# Patient Record
Sex: FEMALE | Race: ASIAN | NOT HISPANIC OR LATINO | URBAN - METROPOLITAN AREA
[De-identification: names, ages, dates, MRNs, and addresses within clinical notes are randomized per-mention and may not be internally consistent; named-entity substitution may affect disease eponyms.]

---

## 2017-01-25 ENCOUNTER — INPATIENT (INPATIENT)
Facility: HOSPITAL | Age: 67
LOS: 8 days | Discharge: HOME | End: 2017-02-03
Payer: COMMERCIAL

## 2017-01-25 VITALS
HEART RATE: 113 BPM | DIASTOLIC BLOOD PRESSURE: 88 MMHG | TEMPERATURE: 98 F | SYSTOLIC BLOOD PRESSURE: 150 MMHG | OXYGEN SATURATION: 100 % | RESPIRATION RATE: 18 BRPM

## 2017-01-25 DIAGNOSIS — D64.9 ANEMIA, UNSPECIFIED: ICD-10-CM

## 2017-01-25 LAB
ALBUMIN SERPL ELPH-MCNC: 3.6 G/DL — SIGNIFICANT CHANGE UP (ref 3.3–5)
ALP SERPL-CCNC: 84 U/L — SIGNIFICANT CHANGE UP (ref 40–120)
ALT FLD-CCNC: 18 U/L — SIGNIFICANT CHANGE UP (ref 4–33)
APTT BLD: 31.7 SEC — SIGNIFICANT CHANGE UP (ref 27.5–37.4)
AST SERPL-CCNC: 21 U/L — SIGNIFICANT CHANGE UP (ref 4–32)
BASOPHILS # BLD AUTO: 0.07 K/UL — SIGNIFICANT CHANGE UP (ref 0–0.2)
BASOPHILS NFR BLD AUTO: 0.5 % — SIGNIFICANT CHANGE UP (ref 0–2)
BASOPHILS NFR SPEC: 0 % — SIGNIFICANT CHANGE UP (ref 0–2)
BILIRUB SERPL-MCNC: < 0.2 MG/DL — LOW (ref 0.2–1.2)
BLASTS # FLD: 0 % — SIGNIFICANT CHANGE UP (ref 0–0)
BLD GP AB SCN SERPL QL: NEGATIVE — SIGNIFICANT CHANGE UP
BUN SERPL-MCNC: 17 MG/DL — SIGNIFICANT CHANGE UP (ref 7–23)
CALCIUM SERPL-MCNC: 9.3 MG/DL — SIGNIFICANT CHANGE UP (ref 8.4–10.5)
CHLORIDE SERPL-SCNC: 101 MMOL/L — SIGNIFICANT CHANGE UP (ref 98–107)
CK MB BLD-MCNC: 1.64 NG/ML — SIGNIFICANT CHANGE UP (ref 1–4.7)
CK MB BLD-MCNC: SIGNIFICANT CHANGE UP (ref 0–2.5)
CK SERPL-CCNC: 113 U/L — SIGNIFICANT CHANGE UP (ref 25–170)
CO2 SERPL-SCNC: 20 MMOL/L — LOW (ref 22–31)
CREAT SERPL-MCNC: 0.81 MG/DL — SIGNIFICANT CHANGE UP (ref 0.5–1.3)
EOSINOPHIL # BLD AUTO: 0.4 K/UL — SIGNIFICANT CHANGE UP (ref 0–0.5)
EOSINOPHIL NFR BLD AUTO: 2.7 % — SIGNIFICANT CHANGE UP (ref 0–6)
EOSINOPHIL NFR FLD: 3.3 % — SIGNIFICANT CHANGE UP (ref 0–6)
FERRITIN SERPL-MCNC: 8.33 NG/ML — LOW (ref 15–150)
GLUCOSE SERPL-MCNC: 181 MG/DL — HIGH (ref 70–99)
HCT VFR BLD CALC: 24 % — LOW (ref 34.5–45)
HCT VFR BLD CALC: 27.7 % — LOW (ref 34.5–45)
HGB BLD-MCNC: 7.1 G/DL — LOW (ref 11.5–15.5)
HGB BLD-MCNC: 8.6 G/DL — LOW (ref 11.5–15.5)
HYPOCHROMIA BLD QL: SIGNIFICANT CHANGE UP
IMM GRANULOCYTES NFR BLD AUTO: 0.5 % — SIGNIFICANT CHANGE UP (ref 0–1.5)
INR BLD: 1.02 — SIGNIFICANT CHANGE UP (ref 0.87–1.18)
IRON SATN MFR SERPL: 12 UG/DL — LOW (ref 30–160)
IRON SATN MFR SERPL: 409 UG/DL — SIGNIFICANT CHANGE UP (ref 140–530)
LDH SERPL L TO P-CCNC: 184 U/L — SIGNIFICANT CHANGE UP (ref 135–225)
LYMPHOCYTES # BLD AUTO: 26.8 % — SIGNIFICANT CHANGE UP (ref 13–44)
LYMPHOCYTES # BLD AUTO: 3.96 K/UL — HIGH (ref 1–3.3)
LYMPHOCYTES NFR SPEC AUTO: 14.3 % — SIGNIFICANT CHANGE UP (ref 13–44)
MACROCYTES BLD QL: SIGNIFICANT CHANGE UP
MCHC RBC-ENTMCNC: 20.2 PG — LOW (ref 27–34)
MCHC RBC-ENTMCNC: 21.8 PG — LOW (ref 27–34)
MCHC RBC-ENTMCNC: 29.6 % — LOW (ref 32–36)
MCHC RBC-ENTMCNC: 31 % — LOW (ref 32–36)
MCV RBC AUTO: 68.4 FL — LOW (ref 80–100)
MCV RBC AUTO: 70.3 FL — LOW (ref 80–100)
METAMYELOCYTES # FLD: 0 % — SIGNIFICANT CHANGE UP (ref 0–1)
MONOCYTES # BLD AUTO: 1 K/UL — HIGH (ref 0–0.9)
MONOCYTES NFR BLD AUTO: 6.8 % — SIGNIFICANT CHANGE UP (ref 2–14)
MONOCYTES NFR BLD: 6.6 % — SIGNIFICANT CHANGE UP (ref 2–9)
MYELOCYTES NFR BLD: 2.2 % — HIGH (ref 0–0)
NEUTROPHIL AB SER-ACNC: 73.6 % — SIGNIFICANT CHANGE UP (ref 43–77)
NEUTROPHILS # BLD AUTO: 9.27 K/UL — HIGH (ref 1.8–7.4)
NEUTROPHILS NFR BLD AUTO: 62.7 % — SIGNIFICANT CHANGE UP (ref 43–77)
NEUTS BAND # BLD: 0 % — SIGNIFICANT CHANGE UP (ref 0–6)
OB PNL STL: POSITIVE — SIGNIFICANT CHANGE UP
OTHER - HEMATOLOGY %: 0 — SIGNIFICANT CHANGE UP
OVALOCYTES BLD QL SMEAR: SLIGHT — SIGNIFICANT CHANGE UP
PLATELET # BLD AUTO: 575 K/UL — HIGH (ref 150–400)
PLATELET # BLD AUTO: 720 K/UL — HIGH (ref 150–400)
PLATELET COUNT - ESTIMATE: SIGNIFICANT CHANGE UP
PMV BLD: 9.1 FL — SIGNIFICANT CHANGE UP (ref 7–13)
PMV BLD: 9.1 FL — SIGNIFICANT CHANGE UP (ref 7–13)
POLYCHROMASIA BLD QL SMEAR: SLIGHT — SIGNIFICANT CHANGE UP
POTASSIUM SERPL-MCNC: 4.4 MMOL/L — SIGNIFICANT CHANGE UP (ref 3.5–5.3)
POTASSIUM SERPL-SCNC: 4.4 MMOL/L — SIGNIFICANT CHANGE UP (ref 3.5–5.3)
PROMYELOCYTES # FLD: 0 % — SIGNIFICANT CHANGE UP (ref 0–0)
PROT SERPL-MCNC: 7.2 G/DL — SIGNIFICANT CHANGE UP (ref 6–8.3)
PROTHROM AB SERPL-ACNC: 11.6 SEC — SIGNIFICANT CHANGE UP (ref 10–13.1)
RBC # BLD: 3.51 M/UL — LOW (ref 3.8–5.2)
RBC # BLD: 3.94 M/UL — SIGNIFICANT CHANGE UP (ref 3.8–5.2)
RBC # FLD: 19 % — HIGH (ref 10.3–14.5)
RBC # FLD: 19.3 % — HIGH (ref 10.3–14.5)
RETICS #: 101.2 10X3/UL — HIGH (ref 17–73)
RETICS/RBC NFR: 2.9 % — HIGH (ref 0.5–2.5)
RH IG SCN BLD-IMP: POSITIVE — SIGNIFICANT CHANGE UP
SCHISTOCYTES BLD QL AUTO: SLIGHT — SIGNIFICANT CHANGE UP
SODIUM SERPL-SCNC: 137 MMOL/L — SIGNIFICANT CHANGE UP (ref 135–145)
TROPONIN T SERPL-MCNC: < 0.06 NG/ML — SIGNIFICANT CHANGE UP (ref 0–0.06)
UIBC SERPL-MCNC: 397 UG/DL — HIGH (ref 110–370)
VARIANT LYMPHS # BLD: 0 % — SIGNIFICANT CHANGE UP
WBC # BLD: 13.41 K/UL — HIGH (ref 3.8–10.5)
WBC # BLD: 14.77 K/UL — HIGH (ref 3.8–10.5)
WBC # FLD AUTO: 13.41 K/UL — HIGH (ref 3.8–10.5)
WBC # FLD AUTO: 14.77 K/UL — HIGH (ref 3.8–10.5)

## 2017-01-25 PROCEDURE — 99223 1ST HOSP IP/OBS HIGH 75: CPT | Mod: AI

## 2017-01-25 PROCEDURE — 71020: CPT | Mod: 26

## 2017-01-25 RX ORDER — SOD SULF/SODIUM/NAHCO3/KCL/PEG
1 SOLUTION, RECONSTITUTED, ORAL ORAL EVERY 4 HOURS
Qty: 0 | Refills: 0 | Status: COMPLETED | OUTPATIENT
Start: 2017-01-25 | End: 2017-01-25

## 2017-01-25 RX ORDER — INSULIN GLARGINE 100 [IU]/ML
24 INJECTION, SOLUTION SUBCUTANEOUS AT BEDTIME
Qty: 0 | Refills: 0 | Status: DISCONTINUED | OUTPATIENT
Start: 2017-01-25 | End: 2017-01-27

## 2017-01-25 RX ORDER — SODIUM CHLORIDE 9 MG/ML
1000 INJECTION, SOLUTION INTRAVENOUS
Qty: 0 | Refills: 0 | Status: DISCONTINUED | OUTPATIENT
Start: 2017-01-25 | End: 2017-01-30

## 2017-01-25 RX ORDER — DEXTROSE 50 % IN WATER 50 %
25 SYRINGE (ML) INTRAVENOUS ONCE
Qty: 0 | Refills: 0 | Status: DISCONTINUED | OUTPATIENT
Start: 2017-01-25 | End: 2017-01-30

## 2017-01-25 RX ORDER — PANTOPRAZOLE SODIUM 20 MG/1
80 TABLET, DELAYED RELEASE ORAL ONCE
Qty: 0 | Refills: 0 | Status: COMPLETED | OUTPATIENT
Start: 2017-01-25 | End: 2017-01-25

## 2017-01-25 RX ORDER — INSULIN LISPRO 100/ML
VIAL (ML) SUBCUTANEOUS AT BEDTIME
Qty: 0 | Refills: 0 | Status: DISCONTINUED | OUTPATIENT
Start: 2017-01-25 | End: 2017-01-30

## 2017-01-25 RX ORDER — GLUCAGON INJECTION, SOLUTION 0.5 MG/.1ML
1 INJECTION, SOLUTION SUBCUTANEOUS ONCE
Qty: 0 | Refills: 0 | Status: DISCONTINUED | OUTPATIENT
Start: 2017-01-25 | End: 2017-01-30

## 2017-01-25 RX ORDER — DEXTROSE 50 % IN WATER 50 %
12.5 SYRINGE (ML) INTRAVENOUS ONCE
Qty: 0 | Refills: 0 | Status: DISCONTINUED | OUTPATIENT
Start: 2017-01-25 | End: 2017-01-30

## 2017-01-25 RX ORDER — DEXTROSE 50 % IN WATER 50 %
1 SYRINGE (ML) INTRAVENOUS ONCE
Qty: 0 | Refills: 0 | Status: DISCONTINUED | OUTPATIENT
Start: 2017-01-25 | End: 2017-01-30

## 2017-01-25 RX ORDER — SIMVASTATIN 20 MG/1
40 TABLET, FILM COATED ORAL AT BEDTIME
Qty: 0 | Refills: 0 | Status: DISCONTINUED | OUTPATIENT
Start: 2017-01-25 | End: 2017-01-30

## 2017-01-25 RX ORDER — INSULIN LISPRO 100/ML
VIAL (ML) SUBCUTANEOUS
Qty: 0 | Refills: 0 | Status: DISCONTINUED | OUTPATIENT
Start: 2017-01-25 | End: 2017-01-30

## 2017-01-25 RX ADMIN — Medication 1 LITER(S): at 22:46

## 2017-01-25 RX ADMIN — Medication 20 MILLIGRAM(S): at 18:03

## 2017-01-25 RX ADMIN — INSULIN GLARGINE 24 UNIT(S): 100 INJECTION, SOLUTION SUBCUTANEOUS at 22:46

## 2017-01-25 RX ADMIN — PANTOPRAZOLE SODIUM 80 MILLIGRAM(S): 20 TABLET, DELAYED RELEASE ORAL at 10:33

## 2017-01-25 RX ADMIN — SIMVASTATIN 40 MILLIGRAM(S): 20 TABLET, FILM COATED ORAL at 22:46

## 2017-01-25 RX ADMIN — Medication 1 LITER(S): at 18:08

## 2017-01-25 RX ADMIN — Medication 10 MILLIGRAM(S): at 18:03

## 2017-01-25 RX ADMIN — Medication 10 MILLIGRAM(S): at 22:46

## 2017-01-25 NOTE — ED PROVIDER NOTE - PROGRESS NOTE DETAILS
Will check basic labs, type and screen, iron studies, hemolysis labs, give IVF.  Will reassess. Klepfish: Awaiting XR, labs. If guaiac negative. Then PRBC 1-2U, possible CDU. If guaiac+ then likely admission. MIGUEL ANGEL Marshall: Received signout this morning on patient from Dr. Collins. Pt occult blood positive. Pt states her cp is substernal/burning, radiates into her throat, accompanied with dry cough and dry mouth. States all symptoms began last month, after having a viral syndrome and taking anti-inflammatories x 1 month. Anemia 7.1 w/ symptoms, will consent for transfusion. Maria De Jesus Eubanks- [phone]- RN case manager working with Dr. Murray- will reach out to covering doc for admission. Paged GI x 2, no response. Admit to Dr. Russell for Dr. Murray.

## 2017-01-25 NOTE — ED PROVIDER NOTE - MEDICAL DECISION MAKING DETAILS
67 y/o F with pmhx DM II, HTN, and HLD presents with symptomatic anemia and low hgb.  Concern for GIB vs malignancy vs hemolysis vs alternative etiology.  Will check basic labs, type and screen, iron studies, hemolysis labs, give IVF.  Will reassess.

## 2017-01-25 NOTE — ED PROVIDER NOTE - OBJECTIVE STATEMENT
67 y/o F with pmhx DM II, HTN, and HLD presents with symptomatic anemia and low hgb.  According to the patient, she has been feeling weak/tired for about the last month.  She had a URI a month ago and went to urgent care where she received medication and while the URI resolved the fatigue persisted.  She went to her PMD yesterday who checked blood work and she was found to have a hgb of 7.5.  She had the test repeated and went home.  The patient then received a call from the doctor at home alerting her that her repeat hgb was 6.5 and that she needed to come to the hospital.  The patient denies any rectal bleeding, vaginal bleeding, hematemesis, chest pain, cough, SOB, fevers, chills, N/V.  LMP 8 years ago.      PMD: Raven Soriano ([facility]) 65 y/o F with pmhx DM II, HTN, and HLD presents with symptomatic anemia and low hgb.  According to the patient, she has been feeling weak/tired for about the last month.  She had a URI a month ago and went to urgent care where she received medication and while the URI resolved the fatigue persisted.  She went to her PMD yesterday who checked blood work and she was found to have a hgb of 7.5.  She had the test repeated and went home.  The patient then received a call from the doctor at home alerting her that her repeat hgb was 6.5 and that she needed to come to the hospital.  The patient denies any rectal bleeding, vaginal bleeding, hematemesis, chest pain, cough, SOB, fevers, chills, N/V.  LMP 8 years ago.    Klepfish: 66F PMH DM, HTN, HLD p/w anemia. Pt w/ URI-like symptoms a few w ago that has since resolved but had perssitent fatigue and SANDOVAL, went to pmd yesterday where she was called last night for decreasing hgb, finally able to come to ED now. Also has midsternal cp, constant x at least one month, non-exertional. No black/bloody stool, lightheaded, NVD, abd pain, weight loss, night sweats. Never had EGD. Routine colonoscopy 9yrs ago, unknown results.Not on AC. No hx blood transfusions.   PMD: Raven Soriano ([facility])

## 2017-01-25 NOTE — ED PROVIDER NOTE - ATTENDING CONTRIBUTION TO CARE
66F PMH DM, HTN, HLD p/w fatigue, hgb 6.5, SANDOVAL, constant cp. Slight tachycardia, other vitals wnl. Exam, well appearing, pale, otherwise wnl.  ddx: Anemia unclear etiology.  CBC, cmp, coags, T and S. Iron studies, hemolysis labs. Guaiac (though brown on exam).   CP: Very atypical, unlikely ACS. EKG no ischemic changes. Will check CE x1. CXR.  Reassess.

## 2017-01-25 NOTE — H&P ADULT. - ASSESSMENT
66F DM2 HTN with symptomatic anemia, iron deficiency anemia, positive fecal occult blood, c/w subacute GI bleed in setting of NSAID use    Plan:  GI bleeding, likely upper GI source: IV protonix, GI eval to consider endoscopy.  Stop NSAIDs.    Iron deficiency anemia due to bleeding: GI eval as above, iron supplementation    DM2: Continue basal insulin reduce home dose by 20%, 24 units rather than 30. Hold oral DM meds while inpatient. FSBG TIDAC with humalog correction scale, add nutritional humalog if needed. Continue statin.    HTN: Continue enalapril.    VTE PPx: IMPROVE score 2, no pharmacologic prophylaxis due to bleeding, SCD for VTE prophylaxis

## 2017-01-25 NOTE — ED ADULT NURSE REASSESSMENT NOTE - NS ED NURSE REASSESS COMMENT FT1
Pt receiving unit of PRBC, tolerating it well, no adverse reactions noted, pt instructed to report any discomfort while blood infusing. vs wnl, pt afebrile. Pts  at bedside.

## 2017-01-25 NOTE — ED ADULT NURSE NOTE - OBJECTIVE STATEMENT
Pt received to room 23 on stretcher with  at bedside. Pt is awake, ambulatory, A&Ox4, appears in NAD, breathing with ease on room air. Pt is 67 YO female with h/o DM and HTN recommended to seek ED treatment by PCP after low H&H results. Pt reports feeling tired and weak x 1 month, can recall one particular episode of weakness 2 days ago. Pt appears pale on assessment. Pt denies dizziness, lightheadedness, SOB, bloody stools, emesis, and vaginal. Pt denies h/o anemia and reports she has never had a blood transfusion in the past. 20G IV access obtained in right AC, labs drawn and sent as ordered. MD aj at bedside. Comfort and safety measures provided.

## 2017-01-25 NOTE — ED PROVIDER NOTE - INTERPRETATION
normal sinus rhythm, Normal axis, Normal MT interval and QRS complex. There are no acute ischemic ST or T-wave changes.

## 2017-01-25 NOTE — ED ADULT NURSE REASSESSMENT NOTE - NS ED NURSE REASSESS COMMENT FT1
PT awake and alert no nausea or vomiting noted, pt denies sob or chest pain. VS wnl, awaiting further md orders.

## 2017-01-25 NOTE — ED ADULT TRIAGE NOTE - CHIEF COMPLAINT QUOTE
Pt here for low H&H. Was sent by PCP for Hgb 6.5. Currently c/o weakness, CP when coughing. Denies SOB, dizziness. Pt appears mildly pale. Hx: DM, HTN, HLD. F/s 188. Respirations even/unlabored.

## 2017-01-26 LAB
BUN SERPL-MCNC: 13 MG/DL — SIGNIFICANT CHANGE UP (ref 7–23)
CALCIUM SERPL-MCNC: 9.3 MG/DL — SIGNIFICANT CHANGE UP (ref 8.4–10.5)
CHLORIDE SERPL-SCNC: 100 MMOL/L — SIGNIFICANT CHANGE UP (ref 98–107)
CO2 SERPL-SCNC: 21 MMOL/L — LOW (ref 22–31)
CREAT SERPL-MCNC: 0.76 MG/DL — SIGNIFICANT CHANGE UP (ref 0.5–1.3)
GLUCOSE SERPL-MCNC: 190 MG/DL — HIGH (ref 70–99)
HBA1C BLD-MCNC: 8.4 % — HIGH (ref 4–5.6)
HCT VFR BLD CALC: 31.7 % — LOW (ref 34.5–45)
HCV AB S/CO SERPL IA: 0.11 S/CO — SIGNIFICANT CHANGE UP
HCV AB SERPL-IMP: SIGNIFICANT CHANGE UP
HGB BLD-MCNC: 10.2 G/DL — LOW (ref 11.5–15.5)
MCHC RBC-ENTMCNC: 23.1 PG — LOW (ref 27–34)
MCHC RBC-ENTMCNC: 32.2 % — SIGNIFICANT CHANGE UP (ref 32–36)
MCV RBC AUTO: 71.7 FL — LOW (ref 80–100)
PLATELET # BLD AUTO: 566 K/UL — HIGH (ref 150–400)
PMV BLD: 8.9 FL — SIGNIFICANT CHANGE UP (ref 7–13)
POTASSIUM SERPL-MCNC: 4.2 MMOL/L — SIGNIFICANT CHANGE UP (ref 3.5–5.3)
POTASSIUM SERPL-SCNC: 4.2 MMOL/L — SIGNIFICANT CHANGE UP (ref 3.5–5.3)
RBC # BLD: 4.42 M/UL — SIGNIFICANT CHANGE UP (ref 3.8–5.2)
RBC # FLD: 19.5 % — HIGH (ref 10.3–14.5)
SODIUM SERPL-SCNC: 136 MMOL/L — SIGNIFICANT CHANGE UP (ref 135–145)
WBC # BLD: 12.54 K/UL — HIGH (ref 3.8–10.5)
WBC # FLD AUTO: 12.54 K/UL — HIGH (ref 3.8–10.5)

## 2017-01-26 PROCEDURE — 74177 CT ABD & PELVIS W/CONTRAST: CPT | Mod: 26

## 2017-01-26 PROCEDURE — 88305 TISSUE EXAM BY PATHOLOGIST: CPT | Mod: 26

## 2017-01-26 PROCEDURE — 71260 CT THORAX DX C+: CPT | Mod: 26

## 2017-01-26 PROCEDURE — 88312 SPECIAL STAINS GROUP 1: CPT | Mod: 26

## 2017-01-26 RX ORDER — PANTOPRAZOLE SODIUM 20 MG/1
40 TABLET, DELAYED RELEASE ORAL
Qty: 0 | Refills: 0 | Status: DISCONTINUED | OUTPATIENT
Start: 2017-01-26 | End: 2017-01-30

## 2017-01-26 RX ADMIN — INSULIN GLARGINE 24 UNIT(S): 100 INJECTION, SOLUTION SUBCUTANEOUS at 22:22

## 2017-01-26 RX ADMIN — Medication 2: at 22:22

## 2017-01-26 RX ADMIN — Medication 1: at 12:45

## 2017-01-26 RX ADMIN — Medication 1: at 08:17

## 2017-01-26 RX ADMIN — Medication 20 MILLIGRAM(S): at 06:32

## 2017-01-26 RX ADMIN — SIMVASTATIN 40 MILLIGRAM(S): 20 TABLET, FILM COATED ORAL at 22:22

## 2017-01-27 LAB
HCT VFR BLD CALC: 35.6 % — SIGNIFICANT CHANGE UP (ref 34.5–45)
HGB BLD-MCNC: 11.4 G/DL — LOW (ref 11.5–15.5)
MCHC RBC-ENTMCNC: 23.3 PG — LOW (ref 27–34)
MCHC RBC-ENTMCNC: 32 % — SIGNIFICANT CHANGE UP (ref 32–36)
MCV RBC AUTO: 72.8 FL — LOW (ref 80–100)
PLATELET # BLD AUTO: 620 K/UL — HIGH (ref 150–400)
PMV BLD: 9 FL — SIGNIFICANT CHANGE UP (ref 7–13)
RBC # BLD: 4.89 M/UL — SIGNIFICANT CHANGE UP (ref 3.8–5.2)
RBC # FLD: 20.8 % — HIGH (ref 10.3–14.5)
WBC # BLD: 17.67 K/UL — HIGH (ref 3.8–10.5)
WBC # FLD AUTO: 17.67 K/UL — HIGH (ref 3.8–10.5)

## 2017-01-27 RX ORDER — INSULIN GLARGINE 100 [IU]/ML
30 INJECTION, SOLUTION SUBCUTANEOUS AT BEDTIME
Qty: 0 | Refills: 0 | Status: DISCONTINUED | OUTPATIENT
Start: 2017-01-27 | End: 2017-01-30

## 2017-01-27 RX ADMIN — INSULIN GLARGINE 30 UNIT(S): 100 INJECTION, SOLUTION SUBCUTANEOUS at 21:22

## 2017-01-27 RX ADMIN — Medication 20 MILLIGRAM(S): at 05:08

## 2017-01-27 RX ADMIN — Medication 2: at 12:51

## 2017-01-27 RX ADMIN — Medication 4: at 17:01

## 2017-01-27 RX ADMIN — PANTOPRAZOLE SODIUM 40 MILLIGRAM(S): 20 TABLET, DELAYED RELEASE ORAL at 06:35

## 2017-01-27 RX ADMIN — Medication 1: at 21:21

## 2017-01-27 RX ADMIN — SIMVASTATIN 40 MILLIGRAM(S): 20 TABLET, FILM COATED ORAL at 21:22

## 2017-01-27 RX ADMIN — Medication 1: at 08:31

## 2017-01-28 LAB
BUN SERPL-MCNC: 19 MG/DL — SIGNIFICANT CHANGE UP (ref 7–23)
CALCIUM SERPL-MCNC: 9.4 MG/DL — SIGNIFICANT CHANGE UP (ref 8.4–10.5)
CEA SERPL-MCNC: 1.9 NG/ML — SIGNIFICANT CHANGE UP (ref 0.2–3.8)
CHLORIDE SERPL-SCNC: 99 MMOL/L — SIGNIFICANT CHANGE UP (ref 98–107)
CO2 SERPL-SCNC: 24 MMOL/L — SIGNIFICANT CHANGE UP (ref 22–31)
CREAT SERPL-MCNC: 0.86 MG/DL — SIGNIFICANT CHANGE UP (ref 0.5–1.3)
GLUCOSE SERPL-MCNC: 229 MG/DL — HIGH (ref 70–99)
HCT VFR BLD CALC: 33.7 % — LOW (ref 34.5–45)
HGB BLD-MCNC: 10.7 G/DL — LOW (ref 11.5–15.5)
MCHC RBC-ENTMCNC: 23.2 PG — LOW (ref 27–34)
MCHC RBC-ENTMCNC: 31.8 % — LOW (ref 32–36)
MCV RBC AUTO: 72.9 FL — LOW (ref 80–100)
PLATELET # BLD AUTO: 576 K/UL — HIGH (ref 150–400)
PMV BLD: 9.2 FL — SIGNIFICANT CHANGE UP (ref 7–13)
POTASSIUM SERPL-MCNC: 5 MMOL/L — SIGNIFICANT CHANGE UP (ref 3.5–5.3)
POTASSIUM SERPL-SCNC: 5 MMOL/L — SIGNIFICANT CHANGE UP (ref 3.5–5.3)
RBC # BLD: 4.62 M/UL — SIGNIFICANT CHANGE UP (ref 3.8–5.2)
RBC # FLD: 21.5 % — HIGH (ref 10.3–14.5)
SODIUM SERPL-SCNC: 139 MMOL/L — SIGNIFICANT CHANGE UP (ref 135–145)
WBC # BLD: 13.12 K/UL — HIGH (ref 3.8–10.5)
WBC # FLD AUTO: 13.12 K/UL — HIGH (ref 3.8–10.5)

## 2017-01-28 RX ADMIN — Medication 3: at 13:15

## 2017-01-28 RX ADMIN — Medication 4: at 17:19

## 2017-01-28 RX ADMIN — INSULIN GLARGINE 30 UNIT(S): 100 INJECTION, SOLUTION SUBCUTANEOUS at 22:41

## 2017-01-28 RX ADMIN — Medication 2: at 08:06

## 2017-01-28 RX ADMIN — SIMVASTATIN 40 MILLIGRAM(S): 20 TABLET, FILM COATED ORAL at 22:39

## 2017-01-28 RX ADMIN — Medication 1: at 22:39

## 2017-01-28 RX ADMIN — PANTOPRAZOLE SODIUM 40 MILLIGRAM(S): 20 TABLET, DELAYED RELEASE ORAL at 06:06

## 2017-01-29 ENCOUNTER — RESULT REVIEW (OUTPATIENT)
Age: 67
End: 2017-01-29

## 2017-01-29 LAB
APTT BLD: 29.5 SEC — SIGNIFICANT CHANGE UP (ref 27.5–37.4)
BLD GP AB SCN SERPL QL: NEGATIVE — SIGNIFICANT CHANGE UP
BUN SERPL-MCNC: 19 MG/DL — SIGNIFICANT CHANGE UP (ref 7–23)
CALCIUM SERPL-MCNC: 9.2 MG/DL — SIGNIFICANT CHANGE UP (ref 8.4–10.5)
CHLORIDE SERPL-SCNC: 99 MMOL/L — SIGNIFICANT CHANGE UP (ref 98–107)
CO2 SERPL-SCNC: 21 MMOL/L — LOW (ref 22–31)
CREAT SERPL-MCNC: 0.72 MG/DL — SIGNIFICANT CHANGE UP (ref 0.5–1.3)
GLUCOSE SERPL-MCNC: 170 MG/DL — HIGH (ref 70–99)
HCT VFR BLD CALC: 33.6 % — LOW (ref 34.5–45)
HGB BLD-MCNC: 10.6 G/DL — LOW (ref 11.5–15.5)
INR BLD: 1.07 — SIGNIFICANT CHANGE UP (ref 0.87–1.18)
MCHC RBC-ENTMCNC: 23 PG — LOW (ref 27–34)
MCHC RBC-ENTMCNC: 31.5 % — LOW (ref 32–36)
MCV RBC AUTO: 72.9 FL — LOW (ref 80–100)
PLATELET # BLD AUTO: 534 K/UL — HIGH (ref 150–400)
PMV BLD: 9 FL — SIGNIFICANT CHANGE UP (ref 7–13)
POTASSIUM SERPL-MCNC: 4.1 MMOL/L — SIGNIFICANT CHANGE UP (ref 3.5–5.3)
POTASSIUM SERPL-SCNC: 4.1 MMOL/L — SIGNIFICANT CHANGE UP (ref 3.5–5.3)
PROTHROM AB SERPL-ACNC: 12.2 SEC — SIGNIFICANT CHANGE UP (ref 10–13.1)
RBC # BLD: 4.61 M/UL — SIGNIFICANT CHANGE UP (ref 3.8–5.2)
RBC # FLD: 21.8 % — HIGH (ref 10.3–14.5)
RH IG SCN BLD-IMP: POSITIVE — SIGNIFICANT CHANGE UP
SODIUM SERPL-SCNC: 137 MMOL/L — SIGNIFICANT CHANGE UP (ref 135–145)
WBC # BLD: 12.37 K/UL — HIGH (ref 3.8–10.5)
WBC # FLD AUTO: 12.37 K/UL — HIGH (ref 3.8–10.5)

## 2017-01-29 PROCEDURE — 71010: CPT | Mod: 26

## 2017-01-29 PROCEDURE — 93010 ELECTROCARDIOGRAM REPORT: CPT

## 2017-01-29 RX ORDER — ACETAMINOPHEN 500 MG
650 TABLET ORAL ONCE
Qty: 0 | Refills: 0 | Status: COMPLETED | OUTPATIENT
Start: 2017-01-29 | End: 2017-01-29

## 2017-01-29 RX ORDER — SOD SULF/SODIUM/NAHCO3/KCL/PEG
1000 SOLUTION, RECONSTITUTED, ORAL ORAL EVERY 4 HOURS
Qty: 0 | Refills: 0 | Status: COMPLETED | OUTPATIENT
Start: 2017-01-29 | End: 2017-01-29

## 2017-01-29 RX ADMIN — Medication 1: at 08:17

## 2017-01-29 RX ADMIN — Medication 1000 MILLILITER(S): at 17:58

## 2017-01-29 RX ADMIN — INSULIN GLARGINE 30 UNIT(S): 100 INJECTION, SOLUTION SUBCUTANEOUS at 22:36

## 2017-01-29 RX ADMIN — Medication 650 MILLIGRAM(S): at 23:12

## 2017-01-29 RX ADMIN — PANTOPRAZOLE SODIUM 40 MILLIGRAM(S): 20 TABLET, DELAYED RELEASE ORAL at 05:57

## 2017-01-29 RX ADMIN — Medication 1000 MILLILITER(S): at 21:11

## 2017-01-29 RX ADMIN — SIMVASTATIN 40 MILLIGRAM(S): 20 TABLET, FILM COATED ORAL at 22:36

## 2017-01-29 NOTE — PROVIDER CONTACT NOTE (OTHER) - ACTION/TREATMENT ORDERED:
fingerstick rechecked s/p FS of 63 applejuice given, repeat of 73 and applejuice given, repeat fingerstick 108. pt stable.

## 2017-01-30 ENCOUNTER — TRANSCRIPTION ENCOUNTER (OUTPATIENT)
Age: 67
End: 2017-01-30

## 2017-01-30 LAB
BASE EXCESS BLDV CALC-SCNC: -1.6 MMOL/L — SIGNIFICANT CHANGE UP
BASE EXCESS BLDV CALC-SCNC: 1.9 MMOL/L — SIGNIFICANT CHANGE UP
BUN SERPL-MCNC: 10 MG/DL — SIGNIFICANT CHANGE UP (ref 7–23)
BUN SERPL-MCNC: 11 MG/DL — SIGNIFICANT CHANGE UP (ref 7–23)
CA-I SERPL-SCNC: 1.14 MMOL/L — LOW (ref 1.15–1.29)
CA-I SERPL-SCNC: 1.19 MMOL/L — SIGNIFICANT CHANGE UP (ref 1.15–1.29)
CALCIUM SERPL-MCNC: 8.5 MG/DL — SIGNIFICANT CHANGE UP (ref 8.4–10.5)
CALCIUM SERPL-MCNC: 9.2 MG/DL — SIGNIFICANT CHANGE UP (ref 8.4–10.5)
CHLORIDE SERPL-SCNC: 101 MMOL/L — SIGNIFICANT CHANGE UP (ref 98–107)
CHLORIDE SERPL-SCNC: 101 MMOL/L — SIGNIFICANT CHANGE UP (ref 98–107)
CO2 SERPL-SCNC: 23 MMOL/L — SIGNIFICANT CHANGE UP (ref 22–31)
CO2 SERPL-SCNC: 24 MMOL/L — SIGNIFICANT CHANGE UP (ref 22–31)
CREAT SERPL-MCNC: 0.72 MG/DL — SIGNIFICANT CHANGE UP (ref 0.5–1.3)
CREAT SERPL-MCNC: 0.77 MG/DL — SIGNIFICANT CHANGE UP (ref 0.5–1.3)
GAS PNL BLDV: 133 MMOL/L — LOW (ref 136–146)
GAS PNL BLDV: 137 MMOL/L — SIGNIFICANT CHANGE UP (ref 136–146)
GLUCOSE BLDV-MCNC: 191 — HIGH (ref 70–99)
GLUCOSE BLDV-MCNC: 99 — SIGNIFICANT CHANGE UP (ref 70–99)
GLUCOSE SERPL-MCNC: 177 MG/DL — HIGH (ref 70–99)
GLUCOSE SERPL-MCNC: 76 MG/DL — SIGNIFICANT CHANGE UP (ref 70–99)
HCO3 BLDV-SCNC: 23 MMOL/L — SIGNIFICANT CHANGE UP (ref 20–27)
HCO3 BLDV-SCNC: 26 MMOL/L — SIGNIFICANT CHANGE UP (ref 20–27)
HCT VFR BLD CALC: 31.3 % — LOW (ref 34.5–45)
HCT VFR BLD CALC: 33.2 % — LOW (ref 34.5–45)
HCT VFR BLDV CALC: 25 % — LOW (ref 34.5–45)
HCT VFR BLDV CALC: 28.9 % — LOW (ref 34.5–45)
HGB BLD-MCNC: 10.5 G/DL — LOW (ref 11.5–15.5)
HGB BLD-MCNC: 9.9 G/DL — LOW (ref 11.5–15.5)
HGB BLDV-MCNC: 8 G/DL — LOW (ref 11.5–15.5)
HGB BLDV-MCNC: 9.3 G/DL — LOW (ref 11.5–15.5)
INR BLD: 1.12 — SIGNIFICANT CHANGE UP (ref 0.87–1.18)
MAGNESIUM SERPL-MCNC: 1.3 MG/DL — LOW (ref 1.6–2.6)
MAGNESIUM SERPL-MCNC: 1.7 MG/DL — SIGNIFICANT CHANGE UP (ref 1.6–2.6)
MCHC RBC-ENTMCNC: 23.2 PG — LOW (ref 27–34)
MCHC RBC-ENTMCNC: 23.2 PG — LOW (ref 27–34)
MCHC RBC-ENTMCNC: 31.6 % — LOW (ref 32–36)
MCHC RBC-ENTMCNC: 31.6 % — LOW (ref 32–36)
MCV RBC AUTO: 73.5 FL — LOW (ref 80–100)
MCV RBC AUTO: 73.5 FL — LOW (ref 80–100)
PCO2 BLDV: 39 MMHG — LOW (ref 41–51)
PCO2 BLDV: 39 MMHG — LOW (ref 41–51)
PH BLDV: 7.39 PH — SIGNIFICANT CHANGE UP (ref 7.32–7.43)
PH BLDV: 7.43 PH — SIGNIFICANT CHANGE UP (ref 7.32–7.43)
PHOSPHATE SERPL-MCNC: 3.9 MG/DL — SIGNIFICANT CHANGE UP (ref 2.5–4.5)
PHOSPHATE SERPL-MCNC: 4.6 MG/DL — HIGH (ref 2.5–4.5)
PLATELET # BLD AUTO: 459 K/UL — HIGH (ref 150–400)
PLATELET # BLD AUTO: 501 K/UL — HIGH (ref 150–400)
PMV BLD: 8.8 FL — SIGNIFICANT CHANGE UP (ref 7–13)
PMV BLD: 9.1 FL — SIGNIFICANT CHANGE UP (ref 7–13)
PO2 BLDV: 44 MMHG — HIGH (ref 35–40)
PO2 BLDV: 81 MMHG — HIGH (ref 35–40)
POTASSIUM BLDV-SCNC: 3.2 MMOL/L — LOW (ref 3.4–4.5)
POTASSIUM BLDV-SCNC: 3.8 MMOL/L — SIGNIFICANT CHANGE UP (ref 3.4–4.5)
POTASSIUM SERPL-MCNC: 3.7 MMOL/L — SIGNIFICANT CHANGE UP (ref 3.5–5.3)
POTASSIUM SERPL-MCNC: 3.7 MMOL/L — SIGNIFICANT CHANGE UP (ref 3.5–5.3)
POTASSIUM SERPL-SCNC: 3.7 MMOL/L — SIGNIFICANT CHANGE UP (ref 3.5–5.3)
POTASSIUM SERPL-SCNC: 3.7 MMOL/L — SIGNIFICANT CHANGE UP (ref 3.5–5.3)
PROTHROM AB SERPL-ACNC: 12.8 SEC — SIGNIFICANT CHANGE UP (ref 10–13.1)
RBC # BLD: 4.26 M/UL — SIGNIFICANT CHANGE UP (ref 3.8–5.2)
RBC # BLD: 4.52 M/UL — SIGNIFICANT CHANGE UP (ref 3.8–5.2)
RBC # FLD: 22.3 % — HIGH (ref 10.3–14.5)
RBC # FLD: 22.6 % — HIGH (ref 10.3–14.5)
RH IG SCN BLD-IMP: POSITIVE — SIGNIFICANT CHANGE UP
SAO2 % BLDV: 76.3 % — SIGNIFICANT CHANGE UP (ref 60–85)
SAO2 % BLDV: 92.6 % — HIGH (ref 60–85)
SODIUM SERPL-SCNC: 136 MMOL/L — SIGNIFICANT CHANGE UP (ref 135–145)
SODIUM SERPL-SCNC: 139 MMOL/L — SIGNIFICANT CHANGE UP (ref 135–145)
WBC # BLD: 11.09 K/UL — HIGH (ref 3.8–10.5)
WBC # BLD: 17.68 K/UL — HIGH (ref 3.8–10.5)
WBC # FLD AUTO: 11.09 K/UL — HIGH (ref 3.8–10.5)
WBC # FLD AUTO: 17.68 K/UL — HIGH (ref 3.8–10.5)

## 2017-01-30 PROCEDURE — 88309 TISSUE EXAM BY PATHOLOGIST: CPT | Mod: 26

## 2017-01-30 PROCEDURE — 88341 IMHCHEM/IMCYTCHM EA ADD ANTB: CPT | Mod: 26

## 2017-01-30 PROCEDURE — 88342 IMHCHEM/IMCYTCHM 1ST ANTB: CPT | Mod: 26

## 2017-01-30 PROCEDURE — 93306 TTE W/DOPPLER COMPLETE: CPT | Mod: 26

## 2017-01-30 RX ORDER — HYDROMORPHONE HYDROCHLORIDE 2 MG/ML
30 INJECTION INTRAMUSCULAR; INTRAVENOUS; SUBCUTANEOUS
Qty: 0 | Refills: 0 | Status: DISCONTINUED | OUTPATIENT
Start: 2017-01-30 | End: 2017-02-02

## 2017-01-30 RX ORDER — POTASSIUM CHLORIDE 20 MEQ
10 PACKET (EA) ORAL
Qty: 0 | Refills: 0 | Status: COMPLETED | OUTPATIENT
Start: 2017-01-30 | End: 2017-01-31

## 2017-01-30 RX ORDER — HYDROMORPHONE HYDROCHLORIDE 2 MG/ML
0.5 INJECTION INTRAMUSCULAR; INTRAVENOUS; SUBCUTANEOUS
Qty: 0 | Refills: 0 | Status: DISCONTINUED | OUTPATIENT
Start: 2017-01-30 | End: 2017-01-31

## 2017-01-30 RX ORDER — INSULIN LISPRO 100/ML
VIAL (ML) SUBCUTANEOUS EVERY 6 HOURS
Qty: 0 | Refills: 0 | Status: DISCONTINUED | OUTPATIENT
Start: 2017-01-30 | End: 2017-02-01

## 2017-01-30 RX ORDER — MAGNESIUM SULFATE 500 MG/ML
2 VIAL (ML) INJECTION ONCE
Qty: 0 | Refills: 0 | Status: COMPLETED | OUTPATIENT
Start: 2017-01-30 | End: 2017-01-31

## 2017-01-30 RX ORDER — DEXTROSE 50 % IN WATER 50 %
25 SYRINGE (ML) INTRAVENOUS ONCE
Qty: 0 | Refills: 0 | Status: COMPLETED | OUTPATIENT
Start: 2017-01-30 | End: 2017-01-30

## 2017-01-30 RX ORDER — SODIUM CHLORIDE 9 MG/ML
1000 INJECTION, SOLUTION INTRAVENOUS
Qty: 0 | Refills: 0 | Status: DISCONTINUED | OUTPATIENT
Start: 2017-01-30 | End: 2017-02-01

## 2017-01-30 RX ORDER — ENOXAPARIN SODIUM 100 MG/ML
40 INJECTION SUBCUTANEOUS DAILY
Qty: 0 | Refills: 0 | Status: DISCONTINUED | OUTPATIENT
Start: 2017-01-30 | End: 2017-02-03

## 2017-01-30 RX ORDER — INSULIN GLARGINE 100 [IU]/ML
15 INJECTION, SOLUTION SUBCUTANEOUS AT BEDTIME
Qty: 0 | Refills: 0 | Status: DISCONTINUED | OUTPATIENT
Start: 2017-01-30 | End: 2017-02-03

## 2017-01-30 RX ORDER — ONDANSETRON 8 MG/1
4 TABLET, FILM COATED ORAL EVERY 6 HOURS
Qty: 0 | Refills: 0 | Status: DISCONTINUED | OUTPATIENT
Start: 2017-01-30 | End: 2017-02-03

## 2017-01-30 RX ORDER — DIPHENHYDRAMINE HCL 50 MG
25 CAPSULE ORAL EVERY 4 HOURS
Qty: 0 | Refills: 0 | Status: DISCONTINUED | OUTPATIENT
Start: 2017-01-30 | End: 2017-02-03

## 2017-01-30 RX ORDER — HYDROMORPHONE HYDROCHLORIDE 2 MG/ML
0.5 INJECTION INTRAMUSCULAR; INTRAVENOUS; SUBCUTANEOUS
Qty: 0 | Refills: 0 | Status: DISCONTINUED | OUTPATIENT
Start: 2017-01-30 | End: 2017-02-02

## 2017-01-30 RX ORDER — NALOXONE HYDROCHLORIDE 4 MG/.1ML
0.1 SPRAY NASAL
Qty: 0 | Refills: 0 | Status: DISCONTINUED | OUTPATIENT
Start: 2017-01-30 | End: 2017-02-03

## 2017-01-30 RX ORDER — POTASSIUM CHLORIDE 20 MEQ
10 PACKET (EA) ORAL
Qty: 0 | Refills: 0 | Status: DISCONTINUED | OUTPATIENT
Start: 2017-01-30 | End: 2017-01-30

## 2017-01-30 RX ORDER — ONDANSETRON 8 MG/1
4 TABLET, FILM COATED ORAL ONCE
Qty: 0 | Refills: 0 | Status: DISCONTINUED | OUTPATIENT
Start: 2017-01-30 | End: 2017-01-31

## 2017-01-30 RX ORDER — INSULIN GLARGINE 100 [IU]/ML
15 INJECTION, SOLUTION SUBCUTANEOUS AT BEDTIME
Qty: 0 | Refills: 0 | Status: DISCONTINUED | OUTPATIENT
Start: 2017-01-30 | End: 2017-01-30

## 2017-01-30 RX ADMIN — INSULIN GLARGINE 15 UNIT(S): 100 INJECTION, SOLUTION SUBCUTANEOUS at 22:52

## 2017-01-30 RX ADMIN — Medication 650 MILLIGRAM(S): at 00:12

## 2017-01-30 RX ADMIN — HYDROMORPHONE HYDROCHLORIDE 30 MILLILITER(S): 2 INJECTION INTRAMUSCULAR; INTRAVENOUS; SUBCUTANEOUS at 22:33

## 2017-01-30 RX ADMIN — Medication 25 GRAM(S): at 12:35

## 2017-01-30 RX ADMIN — SODIUM CHLORIDE 100 MILLILITER(S): 9 INJECTION, SOLUTION INTRAVENOUS at 10:30

## 2017-01-30 RX ADMIN — SODIUM CHLORIDE 125 MILLILITER(S): 9 INJECTION, SOLUTION INTRAVENOUS at 21:00

## 2017-01-30 RX ADMIN — Medication 100 MILLIEQUIVALENT(S): at 23:10

## 2017-01-30 NOTE — BRIEF OPERATIVE NOTE - OPERATION/FINDINGS
INDICATION: hepatic flexure mass p/w anemia s/p colonoscopy and tattoo of lesion that was clearly seen in hepatic flexure    OPERATION: lap assisted R hemicolectomy [stapled side to side anastomosis with ROSE blue load and TA]

## 2017-01-31 LAB
BUN SERPL-MCNC: 8 MG/DL — SIGNIFICANT CHANGE UP (ref 7–23)
CALCIUM SERPL-MCNC: 8.6 MG/DL — SIGNIFICANT CHANGE UP (ref 8.4–10.5)
CHLORIDE SERPL-SCNC: 95 MMOL/L — LOW (ref 98–107)
CO2 SERPL-SCNC: 26 MMOL/L — SIGNIFICANT CHANGE UP (ref 22–31)
CREAT SERPL-MCNC: 0.71 MG/DL — SIGNIFICANT CHANGE UP (ref 0.5–1.3)
GLUCOSE SERPL-MCNC: 238 MG/DL — HIGH (ref 70–99)
HCT VFR BLD CALC: 30.2 % — LOW (ref 34.5–45)
HGB BLD-MCNC: 9.4 G/DL — LOW (ref 11.5–15.5)
MAGNESIUM SERPL-MCNC: 1.3 MG/DL — LOW (ref 1.6–2.6)
MCHC RBC-ENTMCNC: 22.9 PG — LOW (ref 27–34)
MCHC RBC-ENTMCNC: 31.1 % — LOW (ref 32–36)
MCV RBC AUTO: 73.7 FL — LOW (ref 80–100)
PHOSPHATE SERPL-MCNC: 4.3 MG/DL — SIGNIFICANT CHANGE UP (ref 2.5–4.5)
PLATELET # BLD AUTO: 468 K/UL — HIGH (ref 150–400)
PMV BLD: 9.1 FL — SIGNIFICANT CHANGE UP (ref 7–13)
POTASSIUM SERPL-MCNC: 4.7 MMOL/L — SIGNIFICANT CHANGE UP (ref 3.5–5.3)
POTASSIUM SERPL-SCNC: 4.7 MMOL/L — SIGNIFICANT CHANGE UP (ref 3.5–5.3)
RBC # BLD: 4.1 M/UL — SIGNIFICANT CHANGE UP (ref 3.8–5.2)
RBC # FLD: 22.6 % — HIGH (ref 10.3–14.5)
SODIUM SERPL-SCNC: 135 MMOL/L — SIGNIFICANT CHANGE UP (ref 135–145)
SURGICAL PATHOLOGY STUDY: SIGNIFICANT CHANGE UP
WBC # BLD: 13.85 K/UL — HIGH (ref 3.8–10.5)
WBC # FLD AUTO: 13.85 K/UL — HIGH (ref 3.8–10.5)

## 2017-01-31 RX ORDER — MAGNESIUM SULFATE 500 MG/ML
2 VIAL (ML) INJECTION ONCE
Qty: 0 | Refills: 0 | Status: COMPLETED | OUTPATIENT
Start: 2017-01-31 | End: 2017-01-31

## 2017-01-31 RX ORDER — ACETAMINOPHEN 500 MG
1000 TABLET ORAL EVERY 6 HOURS
Qty: 0 | Refills: 0 | Status: COMPLETED | OUTPATIENT
Start: 2017-01-31 | End: 2017-02-03

## 2017-01-31 RX ORDER — MAGNESIUM SULFATE 500 MG/ML
2 VIAL (ML) INJECTION ONCE
Qty: 0 | Refills: 0 | Status: DISCONTINUED | OUTPATIENT
Start: 2017-01-31 | End: 2017-01-31

## 2017-01-31 RX ADMIN — Medication 1000 MILLIGRAM(S): at 20:20

## 2017-01-31 RX ADMIN — ENOXAPARIN SODIUM 40 MILLIGRAM(S): 100 INJECTION SUBCUTANEOUS at 12:17

## 2017-01-31 RX ADMIN — Medication 50 GRAM(S): at 18:29

## 2017-01-31 RX ADMIN — Medication 2: at 06:29

## 2017-01-31 RX ADMIN — Medication 1000 MILLIGRAM(S): at 11:12

## 2017-01-31 RX ADMIN — Medication 100 MILLIEQUIVALENT(S): at 00:16

## 2017-01-31 RX ADMIN — HYDROMORPHONE HYDROCHLORIDE 30 MILLILITER(S): 2 INJECTION INTRAMUSCULAR; INTRAVENOUS; SUBCUTANEOUS at 08:25

## 2017-01-31 RX ADMIN — Medication 50 GRAM(S): at 09:49

## 2017-01-31 RX ADMIN — Medication 2: at 00:32

## 2017-01-31 RX ADMIN — SODIUM CHLORIDE 125 MILLILITER(S): 9 INJECTION, SOLUTION INTRAVENOUS at 02:26

## 2017-01-31 RX ADMIN — Medication 100 MILLIEQUIVALENT(S): at 01:18

## 2017-01-31 RX ADMIN — Medication 1: at 12:17

## 2017-01-31 RX ADMIN — Medication 400 MILLIGRAM(S): at 18:29

## 2017-01-31 RX ADMIN — INSULIN GLARGINE 15 UNIT(S): 100 INJECTION, SOLUTION SUBCUTANEOUS at 22:45

## 2017-01-31 RX ADMIN — HYDROMORPHONE HYDROCHLORIDE 30 MILLILITER(S): 2 INJECTION INTRAMUSCULAR; INTRAVENOUS; SUBCUTANEOUS at 02:07

## 2017-01-31 RX ADMIN — Medication 400 MILLIGRAM(S): at 10:37

## 2017-01-31 RX ADMIN — HYDROMORPHONE HYDROCHLORIDE 30 MILLILITER(S): 2 INJECTION INTRAMUSCULAR; INTRAVENOUS; SUBCUTANEOUS at 20:11

## 2017-02-01 LAB
BUN SERPL-MCNC: 7 MG/DL — SIGNIFICANT CHANGE UP (ref 7–23)
CALCIUM SERPL-MCNC: 8.8 MG/DL — SIGNIFICANT CHANGE UP (ref 8.4–10.5)
CHLORIDE SERPL-SCNC: 100 MMOL/L — SIGNIFICANT CHANGE UP (ref 98–107)
CO2 SERPL-SCNC: 24 MMOL/L — SIGNIFICANT CHANGE UP (ref 22–31)
CREAT SERPL-MCNC: 0.75 MG/DL — SIGNIFICANT CHANGE UP (ref 0.5–1.3)
GLUCOSE SERPL-MCNC: 105 MG/DL — HIGH (ref 70–99)
HCT VFR BLD CALC: 30.7 % — LOW (ref 34.5–45)
HGB BLD-MCNC: 9.4 G/DL — LOW (ref 11.5–15.5)
MAGNESIUM SERPL-MCNC: 1.9 MG/DL — SIGNIFICANT CHANGE UP (ref 1.6–2.6)
MCHC RBC-ENTMCNC: 22.9 PG — LOW (ref 27–34)
MCHC RBC-ENTMCNC: 30.6 % — LOW (ref 32–36)
MCV RBC AUTO: 74.7 FL — LOW (ref 80–100)
PHOSPHATE SERPL-MCNC: 3.9 MG/DL — SIGNIFICANT CHANGE UP (ref 2.5–4.5)
PLATELET # BLD AUTO: 459 K/UL — HIGH (ref 150–400)
PMV BLD: 9.3 FL — SIGNIFICANT CHANGE UP (ref 7–13)
POTASSIUM SERPL-MCNC: 3.9 MMOL/L — SIGNIFICANT CHANGE UP (ref 3.5–5.3)
POTASSIUM SERPL-SCNC: 3.9 MMOL/L — SIGNIFICANT CHANGE UP (ref 3.5–5.3)
RBC # BLD: 4.11 M/UL — SIGNIFICANT CHANGE UP (ref 3.8–5.2)
RBC # FLD: 23 % — HIGH (ref 10.3–14.5)
SODIUM SERPL-SCNC: 140 MMOL/L — SIGNIFICANT CHANGE UP (ref 135–145)
WBC # BLD: 11.56 K/UL — HIGH (ref 3.8–10.5)
WBC # FLD AUTO: 11.56 K/UL — HIGH (ref 3.8–10.5)

## 2017-02-01 RX ORDER — INSULIN LISPRO 100/ML
VIAL (ML) SUBCUTANEOUS
Qty: 0 | Refills: 0 | Status: DISCONTINUED | OUTPATIENT
Start: 2017-02-01 | End: 2017-02-02

## 2017-02-01 RX ORDER — DEXTROSE MONOHYDRATE, SODIUM CHLORIDE, AND POTASSIUM CHLORIDE 50; .745; 4.5 G/1000ML; G/1000ML; G/1000ML
1000 INJECTION, SOLUTION INTRAVENOUS
Qty: 0 | Refills: 0 | Status: DISCONTINUED | OUTPATIENT
Start: 2017-02-01 | End: 2017-02-02

## 2017-02-01 RX ORDER — PANTOPRAZOLE SODIUM 20 MG/1
40 TABLET, DELAYED RELEASE ORAL
Qty: 0 | Refills: 0 | Status: DISCONTINUED | OUTPATIENT
Start: 2017-02-01 | End: 2017-02-03

## 2017-02-01 RX ORDER — INSULIN LISPRO 100/ML
VIAL (ML) SUBCUTANEOUS AT BEDTIME
Qty: 0 | Refills: 0 | Status: DISCONTINUED | OUTPATIENT
Start: 2017-02-01 | End: 2017-02-02

## 2017-02-01 RX ORDER — INSULIN LISPRO 100/ML
VIAL (ML) SUBCUTANEOUS EVERY 6 HOURS
Qty: 0 | Refills: 0 | Status: DISCONTINUED | OUTPATIENT
Start: 2017-02-01 | End: 2017-02-01

## 2017-02-01 RX ORDER — DEXTROSE MONOHYDRATE, SODIUM CHLORIDE, AND POTASSIUM CHLORIDE 50; .745; 4.5 G/1000ML; G/1000ML; G/1000ML
1000 INJECTION, SOLUTION INTRAVENOUS
Qty: 0 | Refills: 0 | Status: DISCONTINUED | OUTPATIENT
Start: 2017-02-01 | End: 2017-02-01

## 2017-02-01 RX ADMIN — Medication 1000 MILLIGRAM(S): at 06:53

## 2017-02-01 RX ADMIN — ENOXAPARIN SODIUM 40 MILLIGRAM(S): 100 INJECTION SUBCUTANEOUS at 11:48

## 2017-02-01 RX ADMIN — Medication 400 MILLIGRAM(S): at 17:13

## 2017-02-01 RX ADMIN — DEXTROSE MONOHYDRATE, SODIUM CHLORIDE, AND POTASSIUM CHLORIDE 75 MILLILITER(S): 50; .745; 4.5 INJECTION, SOLUTION INTRAVENOUS at 11:13

## 2017-02-01 RX ADMIN — Medication 1000 MILLIGRAM(S): at 12:20

## 2017-02-01 RX ADMIN — Medication 400 MILLIGRAM(S): at 11:47

## 2017-02-01 RX ADMIN — HYDROMORPHONE HYDROCHLORIDE 30 MILLILITER(S): 2 INJECTION INTRAMUSCULAR; INTRAVENOUS; SUBCUTANEOUS at 20:09

## 2017-02-01 RX ADMIN — Medication 1000 MILLIGRAM(S): at 17:45

## 2017-02-01 RX ADMIN — PANTOPRAZOLE SODIUM 40 MILLIGRAM(S): 20 TABLET, DELAYED RELEASE ORAL at 17:12

## 2017-02-01 RX ADMIN — Medication 400 MILLIGRAM(S): at 06:23

## 2017-02-01 RX ADMIN — HYDROMORPHONE HYDROCHLORIDE 30 MILLILITER(S): 2 INJECTION INTRAMUSCULAR; INTRAVENOUS; SUBCUTANEOUS at 08:33

## 2017-02-01 RX ADMIN — Medication 400 MILLIGRAM(S): at 00:00

## 2017-02-01 RX ADMIN — INSULIN GLARGINE 15 UNIT(S): 100 INJECTION, SOLUTION SUBCUTANEOUS at 22:20

## 2017-02-01 RX ADMIN — Medication 1000 MILLIGRAM(S): at 00:30

## 2017-02-01 NOTE — DISCHARGE NOTE ADULT - CARE PLAN
Principal Discharge DX:	S/P colon resection  Goal:	You went to the OR for a right hemicolectomy.  Instructions for follow-up, activity and diet:	Follow a low residue/low fiber diet. Follow up with Dr. Hernandez in one week. Please follow up with your primary care physician regarding your hospitalization. Do not drive while taking pain medications Principal Discharge DX:	S/P colon resection  Goal:	You went to the OR for a right hemicolectomy.  Instructions for follow-up, activity and diet:	Follow a low residue/low fiber diet. Follow up with Dr. Hernandez in one week. Follow up with Gastroenterologist Dr. Ortiz for your endoscopy results showing H.Pylori. Please follow up with your primary care physician regarding your hospitalization. Do not drive while taking pain medications Principal Discharge DX:	S/P colon resection  Goal:	You went to the OR for a right hemicolectomy.  Instructions for follow-up, activity and diet:	Follow a low residue/low fiber diet.   You may take Percocet as needed for pain. Do not take Tylenol and Percocet at the same time. Do not drive while taking pain medications.  Follow up with Dr. Hernandez in one week. Follow up with Gastroenterologist Dr. Ortiz for your endoscopy results showing H.Pylori. Please follow up with your primary care physician regarding your hospitalization.  If you experience pain not controlled by pain medication, fevers greater than 100.4, bleeding that doesn't stop, or inability to tolerate a diet, please call Dr. Hernandez's office and return to the ER at [facility]. Principal Discharge DX:	S/P colon resection  Goal:	You went to the OR for a right hemicolectomy.  Instructions for follow-up, activity and diet:	Follow a low residue/low fiber diet.   You may take Percocet as needed for pain. Do not take Tylenol and Percocet at the same time. Do not drive while taking pain medications.  Follow up with Dr. Hernandez in one week. Follow up with Gastroenterologist Dr. Ortiz for your endoscopy results showing H.Pylori. Please follow up with your primary care physician regarding your hospitalization.  If you experience pain not controlled by pain medication, fevers greater than 100.4, bleeding that doesn't stop, or inability to tolerate a diet, please call Dr. Hernandez's office and return to the ER at [facility]. Principal Discharge DX:	S/P colon resection  Goal:	You went to the OR for a right hemicolectomy.  Instructions for follow-up, activity and diet:	Follow a low residue/low fiber diet.   You may take Percocet as needed for pain. Do not take Tylenol and Percocet at the same time. Do not drive while taking pain medications.  Follow up with Dr. Hernandez in one week. Follow up with Gastroenterologist Dr. Ortiz for your endoscopy results showing H.Pylori. Please follow up with your primary care physician regarding your hospitalization.  If you experience pain not controlled by pain medication, fevers greater than 100.4, bleeding that doesn't stop, or inability to tolerate a diet, please call Dr. Hernandez's office and return to the ER at [facility]. Principal Discharge DX:	S/P colon resection  Goal:	You went to the OR for a right hemicolectomy.  Instructions for follow-up, activity and diet:	Follow a low residue/low fiber diet.   You may take Percocet as needed for pain. Do not take Tylenol and Percocet at the same time. Do not drive while taking pain medications.  Follow up with Dr. Hernandez in one week. Follow up with Gastroenterologist Dr. Ortiz for your endoscopy results showing H.Pylori. Please follow up with your primary care physician regarding your hospitalization.  If you experience pain not controlled by pain medication, fevers greater than 100.4, bleeding that doesn't stop, or inability to tolerate a diet, please call Dr. Hernandez's office and return to the ER at [facility]. Principal Discharge DX:	S/P colon resection  Goal:	You went to the OR for a right hemicolectomy.  Instructions for follow-up, activity and diet:	Follow a low residue/low fiber diet.   You may take Percocet as needed for pain. Do not take Tylenol and Percocet at the same time. Do not drive while taking pain medications.  Follow up with Dr. Hernandez in one week. Follow up with Gastroenterologist Dr. Ortiz for your endoscopy results showing H.Pylori. Please follow up with your primary care physician regarding your hospitalization.  If you experience pain not controlled by pain medication, fevers greater than 100.4, bleeding that doesn't stop, or inability to tolerate a diet, please call Dr. Hernadnez's office and return to the ER at [facility].

## 2017-02-01 NOTE — DISCHARGE NOTE ADULT - CONDITIONS AT DISCHARGE
Alert & orientedx4. Out of bed ambulating. Tolerating diet without nausea or vomiting. Voiding without difficulty. Vitals stable, afebrile. PIV discontinued, no s/s of infection noted. Understands all discharge instruction & will follow up with the MD.

## 2017-02-01 NOTE — DISCHARGE NOTE ADULT - HOSPITAL COURSE
66F DM2 HTN one month ago had URI treated with antibiotic and NSAID. Sinec then has had progressive fatigue and nonexertional mid sternal and mid-back pain.  She went to PMD where labwork showed Hb 6.5, was told to come to ED for evaluation.  Denies hematemesis, hematochezia, dark stools, melena, BRBPR, vaginal bleeding, hematuria.     In ED received protonix IV and 1 unit PRBC.  - IV protonix gtt  - GI Dr Gtz   - Iron studies - deficient iron    [place]-rectal surgery consulted Dr Higgins  - CT Chest/ A/ P 1.  Large ascending colonic mass with associated mesenteric adenopathy measuring up to 1.3 cm., consistent with known neoplasm.  2.  Nonspecific few scattered subcentimeter pulmonary nodules, measuring up to 0.6 cm in the left lower lobe.    1/26 s/p Colonoscopy - Non-thrombosed internal hemorrhoids and internal hemorrhoids that prolapse with straining. Likely malignant partially obstructing tumor at the hepatic flexure. Biopsied. Await pathology results.    1/26 s/p EGD -- Normal esophagus. Chronic gastritis. Biopsied. Non-bleeding gastric ulcers with no stigmata of bleeding. Biopsied.  Await pathology results. Repeat the upper endoscopy for surveillance based on pathology results.     Pathology results:  Final Diagnosis  1. Duodenum, biopsy:  - Duodenal mucosa with surface epithelial erosion    2. Stomach, nodule, biopsy:  - Gastric antral mucosa with active chronic Helicobacter pylori associated gastritis  - Positive for Helicobacter pylori on Warthin-Starry stain.    3. Stomach, antrum, biopsy:  - Gastric oxyntic mucosa with active chronic Helicobacter pylori associated gastritis  - Positive for Helicobacter pylori on Warthin-Starry stain.    4. 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 Post operatively patient hemodynamically stable and transferred to the floor.   Patient's pain initially controlled with PCA. 66F DM2 HTN one month ago had URI treated with antibiotic and NSAID. Sinec then has had progressive fatigue and nonexertional mid sternal and mid-back pain.  She went to PMD where labwork showed Hb 6.5, was told to come to ED for evaluation.  Denies hematemesis, hematochezia, dark stools, melena, BRBPR, vaginal bleeding, hematuria.     In ED received protonix IV and 1 unit PRBC.  - IV protonix gtt  - GI Dr Gtz   - Iron studies - deficient iron    [place]-rectal surgery consulted Dr Higgins  - CT Chest/ A/ P 1.  Large ascending colonic mass with associated mesenteric adenopathy measuring up to 1.3 cm., consistent with known neoplasm.  2.  Nonspecific few scattered subcentimeter pulmonary nodules, measuring up to 0.6 cm in the left lower lobe.    1/26 s/p Colonoscopy - Non-thrombosed internal hemorrhoids and internal hemorrhoids that prolapse with straining. Likely malignant partially obstructing tumor at the hepatic flexure. Biopsied. Await pathology results.    1/26 s/p EGD -- Normal esophagus. Chronic gastritis. Biopsied. Non-bleeding gastric ulcers with no stigmata of bleeding. Biopsied.  Await pathology results. Repeat the upper endoscopy for surveillance based on pathology results.     Pathology results:  Final Diagnosis  1. Duodenum, biopsy:  - Duodenal mucosa with surface epithelial erosion    2. Stomach, nodule, biopsy:  - Gastric antral mucosa with active chronic Helicobacter pylori associated gastritis  - Positive for Helicobacter pylori on Warthin-Starry stain.    3. Stomach, antrum, biopsy:  - Gastric oxyntic mucosa with active chronic Helicobacter pylori associated gastritis  - Positive for Helicobacter pylori on Warthin-Starry stain.    4. 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 Post operatively patient hemodynamically stable and transferred to the floor.   Patient's pain initially controlled with PCA, as diet advanced, pain controlled with PO pain medications.   Patient tolerating regular diet, ambulating, voiding, and pain well controlled.   Pt stable for discharge home at this time with surgery and GI follow up.

## 2017-02-01 NOTE — DISCHARGE NOTE ADULT - PLAN OF CARE
You went to the OR for a right hemicolectomy. Follow a low residue/low fiber diet. Follow up with Dr. Hernandez in one week. Please follow up with your primary care physician regarding your hospitalization. Do not drive while taking pain medications Follow a low residue/low fiber diet. Follow up with Dr. Hernandez in one week. Follow up with Gastroenterologist Dr. Ortiz for your endoscopy results showing H.Pylori. Please follow up with your primary care physician regarding your hospitalization. Do not drive while taking pain medications Follow a low residue/low fiber diet.   You may take Percocet as needed for pain. Do not take Tylenol and Percocet at the same time. Do not drive while taking pain medications.  Follow up with Dr. Hernandez in one week. Follow up with Gastroenterologist Dr. Ortiz for your endoscopy results showing H.Pylori. Please follow up with your primary care physician regarding your hospitalization.  If you experience pain not controlled by pain medication, fevers greater than 100.4, bleeding that doesn't stop, or inability to tolerate a diet, please call Dr. Hernandez's office and return to the ER at [facility].

## 2017-02-01 NOTE — DISCHARGE NOTE ADULT - MEDICATION SUMMARY - MEDICATIONS TO TAKE
I will START or STAY ON the medications listed below when I get home from the hospital:    oxyCODONE-acetaminophen 5 mg-325 mg oral tablet  -- 1 tab(s) by mouth every 4 hours, As Needed -for severe pain MDD:6 tabs  -- Caution federal law prohibits the transfer of this drug to any person other  than the person for whom it was prescribed.  May cause drowsiness.  Alcohol may intensify this effect.  Use care when operating dangerous machinery.  This prescription cannot be refilled.  This product contains acetaminophen.  Do not use  with any other product containing acetaminophen to prevent possible liver damage.  Using more of this medication than prescribed may cause serious breathing problems.    -- Indication: For pain    enalapril 20 mg oral tablet  -- 1 tab(s) by mouth once a day  -- Indication: For Htn    glimepiride 4 mg oral tablet  -- 1 tab(s) by mouth 2 times a day  -- Indication: For Diabetes mellitus    Levemir FlexPen 100 units/mL subcutaneous solution  -- 30 unit(s) subcutaneous once a day  -- Indication: For Diabetes mellitus    SITagliptin-metFORMIN 50mg-1000mg oral tablet  -- 1 tab(s) by mouth 2 times a day  -- Indication: For Diabetes mellitus    simvastatin 40 mg oral tablet  -- 1 tab(s) by mouth once a day (at bedtime)  -- Indication: For High cholesterol I will START or STAY ON the medications listed below when I get home from the hospital:    oxyCODONE-acetaminophen 5 mg-325 mg oral tablet  -- 1 tab(s) by mouth every 4 hours, As Needed -for severe pain MDD:6 tabs  -- Caution federal law prohibits the transfer of this drug to any person other  than the person for whom it was prescribed.  May cause drowsiness.  Alcohol may intensify this effect.  Use care when operating dangerous machinery.  This prescription cannot be refilled.  This product contains acetaminophen.  Do not use  with any other product containing acetaminophen to prevent possible liver damage.  Using more of this medication than prescribed may cause serious breathing problems.    -- Indication: For pain    enalapril 20 mg oral tablet  -- 1 tab(s) by mouth once a day  -- Indication: For Htn    glimepiride 4 mg oral tablet  -- 1 tab(s) by mouth 2 times a day  -- Indication: For Diabetes mellitus    Levemir FlexPen 100 units/mL subcutaneous solution  -- 30 unit(s) subcutaneous once a day  -- Indication: For Diabetes mellitus    SITagliptin-metFORMIN 50mg-1000mg oral tablet  -- 1 tab(s) by mouth 2 times a day  -- Indication: For Diabetes mellitus    simvastatin 40 mg oral tablet  -- 1 tab(s) by mouth once a day (at bedtime)  -- Indication: For High cholesterol    pantoprazole 40 mg oral delayed release tablet  -- 1 tab(s) by mouth 2 times a day  -- It is very important that you take or use this exactly as directed.  Do not skip doses or discontinue unless directed by your doctor.  Obtain medical advice before taking any non-prescription drugs as some may affect the action of this medication.  Swallow whole.  Do not crush.    -- Indication: For gastric ulcer I will START or STAY ON the medications listed below when I get home from the hospital:    oxyCODONE-acetaminophen 5 mg-325 mg oral tablet  -- 1 tab(s) by mouth every 4 hours, As Needed -for severe pain MDD:6 tabs  -- Caution federal law prohibits the transfer of this drug to any person other  than the person for whom it was prescribed.  May cause drowsiness.  Alcohol may intensify this effect.  Use care when operating dangerous machinery.  This prescription cannot be refilled.  This product contains acetaminophen.  Do not use  with any other product containing acetaminophen to prevent possible liver damage.  Using more of this medication than prescribed may cause serious breathing problems.    -- Indication: For post op pain     enalapril 20 mg oral tablet  -- 1 tab(s) by mouth once a day  -- Indication: For Htn    glimepiride 4 mg oral tablet  -- 1 tab(s) by mouth 2 times a day  -- Indication: For Diabetes mellitus    Levemir FlexPen 100 units/mL subcutaneous solution  -- 30 unit(s) subcutaneous once a day  -- Indication: For Diabetes mellitus    SITagliptin-metFORMIN 50mg-1000mg oral tablet  -- 1 tab(s) by mouth 2 times a day  -- Indication: For Diabetes mellitus    simvastatin 40 mg oral tablet  -- 1 tab(s) by mouth once a day (at bedtime)  -- Indication: For High cholesterol    pantoprazole 40 mg oral delayed release tablet  -- 1 tab(s) by mouth 2 times a day  -- It is very important that you take or use this exactly as directed.  Do not skip doses or discontinue unless directed by your doctor.  Obtain medical advice before taking any non-prescription drugs as some may affect the action of this medication.  Swallow whole.  Do not crush.    -- Indication: For gastric ulcer

## 2017-02-01 NOTE — DISCHARGE NOTE ADULT - PATIENT PORTAL LINK FT
“You can access the Follow[employer] Patient Portal, offered by [facility], by registering with the following website: [email or web address]”

## 2017-02-01 NOTE — DIETITIAN INITIAL EVALUATION ADULT. - OTHER INFO
Pt seen for LOS. Met with pt and family by bedside. Pt was NPO and advanced to clear liquid this am, didn't have a meal at time of visit but endorses good appetite. Denies nausea/diarrhea/constipation or difficulty chewing and swallowing, but one episode of vomiting noted this am. No further episode reported. PO and fluid intake encouraged. Provided verbal and written DM diet education. All questions and concerns answered during interview. Pt and family made aware RDN remains available.

## 2017-02-01 NOTE — DISCHARGE NOTE ADULT - CARE PROVIDER_API CALL
Judith Hernandez (MD), ColonRectal Surgery; Surgery  [address]  [place] [address]  Phone: [phone]  Fax: [phone] Judith Hernandez (MD), ColonRectal Surgery; Surgery  [address]  [place] [address]  Phone: [phone]  Fax: [phone]    Moody Gtz (DO), Internal Medicine  [address] [place] [address]  Phone: [phone]  Fax: [phone]

## 2017-02-01 NOTE — DISCHARGE NOTE ADULT - INSTRUCTIONS
Follow a low residue/low fiber diet Watch for signs of infection; redness, swelling, fever, chills or heat, report such symptoms to the MD. No driving while taking pain medication, it causes drowsiness & constipation. Drink 6-8 glasses of fluids daily to promote hydration. No heavy lifting, pulling or pushing heavy objects. Follow up with the MD.

## 2017-02-01 NOTE — DISCHARGE NOTE ADULT - MEDICATION SUMMARY - MEDICATIONS TO STOP TAKING
I will STOP taking the medications listed below when I get home from the hospital:    nabumetone 750 mg oral tablet  -- 1 tab(s) by mouth once a day

## 2017-02-01 NOTE — DIETITIAN INITIAL EVALUATION ADULT. - NS AS NUTRI INTERV MEALS SNACK
Carbohydrate - modified diet/Other (specify)/1. Continue current diet order, which remains appropriate at this time. 2. Suggest advance diet Clear diet-->  CSTCHO (with evening snack) as tolerated. 3. Monitor PO diet tolerance 4. Obtain daily weight 5.Multivitamin 6. Monitor weights, labs, BM's, skin integrity, p.o. intake.

## 2017-02-02 LAB
HCT VFR BLD CALC: 28.4 % — LOW (ref 34.5–45)
HGB BLD-MCNC: 8.7 G/DL — LOW (ref 11.5–15.5)
MCHC RBC-ENTMCNC: 22.8 PG — LOW (ref 27–34)
MCHC RBC-ENTMCNC: 30.6 % — LOW (ref 32–36)
MCV RBC AUTO: 74.5 FL — LOW (ref 80–100)
PLATELET # BLD AUTO: 450 K/UL — HIGH (ref 150–400)
PMV BLD: 9 FL — SIGNIFICANT CHANGE UP (ref 7–13)
RBC # BLD: 3.81 M/UL — SIGNIFICANT CHANGE UP (ref 3.8–5.2)
RBC # FLD: 23.3 % — HIGH (ref 10.3–14.5)
WBC # BLD: 11.16 K/UL — HIGH (ref 3.8–10.5)
WBC # FLD AUTO: 11.16 K/UL — HIGH (ref 3.8–10.5)

## 2017-02-02 RX ORDER — INSULIN LISPRO 100/ML
VIAL (ML) SUBCUTANEOUS
Qty: 0 | Refills: 0 | Status: DISCONTINUED | OUTPATIENT
Start: 2017-02-02 | End: 2017-02-03

## 2017-02-02 RX ORDER — OXYCODONE HYDROCHLORIDE 5 MG/1
5 TABLET ORAL
Qty: 0 | Refills: 0 | Status: DISCONTINUED | OUTPATIENT
Start: 2017-02-02 | End: 2017-02-03

## 2017-02-02 RX ORDER — INSULIN LISPRO 100/ML
VIAL (ML) SUBCUTANEOUS AT BEDTIME
Qty: 0 | Refills: 0 | Status: DISCONTINUED | OUTPATIENT
Start: 2017-02-02 | End: 2017-02-03

## 2017-02-02 RX ORDER — OXYCODONE HYDROCHLORIDE 5 MG/1
7.5 TABLET ORAL
Qty: 0 | Refills: 0 | Status: DISCONTINUED | OUTPATIENT
Start: 2017-02-02 | End: 2017-02-03

## 2017-02-02 RX ORDER — HYDROMORPHONE HYDROCHLORIDE 2 MG/ML
0.5 INJECTION INTRAMUSCULAR; INTRAVENOUS; SUBCUTANEOUS EVERY 6 HOURS
Qty: 0 | Refills: 0 | Status: DISCONTINUED | OUTPATIENT
Start: 2017-02-02 | End: 2017-02-03

## 2017-02-02 RX ORDER — DOCUSATE SODIUM 100 MG
100 CAPSULE ORAL THREE TIMES A DAY
Qty: 0 | Refills: 0 | Status: DISCONTINUED | OUTPATIENT
Start: 2017-02-02 | End: 2017-02-03

## 2017-02-02 RX ORDER — SENNA PLUS 8.6 MG/1
2 TABLET ORAL AT BEDTIME
Qty: 0 | Refills: 0 | Status: DISCONTINUED | OUTPATIENT
Start: 2017-02-02 | End: 2017-02-03

## 2017-02-02 RX ADMIN — Medication 2: at 22:54

## 2017-02-02 RX ADMIN — PANTOPRAZOLE SODIUM 40 MILLIGRAM(S): 20 TABLET, DELAYED RELEASE ORAL at 17:28

## 2017-02-02 RX ADMIN — INSULIN GLARGINE 15 UNIT(S): 100 INJECTION, SOLUTION SUBCUTANEOUS at 22:55

## 2017-02-02 RX ADMIN — ENOXAPARIN SODIUM 40 MILLIGRAM(S): 100 INJECTION SUBCUTANEOUS at 12:57

## 2017-02-02 RX ADMIN — Medication 400 MILLIGRAM(S): at 00:14

## 2017-02-02 RX ADMIN — OXYCODONE HYDROCHLORIDE 5 MILLIGRAM(S): 5 TABLET ORAL at 17:27

## 2017-02-02 RX ADMIN — Medication 2: at 19:13

## 2017-02-02 RX ADMIN — Medication 1000 MILLIGRAM(S): at 00:35

## 2017-02-02 RX ADMIN — HYDROMORPHONE HYDROCHLORIDE 30 MILLILITER(S): 2 INJECTION INTRAMUSCULAR; INTRAVENOUS; SUBCUTANEOUS at 08:34

## 2017-02-02 RX ADMIN — Medication 20 MILLIGRAM(S): at 05:23

## 2017-02-02 RX ADMIN — Medication 1: at 09:35

## 2017-02-02 RX ADMIN — SENNA PLUS 2 TABLET(S): 8.6 TABLET ORAL at 23:01

## 2017-02-02 RX ADMIN — Medication 1: at 12:56

## 2017-02-02 RX ADMIN — OXYCODONE HYDROCHLORIDE 5 MILLIGRAM(S): 5 TABLET ORAL at 17:57

## 2017-02-02 RX ADMIN — Medication 100 MILLIGRAM(S): at 23:00

## 2017-02-02 RX ADMIN — PANTOPRAZOLE SODIUM 40 MILLIGRAM(S): 20 TABLET, DELAYED RELEASE ORAL at 07:32

## 2017-02-03 VITALS
HEART RATE: 102 BPM | TEMPERATURE: 98 F | DIASTOLIC BLOOD PRESSURE: 84 MMHG | SYSTOLIC BLOOD PRESSURE: 147 MMHG | OXYGEN SATURATION: 99 % | RESPIRATION RATE: 18 BRPM

## 2017-02-03 LAB
BUN SERPL-MCNC: 7 MG/DL — SIGNIFICANT CHANGE UP (ref 7–23)
CALCIUM SERPL-MCNC: 9.6 MG/DL — SIGNIFICANT CHANGE UP (ref 8.4–10.5)
CHLORIDE SERPL-SCNC: 100 MMOL/L — SIGNIFICANT CHANGE UP (ref 98–107)
CO2 SERPL-SCNC: 27 MMOL/L — SIGNIFICANT CHANGE UP (ref 22–31)
CREAT SERPL-MCNC: 0.76 MG/DL — SIGNIFICANT CHANGE UP (ref 0.5–1.3)
GLUCOSE SERPL-MCNC: 182 MG/DL — HIGH (ref 70–99)
HCT VFR BLD CALC: 28.4 % — LOW (ref 34.5–45)
HGB BLD-MCNC: 8.7 G/DL — LOW (ref 11.5–15.5)
MAGNESIUM SERPL-MCNC: 1.5 MG/DL — LOW (ref 1.6–2.6)
MCHC RBC-ENTMCNC: 22.8 PG — LOW (ref 27–34)
MCHC RBC-ENTMCNC: 30.6 % — LOW (ref 32–36)
MCV RBC AUTO: 74.5 FL — LOW (ref 80–100)
PHOSPHATE SERPL-MCNC: 4.2 MG/DL — SIGNIFICANT CHANGE UP (ref 2.5–4.5)
PLATELET # BLD AUTO: 458 K/UL — HIGH (ref 150–400)
PMV BLD: 9.1 FL — SIGNIFICANT CHANGE UP (ref 7–13)
POTASSIUM SERPL-MCNC: 5 MMOL/L — SIGNIFICANT CHANGE UP (ref 3.5–5.3)
POTASSIUM SERPL-SCNC: 5 MMOL/L — SIGNIFICANT CHANGE UP (ref 3.5–5.3)
RBC # BLD: 3.81 M/UL — SIGNIFICANT CHANGE UP (ref 3.8–5.2)
RBC # FLD: 23.4 % — HIGH (ref 10.3–14.5)
SODIUM SERPL-SCNC: 139 MMOL/L — SIGNIFICANT CHANGE UP (ref 135–145)
WBC # BLD: 9.87 K/UL — SIGNIFICANT CHANGE UP (ref 3.8–10.5)
WBC # FLD AUTO: 9.87 K/UL — SIGNIFICANT CHANGE UP (ref 3.8–10.5)

## 2017-02-03 RX ORDER — PANTOPRAZOLE SODIUM 20 MG/1
1 TABLET, DELAYED RELEASE ORAL
Qty: 42 | Refills: 0
Start: 2017-02-03 | End: 2017-02-24

## 2017-02-03 RX ORDER — MAGNESIUM SULFATE 500 MG/ML
2 VIAL (ML) INJECTION ONCE
Qty: 0 | Refills: 0 | Status: COMPLETED | OUTPATIENT
Start: 2017-02-03 | End: 2017-02-03

## 2017-02-03 RX ORDER — NABUMETONE 750 MG
1 TABLET ORAL
Qty: 0 | Refills: 0 | COMMUNITY

## 2017-02-03 RX ORDER — INSULIN GLARGINE 100 [IU]/ML
18 INJECTION, SOLUTION SUBCUTANEOUS AT BEDTIME
Qty: 0 | Refills: 0 | Status: DISCONTINUED | OUTPATIENT
Start: 2017-02-03 | End: 2017-02-03

## 2017-02-03 RX ADMIN — ENOXAPARIN SODIUM 40 MILLIGRAM(S): 100 INJECTION SUBCUTANEOUS at 13:07

## 2017-02-03 RX ADMIN — Medication 50 GRAM(S): at 09:48

## 2017-02-03 RX ADMIN — Medication 100 MILLIGRAM(S): at 05:33

## 2017-02-03 RX ADMIN — Medication 1000 MILLIGRAM(S): at 03:30

## 2017-02-03 RX ADMIN — Medication 20 MILLIGRAM(S): at 05:33

## 2017-02-03 RX ADMIN — PANTOPRAZOLE SODIUM 40 MILLIGRAM(S): 20 TABLET, DELAYED RELEASE ORAL at 05:33

## 2017-02-03 RX ADMIN — Medication 100 MILLIGRAM(S): at 13:14

## 2017-02-03 RX ADMIN — Medication 400 MILLIGRAM(S): at 03:00

## 2017-02-03 RX ADMIN — Medication 2: at 13:07

## 2017-02-03 RX ADMIN — Medication 4: at 08:53

## 2017-02-07 LAB — SURGICAL PATHOLOGY STUDY: SIGNIFICANT CHANGE UP

## 2017-02-13 PROBLEM — E11.9 TYPE 2 DIABETES MELLITUS WITHOUT COMPLICATIONS: Chronic | Status: ACTIVE | Noted: 2017-01-25

## 2017-02-13 PROBLEM — E78.5 HYPERLIPIDEMIA, UNSPECIFIED: Chronic | Status: ACTIVE | Noted: 2017-01-25

## 2017-02-13 PROBLEM — Z00.00 ENCOUNTER FOR PREVENTIVE HEALTH EXAMINATION: Status: ACTIVE | Noted: 2017-02-13

## 2017-02-13 PROBLEM — I10 ESSENTIAL (PRIMARY) HYPERTENSION: Chronic | Status: ACTIVE | Noted: 2017-01-25

## 2017-02-17 ENCOUNTER — OUTPATIENT (OUTPATIENT)
Dept: OUTPATIENT SERVICES | Facility: HOSPITAL | Age: 67
LOS: 1 days | Discharge: HOME | End: 2017-02-17

## 2017-02-17 DIAGNOSIS — C18.9 MALIGNANT NEOPLASM OF COLON, UNSPECIFIED: ICD-10-CM

## 2017-02-19 ENCOUNTER — OUTPATIENT (OUTPATIENT)
Dept: OUTPATIENT SERVICES | Facility: HOSPITAL | Age: 67
LOS: 1 days | End: 2017-02-19
Payer: COMMERCIAL

## 2017-02-19 ENCOUNTER — APPOINTMENT (OUTPATIENT)
Dept: NUCLEAR MEDICINE | Facility: IMAGING CENTER | Age: 67
End: 2017-02-19

## 2017-02-19 DIAGNOSIS — Z00.8 ENCOUNTER FOR OTHER GENERAL EXAMINATION: ICD-10-CM

## 2017-02-19 PROCEDURE — A9552: CPT

## 2017-02-19 PROCEDURE — 78815 PET IMAGE W/CT SKULL-THIGH: CPT

## 2017-02-22 ENCOUNTER — APPOINTMENT (OUTPATIENT)
Dept: HEMATOLOGY ONCOLOGY | Facility: CLINIC | Age: 67
End: 2017-02-22

## 2017-02-27 ENCOUNTER — OUTPATIENT (OUTPATIENT)
Dept: OUTPATIENT SERVICES | Facility: HOSPITAL | Age: 67
LOS: 1 days | End: 2017-02-27
Payer: COMMERCIAL

## 2017-02-27 VITALS
OXYGEN SATURATION: 100 % | RESPIRATION RATE: 16 BRPM | SYSTOLIC BLOOD PRESSURE: 125 MMHG | TEMPERATURE: 99 F | HEIGHT: 58 IN | HEART RATE: 90 BPM | DIASTOLIC BLOOD PRESSURE: 80 MMHG | WEIGHT: 149.91 LBS

## 2017-02-27 DIAGNOSIS — Z90.49 ACQUIRED ABSENCE OF OTHER SPECIFIED PARTS OF DIGESTIVE TRACT: Chronic | ICD-10-CM

## 2017-02-27 DIAGNOSIS — K63.9 DISEASE OF INTESTINE, UNSPECIFIED: ICD-10-CM

## 2017-02-27 DIAGNOSIS — I10 ESSENTIAL (PRIMARY) HYPERTENSION: ICD-10-CM

## 2017-02-27 DIAGNOSIS — C18.3 MALIGNANT NEOPLASM OF HEPATIC FLEXURE: ICD-10-CM

## 2017-02-27 PROCEDURE — G0463: CPT

## 2017-02-27 NOTE — H&P PST ADULT - RS GEN PE MLT RESP DETAILS PC
no wheezes/breath sounds equal/clear to auscultation bilaterally/airway patent/good air movement/normal/no rhonchi/no chest wall tenderness/no intercostal retractions/no rales/respirations non-labored/no subcutaneous emphysema

## 2017-02-27 NOTE — H&P PST ADULT - NEGATIVE CARDIOVASCULAR SYMPTOMS
no orthopnea/no dyspnea on exertion/no chest pain/no paroxysmal nocturnal dyspnea/no palpitations/no claudication/no peripheral edema

## 2017-02-27 NOTE — H&P PST ADULT - DOES PATIENT HAVE ADVANCE DIRECTIVE
No/, Wilian Morgan will make decisions for patient in case of medical emergency Telephone [phone]

## 2017-02-27 NOTE — H&P PST ADULT - NEGATIVE GASTROINTESTINAL SYMPTOMS
no flatulence/no melena/no change in bowel habits/no abdominal pain/no vomiting/no diarrhea/no constipation/no nausea

## 2017-02-27 NOTE — H&P PST ADULT - NSANTHOSAYNRD_GEN_A_CORE
No. AURA screening performed.  STOP BANG Legend: 0-2 = LOW Risk; 3-4 = INTERMEDIATE Risk; 5-8 = HIGH Risk

## 2017-02-27 NOTE — H&P PST ADULT - PROBLEM SELECTOR PLAN 2
Instructed to take enalapril with a sip of water the morning of surgery and to follow up with PCP post surgery for management of hypertension and other comorbid conditions

## 2017-02-27 NOTE — H&P PST ADULT - HISTORY OF PRESENT ILLNESS
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

## 2017-02-27 NOTE — H&P PST ADULT - ASSESSMENT
67 y/o Lebanese female with PMHx of essential hypertension, low AURA risk on STOP BANG screen, hyperlipidema, T2DM w/o complications, and malignant neoplasm of hepatic flexure scheduled for bardport insertion on 3/3/2017. Patient is at moderate to high risk for planned procedure

## 2017-02-27 NOTE — H&P PST ADULT - CONSTITUTIONAL DETAILS
well-groomed/well-developed/no distress well-developed/no distress/generalized weakness/well-groomed

## 2017-02-27 NOTE — H&P PST ADULT - NEGATIVE GENERAL SYMPTOMS
no sweating/no polyphagia/no malaise/no weight loss/no chills/no anorexia/no polyuria/no fever/no weight gain/no polydipsia

## 2017-02-27 NOTE — H&P PST ADULT - PMH
Diabetes mellitus, type 2    Essential hypertension    Hepatic flexure mass  malignant neoplasm  Hyperlipidemia, unspecified hyperlipidemia type    Obesity (BMI 30.0-34.9) Diabetes mellitus, type 2    Essential hypertension    Hepatic flexure mass  malignant neoplasm  Hyperlipidemia, unspecified hyperlipidemia type    Malignant neoplasm of hepatic flexure    Obesity (BMI 30.0-34.9)

## 2017-02-27 NOTE — H&P PST ADULT - GASTROINTESTINAL DETAILS
soft/no bruit/no rebound tenderness/no distention/no masses palpable/no guarding/no rigidity/no organomegaly

## 2017-03-03 ENCOUNTER — TRANSCRIPTION ENCOUNTER (OUTPATIENT)
Age: 67
End: 2017-03-03

## 2017-03-03 ENCOUNTER — OUTPATIENT (OUTPATIENT)
Dept: OUTPATIENT SERVICES | Facility: HOSPITAL | Age: 67
LOS: 1 days | Discharge: HOME | End: 2017-03-03
Payer: COMMERCIAL

## 2017-03-03 VITALS
TEMPERATURE: 98 F | SYSTOLIC BLOOD PRESSURE: 114 MMHG | RESPIRATION RATE: 14 BRPM | DIASTOLIC BLOOD PRESSURE: 69 MMHG | HEART RATE: 78 BPM | OXYGEN SATURATION: 100 %

## 2017-03-03 VITALS
SYSTOLIC BLOOD PRESSURE: 120 MMHG | HEART RATE: 87 BPM | OXYGEN SATURATION: 100 % | WEIGHT: 149.91 LBS | RESPIRATION RATE: 18 BRPM | HEIGHT: 58 IN | DIASTOLIC BLOOD PRESSURE: 70 MMHG | TEMPERATURE: 98 F

## 2017-03-03 DIAGNOSIS — C18.3 MALIGNANT NEOPLASM OF HEPATIC FLEXURE: ICD-10-CM

## 2017-03-03 DIAGNOSIS — Z90.49 ACQUIRED ABSENCE OF OTHER SPECIFIED PARTS OF DIGESTIVE TRACT: Chronic | ICD-10-CM

## 2017-03-03 DIAGNOSIS — Z01.818 ENCOUNTER FOR OTHER PREPROCEDURAL EXAMINATION: ICD-10-CM

## 2017-03-03 PROCEDURE — 76000 FLUOROSCOPY <1 HR PHYS/QHP: CPT

## 2017-03-03 PROCEDURE — C1788: CPT

## 2017-03-03 PROCEDURE — 36561 INSERT TUNNELED CV CATH: CPT

## 2017-03-03 RX ORDER — SODIUM CHLORIDE 9 MG/ML
1000 INJECTION, SOLUTION INTRAVENOUS
Qty: 0 | Refills: 0 | Status: DISCONTINUED | OUTPATIENT
Start: 2017-03-03 | End: 2017-03-03

## 2017-03-03 RX ORDER — GLYBURIDE 5 MG
1 TABLET ORAL
Qty: 0 | Refills: 0 | COMMUNITY

## 2017-03-03 RX ORDER — ONDANSETRON 8 MG/1
4 TABLET, FILM COATED ORAL EVERY 6 HOURS
Qty: 0 | Refills: 0 | Status: DISCONTINUED | OUTPATIENT
Start: 2017-03-03 | End: 2017-03-11

## 2017-03-03 RX ORDER — HYDROMORPHONE HYDROCHLORIDE 2 MG/ML
0.5 INJECTION INTRAMUSCULAR; INTRAVENOUS; SUBCUTANEOUS
Qty: 0 | Refills: 0 | Status: DISCONTINUED | OUTPATIENT
Start: 2017-03-03 | End: 2017-03-03

## 2017-03-03 RX ORDER — SODIUM CHLORIDE 9 MG/ML
1000 INJECTION, SOLUTION INTRAVENOUS
Qty: 0 | Refills: 0 | Status: DISCONTINUED | OUTPATIENT
Start: 2017-03-03 | End: 2017-03-11

## 2017-03-03 RX ORDER — SIMVASTATIN 20 MG/1
1 TABLET, FILM COATED ORAL
Qty: 0 | Refills: 0 | COMMUNITY

## 2017-03-03 RX ORDER — ACETAMINOPHEN 500 MG
1000 TABLET ORAL ONCE
Qty: 0 | Refills: 0 | Status: COMPLETED | OUTPATIENT
Start: 2017-03-03 | End: 2017-03-03

## 2017-03-03 RX ADMIN — Medication 1000 MILLIGRAM(S): at 12:40

## 2017-03-03 RX ADMIN — Medication 400 MILLIGRAM(S): at 12:25

## 2017-03-03 RX ADMIN — HYDROMORPHONE HYDROCHLORIDE 0.5 MILLIGRAM(S): 2 INJECTION INTRAMUSCULAR; INTRAVENOUS; SUBCUTANEOUS at 11:56

## 2017-03-03 RX ADMIN — SODIUM CHLORIDE 125 MILLILITER(S): 9 INJECTION, SOLUTION INTRAVENOUS at 12:39

## 2017-03-03 RX ADMIN — HYDROMORPHONE HYDROCHLORIDE 0.5 MILLIGRAM(S): 2 INJECTION INTRAMUSCULAR; INTRAVENOUS; SUBCUTANEOUS at 12:11

## 2017-03-03 NOTE — ASU DISCHARGE PLAN (ADULT/PEDIATRIC). - MEDICATION SUMMARY - MEDICATIONS TO TAKE
I will START or STAY ON the medications listed below when I get home from the hospital:    oxyCODONE-acetaminophen 5 mg-325 mg oral tablet  -- 1 tab(s) by mouth every 4 hours, As Needed -for severe pain MDD:6 tabs  -- Caution federal law prohibits the transfer of this drug to any person other  than the person for whom it was prescribed.  May cause drowsiness.  Alcohol may intensify this effect.  Use care when operating dangerous machinery.  This prescription cannot be refilled.  This product contains acetaminophen.  Do not use  with any other product containing acetaminophen to prevent possible liver damage.  Using more of this medication than prescribed may cause serious breathing problems.    -- Indication: For as previously prescribed    nabumetone 750 mg oral tablet  -- 1 tab(s) by mouth once a day  -- Indication: For as previously prescribed    enalapril 20 mg oral tablet  -- 1 tab(s) by mouth once a day  -- Indication: For as previously prescribed    enalapril 20 mg oral tablet  -- 1 tab(s) by mouth once a day  -- Indication: For as previously prescribed    glimepiride 4 mg oral tablet  -- 1 tab(s) by mouth 2 times a day  -- Indication: For as previously prescribed    Levemir FlexPen 100 units/mL subcutaneous solution  -- 30 unit(s) subcutaneous once a day  -- Indication: For as previously prescribed    Janumet XR 50 mg-1000 mg oral tablet, extended release  -- 2 tab(s) by mouth once a day (in the evening)  -- Indication: For as previously prescribed    SITagliptin-metFORMIN 50mg-1000mg oral tablet  -- 1 tab(s) by mouth 2 times a day  -- Indication: For as previously prescribed    Levemir FlexPen 100 units/mL subcutaneous solution  -- 30 unit(s) subcutaneous once a day  -- Indication: For as previously prescribed    glimepiride 4 mg oral tablet  -- 1 tab(s) by mouth once a day  -- Indication: For as previously prescribed    simvastatin 40 mg oral tablet  -- 1 tab(s) by mouth once a day (at bedtime)  -- Indication: For as previously prescribed    simvastatin 40 mg oral tablet  -- 1 tab(s) by mouth once a day (at bedtime)  -- Indication: For as previously prescribed    pantoprazole 40 mg oral delayed release tablet  -- 1 tab(s) by mouth 2 times a day  -- It is very important that you take or use this exactly as directed.  Do not skip doses or discontinue unless directed by your doctor.  Obtain medical advice before taking any non-prescription drugs as some may affect the action of this medication.  Swallow whole.  Do not crush.    -- Indication: For as previously prescribed

## 2017-03-09 DIAGNOSIS — E11.9 TYPE 2 DIABETES MELLITUS WITHOUT COMPLICATIONS: ICD-10-CM

## 2017-03-09 DIAGNOSIS — E78.5 HYPERLIPIDEMIA, UNSPECIFIED: ICD-10-CM

## 2017-03-09 DIAGNOSIS — I10 ESSENTIAL (PRIMARY) HYPERTENSION: ICD-10-CM

## 2017-03-09 DIAGNOSIS — C18.8 MALIGNANT NEOPLASM OF OVERLAPPING SITES OF COLON: ICD-10-CM

## 2017-04-25 ENCOUNTER — OUTPATIENT (OUTPATIENT)
Dept: OUTPATIENT SERVICES | Facility: HOSPITAL | Age: 67
LOS: 1 days | End: 2017-04-25
Payer: COMMERCIAL

## 2017-04-25 DIAGNOSIS — Z90.49 ACQUIRED ABSENCE OF OTHER SPECIFIED PARTS OF DIGESTIVE TRACT: Chronic | ICD-10-CM

## 2017-04-25 DIAGNOSIS — R07.2 PRECORDIAL PAIN: ICD-10-CM

## 2017-04-25 PROCEDURE — A9502: CPT

## 2017-04-25 PROCEDURE — 78452 HT MUSCLE IMAGE SPECT MULT: CPT

## 2017-04-25 PROCEDURE — 93017 CV STRESS TEST TRACING ONLY: CPT

## 2017-05-08 ENCOUNTER — APPOINTMENT (OUTPATIENT)
Dept: CT IMAGING | Facility: IMAGING CENTER | Age: 67
End: 2017-05-08

## 2017-05-08 ENCOUNTER — OUTPATIENT (OUTPATIENT)
Dept: OUTPATIENT SERVICES | Facility: HOSPITAL | Age: 67
LOS: 1 days | End: 2017-05-08
Payer: COMMERCIAL

## 2017-05-08 DIAGNOSIS — R91.1 SOLITARY PULMONARY NODULE: ICD-10-CM

## 2017-05-08 DIAGNOSIS — Z90.49 ACQUIRED ABSENCE OF OTHER SPECIFIED PARTS OF DIGESTIVE TRACT: Chronic | ICD-10-CM

## 2017-05-08 PROCEDURE — 71250 CT THORAX DX C-: CPT

## 2017-06-12 ENCOUNTER — OUTPATIENT (OUTPATIENT)
Dept: OUTPATIENT SERVICES | Facility: HOSPITAL | Age: 67
LOS: 1 days | End: 2017-06-12
Payer: COMMERCIAL

## 2017-06-12 ENCOUNTER — APPOINTMENT (OUTPATIENT)
Dept: NUCLEAR MEDICINE | Facility: IMAGING CENTER | Age: 67
End: 2017-06-12

## 2017-06-12 DIAGNOSIS — Z90.49 ACQUIRED ABSENCE OF OTHER SPECIFIED PARTS OF DIGESTIVE TRACT: Chronic | ICD-10-CM

## 2017-06-12 DIAGNOSIS — C18.9 MALIGNANT NEOPLASM OF COLON, UNSPECIFIED: ICD-10-CM

## 2017-06-12 PROCEDURE — A9552: CPT

## 2017-06-12 PROCEDURE — 78815 PET IMAGE W/CT SKULL-THIGH: CPT

## 2018-02-01 ENCOUNTER — RESULT REVIEW (OUTPATIENT)
Age: 68
End: 2018-02-01

## 2018-08-29 PROBLEM — I10 ESSENTIAL (PRIMARY) HYPERTENSION: Chronic | Status: ACTIVE | Noted: 2017-02-27

## 2018-08-29 PROBLEM — E66.9 OBESITY, UNSPECIFIED: Chronic | Status: ACTIVE | Noted: 2017-02-27

## 2018-08-29 PROBLEM — E11.9 TYPE 2 DIABETES MELLITUS WITHOUT COMPLICATIONS: Chronic | Status: ACTIVE | Noted: 2017-02-27

## 2018-08-29 PROBLEM — E78.5 HYPERLIPIDEMIA, UNSPECIFIED: Chronic | Status: ACTIVE | Noted: 2017-02-27

## 2018-08-29 PROBLEM — C18.3 MALIGNANT NEOPLASM OF HEPATIC FLEXURE: Chronic | Status: ACTIVE | Noted: 2017-02-27

## 2018-08-29 PROBLEM — K63.9 DISEASE OF INTESTINE, UNSPECIFIED: Chronic | Status: ACTIVE | Noted: 2017-02-27

## 2018-10-23 ENCOUNTER — APPOINTMENT (OUTPATIENT)
Dept: GASTROENTEROLOGY | Facility: CLINIC | Age: 68
End: 2018-10-23
Payer: MEDICARE

## 2018-10-23 VITALS
HEIGHT: 60 IN | RESPIRATION RATE: 16 BRPM | BODY MASS INDEX: 31.61 KG/M2 | OXYGEN SATURATION: 96 % | HEART RATE: 114 BPM | DIASTOLIC BLOOD PRESSURE: 72 MMHG | WEIGHT: 161 LBS | SYSTOLIC BLOOD PRESSURE: 146 MMHG | TEMPERATURE: 98.6 F

## 2018-10-23 PROCEDURE — 99203 OFFICE O/P NEW LOW 30 MIN: CPT

## 2018-11-12 NOTE — H&P PST ADULT - NS PRO AD NO ADVANCE DIRECTIVE
Detail Level: Simple Additional Notes: Pt thinking of removing the area due to the site being uncomfortable when sitting down. Discussed with patient she could developed more scar tissue after surgery, she understood. Case discussed with Dr Webb. He stated he will do procedure if patient decides she want to surgically removed the area completely. She will call later to schedule No

## 2019-02-11 ENCOUNTER — FORM ENCOUNTER (OUTPATIENT)
Age: 69
End: 2019-02-11

## 2019-02-12 ENCOUNTER — APPOINTMENT (OUTPATIENT)
Dept: ENDOVASCULAR SURGERY | Facility: CLINIC | Age: 69
End: 2019-02-12
Payer: MEDICARE

## 2019-02-12 PROCEDURE — 36590 REMOVAL TUNNELED CV CATH: CPT

## 2019-02-12 PROCEDURE — 77001 FLUOROGUIDE FOR VEIN DEVICE: CPT

## 2019-02-25 ENCOUNTER — APPOINTMENT (OUTPATIENT)
Dept: CT IMAGING | Facility: IMAGING CENTER | Age: 69
End: 2019-02-25

## 2019-03-02 ENCOUNTER — APPOINTMENT (OUTPATIENT)
Dept: CT IMAGING | Facility: IMAGING CENTER | Age: 69
End: 2019-03-02
Payer: MEDICARE

## 2019-03-02 ENCOUNTER — OUTPATIENT (OUTPATIENT)
Dept: OUTPATIENT SERVICES | Facility: HOSPITAL | Age: 69
LOS: 1 days | End: 2019-03-02
Payer: COMMERCIAL

## 2019-03-02 DIAGNOSIS — Z00.8 ENCOUNTER FOR OTHER GENERAL EXAMINATION: ICD-10-CM

## 2019-03-02 DIAGNOSIS — Z90.49 ACQUIRED ABSENCE OF OTHER SPECIFIED PARTS OF DIGESTIVE TRACT: Chronic | ICD-10-CM

## 2019-03-02 PROCEDURE — 71250 CT THORAX DX C-: CPT

## 2019-03-02 PROCEDURE — 74177 CT ABD & PELVIS W/CONTRAST: CPT

## 2019-03-02 PROCEDURE — 74177 CT ABD & PELVIS W/CONTRAST: CPT | Mod: 26

## 2019-03-02 PROCEDURE — 71250 CT THORAX DX C-: CPT | Mod: 26

## 2019-06-03 NOTE — H&P ADULT. - HISTORY OF PRESENT ILLNESS
[___] : Living: [unfilled]
66F DM2 HTN one month ago had URI treated with antibiotic and NSAID. Sine then has had progressive fatigue and nonexertional mid sternal and mid-back pain.  She went to PMD where labwork showed Hb 6.5, was told to come to ED for evaluation.  Denies hematemesis, hematochezia, dark stools, melena, BRBPR, vaginal bleeding, hematuria.     In ED received protonix IV and 1 unit PRBC.

## 2019-10-17 ENCOUNTER — APPOINTMENT (OUTPATIENT)
Dept: GASTROENTEROLOGY | Facility: CLINIC | Age: 69
End: 2019-10-17
Payer: MEDICARE

## 2019-10-17 VITALS
SYSTOLIC BLOOD PRESSURE: 130 MMHG | HEART RATE: 103 BPM | BODY MASS INDEX: 31.8 KG/M2 | HEIGHT: 60 IN | DIASTOLIC BLOOD PRESSURE: 72 MMHG | WEIGHT: 162 LBS | OXYGEN SATURATION: 97 % | TEMPERATURE: 97.7 F | RESPIRATION RATE: 16 BRPM

## 2019-10-17 DIAGNOSIS — I10 ESSENTIAL (PRIMARY) HYPERTENSION: ICD-10-CM

## 2019-10-17 DIAGNOSIS — D50.9 IRON DEFICIENCY ANEMIA, UNSPECIFIED: ICD-10-CM

## 2019-10-17 DIAGNOSIS — E11.9 TYPE 2 DIABETES MELLITUS W/OUT COMPLICATIONS: ICD-10-CM

## 2019-10-17 PROCEDURE — 99214 OFFICE O/P EST MOD 30 MIN: CPT

## 2019-10-17 PROCEDURE — 99203 OFFICE O/P NEW LOW 30 MIN: CPT

## 2019-11-02 PROBLEM — I10 HYPERTENSION: Status: ACTIVE | Noted: 2019-11-02

## 2019-11-02 PROBLEM — D50.9 IRON DEFICIENCY ANEMIA: Status: ACTIVE | Noted: 2019-11-02

## 2019-11-02 PROBLEM — E11.9 DIABETES: Status: ACTIVE | Noted: 2019-11-02

## 2019-11-02 NOTE — HISTORY OF PRESENT ILLNESS
[FreeTextEntry1] : Patient referred for iron deficiency anemia and evaluation for video capsule endoscopy.\par Primary Gastroenterologist is Dr. PETERSON RESENDIZ. Primary doctor Dr. JOURDAN CIFUENTES\par Referring physician Dr. Jose VALDEZ, oncologist\par \par Patient had upper endoscopy and colonoscopy by Dr. RESENDIZ this year.\par Diagnosed with H. pylori gastritis, treated, confirm eradicated by HP stool antigen\par \par Does not have chronic anemia. Had anemia requiring blood transfusion and 2017 prompting diagnosis of colon cancer. Currently on oral iron supplementation, did receive IV iron infusion earlier this year. No overt GI blood loss. No other visible blood loss.\par \par No fevers, chills, sweats, abdominal pain, heartburn, dysphagia, nausea, vomiting, constipation, diarrhea, melena, hematochezia, jaundice or weight loss.\par \par Meds:\par insulin\par enalapril\par Janumet\par glimepiride\par simvastatin\par

## 2019-11-02 NOTE — CONSULT LETTER
[Dear  ___] : Dear  [unfilled], [Consult Letter:] : I had the pleasure of evaluating your patient, [unfilled]. [Please see my note below.] : Please see my note below. [Consult Closing:] : Thank you very much for allowing me to participate in the care of this patient.  If you have any questions, please do not hesitate to contact me. [Sincerely,] : Sincerely, [FreeTextEntry3] : Howard Whitney MD, MPH, FASGE\par Chief of Clinical Quality in Gastroenterology, [facility]\par Director of Endoscopic Ultrasound, [facility]\par [address], [address]\par [place], [address]\par Weekdays 8 AM-4PM [phone] - [place]\par 24 hours [phone] [DrJessica  ___] : Dr. LIANG [DrJessica ___] : Dr. LIANG

## 2019-11-02 NOTE — ASSESSMENT
[FreeTextEntry1] : Impression:\par \par Iron deficiency anemia without etiology on upper endoscopy and colonoscopy\par \par History of colon cancer, s/p partial colectomy in 2017.  No history of bowel obstruction.\par \par Plan:\par \par Video capsule endoscopy.\par \par Risks, benefits, alternatives of the procedure were discussed with the patient and the patient was educated about the procedure. Risks include, but are not limited to, capsule retention, bowel obstruction, aspiration, infection, possible need for further procedures including emergency surgery, missed lesions.  Patient understands and agrees to proceed.\par \par Will obtain insurance authorization.\par Office staff will call patient to schedule.\par Patient instructed to call office if no contact in 2 weeks.

## 2020-01-09 ENCOUNTER — APPOINTMENT (OUTPATIENT)
Dept: GASTROENTEROLOGY | Facility: CLINIC | Age: 70
End: 2020-01-09

## 2020-07-23 ENCOUNTER — APPOINTMENT (OUTPATIENT)
Dept: GASTROENTEROLOGY | Facility: CLINIC | Age: 70
End: 2020-07-23
Payer: MEDICARE

## 2020-07-23 VITALS — TEMPERATURE: 97.4 F

## 2020-07-23 PROCEDURE — 91110 GI TRC IMG INTRAL ESOPH-ILE: CPT

## 2021-10-20 NOTE — PRE-OP CHECKLIST - DNR CLARIFICATION FORM COMPLETED
SANDRA REYESCENDALES is a 32y  now PPD#1 s/p spontaneous vaginal delivery at 37.4 weeks gestation, uncomplicated.    S:    The patient has no complaints.  Pain controlled with current treatment regimen.   She is ambulating without difficulty and tolerating PO.   +flatus/-BM/+ voiding   She endorses appropriate lochia, which is decreasing.   She is breastfeeding without difficulty.   Patient denies lightheadedness, dizziness, palpitations, shortness of breath and chest pain.     O:    T(C): 36.7 (10-19-21 @ 02:48), Max: 36.7 (10-19-21 @ 02:48)  HR: 68 (10-19-21 @ 02:48) (66 - 82)  BP: 114/71 (10-19-21 @ 02:48) (114/71 - 164/60)  RR: 16 (10-19-21 @ 02:48) (16 - 18)  SpO2: 96% (10-19-21 @ 02:48) (95% - 96%)    Gen: NAD, AOx3  CV: RRR  Pulm: CTAB  Breast: Nontender, non-engorged   Abdomen:  Soft, non-tender, non-distended, +bowel sounds  Uterus:  Fundus firm below umbilicus  VE:  +Lochia  Ext:  Non-tender and non-edematous                          12.7   9.68  )-----------( 196      ( 18 Oct 2021 23:54 )             36.6     10-19    137  |  103  |  15.3  ----------------------------<  83  3.6   |  18.0<L>  |  0.55    Ca    8.9      19 Oct 2021 01:03    TPro  7.0  /  Alb  3.7  /  TBili  <0.2<L>  /  DBili  x   /  AST  17  /  ALT  13  /  AlkPhos  129<H>  10-19        
SANDRA REYESCENDALES is a 32y  now PPD#2 s/p spontaneous vaginal delivery at 37.4 weeks gestation, uncomplicated.    S:    The patient has no complaints.  Pain controlled with current treatment regimen.   She is ambulating without difficulty and tolerating PO.   +flatus/+BM/+ voiding   She endorses appropriate lochia, which is decreasing.   She is breastfeeding without difficulty.   Patient denies lightheadedness, dizziness, palpitations, shortness of breath and chest pain.     O:    Vital Signs Last 24 Hrs  T(C): 36.6 (19 Oct 2021 15:24), Max: 36.6 (19 Oct 2021 12:12)  T(F): 97.9 (19 Oct 2021 15:24), Max: 97.9 (19 Oct 2021 15:24)  HR: 67 (19 Oct 2021 15:24) (64 - 69)  BP: 111/74 (19 Oct 2021 15:24) (109/71 - 128/76)  RR: 18 (19 Oct 2021 15:24) (16 - 18)  SpO2: 98% (19 Oct 2021 15:24) (96% - 98%)    Gen: NAD, AOx3  Resp: breathing comfortably on RA  Abdomen:  Soft, non-tender, non-distended  Uterus:  Fundus firm below umbilicus  VE:  +Lochia  Ext:  Non-tender and non-edematous                        11.2   x     )-----------( x        ( 19 Oct 2021 09:00 )             32.5     10-    137  |  103  |  15.3  ----------------------------<  83  3.6   |  18.0<L>  |  0.55    Ca    8.9      19 Oct 2021 01:03    TPro  7.0  /  Alb  3.7  /  TBili  <0.2<L>  /  DBili  x   /  AST  17  /  ALT  13  /  AlkPhos  129<H>  10-19  
n/a

## 2022-05-30 NOTE — H&P PST ADULT - MUSCULOSKELETAL
[No Acute Distress] : no acute distress [Well Nourished] : well nourished [Well Developed] : well developed [Well-Appearing] : well-appearing [Normal Sclera/Conjunctiva] : normal sclera/conjunctiva [PERRL] : pupils equal round and reactive to light [EOMI] : extraocular movements intact [Normal Outer Ear/Nose] : the outer ears and nose were normal in appearance [Normal Oropharynx] : the oropharynx was normal [No JVD] : no jugular venous distention [No Lymphadenopathy] : no lymphadenopathy [Supple] : supple [Thyroid Normal, No Nodules] : the thyroid was normal and there were no nodules present [No Respiratory Distress] : no respiratory distress  [No Accessory Muscle Use] : no accessory muscle use [Clear to Auscultation] : lungs were clear to auscultation bilaterally [Normal Rate] : normal rate  [Regular Rhythm] : with a regular rhythm [Normal S1, S2] : normal S1 and S2 [No Murmur] : no murmur heard [No Carotid Bruits] : no carotid bruits [No Abdominal Bruit] : a ~M bruit was not heard ~T in the abdomen [No Varicosities] : no varicosities [Pedal Pulses Present] : the pedal pulses are present [No Edema] : there was no peripheral edema [No Palpable Aorta] : no palpable aorta [No Extremity Clubbing/Cyanosis] : no extremity clubbing/cyanosis [Soft] : abdomen soft [Non Tender] : non-tender [Non-distended] : non-distended [No Masses] : no abdominal mass palpated [No HSM] : no HSM [Normal Bowel Sounds] : normal bowel sounds [Normal Posterior Cervical Nodes] : no posterior cervical lymphadenopathy [Normal Anterior Cervical Nodes] : no anterior cervical lymphadenopathy [No CVA Tenderness] : no CVA  tenderness [No Spinal Tenderness] : no spinal tenderness [No Joint Swelling] : no joint swelling [Grossly Normal Strength/Tone] : grossly normal strength/tone [No Rash] : no rash [Coordination Grossly Intact] : coordination grossly intact [No Focal Deficits] : no focal deficits [Normal Gait] : normal gait [Deep Tendon Reflexes (DTR)] : deep tendon reflexes were 2+ and symmetric [Normal Affect] : the affect was normal [Normal Insight/Judgement] : insight and judgment were intact [Normal Sphincter Tone] : normal sphincter tone [No Mass] : no mass [Stool Occult Blood] : stool negative for occult blood [FreeTextEntry1] : guaiac negative ; prostate mildly enlarged negative No joint pain, swelling or deformity; no limitation of movement

## 2022-07-11 NOTE — ASU PATIENT PROFILE, ADULT - TEACHING/LEARNING FACTORS IMPACT ABILITY TO LEARN
[address], and [facility], [address], [address] [address]   Ph: [phone]    Start ear drops and see Dr Leslie Vizcaino
none

## 2022-07-19 ENCOUNTER — INPATIENT (INPATIENT)
Facility: HOSPITAL | Age: 72
LOS: 5 days | Discharge: HOME | End: 2022-07-25
Attending: SURGERY | Admitting: SURGERY

## 2022-07-19 ENCOUNTER — TRANSCRIPTION ENCOUNTER (OUTPATIENT)
Age: 72
End: 2022-07-19

## 2022-07-19 VITALS
HEIGHT: 58 IN | OXYGEN SATURATION: 100 % | DIASTOLIC BLOOD PRESSURE: 77 MMHG | RESPIRATION RATE: 18 BRPM | TEMPERATURE: 98 F | SYSTOLIC BLOOD PRESSURE: 135 MMHG | HEART RATE: 100 BPM | WEIGHT: 154.98 LBS

## 2022-07-19 DIAGNOSIS — K56.609 UNSPECIFIED INTESTINAL OBSTRUCTION, UNSPECIFIED AS TO PARTIAL VERSUS COMPLETE OBSTRUCTION: ICD-10-CM

## 2022-07-19 DIAGNOSIS — Z90.49 ACQUIRED ABSENCE OF OTHER SPECIFIED PARTS OF DIGESTIVE TRACT: Chronic | ICD-10-CM

## 2022-07-19 LAB
ALBUMIN SERPL ELPH-MCNC: 3.9 G/DL — SIGNIFICANT CHANGE UP (ref 3.3–5)
ALP SERPL-CCNC: 77 U/L — SIGNIFICANT CHANGE UP (ref 40–120)
ALT FLD-CCNC: 16 U/L — SIGNIFICANT CHANGE UP (ref 4–33)
ANION GAP SERPL CALC-SCNC: 16 MMOL/L — HIGH (ref 7–14)
APTT BLD: 30.8 SEC — SIGNIFICANT CHANGE UP (ref 27–36.3)
AST SERPL-CCNC: 16 U/L — SIGNIFICANT CHANGE UP (ref 4–32)
BASE EXCESS BLDV CALC-SCNC: -2.6 MMOL/L — LOW (ref -2–3)
BASE EXCESS BLDV CALC-SCNC: 0.7 MMOL/L — SIGNIFICANT CHANGE UP (ref -2–3)
BASOPHILS # BLD AUTO: 0.02 K/UL — SIGNIFICANT CHANGE UP (ref 0–0.2)
BASOPHILS NFR BLD AUTO: 0.2 % — SIGNIFICANT CHANGE UP (ref 0–2)
BILIRUB SERPL-MCNC: 0.4 MG/DL — SIGNIFICANT CHANGE UP (ref 0.2–1.2)
BLD GP AB SCN SERPL QL: NEGATIVE — SIGNIFICANT CHANGE UP
BLOOD GAS VENOUS COMPREHENSIVE RESULT: SIGNIFICANT CHANGE UP
BLOOD GAS VENOUS COMPREHENSIVE RESULT: SIGNIFICANT CHANGE UP
BUN SERPL-MCNC: 17 MG/DL — SIGNIFICANT CHANGE UP (ref 7–23)
CALCIUM SERPL-MCNC: 9.6 MG/DL — SIGNIFICANT CHANGE UP (ref 8.4–10.5)
CHLORIDE BLDV-SCNC: 95 MMOL/L — LOW (ref 96–108)
CHLORIDE BLDV-SCNC: 97 MMOL/L — SIGNIFICANT CHANGE UP (ref 96–108)
CHLORIDE SERPL-SCNC: 93 MMOL/L — LOW (ref 98–107)
CO2 BLDV-SCNC: 24.5 MMOL/L — SIGNIFICANT CHANGE UP (ref 22–26)
CO2 BLDV-SCNC: 26.6 MMOL/L — HIGH (ref 22–26)
CO2 SERPL-SCNC: 22 MMOL/L — SIGNIFICANT CHANGE UP (ref 22–31)
CREAT SERPL-MCNC: 0.95 MG/DL — SIGNIFICANT CHANGE UP (ref 0.5–1.3)
EGFR: 64 ML/MIN/1.73M2 — SIGNIFICANT CHANGE UP
EOSINOPHIL # BLD AUTO: 0.01 K/UL — SIGNIFICANT CHANGE UP (ref 0–0.5)
EOSINOPHIL NFR BLD AUTO: 0.1 % — SIGNIFICANT CHANGE UP (ref 0–6)
FLUAV AG NPH QL: SIGNIFICANT CHANGE UP
FLUBV AG NPH QL: SIGNIFICANT CHANGE UP
GAS PNL BLDV: 128 MMOL/L — LOW (ref 136–145)
GAS PNL BLDV: 130 MMOL/L — LOW (ref 136–145)
GAS PNL BLDV: SIGNIFICANT CHANGE UP
GAS PNL BLDV: SIGNIFICANT CHANGE UP
GLUCOSE BLDC GLUCOMTR-MCNC: 257 MG/DL — HIGH (ref 70–99)
GLUCOSE BLDV-MCNC: 290 MG/DL — HIGH (ref 70–99)
GLUCOSE BLDV-MCNC: 363 MG/DL — HIGH (ref 70–99)
GLUCOSE SERPL-MCNC: 329 MG/DL — HIGH (ref 70–99)
HCO3 BLDV-SCNC: 23 MMOL/L — SIGNIFICANT CHANGE UP (ref 22–29)
HCO3 BLDV-SCNC: 25 MMOL/L — SIGNIFICANT CHANGE UP (ref 22–29)
HCT VFR BLD CALC: 39.5 % — SIGNIFICANT CHANGE UP (ref 34.5–45)
HCT VFR BLDA CALC: 37 % — SIGNIFICANT CHANGE UP (ref 34.5–46.5)
HCT VFR BLDA CALC: 41 % — SIGNIFICANT CHANGE UP (ref 34.5–46.5)
HGB BLD CALC-MCNC: 12.3 G/DL — SIGNIFICANT CHANGE UP (ref 11.5–15.5)
HGB BLD CALC-MCNC: 13.6 G/DL — SIGNIFICANT CHANGE UP (ref 11.5–15.5)
HGB BLD-MCNC: 13.4 G/DL — SIGNIFICANT CHANGE UP (ref 11.5–15.5)
IANC: 7.85 K/UL — HIGH (ref 1.8–7.4)
IMM GRANULOCYTES NFR BLD AUTO: 0.3 % — SIGNIFICANT CHANGE UP (ref 0–1.5)
INR BLD: 1.09 RATIO — SIGNIFICANT CHANGE UP (ref 0.88–1.16)
LACTATE BLDV-MCNC: 2.7 MMOL/L — HIGH (ref 0.5–2)
LACTATE BLDV-MCNC: 2.8 MMOL/L — HIGH (ref 0.5–2)
LIDOCAIN IGE QN: 27 U/L — SIGNIFICANT CHANGE UP (ref 7–60)
LYMPHOCYTES # BLD AUTO: 1.74 K/UL — SIGNIFICANT CHANGE UP (ref 1–3.3)
LYMPHOCYTES # BLD AUTO: 16.6 % — SIGNIFICANT CHANGE UP (ref 13–44)
MAGNESIUM SERPL-MCNC: 1.4 MG/DL — LOW (ref 1.6–2.6)
MCHC RBC-ENTMCNC: 29.5 PG — SIGNIFICANT CHANGE UP (ref 27–34)
MCHC RBC-ENTMCNC: 33.9 GM/DL — SIGNIFICANT CHANGE UP (ref 32–36)
MCV RBC AUTO: 86.8 FL — SIGNIFICANT CHANGE UP (ref 80–100)
MONOCYTES # BLD AUTO: 0.84 K/UL — SIGNIFICANT CHANGE UP (ref 0–0.9)
MONOCYTES NFR BLD AUTO: 8 % — SIGNIFICANT CHANGE UP (ref 2–14)
NEUTROPHILS # BLD AUTO: 7.85 K/UL — HIGH (ref 1.8–7.4)
NEUTROPHILS NFR BLD AUTO: 74.8 % — SIGNIFICANT CHANGE UP (ref 43–77)
NRBC # BLD: 0 /100 WBCS — SIGNIFICANT CHANGE UP
NRBC # FLD: 0 K/UL — SIGNIFICANT CHANGE UP
PCO2 BLDV: 40 MMHG — SIGNIFICANT CHANGE UP (ref 39–42)
PCO2 BLDV: 43 MMHG — HIGH (ref 39–42)
PH BLDV: 7.34 — SIGNIFICANT CHANGE UP (ref 7.32–7.43)
PH BLDV: 7.41 — SIGNIFICANT CHANGE UP (ref 7.32–7.43)
PLATELET # BLD AUTO: 410 K/UL — HIGH (ref 150–400)
PO2 BLDV: 30 MMHG — SIGNIFICANT CHANGE UP
PO2 BLDV: 36 MMHG — SIGNIFICANT CHANGE UP
POTASSIUM BLDV-SCNC: 4.2 MMOL/L — SIGNIFICANT CHANGE UP (ref 3.5–5.1)
POTASSIUM BLDV-SCNC: 4.9 MMOL/L — SIGNIFICANT CHANGE UP (ref 3.5–5.1)
POTASSIUM SERPL-MCNC: 5 MMOL/L — SIGNIFICANT CHANGE UP (ref 3.5–5.3)
POTASSIUM SERPL-SCNC: 5 MMOL/L — SIGNIFICANT CHANGE UP (ref 3.5–5.3)
PROT SERPL-MCNC: 7.8 G/DL — SIGNIFICANT CHANGE UP (ref 6–8.3)
PROTHROM AB SERPL-ACNC: 12.6 SEC — SIGNIFICANT CHANGE UP (ref 10.5–13.4)
RBC # BLD: 4.55 M/UL — SIGNIFICANT CHANGE UP (ref 3.8–5.2)
RBC # FLD: 14.1 % — SIGNIFICANT CHANGE UP (ref 10.3–14.5)
RH IG SCN BLD-IMP: POSITIVE — SIGNIFICANT CHANGE UP
RSV RNA NPH QL NAA+NON-PROBE: SIGNIFICANT CHANGE UP
SAO2 % BLDV: 33.4 % — SIGNIFICANT CHANGE UP
SAO2 % BLDV: 40.2 % — SIGNIFICANT CHANGE UP
SARS-COV-2 RNA SPEC QL NAA+PROBE: SIGNIFICANT CHANGE UP
SODIUM SERPL-SCNC: 131 MMOL/L — LOW (ref 135–145)
WBC # BLD: 10.49 K/UL — SIGNIFICANT CHANGE UP (ref 3.8–10.5)
WBC # FLD AUTO: 10.49 K/UL — SIGNIFICANT CHANGE UP (ref 3.8–10.5)

## 2022-07-19 PROCEDURE — 74177 CT ABD & PELVIS W/CONTRAST: CPT | Mod: 26,MA

## 2022-07-19 PROCEDURE — 71045 X-RAY EXAM CHEST 1 VIEW: CPT | Mod: 26

## 2022-07-19 PROCEDURE — 99285 EMERGENCY DEPT VISIT HI MDM: CPT

## 2022-07-19 RX ORDER — DEXTROSE 50 % IN WATER 50 %
25 SYRINGE (ML) INTRAVENOUS ONCE
Refills: 0 | Status: DISCONTINUED | OUTPATIENT
Start: 2022-07-19 | End: 2022-07-24

## 2022-07-19 RX ORDER — ENOXAPARIN SODIUM 100 MG/ML
40 INJECTION SUBCUTANEOUS EVERY 24 HOURS
Refills: 0 | Status: DISCONTINUED | OUTPATIENT
Start: 2022-07-19 | End: 2022-07-25

## 2022-07-19 RX ORDER — MORPHINE SULFATE 50 MG/1
4 CAPSULE, EXTENDED RELEASE ORAL ONCE
Refills: 0 | Status: DISCONTINUED | OUTPATIENT
Start: 2022-07-19 | End: 2022-07-19

## 2022-07-19 RX ORDER — SODIUM CHLORIDE 9 MG/ML
1000 INJECTION INTRAMUSCULAR; INTRAVENOUS; SUBCUTANEOUS ONCE
Refills: 0 | Status: COMPLETED | OUTPATIENT
Start: 2022-07-19 | End: 2022-07-19

## 2022-07-19 RX ORDER — TETRACAINE/BENZOCAINE/BUTAMBEN 2%-14%-2%
1 OINTMENT (GRAM) TOPICAL ONCE
Refills: 0 | Status: COMPLETED | OUTPATIENT
Start: 2022-07-19 | End: 2022-07-19

## 2022-07-19 RX ORDER — SODIUM CHLORIDE 9 MG/ML
1000 INJECTION, SOLUTION INTRAVENOUS
Refills: 0 | Status: DISCONTINUED | OUTPATIENT
Start: 2022-07-19 | End: 2022-07-20

## 2022-07-19 RX ORDER — SODIUM CHLORIDE 9 MG/ML
1000 INJECTION, SOLUTION INTRAVENOUS
Refills: 0 | Status: DISCONTINUED | OUTPATIENT
Start: 2022-07-19 | End: 2022-07-24

## 2022-07-19 RX ORDER — DEXTROSE 50 % IN WATER 50 %
12.5 SYRINGE (ML) INTRAVENOUS ONCE
Refills: 0 | Status: DISCONTINUED | OUTPATIENT
Start: 2022-07-19 | End: 2022-07-24

## 2022-07-19 RX ORDER — INSULIN LISPRO 100/ML
VIAL (ML) SUBCUTANEOUS EVERY 6 HOURS
Refills: 0 | Status: DISCONTINUED | OUTPATIENT
Start: 2022-07-19 | End: 2022-07-22

## 2022-07-19 RX ORDER — SODIUM CHLORIDE 9 MG/ML
1000 INJECTION, SOLUTION INTRAVENOUS ONCE
Refills: 0 | Status: COMPLETED | OUTPATIENT
Start: 2022-07-19 | End: 2022-07-19

## 2022-07-19 RX ORDER — ONDANSETRON 8 MG/1
4 TABLET, FILM COATED ORAL ONCE
Refills: 0 | Status: COMPLETED | OUTPATIENT
Start: 2022-07-19 | End: 2022-07-19

## 2022-07-19 RX ORDER — INSULIN DETEMIR 100/ML (3)
15 INSULIN PEN (ML) SUBCUTANEOUS DAILY
Refills: 0 | Status: DISCONTINUED | OUTPATIENT
Start: 2022-07-19 | End: 2022-07-25

## 2022-07-19 RX ADMIN — ONDANSETRON 4 MILLIGRAM(S): 8 TABLET, FILM COATED ORAL at 15:04

## 2022-07-19 RX ADMIN — SODIUM CHLORIDE 1000 MILLILITER(S): 9 INJECTION, SOLUTION INTRAVENOUS at 21:10

## 2022-07-19 RX ADMIN — SODIUM CHLORIDE 150 MILLILITER(S): 9 INJECTION, SOLUTION INTRAVENOUS at 22:44

## 2022-07-19 RX ADMIN — SODIUM CHLORIDE 150 MILLILITER(S): 9 INJECTION, SOLUTION INTRAVENOUS at 23:48

## 2022-07-19 RX ADMIN — MORPHINE SULFATE 4 MILLIGRAM(S): 50 CAPSULE, EXTENDED RELEASE ORAL at 21:10

## 2022-07-19 RX ADMIN — SODIUM CHLORIDE 1000 MILLILITER(S): 9 INJECTION INTRAMUSCULAR; INTRAVENOUS; SUBCUTANEOUS at 15:04

## 2022-07-19 RX ADMIN — MORPHINE SULFATE 4 MILLIGRAM(S): 50 CAPSULE, EXTENDED RELEASE ORAL at 15:19

## 2022-07-19 RX ADMIN — MORPHINE SULFATE 4 MILLIGRAM(S): 50 CAPSULE, EXTENDED RELEASE ORAL at 15:04

## 2022-07-19 RX ADMIN — Medication 1 SPRAY(S): at 23:48

## 2022-07-19 RX ADMIN — MORPHINE SULFATE 4 MILLIGRAM(S): 50 CAPSULE, EXTENDED RELEASE ORAL at 21:25

## 2022-07-19 NOTE — ED ADULT NURSE NOTE - OBJECTIVE STATEMENT
received pt in room 2, 72 yr/o female A+Ox4, ambulatory at baseline, Malayalam speaking. PMH of DMII, HTN, HLD, colon CA s/p hemicolectomy in 2017 and chemo. presented to the ED C/O sever abdominal pain with N/V. abdomen is rounded, large, firm mass noted on RLQ, tender to palpation. pt is tachycardic, MD aware. Right AC 20g placed, labs drawn and sent. medications given as ordered. will continue to monitor.

## 2022-07-19 NOTE — ED PROVIDER NOTE - PROGRESS NOTE DETAILS
Sarah, PGY3: Patient received as sign out, initially presented with RLQ pain with palpable mass on exam. patient s/p hemicolectomy with subsequent reversal for colon CA. pending CT and dispo pending results.

## 2022-07-19 NOTE — ED PROVIDER NOTE - ATTENDING CONTRIBUTION TO CARE
agree with resident note    "71 yo F PMHx of DM, HTN, HLD, colon CA s/p hemicolectomy in 2017 and chemo, presenting to ED for c/o of sharp non radiating RLQ abd pain and NBNB N/V x 2 days. Last BM today AM and unremarkable. No diarrhea or melena/hematochezia. Has not taken anything for her symptoms. Denies dysuria/hematuria. No fevers, cough, SOB, or CP."    PE: incomfortable in pain; VSS; CTAB/L; s1 s2 no m/r/g abd: distended; palpable mass? in RLQ; tender    Imp:  recurrence of colon CA?; has area of distension and possible mass in RLQ: will require, CT, labs, pain control and reassess after CT

## 2022-07-19 NOTE — H&P ADULT - ASSESSMENT
72F with hx of right hemicolectomy for colon ca in 2017, with incarcerated, bowel containing ventral hernia causing an SBO    -admit to surgery   -NPO/IVF  -NGT to suction  -serial abdominal exams  -trend lactate    Discussed with Dr. LASHAWN Gonzales    A team surgery  [id number]

## 2022-07-19 NOTE — ED PROVIDER NOTE - CLINICAL SUMMARY MEDICAL DECISION MAKING FREE TEXT BOX
71 yo F PMHx of DM, HTN, HLD, colon CA s/p hemicolectomy in 2017 and chemo, presenting to ED for c/o of sharp non radiating RLQ abd pain and NBNB N/V x 2 days. Last BM today AM and unremarkable. No diarrhea or melena/hematochezia. Has not taken anything for her symptoms. Denies dysuria/hematuria. No fevers, cough, SOB, or CP. Exam as above. Consider SBO, diverticulitis, colonic mass, colitis, uti, nephrolithiasis. Labs, ecg, imaging, analgesia, will reassess.

## 2022-07-19 NOTE — ED PROVIDER NOTE - OBJECTIVE STATEMENT
73 yo F PMHx of DM, HTN, HLD, colon CA s/p hemicolectomy in 2017 and chemo, presenting to ED for c/o of sharp non radiating RLQ abd pain and NBNB N/V x 2 days. Last BM today AM and unremarkable. No diarrhea or melena/hematochezia. Has not taken anything for her symptoms. Denies dysuria/hematuria. No fevers, cough, SOB, or CP.

## 2022-07-19 NOTE — ED ADULT NURSE REASSESSMENT NOTE - NS ED NURSE REASSESS COMMENT FT1
Break RN note- patient resting quietly in bed, breathing even and nonlabored. Patient reports her abdominal pain has improved. Repeat labs sent . Safety maintained. Patient stable upon exiting the room.

## 2022-07-19 NOTE — ED PROVIDER NOTE - PHYSICAL EXAMINATION
Gen: A&Ox4   HEENT: Atraumatic. Mucous membranes moist, no scleral icterus.  CV: Tachy. No significant lower extremity edema.   Resp: Respirations unlabored. CTAB, no rales, no wheezes.  GI: Abdomen ttp in suprapubic region, RLQ, otherwise non tender to palpation, soft,  Skin/MSK: No open wounds. No ecchymosis appreciated.  Neuro: Following commands. EOMI. Pupils ERRL.  Psych: Appropriate mood, cooperative

## 2022-07-19 NOTE — H&P ADULT - NSICDXPASTMEDICALHX_GEN_ALL_CORE_FT
PAST MEDICAL HISTORY:  Diabetes mellitus type 2    Diabetes mellitus, type 2     Essential hypertension     Essential hypertension     Hepatic flexure mass malignant neoplasm    Hyperlipidemia     Hyperlipidemia, unspecified hyperlipidemia type     Malignant neoplasm of hepatic flexure     Obesity (BMI 30.0-34.9)

## 2022-07-19 NOTE — ED ADULT TRIAGE NOTE - CHIEF COMPLAINT QUOTE
from excel urgi for abd pain. RLQ x 2 days . vomited x 2 today. + flatus, poor appetite. partial collectomy for a cancerous mass in 2017- + chemo. RLQ firm

## 2022-07-19 NOTE — H&P ADULT - NSHPPHYSICALEXAM_GEN_ALL_CORE
General: WN/WD NAD  Neurology: A&Ox3, nonfocal, follows commands  Eyes: PERRLA/ EOMI  CV: Normal rate regular rhythm  Abdominal: Soft obese abdomen, RLQ incarcerated hernia, unreducible, no overlying skin changes  Extremities: No edema, + peripheral pulses  Skin: No Rashes, Hematoma, Ecchymosis General: WN/WD NAD  Neurology: A&Ox3, nonfocal, follows commands  Eyes: PERRLA/ EOMI  CV: Normal rate regular rhythm  Abdominal: Soft obese abdomen, midline ventral incarcerated hernia, unreducible, no overlying skin changes  Extremities: No edema, + peripheral pulses  Skin: No Rashes, Hematoma, Ecchymosis

## 2022-07-19 NOTE — H&P ADULT - NSICDXPASTSURGICALHX_GEN_ALL_CORE_FT
PAST SURGICAL HISTORY:  H/O hemicolectomy right - 1/30/2017    No significant past surgical history

## 2022-07-19 NOTE — H&P ADULT - NSHPLABSRESULTS_GEN_ALL_CORE
13.4   10.49 )-----------( 410      ( 19 Jul 2022 14:55 )             39.5     07-19    131<L>  |  93<L>  |  17  ----------------------------<  329<H>  5.0   |  22  |  0.95    Ca    9.6      19 Jul 2022 14:55  Mg     1.40     07-19    TPro  7.8  /  Alb  3.9  /  TBili  0.4  /  DBili  x   /  AST  16  /  ALT  16  /  AlkPhos  77  07-19    LIVER FUNCTIONS - ( 19 Jul 2022 14:55 )  Alb: 3.9 g/dL / Pro: 7.8 g/dL / ALK PHOS: 77 U/L / ALT: 16 U/L / AST: 16 U/L / GGT: x           PT/INR - ( 19 Jul 2022 14:55 )   PT: 12.6 sec;   INR: 1.09 ratio         PTT - ( 19 Jul 2022 14:55 )  PTT:30.8 sec      < from: CT Abdomen and Pelvis w/ IV Cont (07.19.22 @ 18:22) >    BOWEL: High-grade small bowel obstruction. The transition point is   located at a ventral abdominal wall herniacontaining loops of jejunum.   There is mild mesenteric edema. The bowel wall is normally enhancing.   Small volume ascites is present within the hernia sac. The distal small   bowel is collapsed. No closed loop morphology is present. Partial right   colectomy.  PERITONEUM: Small volume ascites, particularly in the hernia sac.  VESSELS: Atherosclerotic changes.  RETROPERITONEUM/LYMPH NODES: No lymphadenopathy.  ABDOMINAL WALL: Ventral abdominal wall hernia containing loops of   nonobstructed bowel.  BONES: Within normal limits.    IMPRESSION:  High-grade small bowel obstruction to the level of a ventral abdominal   wall hernia containing loops of jejunum. Intra-abdominal mesenteric edema   is present. No pneumatosis, portal venous gas or bowel wall   hypoenhancement.

## 2022-07-19 NOTE — H&P ADULT - HISTORY OF PRESENT ILLNESS
72F with hx of right hemicolectomy in 2017 for colon ca, with adjuvant chemo, DM, HTN, HLD, presenting with 2 days of abdominal pain, N/V. She stated once the pain began, she noted a hard bulge in the RLQ that she is unsure if it has ever been there before. Pain is at the side of the bulge, does not radiate. Denies fevers, chills, chest pain. She last passed gas and had a BM this morning. She has never had pain like this before, has had no complaints of this bulge in the past.

## 2022-07-19 NOTE — H&P ADULT - ATTENDING COMMENTS
date of service 7/19/22    pt seen and examined  pt chart and imaging reviewed in detail   agree with note above  72F s/p RHC for colon ca in 2017 with known ventral hernia on CT 2019. pt reports hernia normally reducible and hasn't seen Karis Higgins/Mary for a few years.  now with SBO secondary to ventral hernia. no signs of ischemia on CT.  pt resting in bed, NAD  softly distended, mild tender @ hernia, no r/g  no erythema, induration.   admit to A team surgery  npo, ivf, ngt  strict IOs  serial abd exams  f/u labs, lactate in am  attempt decompression via ngt overnight if pt worsens or clinically doesn't improve - may require operative reduction/repair.  d/w pt & family @bedside in detail.

## 2022-07-20 ENCOUNTER — TRANSCRIPTION ENCOUNTER (OUTPATIENT)
Age: 72
End: 2022-07-20

## 2022-07-20 ENCOUNTER — RESULT REVIEW (OUTPATIENT)
Age: 72
End: 2022-07-20

## 2022-07-20 LAB
ANION GAP SERPL CALC-SCNC: 11 MMOL/L — SIGNIFICANT CHANGE UP (ref 7–14)
ANION GAP SERPL CALC-SCNC: 13 MMOL/L — SIGNIFICANT CHANGE UP (ref 7–14)
BUN SERPL-MCNC: 11 MG/DL — SIGNIFICANT CHANGE UP (ref 7–23)
BUN SERPL-MCNC: 14 MG/DL — SIGNIFICANT CHANGE UP (ref 7–23)
CALCIUM SERPL-MCNC: 7.1 MG/DL — LOW (ref 8.4–10.5)
CALCIUM SERPL-MCNC: 8.3 MG/DL — LOW (ref 8.4–10.5)
CHLORIDE SERPL-SCNC: 104 MMOL/L — SIGNIFICANT CHANGE UP (ref 98–107)
CHLORIDE SERPL-SCNC: 94 MMOL/L — LOW (ref 98–107)
CO2 SERPL-SCNC: 20 MMOL/L — LOW (ref 22–31)
CO2 SERPL-SCNC: 21 MMOL/L — LOW (ref 22–31)
CREAT SERPL-MCNC: 0.81 MG/DL — SIGNIFICANT CHANGE UP (ref 0.5–1.3)
CREAT SERPL-MCNC: 0.83 MG/DL — SIGNIFICANT CHANGE UP (ref 0.5–1.3)
EGFR: 76 ML/MIN/1.73M2 — SIGNIFICANT CHANGE UP
EGFR: 78 ML/MIN/1.73M2 — SIGNIFICANT CHANGE UP
GAS PNL BLDA: SIGNIFICANT CHANGE UP
GAS PNL BLDV: SIGNIFICANT CHANGE UP
GLUCOSE BLDC GLUCOMTR-MCNC: 142 MG/DL — HIGH (ref 70–99)
GLUCOSE BLDC GLUCOMTR-MCNC: 169 MG/DL — HIGH (ref 70–99)
GLUCOSE BLDC GLUCOMTR-MCNC: 180 MG/DL — HIGH (ref 70–99)
GLUCOSE BLDC GLUCOMTR-MCNC: 204 MG/DL — HIGH (ref 70–99)
GLUCOSE BLDC GLUCOMTR-MCNC: 259 MG/DL — HIGH (ref 70–99)
GLUCOSE SERPL-MCNC: 153 MG/DL — HIGH (ref 70–99)
GLUCOSE SERPL-MCNC: 232 MG/DL — HIGH (ref 70–99)
HCT VFR BLD CALC: 34.1 % — LOW (ref 34.5–45)
HCT VFR BLD CALC: 36.5 % — SIGNIFICANT CHANGE UP (ref 34.5–45)
HGB BLD-MCNC: 11.5 G/DL — SIGNIFICANT CHANGE UP (ref 11.5–15.5)
HGB BLD-MCNC: 11.9 G/DL — SIGNIFICANT CHANGE UP (ref 11.5–15.5)
LACTATE BLDV-MCNC: 2.4 MMOL/L — HIGH (ref 0.5–2)
LACTATE SERPL-SCNC: 1.7 MMOL/L — SIGNIFICANT CHANGE UP (ref 0.5–2)
MCHC RBC-ENTMCNC: 29 PG — SIGNIFICANT CHANGE UP (ref 27–34)
MCHC RBC-ENTMCNC: 29.2 PG — SIGNIFICANT CHANGE UP (ref 27–34)
MCHC RBC-ENTMCNC: 32.6 GM/DL — SIGNIFICANT CHANGE UP (ref 32–36)
MCHC RBC-ENTMCNC: 33.7 GM/DL — SIGNIFICANT CHANGE UP (ref 32–36)
MCV RBC AUTO: 86.5 FL — SIGNIFICANT CHANGE UP (ref 80–100)
MCV RBC AUTO: 89 FL — SIGNIFICANT CHANGE UP (ref 80–100)
NRBC # BLD: 0 /100 WBCS — SIGNIFICANT CHANGE UP
NRBC # BLD: 0 /100 WBCS — SIGNIFICANT CHANGE UP
NRBC # FLD: 0 K/UL — SIGNIFICANT CHANGE UP
NRBC # FLD: 0 K/UL — SIGNIFICANT CHANGE UP
PLATELET # BLD AUTO: 333 K/UL — SIGNIFICANT CHANGE UP (ref 150–400)
PLATELET # BLD AUTO: 337 K/UL — SIGNIFICANT CHANGE UP (ref 150–400)
POTASSIUM SERPL-MCNC: 3.8 MMOL/L — SIGNIFICANT CHANGE UP (ref 3.5–5.3)
POTASSIUM SERPL-MCNC: 4.4 MMOL/L — SIGNIFICANT CHANGE UP (ref 3.5–5.3)
POTASSIUM SERPL-SCNC: 3.8 MMOL/L — SIGNIFICANT CHANGE UP (ref 3.5–5.3)
POTASSIUM SERPL-SCNC: 4.4 MMOL/L — SIGNIFICANT CHANGE UP (ref 3.5–5.3)
RBC # BLD: 3.94 M/UL — SIGNIFICANT CHANGE UP (ref 3.8–5.2)
RBC # BLD: 4.1 M/UL — SIGNIFICANT CHANGE UP (ref 3.8–5.2)
RBC # FLD: 13.8 % — SIGNIFICANT CHANGE UP (ref 10.3–14.5)
RBC # FLD: 14.2 % — SIGNIFICANT CHANGE UP (ref 10.3–14.5)
SODIUM SERPL-SCNC: 128 MMOL/L — LOW (ref 135–145)
SODIUM SERPL-SCNC: 135 MMOL/L — SIGNIFICANT CHANGE UP (ref 135–145)
WBC # BLD: 5.78 K/UL — SIGNIFICANT CHANGE UP (ref 3.8–10.5)
WBC # BLD: 7.52 K/UL — SIGNIFICANT CHANGE UP (ref 3.8–10.5)
WBC # FLD AUTO: 5.78 K/UL — SIGNIFICANT CHANGE UP (ref 3.8–10.5)
WBC # FLD AUTO: 7.52 K/UL — SIGNIFICANT CHANGE UP (ref 3.8–10.5)

## 2022-07-20 PROCEDURE — 71045 X-RAY EXAM CHEST 1 VIEW: CPT | Mod: 26

## 2022-07-20 PROCEDURE — 88302 TISSUE EXAM BY PATHOLOGIST: CPT | Mod: 26

## 2022-07-20 RX ORDER — TETRACAINE/BENZOCAINE/BUTAMBEN 2%-14%-2%
1 OINTMENT (GRAM) TOPICAL EVERY 6 HOURS
Refills: 0 | Status: DISCONTINUED | OUTPATIENT
Start: 2022-07-20 | End: 2022-07-23

## 2022-07-20 RX ORDER — SODIUM CHLORIDE 9 MG/ML
1000 INJECTION INTRAMUSCULAR; INTRAVENOUS; SUBCUTANEOUS
Refills: 0 | Status: DISCONTINUED | OUTPATIENT
Start: 2022-07-20 | End: 2022-07-21

## 2022-07-20 RX ORDER — PANTOPRAZOLE SODIUM 20 MG/1
40 TABLET, DELAYED RELEASE ORAL DAILY
Refills: 0 | Status: DISCONTINUED | OUTPATIENT
Start: 2022-07-20 | End: 2022-07-20

## 2022-07-20 RX ORDER — ACETAMINOPHEN 500 MG
1000 TABLET ORAL EVERY 6 HOURS
Refills: 0 | Status: COMPLETED | OUTPATIENT
Start: 2022-07-20 | End: 2022-07-21

## 2022-07-20 RX ORDER — SODIUM CHLORIDE 9 MG/ML
500 INJECTION INTRAMUSCULAR; INTRAVENOUS; SUBCUTANEOUS ONCE
Refills: 0 | Status: COMPLETED | OUTPATIENT
Start: 2022-07-20 | End: 2022-07-20

## 2022-07-20 RX ORDER — ONDANSETRON 8 MG/1
4 TABLET, FILM COATED ORAL ONCE
Refills: 0 | Status: DISCONTINUED | OUTPATIENT
Start: 2022-07-20 | End: 2022-07-20

## 2022-07-20 RX ORDER — HYDROMORPHONE HYDROCHLORIDE 2 MG/ML
0.5 INJECTION INTRAMUSCULAR; INTRAVENOUS; SUBCUTANEOUS
Refills: 0 | Status: DISCONTINUED | OUTPATIENT
Start: 2022-07-20 | End: 2022-07-22

## 2022-07-20 RX ORDER — HYDROMORPHONE HYDROCHLORIDE 2 MG/ML
0.5 INJECTION INTRAMUSCULAR; INTRAVENOUS; SUBCUTANEOUS
Refills: 0 | Status: DISCONTINUED | OUTPATIENT
Start: 2022-07-20 | End: 2022-07-20

## 2022-07-20 RX ORDER — NALOXONE HYDROCHLORIDE 4 MG/.1ML
0.1 SPRAY NASAL
Refills: 0 | Status: DISCONTINUED | OUTPATIENT
Start: 2022-07-20 | End: 2022-07-25

## 2022-07-20 RX ORDER — PANTOPRAZOLE SODIUM 20 MG/1
40 TABLET, DELAYED RELEASE ORAL
Refills: 0 | Status: DISCONTINUED | OUTPATIENT
Start: 2022-07-20 | End: 2022-07-23

## 2022-07-20 RX ORDER — ONDANSETRON 8 MG/1
4 TABLET, FILM COATED ORAL EVERY 6 HOURS
Refills: 0 | Status: DISCONTINUED | OUTPATIENT
Start: 2022-07-20 | End: 2022-07-25

## 2022-07-20 RX ORDER — HYDROMORPHONE HYDROCHLORIDE 2 MG/ML
1 INJECTION INTRAMUSCULAR; INTRAVENOUS; SUBCUTANEOUS
Refills: 0 | Status: DISCONTINUED | OUTPATIENT
Start: 2022-07-20 | End: 2022-07-20

## 2022-07-20 RX ORDER — HYDROMORPHONE HYDROCHLORIDE 2 MG/ML
30 INJECTION INTRAMUSCULAR; INTRAVENOUS; SUBCUTANEOUS
Refills: 0 | Status: DISCONTINUED | OUTPATIENT
Start: 2022-07-20 | End: 2022-07-22

## 2022-07-20 RX ORDER — DIPHENHYDRAMINE HCL 50 MG
25 CAPSULE ORAL EVERY 4 HOURS
Refills: 0 | Status: DISCONTINUED | OUTPATIENT
Start: 2022-07-20 | End: 2022-07-22

## 2022-07-20 RX ADMIN — PANTOPRAZOLE SODIUM 40 MILLIGRAM(S): 20 TABLET, DELAYED RELEASE ORAL at 17:45

## 2022-07-20 RX ADMIN — Medication 400 MILLIGRAM(S): at 17:43

## 2022-07-20 RX ADMIN — HYDROMORPHONE HYDROCHLORIDE 0.5 MILLIGRAM(S): 2 INJECTION INTRAMUSCULAR; INTRAVENOUS; SUBCUTANEOUS at 13:30

## 2022-07-20 RX ADMIN — HYDROMORPHONE HYDROCHLORIDE 0.5 MILLIGRAM(S): 2 INJECTION INTRAMUSCULAR; INTRAVENOUS; SUBCUTANEOUS at 13:15

## 2022-07-20 RX ADMIN — Medication 1 SPRAY(S): at 05:52

## 2022-07-20 RX ADMIN — SODIUM CHLORIDE 1000 MILLILITER(S): 9 INJECTION INTRAMUSCULAR; INTRAVENOUS; SUBCUTANEOUS at 16:15

## 2022-07-20 RX ADMIN — SODIUM CHLORIDE 1000 MILLILITER(S): 9 INJECTION INTRAMUSCULAR; INTRAVENOUS; SUBCUTANEOUS at 18:42

## 2022-07-20 RX ADMIN — ENOXAPARIN SODIUM 40 MILLIGRAM(S): 100 INJECTION SUBCUTANEOUS at 20:17

## 2022-07-20 RX ADMIN — SODIUM CHLORIDE 1000 MILLILITER(S): 9 INJECTION INTRAMUSCULAR; INTRAVENOUS; SUBCUTANEOUS at 17:00

## 2022-07-20 RX ADMIN — HYDROMORPHONE HYDROCHLORIDE 30 MILLILITER(S): 2 INJECTION INTRAMUSCULAR; INTRAVENOUS; SUBCUTANEOUS at 14:00

## 2022-07-20 RX ADMIN — SODIUM CHLORIDE 150 MILLILITER(S): 9 INJECTION, SOLUTION INTRAVENOUS at 06:46

## 2022-07-20 RX ADMIN — Medication 4: at 14:22

## 2022-07-20 RX ADMIN — Medication 6: at 01:58

## 2022-07-20 RX ADMIN — HYDROMORPHONE HYDROCHLORIDE 30 MILLILITER(S): 2 INJECTION INTRAMUSCULAR; INTRAVENOUS; SUBCUTANEOUS at 22:45

## 2022-07-20 RX ADMIN — SODIUM CHLORIDE 110 MILLILITER(S): 9 INJECTION INTRAMUSCULAR; INTRAVENOUS; SUBCUTANEOUS at 15:35

## 2022-07-20 RX ADMIN — Medication 1000 MILLIGRAM(S): at 18:05

## 2022-07-20 NOTE — PATIENT PROFILE ADULT - FALL HARM RISK - RISK INTERVENTIONS
Assistance OOB with selected safe patient handling equipment/Assistance with ambulation/Communicate Fall Risk and Risk Factors to all staff, patient, and family/Monitor gait and stability/Reinforce activity limits and safety measures with patient and family/Sit up slowly, dangle for a short time, stand at bedside before walking/Use of alarms - bed, chair and/or voice tab/Visual Cue: Yellow wristband/Bed in lowest position, wheels locked, appropriate side rails in place/Call bell, personal items and telephone in reach/Instruct patient to call for assistance before getting out of bed or chair/Non-slip footwear when patient is out of bed/[place] to call system/Physically safe environment - no spills, clutter or unnecessary equipment/Purposeful Proactive Rounding/Room/bathroom lighting operational, light cord in reach

## 2022-07-20 NOTE — PROGRESS NOTE ADULT - ASSESSMENT
72F with hx of right hemicolectomy for colon ca in 2017, with incarcerated, bowel containing ventral hernia causing an SBO    -NPO/IVF  -Going to OR today for Ex Lap, possible SBR, ventral hernia repair      A team surgery  [id number]

## 2022-07-20 NOTE — CHART NOTE - NSCHARTNOTEFT_GEN_A_CORE
Surgery Post-Op Note    Pre-Op Dx:   Procedure: Laparotomy, exploratory    Ventral hernia repair      Surgeon:     SUBJECTIVE:  Pt seen and examined at the bedside. Pt w/some mild appropriate post operative pain around the incisions. Denies F/C/N/V. Pain controlled with medication. BM-/gas-. she received a 500cc bolus in PACU for tachycardia and BP in the low 100s. Her HR was in the 80s when I saw her with SBP in the 120s.    OBJECTIVE:  Vital Signs Last 24 Hrs  T(C): 36.3 (20 Jul 2022 16:00), Max: 37.2 (20 Jul 2022 03:45)  T(F): 97.4 (20 Jul 2022 16:00), Max: 99 (20 Jul 2022 03:45)  HR: 106 (20 Jul 2022 16:30) (93 - 109)  BP: 129/72 (20 Jul 2022 16:30) (108/66 - 155/99)  BP(mean): 86 (20 Jul 2022 16:30) (71 - 134)  RR: 16 (20 Jul 2022 16:30) (12 - 22)  SpO2: 95% (20 Jul 2022 16:30) (93% - 100%)    Parameters below as of 20 Jul 2022 15:00  Patient On (Oxygen Delivery Method): room air        Physical Exam:  General: NAD, resting comfortably in bed   Neuro: A/O x 3, no focal deficits  Pulmonary: Nonlabored breathing, no respiratory distress  Cardiovascular: NSR  Abdominal: soft, appropriately TTP on incision sites, ND, incision clean without discharge, HI serosanguinous, midline prevena vac   Incision: C/D/I   Drains: HI draining serosanguinous fluid 50 ccs,  Extremities: WWP  : telles inplace draining 350 cc urine in bag    LABS:                        11.5   5.78  )-----------( 337      ( 20 Jul 2022 05:38 )             34.1     07-20    128<L>  |  94<L>  |  14  ----------------------------<  232<H>  4.4   |  21<L>  |  0.83    Ca    8.3<L>      20 Jul 2022 05:38  Mg     1.40     07-19    TPro  7.8  /  Alb  3.9  /  TBili  0.4  /  DBili  x   /  AST  16  /  ALT  16  /  AlkPhos  77  07-19    PT/INR - ( 19 Jul 2022 14:55 )   PT: 12.6 sec;   INR: 1.09 ratio         PTT - ( 19 Jul 2022 14:55 )  PTT:30.8 sec  CAPILLARY BLOOD GLUCOSE      POCT Blood Glucose.: 142 mg/dL (20 Jul 2022 17:38)  POCT Blood Glucose.: 204 mg/dL (20 Jul 2022 14:10)  POCT Blood Glucose.: 180 mg/dL (20 Jul 2022 12:39)  POCT Blood Glucose.: 259 mg/dL (20 Jul 2022 01:45)  POCT Blood Glucose.: 257 mg/dL (19 Jul 2022 22:38)      LIVER FUNCTIONS - ( 19 Jul 2022 14:55 )  Alb: 3.9 g/dL / Pro: 7.8 g/dL / ALK PHOS: 77 U/L / ALT: 16 U/L / AST: 16 U/L / GGT: x               IMAGING:    ASSESSMENT:72y Female now 4hours s/p Exploratory laparotomy with reduction of ventral hernia and primary repair and reduction of bowel. She is on NGT with LCS draining 150ccs of bilious fluid, telles draining 350 ccs urine, and 50ccs serosanguinous HI drainage. She is in appropriate pain at incisions without discharge, abdomen is soft, nondistended.     PLAN:  - Pain control  - Encourage IS  - Nausea control PRN  - Monitor vitals  - Diet: IVF  - Monitor I+Os  - OOB/ Ambulate  - DVT ppx:      Team Surgery  [phone]

## 2022-07-20 NOTE — ED ADULT NURSE REASSESSMENT NOTE - NS ED NURSE REASSESS COMMENT FT1
Break RN note- patient resting quietly in bed, breathing even and nonlabored. No acute distress. NGT attached to suction. Approximately 150ml of dark brown gastric drainage in suction container. Patient medicated as ordered. Insulin confirmed with Dr. Joiner. Safety maintained. Patient stable upon exiting the room.

## 2022-07-20 NOTE — BRIEF OPERATIVE NOTE - PRIMARY SURGEON
INTERNAL MEDICINE RESIDENCY PROGRESS NOTE     Name: Robinson Mcgraw   Age & Sex: 41 y o  male   MRN: [id number]  Unit/Bed#: -01   Encounter: [id number]  Team: SOD Team A    PATIENT INFORMATION     Name: Robinson Mcgraw   Age & Sex: 41 y o  male   MRN: [id number]  Hospital Stay Days: 1    ASSESSMENT/PLAN     Principal Problem:    Pleural effusion, left  Active Problems:    Acquired hypothyroidism    History of DVT (deep vein thrombosis)    Tobacco abuse    Sepsis ([place] [id number] )    Mild intermittent asthma      * Pleural effusion, left  Assessment & Plan  Left chest discomfort and shortness of breath started 1/23, per patient was started on Z-Mario 1/25 worsening shortness of breath and chest pain, went back to the ED, was treated with meds - not in the EMR - with temporary relief 1/29 patient went to the ED at [facility] x-ray chest and CT chest showing moderate-to-large loculated left pleural effusion and compressive atelectasis left lung  S/p chest tube  · Patient's pleural effusion met criteria for exudative   · Will switch to ceftriaxone and doxycycline  · Follow-up final cultures  · TPA per IR today  · Follow-up repeat chest x-ray  · Wean oxygen as tolerated  · Smoking cessation  · Will need thoracic consultation if chest tube output does not improve      Mild intermittent asthma  Assessment & Plan  Hx of mild intermittent asthma  not on any bronchodilator    Plan   Albuterol inhaler prn - no evidence of wheezing today       Sepsis ([facility] )  Assessment & Plan  Present on admission secondary to pneumonia  · Follow-up blood cultures  · Ceftriaxone doxycycline as this is a community-acquired pneumonia    Tobacco abuse  Assessment & Plan  Smoking 1 pack a day since age 27 Quit since January 25 in context of shortness of breath and chest pain    Plan  · Offered nicotine patch refused    History of DVT (deep vein thrombosis)  Assessment & Plan  DVT left leg in 2010 secondary to cellulitis due to injury  Patient was on Coumadin for 1 year afterward    Plan  · Lovenox 40 mg subcutaneous daily    Acquired hypothyroidism  Assessment & Plan  Hx of Goiter at age 18 s/p radioactive iodine  Patient not talking Levothyroxine for "Many" years and denies any related symptoms     Plan  · Levothyroxine 25 mg  Disposition: cont in patient care     SUBJECTIVE     Patient seen and examined  Patient with no complaints overnight feeling better breathing improved  OBJECTIVE     Vitals:    21 0404 21 0535 21 0758 21 0802   BP: 124/80  126/91 132/60   Pulse: 95  90    Resp:   20    Temp:       TempSrc:       SpO2: 93%  100%    Weight:  111 kg (244 lb 11 4 oz)     Height:          Temperature:   Temp (24hrs), Av 2 °F (36 8 °C), Min:98 1 °F (36 7 °C), Max:98 3 °F (36 8 °C)    Temperature: 98 1 °F (36 7 °C)  Intake & Output:  I/O        07 -  0700  07 -  0700  07 -  0700    Chest Tube   30    Total Intake(mL/kg)   30 (0 3)    Chest Tube  28     Total Output  28     Net  -28 +30               Weights:   IBW: 77 6 kg    Body mass index is 33 19 kg/m²  Weight (last 2 days)     Date/Time   Weight    21 0535   111 (244 71)            Physical Exam  Constitutional:       Appearance: Normal appearance  HENT:      Head: Normocephalic  Eyes:      Conjunctiva/sclera: Conjunctivae normal    Cardiovascular:      Rate and Rhythm: Normal rate  Pulmonary:      Effort: Pulmonary effort is normal       Comments: decreased breath sounds on the left but present, rhonchi on left    Musculoskeletal: Normal range of motion  Skin:     General: Skin is warm  Neurological:      General: No focal deficit present  Mental Status: He is alert  Psychiatric:         Mood and Affect: Mood normal        LABORATORY DATA     Labs: I have personally reviewed pertinent reports    Results from last 7 days   Lab Units 21  0534 21  0558 21  1654   WBC Thousand/uL 9 57 10 34* 13 03*   HEMOGLOBIN g/dL 12 4 12 8 14 2   HEMATOCRIT % 37 9 38 9 42 6   PLATELETS Thousands/uL 378 382 425*   NEUTROS PCT % 69 74 81*   MONOS PCT % 12 9 9      Results from last 7 days   Lab Units 01/31/21  0534 01/30/21  0558 01/29/21  1654   POTASSIUM mmol/L 3 7 3 7 3 5   CHLORIDE mmol/L 106 101 97*   CO2 mmol/L 24 18* 24   BUN mg/dL 10 12 15   CREATININE mg/dL 0 85 0 88 1 16   CALCIUM mg/dL 9 2 8 3 9 3   ALK PHOS U/L  --  102 119*   ALT U/L  --  19 24   AST U/L  --  13 13     Results from last 7 days   Lab Units 01/30/21  0558   MAGNESIUM mg/dL 1 8     Results from last 7 days   Lab Units 01/30/21  0558   PHOSPHORUS mg/dL 2 4*      Results from last 7 days   Lab Units 01/30/21  0558   INR  1 04     Results from last 7 days   Lab Units 01/29/21  1654   LACTIC ACID mmol/L 1 1     Results from last 7 days   Lab Units 01/29/21  1839   TROPONIN I ng/mL <0 02       IMAGING & DIAGNOSTIC TESTING     Radiology Results: I have personally reviewed pertinent reports  No results found  Other Diagnostic Testing: I have personally reviewed pertinent reports      ACTIVE MEDICATIONS     Current Facility-Administered Medications   Medication Dose Route Frequency    acetaminophen (TYLENOL) tablet 650 mg  650 mg Oral Q6H PRN    Or    HYDROmorphone (DILAUDID) injection 1 mg  1 mg Intravenous Q4H PRN    albuterol (PROVENTIL HFA,VENTOLIN HFA) inhaler 2 puff  2 puff Inhalation Q4H PRN    cefTRIAXone (ROCEPHIN) 1,000 mg in dextrose 5 % 50 mL IVPB  1,000 mg Intravenous Q24H    And    doxycycline (VIBRAMYCIN) 100 mg in sodium chloride 0 9 % 100 mL IVPB  100 mg Intravenous Q12H    docusate sodium (COLACE) capsule 100 mg  100 mg Oral BID PRN    enoxaparin (LOVENOX) subcutaneous injection 40 mg  40 mg Subcutaneous Q24H GARRET    HYDROmorphone (DILAUDID) injection 0 5 mg  0 5 mg Intravenous Q1H PRN    levothyroxine tablet 25 mcg  25 mcg Oral Early Morning    nicotine (NICODERM CQ) 21 mg/24 hr TD 24 hr patch 21 mg  21 mg Transdermal Daily    ondansetron (ZOFRAN) injection 4 mg  4 mg Intravenous Q4H PRN    sodium chloride 0 9 % with KCl 20 mEq/L infusion (premix)  100 mL/hr Intravenous Continuous       VTE Pharmacologic Prophylaxis: Enoxaparin (Lovenox)  VTE Mechanical Prophylaxis: sequential compression device    Portions of the record may have been created with voice recognition software  Occasional wrong word or "sound a like" substitutions may have occurred due to the inherent limitations of voice recognition software    Read the chart carefully and recognize, using context, where substitutions have occurred   ==  Dal Sever, [address] [facility]  Internal Medicine Residency PGY-3 [place] RODNEY Gonzales

## 2022-07-20 NOTE — BRIEF OPERATIVE NOTE - OPERATION/FINDINGS
Lower midline incision was made. Hernia sac was entered and ascites fluid was suctioned. Bowel within hernia sac was eviscerated and inspected. All bowel appeared viable and healthy. Bowel was reduced back into the abdomen. The hernia sac was excised. The small bowel was ran proximally and distally and all appeared viable. The midline defect was repaired primarily. A 19 Fr Brigido drain was left above the fascia. Skin was closed with staples.

## 2022-07-20 NOTE — PATIENT PROFILE ADULT - FUNCTIONAL ASSESSMENT - BASIC MOBILITY 6.
3-calculated by average/Not able to assess (calculate score using [facility] averaging method)

## 2022-07-20 NOTE — PROGRESS NOTE ADULT - SUBJECTIVE AND OBJECTIVE BOX
Surgery Progress Note    SUBJECTIVE: Pt seen and examined at bedside. Patient in obvious discomfort. Complaining of abdominal pain localized to RLQ. NGT in place. NPO    Vital Signs Last 24 Hrs  T(C): 37.2 (20 Jul 2022 03:45), Max: 37.2 (20 Jul 2022 03:45)  T(F): 99 (20 Jul 2022 03:45), Max: 99 (20 Jul 2022 03:45)  HR: 109 (20 Jul 2022 03:45) (100 - 109)  BP: 155/99 (20 Jul 2022 03:45) (135/77 - 155/99)  BP(mean): --  RR: 18 (20 Jul 2022 03:45) (16 - 18)  SpO2: 100% (20 Jul 2022 03:45) (98% - 100%)    Parameters below as of 20 Jul 2022 03:45  Patient On (Oxygen Delivery Method): room air        Physical Exam:  General Appearance: Appears well, NAD  Respiratory: No labored breathing  CV: Pulse regularly present  Abdomen: Soft, tender to palpation, mildly distended. Palpable ventral hernia     LABS:                        11.5   5.78  )-----------( 337      ( 20 Jul 2022 05:38 )             34.1     07-20    128<L>  |  94<L>  |  14  ----------------------------<  232<H>  4.4   |  21<L>  |  0.83    Ca    8.3<L>      20 Jul 2022 05:38  Mg     1.40     07-19    TPro  7.8  /  Alb  3.9  /  TBili  0.4  /  DBili  x   /  AST  16  /  ALT  16  /  AlkPhos  77  07-19    PT/INR - ( 19 Jul 2022 14:55 )   PT: 12.6 sec;   INR: 1.09 ratio         PTT - ( 19 Jul 2022 14:55 )  PTT:30.8 sec      INs and OUTs:    07-19-22 @ 07:01  -  07-20-22 @ 07:00  --------------------------------------------------------  IN: 0 mL / OUT: 300 mL / NET: -300 mL

## 2022-07-20 NOTE — BRIEF OPERATIVE NOTE - NSICDXBRIEFPROCEDURE_GEN_ALL_CORE_FT
PROCEDURES:  Laparotomy, exploratory 20-Jul-2022 12:43:18  Mehreen Vargas  Ventral hernia repair 20-Jul-2022 12:44:07  Mehreen Vargas

## 2022-07-21 ENCOUNTER — TRANSCRIPTION ENCOUNTER (OUTPATIENT)
Age: 72
End: 2022-07-21

## 2022-07-21 LAB
ANION GAP SERPL CALC-SCNC: 11 MMOL/L — SIGNIFICANT CHANGE UP (ref 7–14)
BASE EXCESS BLDA CALC-SCNC: -4.6 MMOL/L — LOW (ref -2–3)
BUN SERPL-MCNC: 11 MG/DL — SIGNIFICANT CHANGE UP (ref 7–23)
CALCIUM SERPL-MCNC: 7.1 MG/DL — LOW (ref 8.4–10.5)
CHLORIDE SERPL-SCNC: 102 MMOL/L — SIGNIFICANT CHANGE UP (ref 98–107)
CO2 BLDA-SCNC: 20 MMOL/L — SIGNIFICANT CHANGE UP (ref 19–24)
CO2 SERPL-SCNC: 21 MMOL/L — LOW (ref 22–31)
CREAT SERPL-MCNC: 0.96 MG/DL — SIGNIFICANT CHANGE UP (ref 0.5–1.3)
EGFR: 64 ML/MIN/1.73M2 — SIGNIFICANT CHANGE UP
GLUCOSE BLDC GLUCOMTR-MCNC: 146 MG/DL — HIGH (ref 70–99)
GLUCOSE BLDC GLUCOMTR-MCNC: 151 MG/DL — HIGH (ref 70–99)
GLUCOSE BLDC GLUCOMTR-MCNC: 157 MG/DL — HIGH (ref 70–99)
GLUCOSE BLDC GLUCOMTR-MCNC: 72 MG/DL — SIGNIFICANT CHANGE UP (ref 70–99)
GLUCOSE SERPL-MCNC: 148 MG/DL — HIGH (ref 70–99)
HCO3 BLDA-SCNC: 19 MMOL/L — LOW (ref 21–28)
HCT VFR BLD CALC: 33.4 % — LOW (ref 34.5–45)
HCT VFR BLD CALC: 34.6 % — SIGNIFICANT CHANGE UP (ref 34.5–45)
HGB BLD-MCNC: 11.2 G/DL — LOW (ref 11.5–15.5)
HGB BLD-MCNC: 11.2 G/DL — LOW (ref 11.5–15.5)
MAGNESIUM SERPL-MCNC: 1.1 MG/DL — LOW (ref 1.6–2.6)
MCHC RBC-ENTMCNC: 28.9 PG — SIGNIFICANT CHANGE UP (ref 27–34)
MCHC RBC-ENTMCNC: 29.7 PG — SIGNIFICANT CHANGE UP (ref 27–34)
MCHC RBC-ENTMCNC: 32.4 GM/DL — SIGNIFICANT CHANGE UP (ref 32–36)
MCHC RBC-ENTMCNC: 33.5 GM/DL — SIGNIFICANT CHANGE UP (ref 32–36)
MCV RBC AUTO: 88.6 FL — SIGNIFICANT CHANGE UP (ref 80–100)
MCV RBC AUTO: 89.4 FL — SIGNIFICANT CHANGE UP (ref 80–100)
NRBC # BLD: 0 /100 WBCS — SIGNIFICANT CHANGE UP
NRBC # BLD: 0 /100 WBCS — SIGNIFICANT CHANGE UP
NRBC # FLD: 0 K/UL — SIGNIFICANT CHANGE UP
NRBC # FLD: 0 K/UL — SIGNIFICANT CHANGE UP
PCO2 BLDA: 32 MMHG — SIGNIFICANT CHANGE UP (ref 32–35)
PH BLDA: 7.39 — SIGNIFICANT CHANGE UP (ref 7.35–7.45)
PHOSPHATE SERPL-MCNC: 3.7 MG/DL — SIGNIFICANT CHANGE UP (ref 2.5–4.5)
PLATELET # BLD AUTO: 297 K/UL — SIGNIFICANT CHANGE UP (ref 150–400)
PLATELET # BLD AUTO: 339 K/UL — SIGNIFICANT CHANGE UP (ref 150–400)
PO2 BLDA: 143 MMHG — HIGH (ref 83–108)
POTASSIUM SERPL-MCNC: 4.3 MMOL/L — SIGNIFICANT CHANGE UP (ref 3.5–5.3)
POTASSIUM SERPL-SCNC: 4.3 MMOL/L — SIGNIFICANT CHANGE UP (ref 3.5–5.3)
RBC # BLD: 3.77 M/UL — LOW (ref 3.8–5.2)
RBC # BLD: 3.87 M/UL — SIGNIFICANT CHANGE UP (ref 3.8–5.2)
RBC # FLD: 14.3 % — SIGNIFICANT CHANGE UP (ref 10.3–14.5)
RBC # FLD: 14.4 % — SIGNIFICANT CHANGE UP (ref 10.3–14.5)
SAO2 % BLDA: 98 % — SIGNIFICANT CHANGE UP (ref 94–98)
SODIUM SERPL-SCNC: 134 MMOL/L — LOW (ref 135–145)
WBC # BLD: 11.59 K/UL — HIGH (ref 3.8–10.5)
WBC # BLD: 14.16 K/UL — HIGH (ref 3.8–10.5)
WBC # FLD AUTO: 11.59 K/UL — HIGH (ref 3.8–10.5)
WBC # FLD AUTO: 14.16 K/UL — HIGH (ref 3.8–10.5)

## 2022-07-21 PROCEDURE — 71045 X-RAY EXAM CHEST 1 VIEW: CPT | Mod: 26

## 2022-07-21 PROCEDURE — 93010 ELECTROCARDIOGRAM REPORT: CPT

## 2022-07-21 RX ORDER — MAGNESIUM SULFATE 500 MG/ML
2 VIAL (ML) INJECTION ONCE
Refills: 0 | Status: COMPLETED | OUTPATIENT
Start: 2022-07-21 | End: 2022-07-21

## 2022-07-21 RX ORDER — ACETAMINOPHEN 500 MG
1000 TABLET ORAL EVERY 6 HOURS
Refills: 0 | Status: COMPLETED | OUTPATIENT
Start: 2022-07-21 | End: 2022-07-22

## 2022-07-21 RX ORDER — SODIUM CHLORIDE 9 MG/ML
1000 INJECTION, SOLUTION INTRAVENOUS
Refills: 0 | Status: DISCONTINUED | OUTPATIENT
Start: 2022-07-21 | End: 2022-07-22

## 2022-07-21 RX ADMIN — HYDROMORPHONE HYDROCHLORIDE 30 MILLILITER(S): 2 INJECTION INTRAMUSCULAR; INTRAVENOUS; SUBCUTANEOUS at 08:26

## 2022-07-21 RX ADMIN — HYDROMORPHONE HYDROCHLORIDE 30 MILLILITER(S): 2 INJECTION INTRAMUSCULAR; INTRAVENOUS; SUBCUTANEOUS at 20:38

## 2022-07-21 RX ADMIN — Medication 1000 MILLIGRAM(S): at 12:24

## 2022-07-21 RX ADMIN — PANTOPRAZOLE SODIUM 40 MILLIGRAM(S): 20 TABLET, DELAYED RELEASE ORAL at 19:02

## 2022-07-21 RX ADMIN — Medication 1.25 MILLIGRAM(S): at 18:47

## 2022-07-21 RX ADMIN — Medication 1.25 MILLIGRAM(S): at 00:16

## 2022-07-21 RX ADMIN — Medication 400 MILLIGRAM(S): at 06:13

## 2022-07-21 RX ADMIN — Medication 2: at 06:24

## 2022-07-21 RX ADMIN — PANTOPRAZOLE SODIUM 40 MILLIGRAM(S): 20 TABLET, DELAYED RELEASE ORAL at 06:27

## 2022-07-21 RX ADMIN — Medication 15 UNIT(S): at 09:22

## 2022-07-21 RX ADMIN — Medication 1.25 MILLIGRAM(S): at 11:59

## 2022-07-21 RX ADMIN — SODIUM CHLORIDE 100 MILLILITER(S): 9 INJECTION, SOLUTION INTRAVENOUS at 18:45

## 2022-07-21 RX ADMIN — Medication 25 GRAM(S): at 14:55

## 2022-07-21 RX ADMIN — Medication 400 MILLIGRAM(S): at 11:54

## 2022-07-21 RX ADMIN — Medication 2: at 00:21

## 2022-07-21 RX ADMIN — Medication 400 MILLIGRAM(S): at 19:06

## 2022-07-21 RX ADMIN — Medication 400 MILLIGRAM(S): at 00:12

## 2022-07-21 RX ADMIN — Medication 1000 MILLIGRAM(S): at 06:43

## 2022-07-21 RX ADMIN — Medication 1000 MILLIGRAM(S): at 19:36

## 2022-07-21 RX ADMIN — Medication 1.25 MILLIGRAM(S): at 06:13

## 2022-07-21 NOTE — PROGRESS NOTE ADULT - SUBJECTIVE AND OBJECTIVE BOX
Surgery Progress Note    S/P ex lap with ventral hernia repair on 7/20    SUBJECTIVE: Pt seen and examined at bedside. Patient in obvious discomfort. Complaining of abdominal pain localized to RLQ. NGT in place. NPO    Vital Signs Last 24 Hrs      Physical Exam:  General Appearance: Appears well, NAD  Respiratory: No labored breathing  CV: Pulse regularly present  Abdomen: Soft, tender to palpation, mildly distended. Palpable ventral hernia     LABS:       A Team Surgery Progress Note    S/P ex lap with ventral hernia repair on 7/20    SUBJECTIVE: Pt seen and examined at bedside. Patient in obvious discomfort. Complaining of abdominal pain localized to RLQ. Encouraged patient to use PCA which she has been doing. NGT in place. NPO. + Flatus/No BM. No Chest Pain/SOB/dizziness.    ICU Vital Signs Last 24 Hrs  T(C): 36.4 (21 Jul 2022 05:30), Max: 37.4 (20 Jul 2022 20:30)  T(F): 97.6 (21 Jul 2022 05:30), Max: 99.3 (20 Jul 2022 20:30)  HR: 104 (21 Jul 2022 05:30) (93 - 127)  BP: 129/79 (21 Jul 2022 05:30) (104/63 - 143/131)  BP(mean): 74 (20 Jul 2022 22:00) (68 - 134)  ABP: 107/96 (20 Jul 2022 13:00) (107/96 - 146/71)  ABP(mean): 102 (20 Jul 2022 13:00) (94 - 102)  RR: 17 (21 Jul 2022 05:30) (12 - 28)  SpO2: 96% (21 Jul 2022 05:30) (91% - 98%)    O2 Parameters below as of 21 Jul 2022 05:30  Patient On (Oxygen Delivery Method): room air    I&O's Detail    20 Jul 2022 07:01  -  21 Jul 2022 07:00  --------------------------------------------------------  IN:    IV PiggyBack: 50 mL    Lactated Ringers: 330 mL    sodium chloride 0.9%: 1430 mL    Sodium Chloride 0.9% Bolus: 1000 mL  Total IN: 2810 mL    OUT:    Drain (mL): 115 mL    Indwelling Catheter - Urethral (mL): 975 mL    Nasogastric/Oral tube (mL): 280 mL  Total OUT: 1370 mL    Total NET: 1440 mL    Physical Exam:  General Appearance: Appears well, NAD  Respiratory: Non labored breathing  CV: Pulse regularly present  Abdomen: Soft, appropriately tender to palpation, mildly distended. Preveena in place with good seal to low wall suction. NGT in place 280.    CBC (07-21 @ 05:55)                          11.2<L>                   11.59<H>  )--------------(  297        --    % Neuts, --    % Lymphs, ANC: --                              33.4<L>  CBC (07-20 @ 19:21)                          11.9                     7.52    )--------------(  333        --    % Neuts, --    % Lymphs, ANC: --                              36.5      BMP (07-20 @ 19:21)       135     |  104     |  11    			Ca++ --      Ca 7.1<L>       ---------------------------------( 153<H>		Mg --           3.8     |  20<L>   |  0.81  			Ph --      BMP (07-20 @ 05:38)       128<L>  |  94<L>   |  14    			Ca++ --      Ca 8.3<L>       ---------------------------------( 232<H>		Mg --           4.4     |  21<L>   |  0.83  			Ph --              ABG (07-20 @ 11:21)     7.35 / 38<H> / 116<H> / 21 / -4.2<L> / 99.4<H>%     Lactate:    ABG (07-20 @ 10:40)     7.43 / 35 / 144<H> / 23 / -0.8 / 99.6<H>%     Lactate:      VBG (07-19 @ 23:11)     7.37 / 40 / 40 / 23 / -2.0 / 50.7%      Lactate: 2.4<H>

## 2022-07-21 NOTE — DISCHARGE NOTE PROVIDER - CARE PROVIDER_API CALL
Juan Gonzales)  Surgery  [address] [facility], [address]  [place] [address]  Phone: [phone]  Fax: [phone]  Follow Up Time: 1 week

## 2022-07-21 NOTE — CHART NOTE - NSCHARTNOTEFT_GEN_A_CORE
SICU EVENT NOTE:    called by RN for shortness of breath and de-sats to low 90s. Saw patient at bedside, was sitting up and anxious, was satting 88% on RA. gave 4L O2 NC which brought her to 94-97%, HR in 110s. Pt was complaining of inability to catch her breath and tightness in the chest. briefly endorsed chest pain, but denied this symptom on subsequent inquiry.        T(F): 98.2 (07-21-22 @ 17:41)  HR: 111  BP: 156/80 (07-21-22 @ 17:41)  RR: 17 (07-21-22 @ 17:41)  SpO2: 95% (07-21-22 @ 17:41)  CVP(mm Hg): --  CVP(cm H2O): --    PHYSICAL EXAM:   Neurological: AAOx3, CNII-XII intact  ENT: mucus membrane moist  Cardiovascular: tachycardic  Respiratory: tachypnic w increased WOB  Vascular: no cyanosis/erythema  Skin: no rashes  MSK: no joint swelling.   LABS:    07-21    134<L>  |  102  |  11  ----------------------------<  148<H>  4.3   |  21<L>  |  0.96    Ca    7.1<L>      21 Jul 2022 05:55  Phos  3.7     07-21  Mg     1.10     07-21                          11.2   14.16 )-----------( 339      ( 21 Jul 2022 23:20 )             34.6     ABG - ( 21 Jul 2022 23:20 )  pH, Arterial: 7.39  pH, Blood: x     /  pCO2: 32    /  pO2: 143   / HCO3: 19    / Base Excess: -4.6  /  SaO2: 98.0          Plan:  - CBC  - BMP Mg Phos  - Coags w D-dimer  - ABG  - lactate  - CXR normal  - EKG shows sinus tach, no ST abnormalities  - Continue to carefully monitor  - O2 at 4LNC EVENT NOTE:    called by RN for shortness of breath and de-sats to low 90s. Saw patient at bedside, was sitting up and anxious, was satting 88% on RA. gave 4L O2 NC which brought her to 94-97%, HR in 110s. Pt was complaining of inability to catch her breath and tightness in the chest. briefly endorsed chest pain, but denied this symptom on subsequent inquiry.       T(F): 98.2 (07-21-22 @ 17:41)  HR: 111  BP: 156/80 (07-21-22 @ 17:41)  RR: 17 (07-21-22 @ 17:41)  SpO2: 95% (07-21-22 @ 17:41)  CVP(mm Hg): --  CVP(cm H2O): --    PHYSICAL EXAM:   Neurological: AAOx3, CNII-XII intact  ENT: mucus membrane moist  Cardiovascular: tachycardic  Respiratory: tachypnic w increased WOB  Vascular: no cyanosis/erythema  Skin: no rashes  MSK: no joint swelling.   LABS:                          11.2   14.16 )-----------( 339      ( 21 Jul 2022 23:20 )             34.6     07-21    134<L>  |  104  |  9   ----------------------------<  103<H>  3.5   |  18<L>  |  0.72    Ca    7.2<L>      21 Jul 2022 23:20  Phos  2.2     07-21  Mg     1.70     07-21        Plan:  - CBC WNL  - BMP Mg Phos  - ABG  - lactate = 1.4  - CXR unremarkable  - cardiac US shows mild pericardial effusion, no R heart strain evident  - EKG shows sinus tach, no ST abnormalities  - O2 at 4LNC  - CTAngio for Chest   - Continue to carefully monitor

## 2022-07-21 NOTE — DISCHARGE NOTE PROVIDER - HOSPITAL COURSE
This patient is a 72F with hx of right hemicolectomy in 2017 for colon ca, with adjuvant chemo, DM, HTN, HLD, who presented to [facility] ED on 7/19/22 with 2 days of abdominal pain, N/V. She stated once the pain began, she noted a hard bulge in the RLQ that she is unsure if it has ever been there before. Pain at the side of the bulge, does not radiate. Denied fevers, chills, chest pain. She last passed gas and had a BM morning of 7/19. She has never had pain like this before, has had no complaints of this bulge in the past.  In ED, CT abdomen and pelvis obtained and showed the following: High-grade small bowel obstruction to the level of a ventral abdominal wall hernia containing loops of jejunum. Intra-abdominal mesenteric edema   is present. No pneumatosis, portal venous gas or bowel wall hypoenhancement.  Patient admitted to surgical service for incarcerated, bowel containing ventral hernia causing an SBO.  Made NPO, started on IV fluids and NGT placed for gastric decompression. Plan for operative intervention if patient worsens clinically.   7/20: Patient continued to have RLQ pain. Patient taken to OR by Dr. Gonzales and underwent exploratory laparotomy, ventral hernia repair. All bowel appeared viable.  Patient tolerated operation well and there were no post-operative complications identified. Patient remained hemodynamically stable in the PACU and transferred to the surgical floor. Remained NPO with NGT in place while awaiting return of bowel function.   ****INCOMPLETE****      Diet was restarted and advanced as tolerated. Pain control was transitioned from IV to PO pain meds. At this time, patient is currently ambulating, voiding, tolerating a regular diet. Pain well controlled on PO pain meds. Patient has been deemed stable for discharge home with follow up as an outpatient.    This patient is a 72F with hx of right hemicolectomy in 2017 for colon ca, with adjuvant chemo, DM, HTN, HLD, who presented to [facility] ED on 7/19/22 with 2 days of abdominal pain, N/V. She stated once the pain began, she noted a hard bulge in the RLQ that she is unsure if it has ever been there before. Pain at the side of the bulge, does not radiate. Denied fevers, chills, chest pain. She last passed gas and had a BM morning of 7/19. She has never had pain like this before, has had no complaints of this bulge in the past.  In ED, CT abdomen and pelvis obtained and showed the following: High-grade small bowel obstruction to the level of a ventral abdominal wall hernia containing loops of jejunum. Intra-abdominal mesenteric edema   is present. No pneumatosis, portal venous gas or bowel wall hypoenhancement.  Patient admitted to surgical service for incarcerated, bowel containing ventral hernia causing an SBO.  Made NPO, started on IV fluids and NGT placed for gastric decompression. Plan for operative intervention if patient worsens clinically.   7/20: Patient continued to have RLQ pain. Patient taken to OR by Dr. Gonzales and underwent exploratory laparotomy, ventral hernia repair. All bowel appeared viable.  Patient tolerated operation well and there were no post-operative complications identified. Patient remained hemodynamically stable in the PACU and transferred to the surgical floor. Remained NPO with NGT in place while awaiting return of bowel function.   7/21-7/23: Patient complained of SOB with hypoxemia, SpO2 80s, started on 4L NC, continued to have low saturations in the low 90s. CXR (Bilateral lower lung atelectasis, otherwise clear lungs), troponins (negative), D Dimer (elevated 3873), EKG (sinus tachycardia). CT Angio PE protocol showed no pulmonary embolus. Small bilateral pleural effusions. Segmental/subsegmental atelectasis within all lobes. SICU consulted for respiratory insufficiency. Medicine and pulmonology services consulted. Patient determined to have hypoxia secondary to atelectasis. Started on high flow nasal cannula and successfully diuresed with lasix. High flow nasal cannula subsequently weaned. Diet was restarted and advanced as tolerated. Pain control was transitioned from IV to PO pain meds.  7/25: Patient hemodynamically stable and saturating well on room air without respiratory distress. Patient seen by physical therapy throughout hospital stay who recommended patient be discharged home without skilled PT needs. At this time, patient is currently ambulating, voiding, tolerating a regular diet. Pain well controlled on PO pain meds. Patient has been deemed stable for discharge home with HI drain and with follow up as an outpatient.    This patient is a 72F with hx of right hemicolectomy in 2017 for colon ca, with adjuvant chemo, DM, HTN, HLD, who presented to [facility] ED on 7/19/22 with 2 days of abdominal pain, N/V. She stated once the pain began, she noted a hard bulge in the RLQ that she is unsure if it has ever been there before. Pain at the side of the bulge, does not radiate. Denied fevers, chills, chest pain. She last passed gas and had a BM morning of 7/19. She has never had pain like this before, has had no complaints of this bulge in the past.  In ED, CT abdomen and pelvis obtained and showed the following: High-grade small bowel obstruction to the level of a ventral abdominal wall hernia containing loops of jejunum. Intra-abdominal mesenteric edema   is present. No pneumatosis, portal venous gas or bowel wall hypoenhancement.  Patient admitted to surgical service for incarcerated, bowel containing ventral hernia causing an SBO.  Made NPO, started on IV fluids and NGT placed for gastric decompression. Plan for operative intervention if patient worsens clinically.   7/20: Patient continued to have RLQ pain. Patient taken to OR by Dr. Gonzales and underwent exploratory laparotomy, ventral hernia repair. All bowel appeared viable.  Patient tolerated operation well and there were no post-operative complications identified. Patient remained hemodynamically stable in the PACU and transferred to the surgical floor. Remained NPO with NGT in place while awaiting return of bowel function.   7/21-7/23: Patient complained of SOB with hypoxemia, SpO2 80s, started on 4L NC, continued to have low saturations in the low 90s. CXR (Bilateral lower lung atelectasis, otherwise clear lungs), troponins (negative), D Dimer (elevated 3873), EKG (sinus tachycardia). CT Angio PE protocol showed no pulmonary embolus. Small bilateral pleural effusions. Segmental/subsegmental atelectasis within all lobes. SICU consulted for respiratory insufficiency. Medicine and pulmonology services consulted. Patient determined to have hypoxia secondary to atelectasis. Started on high flow nasal cannula and successfully diuresed with lasix. High flow nasal cannula subsequently weaned. Diet was restarted and advanced as tolerated. Pain control was transitioned from IV to PO pain meds.  7/25: Patient hemodynamically stable and saturating well on room air without respiratory distress. Patient seen by physical therapy throughout hospital stay who recommended patient be discharged home without skilled PT needs. At this time, patient is currently ambulating, voiding, tolerating a regular diet. Pain well controlled on PO pain meds. Patient has been deemed stable for discharge home with HI drain and with follow up as an outpatient.    iSTOP Reference #: [id number]

## 2022-07-21 NOTE — PROGRESS NOTE ADULT - SUBJECTIVE AND OBJECTIVE BOX
Anesthesia Pain Management Service    SUBJECTIVE: Patient is doing well with IV PCA and no significant problems reported.    Pain Scale Score	At rest: __6/10_ 	With Activity: ___ 	[X ] Refer to charted pain scores    THERAPY:    [ ] IV PCA Morphine		[ ] 5 mg/mL	[ ] 1 mg/mL  [X ] IV PCA Hydromorphone	[ ] 5 mg/mL	[X ] 1 mg/mL  [ ] IV PCA Fentanyl		[ ] 50 micrograms/mL    Demand dose __0.2_ lockout __6_ (minutes) Continuous Rate _0__ Total: _4.9__  mg used (in past 24 hours)      MEDICATIONS  (STANDING):  acetaminophen   IVPB .. 1000 milliGRAM(s) IV Intermittent every 6 hours  dextrose 5%. 1000 milliLiter(s) (100 mL/Hr) IV Continuous <Continuous>  dextrose 50% Injectable 25 Gram(s) IV Push once  dextrose 50% Injectable 12.5 Gram(s) IV Push once  enalaprilat Injectable 1.25 milliGRAM(s) IV Push every 6 hours  enoxaparin Injectable 40 milliGRAM(s) SubCutaneous every 24 hours  HYDROmorphone PCA (1 mG/mL) 30 milliLiter(s) PCA Continuous PCA Continuous  insulin detemir injectable (LEVEMIR) 15 Unit(s) SubCutaneous daily  insulin lispro (ADMELOG) corrective regimen sliding scale   SubCutaneous every 6 hours  magnesium sulfate  IVPB 2 Gram(s) IV Intermittent once  pantoprazole  Injectable 40 milliGRAM(s) IV Push two times a day  sodium chloride 0.9%. 1000 milliLiter(s) (110 mL/Hr) IV Continuous <Continuous>    MEDICATIONS  (PRN):  diphenhydrAMINE Injectable 25 milliGRAM(s) IV Push every 4 hours PRN Pruritus  HYDROmorphone PCA (1 mG/mL) Rescue Clinician Bolus 0.5 milliGRAM(s) IV Push every 15 minutes PRN for Pain Scale GREATER THAN 6  naloxone Injectable 0.1 milliGRAM(s) IV Push every 3 minutes PRN For ANY of the following changes in patient status:  A. RR LESS THAN 10 breaths per minute, B. Oxygen saturation LESS THAN 90%, C. Sedation score of 6  ondansetron Injectable 4 milliGRAM(s) IV Push every 6 hours PRN Nausea  tetracaine/benzocaine/butamben Spray 1 Spray(s) Topical every 6 hours PRN pain      OBJECTIVE: Patient laying in bed. NG tube in place.    Sedation Score:	[ X] Alert	[ ] Drowsy 	[ ] Arousable	[ ] Asleep	[ ] Unresponsive    Side Effects:	[X ] None	[ ] Nausea	[ ] Vomiting	[ ] Pruritus  		[ ] Other:    Vital Signs Last 24 Hrs  T(C): 36.7 (21 Jul 2022 10:00), Max: 37.4 (20 Jul 2022 20:30)  T(F): 98.1 (21 Jul 2022 10:00), Max: 99.3 (20 Jul 2022 20:30)  HR: 82 (21 Jul 2022 10:00) (82 - 127)  BP: 129/82 (21 Jul 2022 10:00) (104/63 - 143/131)  BP(mean): 74 (20 Jul 2022 22:00) (68 - 134)  RR: 18 (21 Jul 2022 10:00) (12 - 28)  SpO2: 98% (21 Jul 2022 10:00) (91% - 98%)    Parameters below as of 21 Jul 2022 10:00  Patient On (Oxygen Delivery Method): room air        ASSESSMENT/ PLAN    Therapy to  be:	[ X] Continue   [ ] Discontinued   [ ] Change to prn Analgesics    Documentation and Verification of current medications:   [X] Done	[ ] Not done, not elligible    Comments: Continue IV PCA. Recommend non-opioid adjuvant analgesics to be used when possible and when allowed by primary surgical team.    Progress Note written now but Patient was seen earlier.

## 2022-07-21 NOTE — DISCHARGE NOTE PROVIDER - NSDCCPTREATMENT_GEN_ALL_CORE_FT
PRINCIPAL PROCEDURE  Procedure: Ventral hernia repair  Findings and Treatment:       SECONDARY PROCEDURE  Procedure: Laparotomy, exploratory  Findings and Treatment:

## 2022-07-21 NOTE — PROGRESS NOTE ADULT - ASSESSMENT
72F with hx of right hemicolectomy for colon ca in 2017, with incarcerated, bowel containing ventral hernia causing an SBO. S/P ex lap with ventral hernia repair on 7/20          A team surgery  [id number] 72F with hx of right hemicolectomy for colon ca in 2017, with incarcerated, bowel containing ventral hernia causing an SBO. S/P ex lap with ventral hernia repair on 7/20    Plan:  - Requires better multimodal pain control - Encouraged PCA use and standing Ofirmev.  - Continue NGT to LWS  - Strict I's/O's  - Continue Preveena to LWS  - PT Consult for OOB Ambulate  - Possible D/c Everett today  - DVT Prophylaxis  - HD monitoring if HR remains elevated despite adequate pain control, will get EKG    A team surgery  [id number] 72F with hx of right hemicolectomy for colon ca in 2017, with incarcerated, bowel containing ventral hernia causing an SBO. S/P ex lap with ventral hernia repair on 7/20    Plan:  - Requires better multimodal pain control - Encouraged PCA use and standing Ofirmev.  - NGT clamp trial  - Strict I's/O's  - Continue Preveena to LWS  - PT Consult for OOB Ambulate  - D/c Everett - void check within 8 hrs  - DVT Prophylaxis  - HD monitoring if HR remains elevated despite adequate pain control, will get EKG    A team surgery  [id number]

## 2022-07-21 NOTE — DISCHARGE NOTE PROVIDER - NSDCCPCAREPLAN_GEN_ALL_CORE_FT
PRINCIPAL DISCHARGE DIAGNOSIS  Diagnosis: Small bowel obstruction  Assessment and Plan of Treatment: ***DRAIN CARE: Henrique Rome drain (HI Drain) If you go home with a HI drain it is important that you measure and record the fluid that is in it (output) on the output record sheet. Please bring the output record sheet to your follow-up appointment. Keep the drain secured to your clothing with a safety pin at all times to avoid accidental displacement. Monitor the insertion site for redness, pain, swelling, or purulent drainage.  You may shower with the drain. Wash around the drain with soap and water, pat dry, and reapply dressing. If you noticed a sudden change in the color or amount of fluid in the drain, please contact your surgeon’s office.  Your drain will be removed at an outpatient follow up appointment.   WOUND CARE:  Please keep incisions clean and dry. Please do not Scrub or rub incisions. Do not use lotion or powder on incisions.   BATHING: You may shower and/or sponge bathe. You may use warm soapy water in the shower and rinse, pat dry.  ACTIVITY: No heavy lifting or straining. Otherwise, you may return to your usual level of physical activity. If you are taking narcotic pain medication DO NOT drive a car, operate machinery or make important decisions.  DIET: Return to your usual diet.  NOTIFY YOUR SURGEON IF YOU HAVE: any bleeding that does not stop, any pus draining from your wound(s), any fever (over 100.4 F) persistent nausea/vomiting, or if your pain is not controlled on your discharge pain medications, unable to urinate.  FOLLOW UP:  1. Please follow up with your primary care physician in one week regarding your hospitalization, bring copies of your discharge paperwork.  2. Please follow up with your surgeon, Dr. Gonzales. Please call to make an appointment [phone].       PRINCIPAL DISCHARGE DIAGNOSIS  Diagnosis: Small bowel obstruction  Assessment and Plan of Treatment: DRAIN CARE: Henrique Rome drain (HI Drain) If you go home with a HI drain it is important that you measure and record the fluid that is in it (output) on the output record sheet. Please bring the output record sheet to your follow-up appointment. Keep the drain secured to your clothing with a safety pin at all times to avoid accidental displacement. Monitor the insertion site for redness, pain, swelling, or purulent drainage.  You may shower with the drain. Wash around the drain with soap and water, pat dry, and reapply dressing. If you noticed a sudden change in the color or amount of fluid in the drain, please contact your surgeon’s office.  Your drain will be removed at an outpatient follow up appointment.   WOUND CARE:  Please keep incisions clean and dry. Please do not Scrub or rub incisions. Do not use lotion or powder on incisions.   BATHING: You may shower and/or sponge bathe. You may use warm soapy water in the shower and rinse, pat dry.  ACTIVITY: No heavy lifting or straining. Otherwise, you may return to your usual level of physical activity. If you are taking narcotic pain medication DO NOT drive a car, operate machinery or make important decisions.  DIET: Return to your usual diet.  NOTIFY YOUR SURGEON IF YOU HAVE: any bleeding that does not stop, any pus draining from your wound(s), any fever (over 100.4 F) persistent nausea/vomiting, or if your pain is not controlled on your discharge pain medications, unable to urinate.  FOLLOW UP:  1. Please follow up with your primary care physician in one week regarding your hospitalization, bring copies of your discharge paperwork.  2. Please follow up with your surgeon, Dr. Gonzales. Please call to make an appointment [phone].

## 2022-07-21 NOTE — DISCHARGE NOTE PROVIDER - NSDCMRMEDTOKEN_GEN_ALL_CORE_FT
enalapril 20 mg oral tablet: 1 tab(s) orally once a day  enalapril 20 mg oral tablet: 1 tab(s) orally once a day  glimepiride 4 mg oral tablet: 1 tab(s) orally 2 times a day  glimepiride 4 mg oral tablet: 1 tab(s) orally once a day  Janumet XR 50 mg-1000 mg oral tablet, extended release: 2 tab(s) orally once a day (in the evening)  Levemir FlexPen 100 units/mL subcutaneous solution: 30 unit(s) subcutaneous once a day  Levemir FlexPen 100 units/mL subcutaneous solution: 30 unit(s) subcutaneous once a day  nabumetone 750 mg oral tablet: 1 tab(s) orally once a day  oxyCODONE-acetaminophen 5 mg-325 mg oral tablet: 1 tab(s) orally every 4 hours, As Needed -for severe pain MDD:6 tabs  pantoprazole 40 mg oral delayed release tablet: 1 tab(s) orally 2 times a day  simvastatin 40 mg oral tablet: 1 tab(s) orally once a day (at bedtime)  simvastatin 40 mg oral tablet: 1 tab(s) orally once a day (at bedtime)  SITagliptin-metFORMIN 50mg-1000mg oral tablet: 1 tab(s) orally 2 times a day   acetaminophen 325 mg oral tablet: 3 tab(s) orally every 6 hours  enalapril 20 mg oral tablet: 1 tab(s) orally once a day  enalapril 20 mg oral tablet: 1 tab(s) orally once a day  glimepiride 4 mg oral tablet: 1 tab(s) orally 2 times a day  glimepiride 4 mg oral tablet: 1 tab(s) orally once a day  Janumet XR 50 mg-1000 mg oral tablet, extended release: 2 tab(s) orally once a day (in the evening)  Levemir FlexPen 100 units/mL subcutaneous solution: 30 unit(s) subcutaneous once a day  Levemir FlexPen 100 units/mL subcutaneous solution: 30 unit(s) subcutaneous once a day  nabumetone 750 mg oral tablet: 1 tab(s) orally once a day  oxyCODONE 5 mg oral tablet: 1 tab(s) orally every 6 hours, As Needed -for severe pain not controlled by over the counter medications MDD:4 tabs       iSTOP Reference #: [id number]     pantoprazole 40 mg oral delayed release tablet: 1 tab(s) orally 2 times a day  simvastatin 40 mg oral tablet: 1 tab(s) orally once a day (at bedtime)  simvastatin 40 mg oral tablet: 1 tab(s) orally once a day (at bedtime)  SITagliptin-metFORMIN 50mg-1000mg oral tablet: 1 tab(s) orally 2 times a day   acetaminophen 325 mg oral tablet: 3 tab(s) orally every 6 hours  enalapril 20 mg oral tablet: 1 tab(s) orally once a day  glimepiride 4 mg oral tablet: 1 tab(s) orally 2 times a day  Lantus Solostar Pen 100 units/mL subcutaneous solution: 40 unit(s) subcutaneous after lunch  nabumetone 750 mg oral tablet: 1 tab(s) orally once a day  oxyCODONE 5 mg oral tablet: 1 tab(s) orally every 6 hours, As Needed -for severe pain not controlled by over the counter medications MDD:4 tabs       iSTOP Reference #: [id number]     pantoprazole 40 mg oral delayed release tablet: 1 tab(s) orally 2 times a day  simvastatin 40 mg oral tablet: 1 tab(s) orally once a day (at bedtime)   acetaminophen 325 mg oral tablet: 3 tab(s) orally every 6 hours  Basaglar KwikPen 100 units/mL subcutaneous solution: 40 unit(s) subcutaneous once a day after lunch  glimepiride 4 mg oral tablet: 1 tab(s) orally 2 times a day  lisinopril 20 mg oral tablet: 1 tab(s) orally once a day  metFORMIN 1000 mg oral tablet: 1 tab(s) orally once a day in the morning  oxyCODONE 5 mg oral tablet: 1 tab(s) orally every 6 hours, As Needed -for severe pain not controlled by over the counter medications MDD:4 tabs       iSTOP Reference #: [id number]     pioglitazone 15 mg oral tablet: 1 tab(s) orally once a day  simvastatin 40 mg oral tablet: 1 tab(s) orally once a day (at bedtime)

## 2022-07-22 LAB
ANION GAP SERPL CALC-SCNC: 12 MMOL/L — SIGNIFICANT CHANGE UP (ref 7–14)
ANION GAP SERPL CALC-SCNC: 12 MMOL/L — SIGNIFICANT CHANGE UP (ref 7–14)
APTT BLD: 27.3 SEC — SIGNIFICANT CHANGE UP (ref 27–36.3)
BLOOD GAS ARTERIAL - LYTES,HGB,ICA,LACT RESULT: SIGNIFICANT CHANGE UP
BUN SERPL-MCNC: 7 MG/DL — SIGNIFICANT CHANGE UP (ref 7–23)
BUN SERPL-MCNC: 9 MG/DL — SIGNIFICANT CHANGE UP (ref 7–23)
CALCIUM SERPL-MCNC: 7.1 MG/DL — LOW (ref 8.4–10.5)
CALCIUM SERPL-MCNC: 7.2 MG/DL — LOW (ref 8.4–10.5)
CHLORIDE SERPL-SCNC: 101 MMOL/L — SIGNIFICANT CHANGE UP (ref 98–107)
CHLORIDE SERPL-SCNC: 104 MMOL/L — SIGNIFICANT CHANGE UP (ref 98–107)
CK MB BLD-MCNC: 2.2 % — SIGNIFICANT CHANGE UP (ref 0–2.5)
CK MB CFR SERPL CALC: 5.4 NG/ML — HIGH
CK SERPL-CCNC: 249 U/L — HIGH (ref 25–170)
CO2 SERPL-SCNC: 18 MMOL/L — LOW (ref 22–31)
CO2 SERPL-SCNC: 19 MMOL/L — LOW (ref 22–31)
CREAT SERPL-MCNC: 0.65 MG/DL — SIGNIFICANT CHANGE UP (ref 0.5–1.3)
CREAT SERPL-MCNC: 0.72 MG/DL — SIGNIFICANT CHANGE UP (ref 0.5–1.3)
D DIMER BLD IA.RAPID-MCNC: 3873 NG/ML DDU — HIGH
EGFR: 90 ML/MIN/1.73M2 — SIGNIFICANT CHANGE UP
EGFR: 95 ML/MIN/1.73M2 — SIGNIFICANT CHANGE UP
GLUCOSE BLDC GLUCOMTR-MCNC: 122 MG/DL — HIGH (ref 70–99)
GLUCOSE BLDC GLUCOMTR-MCNC: 154 MG/DL — HIGH (ref 70–99)
GLUCOSE BLDC GLUCOMTR-MCNC: 163 MG/DL — HIGH (ref 70–99)
GLUCOSE BLDC GLUCOMTR-MCNC: 69 MG/DL — LOW (ref 70–99)
GLUCOSE BLDC GLUCOMTR-MCNC: 72 MG/DL — SIGNIFICANT CHANGE UP (ref 70–99)
GLUCOSE BLDC GLUCOMTR-MCNC: 91 MG/DL — SIGNIFICANT CHANGE UP (ref 70–99)
GLUCOSE BLDC GLUCOMTR-MCNC: 95 MG/DL — SIGNIFICANT CHANGE UP (ref 70–99)
GLUCOSE SERPL-MCNC: 103 MG/DL — HIGH (ref 70–99)
GLUCOSE SERPL-MCNC: 169 MG/DL — HIGH (ref 70–99)
HCT VFR BLD CALC: 34.1 % — LOW (ref 34.5–45)
HGB BLD-MCNC: 10.8 G/DL — LOW (ref 11.5–15.5)
INR BLD: 1.2 RATIO — HIGH (ref 0.88–1.16)
LACTATE SERPL-SCNC: 1.4 MMOL/L — SIGNIFICANT CHANGE UP (ref 0.5–2)
MAGNESIUM SERPL-MCNC: 1.6 MG/DL — SIGNIFICANT CHANGE UP (ref 1.6–2.6)
MAGNESIUM SERPL-MCNC: 1.7 MG/DL — SIGNIFICANT CHANGE UP (ref 1.6–2.6)
MCHC RBC-ENTMCNC: 28.6 PG — SIGNIFICANT CHANGE UP (ref 27–34)
MCHC RBC-ENTMCNC: 31.7 GM/DL — LOW (ref 32–36)
MCV RBC AUTO: 90.2 FL — SIGNIFICANT CHANGE UP (ref 80–100)
NRBC # BLD: 0 /100 WBCS — SIGNIFICANT CHANGE UP
NRBC # FLD: 0.02 K/UL — HIGH
PHOSPHATE SERPL-MCNC: 2.1 MG/DL — LOW (ref 2.5–4.5)
PHOSPHATE SERPL-MCNC: 2.2 MG/DL — LOW (ref 2.5–4.5)
PLATELET # BLD AUTO: 340 K/UL — SIGNIFICANT CHANGE UP (ref 150–400)
POTASSIUM SERPL-MCNC: 3.5 MMOL/L — SIGNIFICANT CHANGE UP (ref 3.5–5.3)
POTASSIUM SERPL-MCNC: 3.5 MMOL/L — SIGNIFICANT CHANGE UP (ref 3.5–5.3)
POTASSIUM SERPL-SCNC: 3.5 MMOL/L — SIGNIFICANT CHANGE UP (ref 3.5–5.3)
POTASSIUM SERPL-SCNC: 3.5 MMOL/L — SIGNIFICANT CHANGE UP (ref 3.5–5.3)
PROTHROM AB SERPL-ACNC: 13.9 SEC — HIGH (ref 10.5–13.4)
RBC # BLD: 3.78 M/UL — LOW (ref 3.8–5.2)
RBC # FLD: 14.4 % — SIGNIFICANT CHANGE UP (ref 10.3–14.5)
SODIUM SERPL-SCNC: 132 MMOL/L — LOW (ref 135–145)
SODIUM SERPL-SCNC: 134 MMOL/L — LOW (ref 135–145)
TROPONIN T, HIGH SENSITIVITY RESULT: 22 NG/L — SIGNIFICANT CHANGE UP
TROPONIN T, HIGH SENSITIVITY RESULT: 31 NG/L — SIGNIFICANT CHANGE UP
WBC # BLD: 15.65 K/UL — HIGH (ref 3.8–10.5)
WBC # FLD AUTO: 15.65 K/UL — HIGH (ref 3.8–10.5)

## 2022-07-22 PROCEDURE — 99223 1ST HOSP IP/OBS HIGH 75: CPT | Mod: 25

## 2022-07-22 PROCEDURE — 71275 CT ANGIOGRAPHY CHEST: CPT | Mod: 26

## 2022-07-22 RX ORDER — INSULIN LISPRO 100/ML
VIAL (ML) SUBCUTANEOUS
Refills: 0 | Status: DISCONTINUED | OUTPATIENT
Start: 2022-07-22 | End: 2022-07-25

## 2022-07-22 RX ORDER — CALCIUM GLUCONATE 100 MG/ML
2 VIAL (ML) INTRAVENOUS ONCE
Refills: 0 | Status: COMPLETED | OUTPATIENT
Start: 2022-07-22 | End: 2022-07-22

## 2022-07-22 RX ORDER — IPRATROPIUM/ALBUTEROL SULFATE 18-103MCG
3 AEROSOL WITH ADAPTER (GRAM) INHALATION ONCE
Refills: 0 | Status: COMPLETED | OUTPATIENT
Start: 2022-07-22 | End: 2022-07-22

## 2022-07-22 RX ORDER — HYDROMORPHONE HYDROCHLORIDE 2 MG/ML
0.25 INJECTION INTRAMUSCULAR; INTRAVENOUS; SUBCUTANEOUS
Refills: 0 | Status: DISCONTINUED | OUTPATIENT
Start: 2022-07-22 | End: 2022-07-24

## 2022-07-22 RX ORDER — LEVALBUTEROL 1.25 MG/.5ML
0.63 SOLUTION, CONCENTRATE RESPIRATORY (INHALATION) EVERY 6 HOURS
Refills: 0 | Status: DISCONTINUED | OUTPATIENT
Start: 2022-07-22 | End: 2022-07-25

## 2022-07-22 RX ORDER — POTASSIUM PHOSPHATE, MONOBASIC POTASSIUM PHOSPHATE, DIBASIC 236; 224 MG/ML; MG/ML
15 INJECTION, SOLUTION INTRAVENOUS ONCE
Refills: 0 | Status: COMPLETED | OUTPATIENT
Start: 2022-07-22 | End: 2022-07-22

## 2022-07-22 RX ORDER — MAGNESIUM SULFATE 500 MG/ML
2 VIAL (ML) INJECTION ONCE
Refills: 0 | Status: COMPLETED | OUTPATIENT
Start: 2022-07-22 | End: 2022-07-22

## 2022-07-22 RX ORDER — LABETALOL HCL 100 MG
10 TABLET ORAL ONCE
Refills: 0 | Status: DISCONTINUED | OUTPATIENT
Start: 2022-07-22 | End: 2022-07-22

## 2022-07-22 RX ORDER — FUROSEMIDE 40 MG
40 TABLET ORAL ONCE
Refills: 0 | Status: COMPLETED | OUTPATIENT
Start: 2022-07-22 | End: 2022-07-22

## 2022-07-22 RX ORDER — HYDROMORPHONE HYDROCHLORIDE 2 MG/ML
0.5 INJECTION INTRAMUSCULAR; INTRAVENOUS; SUBCUTANEOUS
Refills: 0 | Status: DISCONTINUED | OUTPATIENT
Start: 2022-07-22 | End: 2022-07-24

## 2022-07-22 RX ADMIN — Medication 1.25 MILLIGRAM(S): at 09:47

## 2022-07-22 RX ADMIN — PANTOPRAZOLE SODIUM 40 MILLIGRAM(S): 20 TABLET, DELAYED RELEASE ORAL at 17:01

## 2022-07-22 RX ADMIN — Medication 1.25 MILLIGRAM(S): at 03:09

## 2022-07-22 RX ADMIN — Medication 400 MILLIGRAM(S): at 09:53

## 2022-07-22 RX ADMIN — PANTOPRAZOLE SODIUM 40 MILLIGRAM(S): 20 TABLET, DELAYED RELEASE ORAL at 09:46

## 2022-07-22 RX ADMIN — Medication 15 UNIT(S): at 09:39

## 2022-07-22 RX ADMIN — Medication 400 MILLIGRAM(S): at 16:10

## 2022-07-22 RX ADMIN — Medication 1000 MILLIGRAM(S): at 03:39

## 2022-07-22 RX ADMIN — Medication 200 GRAM(S): at 13:54

## 2022-07-22 RX ADMIN — Medication 1000 MILLIGRAM(S): at 10:23

## 2022-07-22 RX ADMIN — Medication 3 MILLILITER(S): at 14:10

## 2022-07-22 RX ADMIN — Medication 1.25 MILLIGRAM(S): at 16:10

## 2022-07-22 RX ADMIN — ENOXAPARIN SODIUM 40 MILLIGRAM(S): 100 INJECTION SUBCUTANEOUS at 23:23

## 2022-07-22 RX ADMIN — Medication 40 MILLIGRAM(S): at 16:10

## 2022-07-22 RX ADMIN — Medication 1000 MILLIGRAM(S): at 16:40

## 2022-07-22 RX ADMIN — Medication 400 MILLIGRAM(S): at 03:09

## 2022-07-22 RX ADMIN — POTASSIUM PHOSPHATE, MONOBASIC POTASSIUM PHOSPHATE, DIBASIC 62.5 MILLIMOLE(S): 236; 224 INJECTION, SOLUTION INTRAVENOUS at 13:54

## 2022-07-22 RX ADMIN — Medication 25 GRAM(S): at 13:55

## 2022-07-22 RX ADMIN — HYDROMORPHONE HYDROCHLORIDE 30 MILLILITER(S): 2 INJECTION INTRAMUSCULAR; INTRAVENOUS; SUBCUTANEOUS at 08:59

## 2022-07-22 NOTE — CONSULT NOTE ADULT - SUBJECTIVE AND OBJECTIVE BOX
07-22-22 @ 14:41    Patient is a 72y old  Female who presents with a chief complaint of SBO (22 Jul 2022 13:45)      HPI:  72F with hx of right hemicolectomy in 2017 for colon ca, with adjuvant chemo, DM, HTN, HLD, presenting with 2 days of abdominal pain, N/V. She stated once the pain began, she noted a hard bulge in the RLQ that she is unsure if it has ever been there before. Pain is at the side of the bulge, does not radiate. Denies fevers, chills, chest pain. She last passed gas and had a BM this morning. She has never had pain like this before, has had no complaints of this bulge in the past. (19 Jul 2022 21:11)    she is s/p bref·  PROCEDURES:  Laparotomy, exploratory  Ventral hernia repair·  PROCEDURES:    ?FOLLOWING PRESENT  [ ] Hx of PE/DVT, [ ] Hx COPD, [ ] Hx of Asthma, [ ] Hx of Hospitalization, [ ]  Hx of BiPAP/CPAP use, [ ] Hx of AURA    Allergies    No Known Allergies    Intolerances        PAST MEDICAL & SURGICAL HISTORY:  Essential hypertension      Diabetes mellitus  type 2      Hyperlipidemia      Diabetes mellitus, type 2      Essential hypertension      Hyperlipidemia, unspecified hyperlipidemia type      Obesity (BMI 30.0-34.9)      Hepatic flexure mass  malignant neoplasm      Malignant neoplasm of hepatic flexure      No significant past surgical history      H/O hemicolectomy  right - 1/30/2017          FAMILY HISTORY:  No pertinent family history in first degree relatives        Social History: [  ] TOBACCO                  [  ] ETOH                                 [  ] IVDA/DRUGS    REVIEW OF SYSTEMS      General:	    Skin/Breast:  	  Ophthalmologic:  	  ENMT:	    Respiratory and Thorax:  	  Cardiovascular:	    Gastrointestinal:	    Genitourinary:	    Musculoskeletal:	    Neurological:	    Psychiatric:	    Hematology/Lymphatics:	    Endocrine:	    Allergic/Immunologic:	    MEDICATIONS  (STANDING):  acetaminophen   IVPB .. 1000 milliGRAM(s) IV Intermittent every 6 hours  dextrose 5%. 1000 milliLiter(s) (100 mL/Hr) IV Continuous <Continuous>  dextrose 50% Injectable 25 Gram(s) IV Push once  dextrose 50% Injectable 12.5 Gram(s) IV Push once  enalaprilat Injectable 1.25 milliGRAM(s) IV Push every 6 hours  enoxaparin Injectable 40 milliGRAM(s) SubCutaneous every 24 hours  insulin detemir injectable (LEVEMIR) 15 Unit(s) SubCutaneous daily  insulin lispro (ADMELOG) corrective regimen sliding scale   SubCutaneous Before meals and at bedtime  pantoprazole  Injectable 40 milliGRAM(s) IV Push two times a day    MEDICATIONS  (PRN):  HYDROmorphone  Injectable 0.5 milliGRAM(s) IV Push every 3 hours PRN Moderate to Severe Pain (4 - 10)  HYDROmorphone  Injectable 0.25 milliGRAM(s) IV Push every 3 hours PRN Severe breakthrough Pain (7 - 10)  levalbuterol Inhalation 0.63 milliGRAM(s) Inhalation every 6 hours PRN sob  naloxone Injectable 0.1 milliGRAM(s) IV Push every 3 minutes PRN For ANY of the following changes in patient status:  A. RR LESS THAN 10 breaths per minute, B. Oxygen saturation LESS THAN 90%, C. Sedation score of 6  ondansetron Injectable 4 milliGRAM(s) IV Push every 6 hours PRN Nausea  tetracaine/benzocaine/butamben Spray 1 Spray(s) Topical every 6 hours PRN pain       Vital Signs Last 24 Hrs  T(C): 36.8 (22 Jul 2022 02:28), Max: 36.8 (21 Jul 2022 17:41)  T(F): 98.2 (22 Jul 2022 02:28), Max: 98.2 (21 Jul 2022 17:41)  HR: 98 (22 Jul 2022 14:38) (90 - 117)  BP: 158/84 (22 Jul 2022 14:00) (152/86 - 191/102)  BP(mean): 107 (22 Jul 2022 14:00) (107 - 107)  RR: 20 (22 Jul 2022 14:38) (17 - 27)  SpO2: 98% (22 Jul 2022 14:38) (90% - 100%)    Parameters below as of 22 Jul 2022 14:38  Patient On (Oxygen Delivery Method): nasal cannula, high flow  O2 Flow (L/min): 30  O2 Concentration (%): 40Orthostatic VS          I&O's Summary    21 Jul 2022 07:01  -  22 Jul 2022 07:00  --------------------------------------------------------  IN: 240 mL / OUT: 1200 mL / NET: -960 mL    22 Jul 2022 07:01  -  22 Jul 2022 14:41  --------------------------------------------------------  IN: 470 mL / OUT: 60 mL / NET: 410 mL        Physical Exam:   GENERAL: NAD, well-groomed, well-developed  HEENT: SULAIMAN/   Atraumatic, Normocephalic  ENMT: No tonsillar erythema, exudates, or enlargement; Moist mucous membranes, Good dentition, No lesions  NECK: Supple, No JVD, Normal thyroid  CHEST/LUNG: Clear to auscultation bilaterally; No rales, rhonchi, wheezing, or rubs  CVS: Regular rate and rhythm; No murmurs, rubs, or gallops  GI: : Soft, Nontender, Nondistended; Bowel sounds present  NERVOUS SYSTEM:  Alert & Oriented X3, Good concentration; Motor Strength 5/5 B/L upper and lower extremities; DTRs 2+ intact and symmetric  EXTREMITIES:  2+ Peripheral Pulses, No clubbing, cyanosis, or edema  LYMPH: No lymphadenopathy noted  SKIN: No rashes or lesions  ENDOCRINOLOGY: No Thyromegaly  PSYCH: Appropriate    Labs:  ABG - ( 22 Jul 2022 12:30 )  pH, Arterial: 7.33  pH, Blood: x     /  pCO2: 37    /  pO2: 167   / HCO3: 20    / Base Excess: -5.9  /  SaO2: 98.5            -2.0<40<4>>40<<7.375>>-2.0<<3><<4><<5<<409>>, Venous<43<4>>30<<7.345>>Venous<<3><<4><<5<<309>>, Venous<40<4>>36<<7.415>>Venous<<3><<4><<5<<369>>  CARDIAC MARKERS ( 22 Jul 2022 12:30 )  x     / x     / 249 U/L / x     / 5.4 ng/mL                            10.8   15.65 )-----------( 340      ( 22 Jul 2022 12:30 )             34.1                         11.2   14.16 )-----------( 339      ( 21 Jul 2022 23:20 )             34.6                         11.2   11.59 )-----------( 297      ( 21 Jul 2022 05:55 )             33.4                         11.9   7.52  )-----------( 333      ( 20 Jul 2022 19:21 )             36.5                         11.5   5.78  )-----------( 337      ( 20 Jul 2022 05:38 )             34.1                         13.4   10.49 )-----------( 410      ( 19 Jul 2022 14:55 )             39.5     07-22    132<L>  |  101  |  7   ----------------------------<  169<H>  3.5   |  19<L>  |  0.65  07-21    134<L>  |  104  |  9   ----------------------------<  103<H>  3.5   |  18<L>  |  0.72  07-21    134<L>  |  102  |  11  ----------------------------<  148<H>  4.3   |  21<L>  |  0.96  07-20    135  |  104  |  11  ----------------------------<  153<H>  3.8   |  20<L>  |  0.81  07-20    128<L>  |  94<L>  |  14  ----------------------------<  232<H>  4.4   |  21<L>  |  0.83  07-19    131<L>  |  93<L>  |  17  ----------------------------<  329<H>  5.0   |  22  |  0.95    Ca    7.1<L>      22 Jul 2022 12:30  Ca    7.2<L>      21 Jul 2022 23:20  Ca    7.1<L>      21 Jul 2022 05:55  Ca    7.1<L>      20 Jul 2022 19:21  Phos  2.1     07-22  Phos  2.2     07-21  Phos  3.7     07-21  Mg     1.60     07-22  Mg     1.70     07-21  Mg     1.10     07-21    TPro  7.8  /  Alb  3.9  /  TBili  0.4  /  DBili  x   /  AST  16  /  ALT  16  /  AlkPhos  77  07-19    CAPILLARY BLOOD GLUCOSE      POCT Blood Glucose.: 154 mg/dL (22 Jul 2022 12:18)  POCT Blood Glucose.: 163 mg/dL (22 Jul 2022 09:38)  POCT Blood Glucose.: 122 mg/dL (22 Jul 2022 08:00)  POCT Blood Glucose.: 72 mg/dL (21 Jul 2022 18:27)      PT/INR - ( 22 Jul 2022 12:30 )   PT: 13.9 sec;   INR: 1.20 ratio         PTT - ( 22 Jul 2022 12:30 )  PTT:27.3 sec    D DImerLactate, Blood: 1.4 mmol/L (07-22 @ 00:12)  Lactate, Blood: 1.7 mmol/L (07-20 @ 05:38)    D-Dimer Assay, Quantitative: 3873 ng/mL DDU (07-22 @ 00:12)      Studies  Chest X-RAY  CT SCAN Chest   CT Abdomen  Venous Dopplers: LE:   Others                     07-22-22 @ 14:41    Patient is a 72y old  Female who presents with a chief complaint of SBO (22 Jul 2022 13:45)      HPI:  72F with hx of right hemicolectomy in 2017 for colon ca, with adjuvant chemo, DM, HTN, HLD, presenting with 2 days of abdominal pain, N/V. She stated once the pain began, she noted a hard bulge in the RLQ that she is unsure if it has ever been there before. Pain is at the side of the bulge, does not radiate. Denies fevers, chills, chest pain. She last passed gas and had a BM this morning. She has never had pain like this before, has had no complaints of this bulge in the past. (19 Jul 2022 21:11)    she is s/p bref·  PROCEDURES:  Laparotomy, exploratory  Ventral hernia repair·  PROCEDURES:    topday in the floor she was found to be in resp distress and was shifter to sicu:  There she was on 40% high flow and feels OK: saying she never had any underlying lung problems:  she never smoked and never had covid or pe or dvt :      ?FOLLOWING PRESENT  [ x] Hx of PE/DVT,x [ ] Hx COPD, [x ] Hx of Asthma, [x ] Hx of Hospitalization, [x ]  Hx of BiPAP/CPAP use, [ ]x Hx of AURA    Allergies    No Known Allergies    Intolerances        PAST MEDICAL & SURGICAL HISTORY:  Essential hypertension      Diabetes mellitus  type 2      Hyperlipidemia      Diabetes mellitus, type 2      Essential hypertension      Hyperlipidemia, unspecified hyperlipidemia type      Obesity (BMI 30.0-34.9)      Hepatic flexure mass  malignant neoplasm      Malignant neoplasm of hepatic flexure      No significant past surgical history      H/O hemicolectomy  right - 1/30/2017          FAMILY HISTORY:  No pertinent family history in first degree relatives        Social History: [  x] TOBACCO                  [  x] ETOH                                 [ x ] IVDA/DRUGS    REVIEW OF SYSTEMS      General:	x    Skin/Breast:x  	  Ophthalmologic:x  	  ENMT:	x    Respiratory and Thorax: sob, increased work of breathing   	  Cardiovascular:	x    Gastrointestinal:	x    Genitourinary:	x    Musculoskeletal:	x    Neurological:	x    Psychiatric:	x    Hematology/Lymphatics:	  x  Endocrine:	x    Allergic/Immunologic:	x    MEDICATIONS  (STANDING):  acetaminophen   IVPB .. 1000 milliGRAM(s) IV Intermittent every 6 hours  dextrose 5%. 1000 milliLiter(s) (100 mL/Hr) IV Continuous <Continuous>  dextrose 50% Injectable 25 Gram(s) IV Push once  dextrose 50% Injectable 12.5 Gram(s) IV Push once  enalaprilat Injectable 1.25 milliGRAM(s) IV Push every 6 hours  enoxaparin Injectable 40 milliGRAM(s) SubCutaneous every 24 hours  insulin detemir injectable (LEVEMIR) 15 Unit(s) SubCutaneous daily  insulin lispro (ADMELOG) corrective regimen sliding scale   SubCutaneous Before meals and at bedtime  pantoprazole  Injectable 40 milliGRAM(s) IV Push two times a day    MEDICATIONS  (PRN):  HYDROmorphone  Injectable 0.5 milliGRAM(s) IV Push every 3 hours PRN Moderate to Severe Pain (4 - 10)  HYDROmorphone  Injectable 0.25 milliGRAM(s) IV Push every 3 hours PRN Severe breakthrough Pain (7 - 10)  levalbuterol Inhalation 0.63 milliGRAM(s) Inhalation every 6 hours PRN sob  naloxone Injectable 0.1 milliGRAM(s) IV Push every 3 minutes PRN For ANY of the following changes in patient status:  A. RR LESS THAN 10 breaths per minute, B. Oxygen saturation LESS THAN 90%, C. Sedation score of 6  ondansetron Injectable 4 milliGRAM(s) IV Push every 6 hours PRN Nausea  tetracaine/benzocaine/butamben Spray 1 Spray(s) Topical every 6 hours PRN pain       Vital Signs Last 24 Hrs  T(C): 36.8 (22 Jul 2022 02:28), Max: 36.8 (21 Jul 2022 17:41)  T(F): 98.2 (22 Jul 2022 02:28), Max: 98.2 (21 Jul 2022 17:41)  HR: 98 (22 Jul 2022 14:38) (90 - 117)  BP: 158/84 (22 Jul 2022 14:00) (152/86 - 191/102)  BP(mean): 107 (22 Jul 2022 14:00) (107 - 107)  RR: 20 (22 Jul 2022 14:38) (17 - 27)  SpO2: 98% (22 Jul 2022 14:38) (90% - 100%)    Parameters below as of 22 Jul 2022 14:38  Patient On (Oxygen Delivery Method): nasal cannula, high flow  O2 Flow (L/min): 30  O2 Concentration (%): 40Orthostatic VS          I&O's Summary    21 Jul 2022 07:01  -  22 Jul 2022 07:00  --------------------------------------------------------  IN: 240 mL / OUT: 1200 mL / NET: -960 mL    22 Jul 2022 07:01  -  22 Jul 2022 14:41  --------------------------------------------------------  IN: 470 mL / OUT: 60 mL / NET: 410 mL        Physical Exam:   GENERAL: NAD, well-groomed, well-developed  HEENT: SULAIMAN/   Atraumatic, Normocephalic  ENMT: No tonsillar erythema, exudates, or enlargement; Moist mucous membranes, Good dentition, No lesions  NECK: Supple, No JVD, Normal thyroid  CHEST/LUNG: bibasilar cracekls+  CVS: Regular rate and rhythm; No murmurs, rubs, or gallops  GI: : Soft, Nontender, Nondistended; Bowel sounds present  NERVOUS SYSTEM:  Alert & Oriented X3  EXTREMITIES:  - edema  LYMPH: No lymphadenopathy noted  SKIN: No rashes or lesions  ENDOCRINOLOGY: No Thyromegaly  PSYCH: Appropriate    Labs:  ABG - ( 22 Jul 2022 12:30 )  pH, Arterial: 7.33  pH, Blood: x     /  pCO2: 37    /  pO2: 167   / HCO3: 20    / Base Excess: -5.9  /  SaO2: 98.5            -2.0<40<4>>40<<7.375>>-2.0<<3><<4><<5<<409>>, Venous<43<4>>30<<7.345>>Venous<<3><<4><<5<<309>>, Venous<40<4>>36<<7.415>>Venous<<3><<4><<5<<369>>  CARDIAC MARKERS ( 22 Jul 2022 12:30 )  x     / x     / 249 U/L / x     / 5.4 ng/mL                            10.8   15.65 )-----------( 340      ( 22 Jul 2022 12:30 )             34.1                         11.2   14.16 )-----------( 339      ( 21 Jul 2022 23:20 )             34.6                         11.2   11.59 )-----------( 297      ( 21 Jul 2022 05:55 )             33.4                         11.9   7.52  )-----------( 333      ( 20 Jul 2022 19:21 )             36.5                         11.5   5.78  )-----------( 337      ( 20 Jul 2022 05:38 )             34.1                         13.4   10.49 )-----------( 410      ( 19 Jul 2022 14:55 )             39.5     07-22    132<L>  |  101  |  7   ----------------------------<  169<H>  3.5   |  19<L>  |  0.65  07-21    134<L>  |  104  |  9   ----------------------------<  103<H>  3.5   |  18<L>  |  0.72  07-21    134<L>  |  102  |  11  ----------------------------<  148<H>  4.3   |  21<L>  |  0.96  07-20    135  |  104  |  11  ----------------------------<  153<H>  3.8   |  20<L>  |  0.81  07-20    128<L>  |  94<L>  |  14  ----------------------------<  232<H>  4.4   |  21<L>  |  0.83  07-19    131<L>  |  93<L>  |  17  ----------------------------<  329<H>  5.0   |  22  |  0.95    Ca    7.1<L>      22 Jul 2022 12:30  Ca    7.2<L>      21 Jul 2022 23:20  Ca    7.1<L>      21 Jul 2022 05:55  Ca    7.1<L>      20 Jul 2022 19:21  Phos  2.1     07-22  Phos  2.2     07-21  Phos  3.7     07-21  Mg     1.60     07-22  Mg     1.70     07-21  Mg     1.10     07-21    TPro  7.8  /  Alb  3.9  /  TBili  0.4  /  DBili  x   /  AST  16  /  ALT  16  /  AlkPhos  77  07-19    CAPILLARY BLOOD GLUCOSE      POCT Blood Glucose.: 154 mg/dL (22 Jul 2022 12:18)  POCT Blood Glucose.: 163 mg/dL (22 Jul 2022 09:38)  POCT Blood Glucose.: 122 mg/dL (22 Jul 2022 08:00)  POCT Blood Glucose.: 72 mg/dL (21 Jul 2022 18:27)      PT/INR - ( 22 Jul 2022 12:30 )   PT: 13.9 sec;   INR: 1.20 ratio         PTT - ( 22 Jul 2022 12:30 )  PTT:27.3 sec    D DImerLactate, Blood: 1.4 mmol/L (07-22 @ 00:12)  Lactate, Blood: 1.7 mmol/L (07-20 @ 05:38)    D-Dimer Assay, Quantitative: 3873 ng/mL DDU (07-22 @ 00:12)      Studies  Chest X-RAY  CT SCAN Chest   CT Abdomen  Venous Dopplers: LE:   Others        rad< from: CT Angio Chest PE Protocol w/ IV Cont (07.22.22 @ 07:45) >  CT Angiogram of the chest was obtained with intravenous contrast. Three   dimensional maximum intensity projection (MIP) images were generated.    FINDINGS:    PULMONARY ANGIOGRAM: No pulmonary embolus.    LYMPH NODES: No thoracic adenopathy.    HEART/VASCULATURE: The heart is normal in size. No pericardial effusion.   There are calcifications of the aorta and coronary arteries. The thoracic   aorta and main pulmonary artery are normal in caliber. No aortic aneurysm   or dissection.    AIRWAYS/LUNGS/PLEURA: Significant collapse of the right main bronchus may   represent bronchomalacia. Central airways are patent.   Segmental/subsegmental atelectasis within all lobes. Small bilateral   pleural effusions. A 4 mm right lower lobe pulmonary nodule is unchanged   (2:58) likely a benign pulmonary lymph node. No pneumothorax.    UPPER ABDOMEN: Small volume perihepatic ascites, which may be   postsurgical in nature. Postsurgical edema involving the anterior   abdominal wall. Small fat-containing ventral hernia, partially imaged.    BONES/SOFT TISSUES: Degenerative changes of the spine.    IMPRESSION:    No pulmonary embolus.    Small bilateral pleural effusions. Segmental/subsegmental atelectasis   within all lobes.    --- End of Report ---           MAIA COBURN MD; Resident Radiologist  This document has been electronically signed.    < end of copied text >      < from: Transthoracic Echocardiogram (01.30.17 @ 09:01) >  consistent with concentric left ventricular remodeling.  Right Heart: Normal right atrium. Normal right ventricular  size and function. Normal tricuspid valve. Minimal  tricuspid regurgitation. Normal pulmonic valve.  Pericardium/PleuraNormal pericardium with no pericardial  effusion.  ------------------------------------------------------------------------  CONCLUSIONS:  1. Increased relative wall thickness with normal left  ventricular mass index, consistent with concentric left  ventricular remodeling.  2. Normal left ventricular systolic function. No segmental  wall motion abnormalities.  3. Normal right ventricular size and function.  ------------------------------------------------------------------------  Confirmed on  1/30/2017 - 13:04:16 by CK Nation  -----------------------------------------------------------------------    < end of copied text >

## 2022-07-22 NOTE — CONSULT NOTE ADULT - SUBJECTIVE AND OBJECTIVE BOX
SICU Consultation Note  =====================================================  HPI:  72F with hx of right hemicolectomy in 2017 for colon ca, with adjuvant chemo, DM, HTN, HLD, presenting with 2 days of abdominal pain, N/V.   Patient had an ex lap with ventral hernia repair on 7/20. Developed SOB overnight on 7/21 and CTA 7/22 negative for PE.   SICU consulted for respiratory distress.  Patient seen and examined at bedside. States she has never had breathing problems and never been diagnosed with lung problems. Admits to having difficulty breathing. Denies chest pain N/V, abdominal pain.    Allergies:   PAST MEDICAL & SURGICAL HISTORY:  Essential hypertension  Diabetes mellitus type 2  Hyperlipidemia  Diabetes mellitus, type 2  Essential hypertension  Hyperlipidemia, unspecified hyperlipidemia type  Obesity (BMI 30.0-34.9)  Hepatic flexure mass - malignant neoplasm  Malignant neoplasm of hepatic flexure      H/O hemicolectomy  right - 1/30/2017    FAMILY HISTORY:  No pertinent family history in first degree relatives      ADVANCE DIRECTIVES: Full Code     REVIEW OF SYSTEMS: negative unless otherwise stated in HPI     HOME MEDICATIONS:    --------------------------------------------------------------------------------------  Home Medications:  enalapril 20 mg oral tablet: 1 tab(s) orally once a day (03 Mar 2017 08:28)  enalapril 20 mg oral tablet: 1 tab(s) orally once a day (03 Mar 2017 06:49)  glimepiride 4 mg oral tablet: 1 tab(s) orally 2 times a day (03 Mar 2017 06:49)  glimepiride 4 mg oral tablet: 1 tab(s) orally once a day (03 Mar 2017 08:28)  Janumet XR 50 mg-1000 mg oral tablet, extended release: 2 tab(s) orally once a day (in the evening) (03 Mar 2017 08:28)  Levemir FlexPen 100 units/mL subcutaneous solution: 30 unit(s) subcutaneous once a day (03 Mar 2017 06:49)  Levemir FlexPen 100 units/mL subcutaneous solution: 30 unit(s) subcutaneous once a day (03 Mar 2017 08:28)  nabumetone 750 mg oral tablet: 1 tab(s) orally once a day (03 Mar 2017 08:28)  simvastatin 40 mg oral tablet: 1 tab(s) orally once a day (at bedtime) (03 Mar 2017 06:49)  simvastatin 40 mg oral tablet: 1 tab(s) orally once a day (at bedtime) (03 Mar 2017 08:28)  SITagliptin-metFORMIN 50mg-1000mg oral tablet: 1 tab(s) orally 2 times a day (03 Mar 2017 06:49)    --------------------------------------------------------------------------------------    CURRENT MEDICATIONS:   --------------------------------------------------------------------------------------  Neurologic Medications  acetaminophen   IVPB .. 1000 milliGRAM(s) IV Intermittent every 6 hours  HYDROmorphone  Injectable 0.5 milliGRAM(s) IV Push every 3 hours PRN Moderate to Severe Pain (4 - 10)  HYDROmorphone  Injectable 0.25 milliGRAM(s) IV Push every 3 hours PRN Severe breakthrough Pain (7 - 10)  ondansetron Injectable 4 milliGRAM(s) IV Push every 6 hours PRN Nausea    Respiratory Medications  albuterol/ipratropium for Nebulization. 3 milliLiter(s) Nebulizer once  levalbuterol Inhalation 0.63 milliGRAM(s) Inhalation every 6 hours PRN sob    Cardiovascular Medications  enalaprilat Injectable 1.25 milliGRAM(s) IV Push every 6 hours    Gastrointestinal Medications  dextrose 5%. 1000 milliLiter(s) IV Continuous <Continuous>  pantoprazole  Injectable 40 milliGRAM(s) IV Push two times a day    Hematologic/Oncologic Medications  enoxaparin Injectable 40 milliGRAM(s) SubCutaneous every 24 hours    Endocrine/Metabolic Medications  dextrose 50% Injectable 25 Gram(s) IV Push once  dextrose 50% Injectable 12.5 Gram(s) IV Push once  insulin detemir injectable (LEVEMIR) 15 Unit(s) SubCutaneous daily  insulin lispro (ADMELOG) corrective regimen sliding scale   SubCutaneous Before meals and at bedtime    Topical/Other Medications  naloxone Injectable 0.1 milliGRAM(s) IV Push every 3 minutes PRN For ANY of the following changes in patient status:  A. RR LESS THAN 10 breaths per minute, B. Oxygen saturation LESS THAN 90%, C. Sedation score of 6  tetracaine/benzocaine/butamben Spray 1 Spray(s) Topical every 6 hours PRN pain    --------------------------------------------------------------------------------------    VITAL SIGNS, INS/OUTS (last 24 hours):  --------------------------------------------------------------------------------------  Vital Signs Last 24 Hrs  T(C): 36.8 (22 Jul 2022 02:28), Max: 36.8 (21 Jul 2022 17:41)  T(F): 98.2 (22 Jul 2022 02:28), Max: 98.2 (21 Jul 2022 17:41)  HR: 117 (22 Jul 2022 03:05) (99 - 117)  BP: 191/102 (22 Jul 2022 03:05) (152/86 - 191/102)  BP(mean): --  RR: 18 (22 Jul 2022 03:05) (17 - 18)  SpO2: 90% (22 Jul 2022 03:05) (90% - 99%)    Parameters below as of 22 Jul 2022 03:05  Patient On (Oxygen Delivery Method): nasal cannula  O2 Flow (L/min): 4      21 Jul 2022 07:01  -  22 Jul 2022 07:00  --------------------------------------------------------  IN:    Oral Fluid: 240 mL  Total IN: 240 mL    OUT:    Drain (mL): 100 mL    Indwelling Catheter - Urethral (mL): 400 mL    Voided (mL): 700 mL  Total OUT: 1200 mL    Total NET: -960 mL    --------------------------------------------------------------------------------------    EXAM  NEUROLOGY  Exam: Normal, NAD, alert, oriented x 3,      HEENT  Exam: Normocephalic, atraumatic.       RESPIRATORY  Exam: + accessory muscle usage, RR of 40. + expiratory wheezes in right chest.     CARDIOVASCULAR  Exam: S1, S2.  Regular rate and rhythm.  Peripheral edema  ***    GI/NUTRITION  Exam: soft, NTND, prevena VAC in place      METABOLIC/FLUIDS/ELECTROLYTES  dextrose 5%. 1000 milliLiter(s) IV Continuous <Continuous>      HEMATOLOGIC  [x] DVT Prophylaxis: enoxaparin Injectable 40 milliGRAM(s) SubCutaneous every 24 hours    Tubes/Lines/Drains   [x] Peripheral IV    LABS  --------------------------------------------------------------------------------------    LABS:  cret                        10.8   15.65 )-----------( 340      ( 22 Jul 2022 12:30 )             34.1     07-21    134<L>  |  104  |  9   ----------------------------<  103<H>  3.5   |  18<L>  |  0.72    Ca    7.2<L>      21 Jul 2022 23:20  Phos  2.2     07-21  Mg     1.70     07-21      PT/INR - ( 22 Jul 2022 12:30 )   PT: 13.9 sec;   INR: 1.20 ratio         PTT - ( 22 Jul 2022 12:30 )  PTT:27.3 sec  --------------------------------------------------------------------------------------      --------------------------------------------------------------------------------------    Critical Care Diagnoses: respiratory distress   SICU Consultation Note  =====================================================  HPI:  72F with hx of right hemicolectomy in 2017 for colon ca, with adjuvant chemo, DM, HTN, HLD, presenting with 2 days of abdominal pain, N/V.   Patient had an ex lap with ventral hernia repair on 7/20. Developed SOB overnight on 7/21 and CTA 7/22 negative for PE.   SICU consulted for respiratory distress.  Patient seen and examined at bedside. States she has never had breathing problems and never been diagnosed with lung problems. Admits to having difficulty breathing. Denies chest pain N/V, abdominal pain.    Allergies:   PAST MEDICAL & SURGICAL HISTORY:  Essential hypertension  Diabetes mellitus type 2  Hyperlipidemia  Diabetes mellitus, type 2  Essential hypertension  Hyperlipidemia, unspecified hyperlipidemia type  Obesity (BMI 30.0-34.9)  Hepatic flexure mass - malignant neoplasm  Malignant neoplasm of hepatic flexure      H/O hemicolectomy  right - 1/30/2017    FAMILY HISTORY:  No pertinent family history in first degree relatives      ADVANCE DIRECTIVES: Full Code     REVIEW OF SYSTEMS: negative unless otherwise stated in HPI     HOME MEDICATIONS:    --------------------------------------------------------------------------------------  Home Medications:  enalapril 20 mg oral tablet: 1 tab(s) orally once a day (03 Mar 2017 08:28)  enalapril 20 mg oral tablet: 1 tab(s) orally once a day (03 Mar 2017 06:49)  glimepiride 4 mg oral tablet: 1 tab(s) orally 2 times a day (03 Mar 2017 06:49)  glimepiride 4 mg oral tablet: 1 tab(s) orally once a day (03 Mar 2017 08:28)  Janumet XR 50 mg-1000 mg oral tablet, extended release: 2 tab(s) orally once a day (in the evening) (03 Mar 2017 08:28)  Levemir FlexPen 100 units/mL subcutaneous solution: 30 unit(s) subcutaneous once a day (03 Mar 2017 06:49)  Levemir FlexPen 100 units/mL subcutaneous solution: 30 unit(s) subcutaneous once a day (03 Mar 2017 08:28)  nabumetone 750 mg oral tablet: 1 tab(s) orally once a day (03 Mar 2017 08:28)  simvastatin 40 mg oral tablet: 1 tab(s) orally once a day (at bedtime) (03 Mar 2017 06:49)  simvastatin 40 mg oral tablet: 1 tab(s) orally once a day (at bedtime) (03 Mar 2017 08:28)  SITagliptin-metFORMIN 50mg-1000mg oral tablet: 1 tab(s) orally 2 times a day (03 Mar 2017 06:49)    --------------------------------------------------------------------------------------    CURRENT MEDICATIONS:   --------------------------------------------------------------------------------------  Neurologic Medications  acetaminophen   IVPB .. 1000 milliGRAM(s) IV Intermittent every 6 hours  HYDROmorphone  Injectable 0.5 milliGRAM(s) IV Push every 3 hours PRN Moderate to Severe Pain (4 - 10)  HYDROmorphone  Injectable 0.25 milliGRAM(s) IV Push every 3 hours PRN Severe breakthrough Pain (7 - 10)  ondansetron Injectable 4 milliGRAM(s) IV Push every 6 hours PRN Nausea    Respiratory Medications  albuterol/ipratropium for Nebulization. 3 milliLiter(s) Nebulizer once  levalbuterol Inhalation 0.63 milliGRAM(s) Inhalation every 6 hours PRN sob    Cardiovascular Medications  enalaprilat Injectable 1.25 milliGRAM(s) IV Push every 6 hours    Gastrointestinal Medications  dextrose 5%. 1000 milliLiter(s) IV Continuous <Continuous>  pantoprazole  Injectable 40 milliGRAM(s) IV Push two times a day    Hematologic/Oncologic Medications  enoxaparin Injectable 40 milliGRAM(s) SubCutaneous every 24 hours    Endocrine/Metabolic Medications  dextrose 50% Injectable 25 Gram(s) IV Push once  dextrose 50% Injectable 12.5 Gram(s) IV Push once  insulin detemir injectable (LEVEMIR) 15 Unit(s) SubCutaneous daily  insulin lispro (ADMELOG) corrective regimen sliding scale   SubCutaneous Before meals and at bedtime    Topical/Other Medications  naloxone Injectable 0.1 milliGRAM(s) IV Push every 3 minutes PRN For ANY of the following changes in patient status:  A. RR LESS THAN 10 breaths per minute, B. Oxygen saturation LESS THAN 90%, C. Sedation score of 6  tetracaine/benzocaine/butamben Spray 1 Spray(s) Topical every 6 hours PRN pain    --------------------------------------------------------------------------------------    VITAL SIGNS, INS/OUTS (last 24 hours):  --------------------------------------------------------------------------------------  Vital Signs Last 24 Hrs  T(C): 36.8 (22 Jul 2022 02:28), Max: 36.8 (21 Jul 2022 17:41)  T(F): 98.2 (22 Jul 2022 02:28), Max: 98.2 (21 Jul 2022 17:41)  HR: 117 (22 Jul 2022 03:05) (99 - 117)  BP: 191/102 (22 Jul 2022 03:05) (152/86 - 191/102)  BP(mean): --  RR: 18 (22 Jul 2022 03:05) (17 - 18)  SpO2: 90% (22 Jul 2022 03:05) (90% - 99%)    Parameters below as of 22 Jul 2022 03:05  Patient On (Oxygen Delivery Method): nasal cannula  O2 Flow (L/min): 4      21 Jul 2022 07:01  -  22 Jul 2022 07:00  --------------------------------------------------------  IN:    Oral Fluid: 240 mL  Total IN: 240 mL    OUT:    Drain (mL): 100 mL    Indwelling Catheter - Urethral (mL): 400 mL    Voided (mL): 700 mL  Total OUT: 1200 mL    Total NET: -960 mL    --------------------------------------------------------------------------------------    EXAM  NEUROLOGY  Exam: Normal, NAD, alert, oriented x 3,      HEENT  Exam: Normocephalic, atraumatic.       RESPIRATORY  Exam: + accessory muscle usage, RR of 40. + expiratory wheezes in right chest.     CARDIOVASCULAR  Exam: S1, S2.  Regular rate and rhythm.  Peripheral edema  ***    GI/NUTRITION  Exam: soft, NTND, prevena VAC in place      METABOLIC/FLUIDS/ELECTROLYTES  dextrose 5%. 1000 milliLiter(s) IV Continuous <Continuous>      HEMATOLOGIC  [x] DVT Prophylaxis: enoxaparin Injectable 40 milliGRAM(s) SubCutaneous every 24 hours    Tubes/Lines/Drains   [x] Peripheral IV    LABS  --------------------------------------------------------------------------------------      LABS:  07-22 @ 12:30 Creatine 249 U/L<H> [25 - 170]  cret                        10.8   15.65 )-----------( 340      ( 22 Jul 2022 12:30 )             34.1     07-22    132<L>  |  101  |  7   ----------------------------<  169<H>  3.5   |  19<L>  |  0.65    Ca    7.1<L>      22 Jul 2022 12:30  Phos  2.1     07-22  Mg     1.60     07-22      PT/INR - ( 22 Jul 2022 12:30 )   PT: 13.9 sec;   INR: 1.20 ratio         PTT - ( 22 Jul 2022 12:30 )  PTT:27.3 sec

## 2022-07-22 NOTE — CHART NOTE - NSCHARTNOTEFT_GEN_A_CORE
: Tina Baker MD/Maral Mcgowan MD    INDICATION:     PROCEDURE:  [X] LIMITED ECHO  [X] LIMITED CHEST  [ ] LIMITED RETROPERITONEAL  [ ] LIMITED ABDOMINAL  [ ] LIMITED DVT  [ ] NEEDLE GUIDANCE VASCULAR  [ ] NEEDLE GUIDANCE THORACENTESIS  [ ] NEEDLE GUIDANCE PARACENTESIS  [ ] NEEDLE GUIDANCE PERICARDIOCENTESIS  [ ] OTHER    FINDINGS:  Lungs: good lung sliding, many (>6) B lines bilaterally  Heart: no wall collapse noted, good contractility    INTERPRETATION:  Nahid hollis

## 2022-07-22 NOTE — PROVIDER CONTACT NOTE (HYPOGLYCEMIA EVENT) - NS PROVIDER CONTACT BACKGROUND-HYPO
Age: 72y    Gender: Female    POCT Blood Glucose:  69 mg/dL (07-22-22 @ 23:15)  72 mg/dL (07-22-22 @ 23:14)  95 mg/dL (07-22-22 @ 16:52)  154 mg/dL (07-22-22 @ 12:18)  163 mg/dL (07-22-22 @ 09:38)  122 mg/dL (07-22-22 @ 08:00)      eMAR:  insulin detemir injectable (LEVEMIR)   15 Unit(s) SubCutaneous (07-22-22 @ 09:39)

## 2022-07-22 NOTE — PROGRESS NOTE ADULT - SUBJECTIVE AND OBJECTIVE BOX
Day ___ of Anesthesia Pain Management Service  3  SUBJECTIVE:    Therapy:	  [x ] IV PCA	   [ ] Epidural           [ ] s/p Spinal Opoid              [ ] Postpartum infusion	  [ ] Patient controlled regional anesthesia (PCRA)    [ ] prn Analgesics    OBJECTIVE:   [ x] No new signs     [ ] Other:    Side Effects:  [ ] None			[ ] Other:    Assessment of Catheter Site:		[ ] Intact		[ ] Other:    ASSESSMENT/PLAN  [ ] Continue current therapy    [x ] Therapy changed to:    [ ] IV PCA       [ ] Epidural     [x ] prn Analgesics     Comments: c/o SOB, surgical team on, no c/o pain

## 2022-07-22 NOTE — CONSULT NOTE ADULT - ASSESSMENT
72F with hx of right hemicolectomy for colon ca in 2017, with incarcerated, bowel containing ventral hernia causing an SBO    now with post op resp distress: currently on high flow at 40%::    D Dimer is very high: cta is negative:   has atelectasis and subsegmental atelectasis in all lobes: ? hypoxia secondary to atelectasis:  she is not retaining co2: ph is doing Ok at this time:  encourage incentive spirometry she has no significant pain :  she has high wbc count but no fever; not on any [id number]:  dvt prophylaxis: cont o monitro in sicu;  dw  and pt

## 2022-07-22 NOTE — PROGRESS NOTE ADULT - SUBJECTIVE AND OBJECTIVE BOX
Anesthesia Pain Management Service    SUBJECTIVE: Patient is doing well with IV PCA, and reports no pain.      Pain Scale Score	At rest: __0/10_ 	With Activity: ___ 	[X ] Refer to charted pain scores    THERAPY:    [ ] IV PCA Morphine		[ ] 5 mg/mL	[ ] 1 mg/mL  [X ] IV PCA Hydromorphone	[ ] 5 mg/mL	[X ] 1 mg/mL  [ ] IV PCA Fentanyl		[ ] 50 micrograms/mL    Demand dose __0.2_ lockout __6_ (minutes) Continuous Rate _0__ Total: _3__   mg used (in past 24 hrs)      MEDICATIONS  (STANDING):  acetaminophen   IVPB .. 1000 milliGRAM(s) IV Intermittent every 6 hours  albuterol/ipratropium for Nebulization. 3 milliLiter(s) Nebulizer once  dextrose 5%. 1000 milliLiter(s) (100 mL/Hr) IV Continuous <Continuous>  dextrose 50% Injectable 25 Gram(s) IV Push once  dextrose 50% Injectable 12.5 Gram(s) IV Push once  enalaprilat Injectable 1.25 milliGRAM(s) IV Push every 6 hours  enoxaparin Injectable 40 milliGRAM(s) SubCutaneous every 24 hours  insulin detemir injectable (LEVEMIR) 15 Unit(s) SubCutaneous daily  insulin lispro (ADMELOG) corrective regimen sliding scale   SubCutaneous Before meals and at bedtime  pantoprazole  Injectable 40 milliGRAM(s) IV Push two times a day    MEDICATIONS  (PRN):  HYDROmorphone  Injectable 0.5 milliGRAM(s) IV Push every 3 hours PRN Moderate to Severe Pain (4 - 10)  HYDROmorphone  Injectable 0.25 milliGRAM(s) IV Push every 3 hours PRN Severe breakthrough Pain (7 - 10)  levalbuterol Inhalation 0.63 milliGRAM(s) Inhalation every 6 hours PRN sob  naloxone Injectable 0.1 milliGRAM(s) IV Push every 3 minutes PRN For ANY of the following changes in patient status:  A. RR LESS THAN 10 breaths per minute, B. Oxygen saturation LESS THAN 90%, C. Sedation score of 6  ondansetron Injectable 4 milliGRAM(s) IV Push every 6 hours PRN Nausea  tetracaine/benzocaine/butamben Spray 1 Spray(s) Topical every 6 hours PRN pain      OBJECTIVE:  Patient is sitting up in bed, short of breath.     Sedation Score:	[ X] Alert	[ ] Drowsy 	[ ] Arousable	[ ] Asleep	[ ] Unresponsive    Side Effects:	[X ] None	[ ] Nausea	[ ] Vomiting	[ ] Pruritus  		[ ] Other:    Vital Signs Last 24 Hrs  T(C): 36.8 (22 Jul 2022 02:28), Max: 36.8 (21 Jul 2022 17:41)  T(F): 98.2 (22 Jul 2022 02:28), Max: 98.2 (21 Jul 2022 17:41)  HR: 98 (22 Jul 2022 14:00) (96 - 117)  BP: 158/84 (22 Jul 2022 14:00) (152/86 - 191/102)  BP(mean): 107 (22 Jul 2022 14:00) (107 - 107)  RR: 21 (22 Jul 2022 14:00) (17 - 27)  SpO2: 98% (22 Jul 2022 14:00) (90% - 100%)    Parameters below as of 22 Jul 2022 14:00  Patient On (Oxygen Delivery Method): nasal cannula, high flow  O2 Flow (L/min): 30  O2 Concentration (%): 10    ASSESSMENT/ PLAN    Therapy to  be:	[ ] Continue   [ X] Discontinued   [X ] Change to prn Analgesics    Documentation and Verification of current medications:   [X] Done	[ ] Not done, not elligible    Comments: IV Dilaudid PCA discontinued. Team made aware.  PRN Oral/IV opioids and/or Adjuvant non-opioid medication to be ordered at this point.    Progress Note written now but Patient was seen earlier.

## 2022-07-22 NOTE — CONSULT NOTE ADULT - ASSESSMENT
72F**    PLAN:  ***  Neurologic:   - pain control PRN with     Respiratory:   - monitor SPO2    Cardiovascular:   - monitor vitals    Gastrointestinal/Nutrition:   - NPO     Renal/Genitourinary:   - monitor UOP  - monitor electrolytes    Hematologic:   - monitor H/H  - DVT ppx:    Infectious Disease:   -    Tubes/Lines/Drains:   -    Endocrine:   -     Disposition: SICU   72F with hx of right hemicolectomy in 2017 for colon ca, with adjuvant chemo, DM, HTN, HLD is now s/p ex lap with ventral hernia repair on 7/20. Patient now with respiratory distress, CTA negative for PE. Admitted to SICU for respiratory distress.     PLAN:    Neurologic:   - pain control PRN with tylenol, dilaudid PRN    Respiratory:   - monitor SPO2  - wean HFNC as tolerated     Cardiovascular:   - monitor vitals  - enalaprilat 1.25 q6h    Gastrointestinal/Nutrition:   - CLD    Renal/Genitourinary:   - monitor UOP  - monitor electrolytes    Hematologic:   - monitor H/H  - DVT ppx: Lovenox    Infectious Disease:   - monitor WBC, off abx for now    Tubes/Lines/Drains:   - PIV    Endocrine:   - Lantus 15   - ISS    Disposition: SICU

## 2022-07-22 NOTE — PROVIDER CONTACT NOTE (CHANGE IN STATUS NOTIFICATION) - BACKGROUND
Pt s/p ex lap w/ ventral hernia repair. PMH of DM, HTN, HLD. Pt on PCA Dilaudid pump 1mg/mL Pt s/p ex lap w/ ventral hernia repair (7/20). PMH of DM, HTN, HLD. Pt on PCA Dilaudid pump 1mg/mL

## 2022-07-22 NOTE — CONSULT NOTE ADULT - SUBJECTIVE AND OBJECTIVE BOX
Patient is a 71 Y/O female with PMhhx of right hemicolectomy in 2017 for colon ca, with adjuvant chemo, DM, HTN, HLD, presenting with 2 days of abdominal pain, N/V. She stated once the pain began, she noted a hard bulge in the RLQ that she is unsure if it has ever been there before. Pain is at the side of the bulge, does not radiate. Denies fevers, chills, chest pain. She last passed gas and had a BM this morning. Patient had an ex lap with ventral hernia repair on 7/20. Developed SOB overnight on 7/21 and CTA 7/22 negative for PE.     REVIEW OF SYSTEMS:    CONSTITUTIONAL: N+ weakness  EYES/ENT: No visual changes;  No vertigo or throat pain   NECK: No pain or stiffness  RESPIRATORY: + SOB   CARDIOVASCULAR: No chest pain or palpitations  GASTROINTESTINAL: No abdominal or epigastric pain. No nausea, vomiting, or hematemesis; No diarrhea or constipation. No melena or hematochezia.  GENITOURINARY: No dysuria, frequency or hematuria  NEUROLOGICAL: No numbness or weakness  SKIN: No itching, burning, rashes, or lesions   All other review of systems is negative unless indicated above.    PAST MEDICAL & SURGICAL HISTORY:  Essential hypertension  Diabetes mellitus  type 2  Hyperlipidemia  Hyperlipidemia, unspecified hyperlipidemia type  Obesity (BMI 30.0-34.9)  Hepatic flexure mass  malignant neoplasm  Malignant neoplasm of hepatic flexure  No significant past surgical history  HO hemicolectomy  right - 1/30/2017    MEDICATIONS  (STANDING):  acetaminophen   IVPB .. 1000 milliGRAM(s) IV Intermittent every 6 hours  dextrose 5%. 1000 milliLiter(s) (100 mL/Hr) IV Continuous <Continuous>  dextrose 50% Injectable 25 Gram(s) IV Push once  dextrose 50% Injectable 12.5 Gram(s) IV Push once  enalaprilat Injectable 1.25 milliGRAM(s) IV Push every 6 hours  enoxaparin Injectable 40 milliGRAM(s) SubCutaneous every 24 hours  insulin detemir injectable (LEVEMIR) 15 Unit(s) SubCutaneous daily  insulin lispro (ADMELOG) corrective regimen sliding scale   SubCutaneous Before meals and at bedtime  pantoprazole  Injectable 40 milliGRAM(s) IV Push two times a day    MEDICATIONS  (PRN):  HYDROmorphone  Injectable 0.5 milliGRAM(s) IV Push every 3 hours PRN Moderate to Severe Pain (4 - 10)  HYDROmorphone  Injectable 0.25 milliGRAM(s) IV Push every 3 hours PRN Severe breakthrough Pain (7 - 10)  levalbuterol Inhalation 0.63 milliGRAM(s) Inhalation every 6 hours PRN sob  naloxone Injectable 0.1 milliGRAM(s) IV Push every 3 minutes PRN For ANY of the following changes in patient status:  A. RR LESS THAN 10 breaths per minute, B. Oxygen saturation LESS THAN 90%, C. Sedation score of 6  ondansetron Injectable 4 milliGRAM(s) IV Push every 6 hours PRN Nausea  tetracaine/benzocaine/butamben Spray 1 Spray(s) Topical every 6 hours PRN pain    Home Medications:  enalapril 20 mg oral tablet: 1 tab(s) orally once a day (03 Mar 2017 08:28)  enalapril 20 mg oral tablet: 1 tab(s) orally once a day (03 Mar 2017 06:49)  glimepiride 4 mg oral tablet: 1 tab(s) orally 2 times a day (03 Mar 2017 06:49)  glimepiride 4 mg oral tablet: 1 tab(s) orally once a day (03 Mar 2017 08:28)  Janumet XR 50 mg-1000 mg oral tablet, extended release: 2 tab(s) orally once a day (in the evening) (03 Mar 2017 08:28)  Levemir FlexPen 100 units/mL subcutaneous solution: 30 unit(s) subcutaneous once a day (03 Mar 2017 06:49)  Levemir FlexPen 100 units/mL subcutaneous solution: 30 unit(s) subcutaneous once a day (03 Mar 2017 08:28)  nabumetone 750 mg oral tablet: 1 tab(s) orally once a day (03 Mar 2017 08:28)  simvastatin 40 mg oral tablet: 1 tab(s) orally once a day (at bedtime) (03 Mar 2017 06:49)  simvastatin 40 mg oral tablet: 1 tab(s) orally once a day (at bedtime) (03 Mar 2017 08:28)  SITagliptin-metFORMIN 50mg-1000mg oral tablet: 1 tab(s) orally 2 times a day (03 Mar 2017 06:49)    Allergies    No Known Allergies    Intolerances        Vital Signs Last 24 Hrs  T(C): 36.8 (22 Jul 2022 02:28), Max: 36.8 (21 Jul 2022 17:41)  T(F): 98.2 (22 Jul 2022 02:28), Max: 98.2 (21 Jul 2022 17:41)  HR: 98 (22 Jul 2022 14:38) (90 - 117)  BP: 158/84 (22 Jul 2022 14:00) (152/86 - 191/102)  BP(mean): 107 (22 Jul 2022 14:00) (107 - 107)  RR: 20 (22 Jul 2022 14:38) (17 - 27)  SpO2: 98% (22 Jul 2022 14:38) (90% - 100%)    Appearance: awake, in discomfort 	  HEENT:   Normal oral mucosa, PERRL, EOMI	  Lymphatic: No lymphadenopathy , no edema  Cardiovascular: Normal S1 S2, No JVD  Respiratory: B/L wheezing 	  Gastrointestinal:  Soft, Non-tender, + BS	  Skin: No rashes, No ecchymoses, No cyanosis, warm to touch  Musculoskeletal: Normal range of motion, normal strength  Psychiatry:  Mood & affect appropriate  Ext: No edema                          10.8   15.65 )-----------( 340      ( 22 Jul 2022 12:30 )             34.1               07-22    132<L>  |  101  |  7   ----------------------------<  169<H>  3.5   |  19<L>  |  0.65    Ca    7.1<L>      22 Jul 2022 12:30  Phos  2.1     07-22  Mg     1.60     07-22      PT/INR - ( 22 Jul 2022 12:30 )   PT: 13.9 sec;   INR: 1.20 ratio         PTT - ( 22 Jul 2022 12:30 )  PTT:27.3 sec       CARDIAC MARKERS ( 22 Jul 2022 12:30 )  x     / x     / 249 U/L / x     / 5.4 ng/mL            ABG - ( 22 Jul 2022 12:30 )  pH, Arterial: 7.33  pH, Blood: x     /  pCO2: 37    /  pO2: 167   / HCO3: 20    / Base Excess: -5.9  /  SaO2: 98.5        < from: CT Angio Chest PE Protocol w/ IV Cont (07.22.22 @ 07:45) >  IMPRESSION:    No pulmonary embolus.    Small bilateral pleural effusions. Segmental/subsegmental atelectasis   within all lobes.

## 2022-07-22 NOTE — CONSULT NOTE ADULT - ASSESSMENT
Patient is a 71 Y/O female with PMhhx of right hemicolectomy in 2017 for colon ca, with adjuvant chemo, DM, HTN, HLD, presenting with 2 days of abdominal pain, N/V. She stated once the pain began, she noted a hard bulge in the RLQ that she is unsure if it has ever been there before. Pain is at the side of the bulge, does not radiate. Denies fevers, chills, chest pain. She last passed gas and had a BM this morning. Patient had an ex lap with ventral hernia repair on 7/20. Developed SOB overnight on 7/21 and CTA 7/22 negative for PE.     # Acute onset SOB   S/P ex lap   elevated D-dimer  PE ruled out   Serial CE and EKG , check Echo   uncontrolled BP and HR  IV Labetalol for BP control   nebs given, cont   ABG noted, not hypoxic, linically not fluid overload, however would cont to control BAUTISTA to avoid pulmonary edema   oral meds as tolerated fo rBP control   SICU eval called   Pulm eval appreciated   incentive breezy     # S/P Ex lap  care as per surg team   pain control     # hypertensive urgency   BPO control     # DM   sliding scale    diab diet when tolerated     # HLD     DVT and gi PPX       Discussed with surg team  Patient is a 73 Y/O female with PMhhx of right hemicolectomy in 2017 for colon ca, with adjuvant chemo, DM, HTN, HLD, presenting with 2 days of abdominal pain, N/V. She stated once the pain began, she noted a hard bulge in the RLQ that she is unsure if it has ever been there before. Pain is at the side of the bulge, does not radiate. Denies fevers, chills, chest pain. She last passed gas and had a BM this morning. Patient had an ex lap with ventral hernia repair on 7/20. Developed SOB overnight on 7/21 and CTA 7/22 negative for PE.     # Acute onset SOB   S/P ex lap   elevated D-dimer  PE ruled out   Serial CE and EKG , check Echo   uncontrolled BP and HR  IV Labetalol for BP control   nebs given, cont   ABG noted, not hypoxic, linically not fluid overload, however would cont to control BP to avoid pulmonary edema   Trial of diuresis   oral meds as tolerated for BP control   SICU eval called   Pulm eval appreciated   incentive breezy     # S/P Ex lap  care as per surg team   pain control     # hypertensive urgency   BPO control     # DM   sliding scale    diab diet when tolerated     # HLD     DVT and gi PPX       Discussed with surg team

## 2022-07-22 NOTE — PROGRESS NOTE ADULT - ASSESSMENT
72F with hx of right hemicolectomy for colon ca in 2017, with incarcerated, bowel containing ventral hernia causing an SBO. S/P ex lap with ventral hernia repair on 7/20    Plan:  - Requires better multimodal pain control - Encouraged PCA use and standing Ofirmev  - Strict I's/O's  - Continue Preveena to LWS  - PT Consult for OOB Ambulate  - DVT Prophylaxis  - CT PE performed this AM    A team surgery  [id number]

## 2022-07-22 NOTE — PROGRESS NOTE ADULT - SUBJECTIVE AND OBJECTIVE BOX
Surgery Progress Note    OVERNIGHT EVENTS: Patient complaining of SOB overnight. Examined by night resident. Patient noted to be saturating in the 80s. Put on 4L NC, continued to have low saturations in the low 90s. CXR, troponins, EKG, and labs were drawn. EKG showed sinus tachycardia. CT Angio PE protocol being completed today.     SUBJECTIVE: Pt seen and examined at bedside. Patient with noticeable increased respirations. Experiencing mild pain. Passing gas, no BMs. Tolerating CLD.     Vital Signs Last 24 Hrs  T(C): 36.8 (22 Jul 2022 02:28), Max: 36.8 (21 Jul 2022 17:41)  T(F): 98.2 (22 Jul 2022 02:28), Max: 98.2 (21 Jul 2022 17:41)  HR: 117 (22 Jul 2022 03:05) (82 - 117)  BP: 191/102 (22 Jul 2022 03:05) (129/82 - 191/102)  BP(mean): --  RR: 18 (22 Jul 2022 03:05) (17 - 18)  SpO2: 90% (22 Jul 2022 03:05) (90% - 99%)    Parameters below as of 22 Jul 2022 03:05  Patient On (Oxygen Delivery Method): nasal cannula  O2 Flow (L/min): 4      Physical Exam:  General Appearance: Appears well, NAD  Respiratory: Increased work of breathing, tachypneic   CV: Pulse regularly present  Abdomen: Soft, nontender, incision c/d/i    LABS:                        11.2   14.16 )-----------( 339      ( 21 Jul 2022 23:20 )             34.6     07-21    134<L>  |  104  |  9   ----------------------------<  103<H>  3.5   |  18<L>  |  0.72    Ca    7.2<L>      21 Jul 2022 23:20  Phos  2.2     07-21  Mg     1.70     07-21            INs and OUTs:    07-21-22 @ 07:01  -  07-22-22 @ 07:00  --------------------------------------------------------  IN: 240 mL / OUT: 1200 mL / NET: -960 mL

## 2022-07-23 LAB
ALBUMIN SERPL ELPH-MCNC: 3.1 G/DL — LOW (ref 3.3–5)
ALP SERPL-CCNC: 119 U/L — SIGNIFICANT CHANGE UP (ref 40–120)
ALT FLD-CCNC: 14 U/L — SIGNIFICANT CHANGE UP (ref 4–33)
ANION GAP SERPL CALC-SCNC: 14 MMOL/L — SIGNIFICANT CHANGE UP (ref 7–14)
ANION GAP SERPL CALC-SCNC: 16 MMOL/L — HIGH (ref 7–14)
AST SERPL-CCNC: 25 U/L — SIGNIFICANT CHANGE UP (ref 4–32)
BILIRUB SERPL-MCNC: 0.3 MG/DL — SIGNIFICANT CHANGE UP (ref 0.2–1.2)
BLD GP AB SCN SERPL QL: NEGATIVE — SIGNIFICANT CHANGE UP
BLOOD GAS ARTERIAL - LYTES,HGB,ICA,LACT RESULT: SIGNIFICANT CHANGE UP
BLOOD GAS ARTERIAL COMPREHENSIVE RESULT: SIGNIFICANT CHANGE UP
BUN SERPL-MCNC: 7 MG/DL — SIGNIFICANT CHANGE UP (ref 7–23)
BUN SERPL-MCNC: 7 MG/DL — SIGNIFICANT CHANGE UP (ref 7–23)
CALCIUM SERPL-MCNC: 8.2 MG/DL — LOW (ref 8.4–10.5)
CALCIUM SERPL-MCNC: 8.6 MG/DL — SIGNIFICANT CHANGE UP (ref 8.4–10.5)
CHLORIDE SERPL-SCNC: 100 MMOL/L — SIGNIFICANT CHANGE UP (ref 98–107)
CHLORIDE SERPL-SCNC: 94 MMOL/L — LOW (ref 98–107)
CO2 SERPL-SCNC: 22 MMOL/L — SIGNIFICANT CHANGE UP (ref 22–31)
CO2 SERPL-SCNC: 23 MMOL/L — SIGNIFICANT CHANGE UP (ref 22–31)
CREAT SERPL-MCNC: 0.7 MG/DL — SIGNIFICANT CHANGE UP (ref 0.5–1.3)
CREAT SERPL-MCNC: 0.73 MG/DL — SIGNIFICANT CHANGE UP (ref 0.5–1.3)
EGFR: 88 ML/MIN/1.73M2 — SIGNIFICANT CHANGE UP
EGFR: 93 ML/MIN/1.73M2 — SIGNIFICANT CHANGE UP
GLUCOSE BLDC GLUCOMTR-MCNC: 106 MG/DL — HIGH (ref 70–99)
GLUCOSE BLDC GLUCOMTR-MCNC: 131 MG/DL — HIGH (ref 70–99)
GLUCOSE BLDC GLUCOMTR-MCNC: 136 MG/DL — HIGH (ref 70–99)
GLUCOSE BLDC GLUCOMTR-MCNC: 144 MG/DL — HIGH (ref 70–99)
GLUCOSE BLDC GLUCOMTR-MCNC: 145 MG/DL — HIGH (ref 70–99)
GLUCOSE BLDC GLUCOMTR-MCNC: 152 MG/DL — HIGH (ref 70–99)
GLUCOSE BLDC GLUCOMTR-MCNC: 99 MG/DL — SIGNIFICANT CHANGE UP (ref 70–99)
GLUCOSE SERPL-MCNC: 154 MG/DL — HIGH (ref 70–99)
GLUCOSE SERPL-MCNC: 156 MG/DL — HIGH (ref 70–99)
HCT VFR BLD CALC: 33.1 % — LOW (ref 34.5–45)
HGB BLD-MCNC: 11 G/DL — LOW (ref 11.5–15.5)
MAGNESIUM SERPL-MCNC: 1.6 MG/DL — SIGNIFICANT CHANGE UP (ref 1.6–2.6)
MAGNESIUM SERPL-MCNC: 1.8 MG/DL — SIGNIFICANT CHANGE UP (ref 1.6–2.6)
MCHC RBC-ENTMCNC: 29.1 PG — SIGNIFICANT CHANGE UP (ref 27–34)
MCHC RBC-ENTMCNC: 33.2 GM/DL — SIGNIFICANT CHANGE UP (ref 32–36)
MCV RBC AUTO: 87.6 FL — SIGNIFICANT CHANGE UP (ref 80–100)
NRBC # BLD: 0 /100 WBCS — SIGNIFICANT CHANGE UP
NRBC # FLD: 0.02 K/UL — HIGH
PHOSPHATE SERPL-MCNC: 1.9 MG/DL — LOW (ref 2.5–4.5)
PHOSPHATE SERPL-MCNC: 2.8 MG/DL — SIGNIFICANT CHANGE UP (ref 2.5–4.5)
PLATELET # BLD AUTO: 377 K/UL — SIGNIFICANT CHANGE UP (ref 150–400)
POTASSIUM SERPL-MCNC: 3.5 MMOL/L — SIGNIFICANT CHANGE UP (ref 3.5–5.3)
POTASSIUM SERPL-MCNC: 4.2 MMOL/L — SIGNIFICANT CHANGE UP (ref 3.5–5.3)
POTASSIUM SERPL-SCNC: 3.5 MMOL/L — SIGNIFICANT CHANGE UP (ref 3.5–5.3)
POTASSIUM SERPL-SCNC: 4.2 MMOL/L — SIGNIFICANT CHANGE UP (ref 3.5–5.3)
PROT SERPL-MCNC: 6.3 G/DL — SIGNIFICANT CHANGE UP (ref 6–8.3)
RBC # BLD: 3.78 M/UL — LOW (ref 3.8–5.2)
RBC # FLD: 14.2 % — SIGNIFICANT CHANGE UP (ref 10.3–14.5)
RH IG SCN BLD-IMP: POSITIVE — SIGNIFICANT CHANGE UP
SODIUM SERPL-SCNC: 133 MMOL/L — LOW (ref 135–145)
SODIUM SERPL-SCNC: 136 MMOL/L — SIGNIFICANT CHANGE UP (ref 135–145)
WBC # BLD: 15.39 K/UL — HIGH (ref 3.8–10.5)
WBC # FLD AUTO: 15.39 K/UL — HIGH (ref 3.8–10.5)

## 2022-07-23 PROCEDURE — 99291 CRITICAL CARE FIRST HOUR: CPT

## 2022-07-23 PROCEDURE — 71045 X-RAY EXAM CHEST 1 VIEW: CPT | Mod: 26

## 2022-07-23 RX ORDER — CALCIUM GLUCONATE 100 MG/ML
1 VIAL (ML) INTRAVENOUS ONCE
Refills: 0 | Status: COMPLETED | OUTPATIENT
Start: 2022-07-23 | End: 2022-07-23

## 2022-07-23 RX ORDER — POTASSIUM PHOSPHATE, MONOBASIC POTASSIUM PHOSPHATE, DIBASIC 236; 224 MG/ML; MG/ML
30 INJECTION, SOLUTION INTRAVENOUS ONCE
Refills: 0 | Status: COMPLETED | OUTPATIENT
Start: 2022-07-23 | End: 2022-07-23

## 2022-07-23 RX ORDER — MAGNESIUM SULFATE 500 MG/ML
2 VIAL (ML) INJECTION ONCE
Refills: 0 | Status: COMPLETED | OUTPATIENT
Start: 2022-07-23 | End: 2022-07-24

## 2022-07-23 RX ORDER — POTASSIUM CHLORIDE 20 MEQ
40 PACKET (EA) ORAL ONCE
Refills: 0 | Status: COMPLETED | OUTPATIENT
Start: 2022-07-23 | End: 2022-07-23

## 2022-07-23 RX ORDER — FUROSEMIDE 40 MG
40 TABLET ORAL ONCE
Refills: 0 | Status: COMPLETED | OUTPATIENT
Start: 2022-07-23 | End: 2022-07-23

## 2022-07-23 RX ORDER — PANTOPRAZOLE SODIUM 20 MG/1
40 TABLET, DELAYED RELEASE ORAL
Refills: 0 | Status: DISCONTINUED | OUTPATIENT
Start: 2022-07-23 | End: 2022-07-25

## 2022-07-23 RX ORDER — MAGNESIUM SULFATE 500 MG/ML
1 VIAL (ML) INJECTION ONCE
Refills: 0 | Status: COMPLETED | OUTPATIENT
Start: 2022-07-23 | End: 2022-07-23

## 2022-07-23 RX ADMIN — PANTOPRAZOLE SODIUM 40 MILLIGRAM(S): 20 TABLET, DELAYED RELEASE ORAL at 05:17

## 2022-07-23 RX ADMIN — Medication 2: at 08:19

## 2022-07-23 RX ADMIN — HYDROMORPHONE HYDROCHLORIDE 0.5 MILLIGRAM(S): 2 INJECTION INTRAMUSCULAR; INTRAVENOUS; SUBCUTANEOUS at 17:10

## 2022-07-23 RX ADMIN — Medication 100 GRAM(S): at 04:41

## 2022-07-23 RX ADMIN — HYDROMORPHONE HYDROCHLORIDE 0.5 MILLIGRAM(S): 2 INJECTION INTRAMUSCULAR; INTRAVENOUS; SUBCUTANEOUS at 17:25

## 2022-07-23 RX ADMIN — POTASSIUM PHOSPHATE, MONOBASIC POTASSIUM PHOSPHATE, DIBASIC 83.33 MILLIMOLE(S): 236; 224 INJECTION, SOLUTION INTRAVENOUS at 06:45

## 2022-07-23 RX ADMIN — Medication 100 GRAM(S): at 04:42

## 2022-07-23 RX ADMIN — HYDROMORPHONE HYDROCHLORIDE 0.5 MILLIGRAM(S): 2 INJECTION INTRAMUSCULAR; INTRAVENOUS; SUBCUTANEOUS at 05:06

## 2022-07-23 RX ADMIN — Medication 40 MILLIGRAM(S): at 14:34

## 2022-07-23 RX ADMIN — PANTOPRAZOLE SODIUM 40 MILLIGRAM(S): 20 TABLET, DELAYED RELEASE ORAL at 17:18

## 2022-07-23 RX ADMIN — HYDROMORPHONE HYDROCHLORIDE 0.5 MILLIGRAM(S): 2 INJECTION INTRAMUSCULAR; INTRAVENOUS; SUBCUTANEOUS at 04:51

## 2022-07-23 RX ADMIN — Medication 1.25 MILLIGRAM(S): at 09:24

## 2022-07-23 RX ADMIN — ENOXAPARIN SODIUM 40 MILLIGRAM(S): 100 INJECTION SUBCUTANEOUS at 23:04

## 2022-07-23 RX ADMIN — Medication 15 UNIT(S): at 08:17

## 2022-07-23 RX ADMIN — Medication 1.25 MILLIGRAM(S): at 02:56

## 2022-07-23 RX ADMIN — Medication 40 MILLIEQUIVALENT(S): at 04:42

## 2022-07-23 NOTE — PROGRESS NOTE ADULT - SUBJECTIVE AND OBJECTIVE BOX
Surgery Progress Note    OVERNIGHT EVENTS: Patient complaining of SOB overnight. Examined by night resident. Patient noted to be saturating in the 80s. Put on 4L NC, continued to have low saturations in the low 90s. CXR, troponins, EKG, and labs were drawn. EKG showed sinus tachycardia. CT Angio PE protocol being completed today.     SUBJECTIVE: Pt seen and examined at bedside. Patient with noticeable increased respirations. Experiencing mild pain. Passing gas, no BMs. Tolerating CLD.     Vital Signs Last 24 Hrs        Physical Exam:  General Appearance: Appears well, NAD  Respiratory: Increased work of breathing, tachypneic   CV: Pulse regularly present  Abdomen: Soft, nontender, incision c/d/i    LABS:               Surgery Progress Note    SUBJECTIVE: Pt seen and examined at bedside. Patient appears more comfortable on HFNC. No nausea, vomiting, diarrhea. Pain is controlled. Tolerating CLD.     Vital Signs Last 24 Hrs  T(C): 36.2 (23 Jul 2022 08:00), Max: 37.7 (23 Jul 2022 04:00)  T(F): 97.1 (23 Jul 2022 08:00), Max: 99.8 (23 Jul 2022 04:00)  HR: 95 (23 Jul 2022 09:00) (90 - 118)  BP: 165/79 (23 Jul 2022 09:00) (106/82 - 173/87)  BP(mean): 106 (23 Jul 2022 09:00) (89 - 117)  RR: 24 (23 Jul 2022 09:00) (14 - 29)  SpO2: 99% (23 Jul 2022 09:00) (94% - 100%)    Parameters below as of 23 Jul 2022 08:00  Patient On (Oxygen Delivery Method): nasal cannula, high flow  O2 Flow (L/min): 30  O2 Concentration (%): 40    Physical Exam:  General Appearance: Appears well, NAD  Respiratory: No labored breathing  CV: Pulse regularly present  Abdomen: Soft, nontender, incision c/d/i, HI drain in place     LABS:                        11.0   15.39 )-----------( 377      ( 23 Jul 2022 01:15 )             33.1     07-23    136  |  100  |  7   ----------------------------<  154<H>  3.5   |  22  |  0.70    Ca    8.2<L>      23 Jul 2022 01:15  Phos  1.9     07-23  Mg     1.80     07-23    TPro  6.3  /  Alb  3.1<L>  /  TBili  0.3  /  DBili  x   /  AST  25  /  ALT  14  /  AlkPhos  119  07-23    PT/INR - ( 22 Jul 2022 12:30 )   PT: 13.9 sec;   INR: 1.20 ratio         PTT - ( 22 Jul 2022 12:30 )  PTT:27.3 sec      INs and OUTs:    07-22-22 @ 07:01  -  07-23-22 @ 07:00  --------------------------------------------------------  IN: 1080 mL / OUT: 5225 mL / NET: -4145 mL

## 2022-07-23 NOTE — PROGRESS NOTE ADULT - SUBJECTIVE AND OBJECTIVE BOX
Name of Patient : DAVON ALCALA  MRN: [id number]  Date of visit: 07-23-22 @ 17:05      Subjective: Patient seen and examined. No new events except as noted.   doing better  improved breathing and BP     REVIEW OF SYSTEMS:    CONSTITUTIONAL: No weakness, fevers or chills  EYES/ENT: No visual changes;  No vertigo or throat pain   NECK: No pain or stiffness  RESPIRATORY: No cough, wheezing, hemoptysis; No shortness of breath  CARDIOVASCULAR: No chest pain or palpitations  GASTROINTESTINAL: No abdominal or epigastric pain. No nausea, vomiting, or hematemesis; No diarrhea or constipation. No melena or hematochezia.  GENITOURINARY: No dysuria, frequency or hematuria  NEUROLOGICAL: No numbness or weakness  SKIN: No itching, burning, rashes, or lesions   All other review of systems is negative unless indicated above.    MEDICATIONS:  MEDICATIONS  (STANDING):  dextrose 5%. 1000 milliLiter(s) (100 mL/Hr) IV Continuous <Continuous>  dextrose 50% Injectable 25 Gram(s) IV Push once  dextrose 50% Injectable 12.5 Gram(s) IV Push once  enalapril 20 milliGRAM(s) Oral daily  enoxaparin Injectable 40 milliGRAM(s) SubCutaneous every 24 hours  insulin detemir injectable (LEVEMIR) 15 Unit(s) SubCutaneous daily  insulin lispro (ADMELOG) corrective regimen sliding scale   SubCutaneous Before meals and at bedtime  pantoprazole    Tablet 40 milliGRAM(s) Oral two times a day      PHYSICAL EXAM:  T(C): 36.3 (07-23-22 @ 16:00), Max: 37.7 (07-23-22 @ 04:00)  HR: 109 (07-23-22 @ 16:00) (95 - 118)  BP: 147/91 (07-23-22 @ 16:00) (106/82 - 172/80)  RR: 23 (07-23-22 @ 16:00) (14 - 29)  SpO2: 98% (07-23-22 @ 16:00) (95% - 100%)  Wt(kg): --  I&O's Summary    22 Jul 2022 07:01  -  23 Jul 2022 07:00  --------------------------------------------------------  IN: 1080 mL / OUT: 5225 mL / NET: -4145 mL    23 Jul 2022 07:01  -  23 Jul 2022 17:05  --------------------------------------------------------  IN: 979.8 mL / OUT: 1880 mL / NET: -900.2 mL          Appearance: Normal	  HEENT:  PERRLA   Lymphatic: No lymphadenopathy   Cardiovascular: Normal S1 S2, no JVD  Respiratory: normal effort , clear  Gastrointestinal:  Soft, Non-tender  Skin: No rashes,  warm to touch  Psychiatry:  Mood & affect appropriate  Musculuskeletal: No edema      All labs, Imaging and EKGs personally reviewed     07-22-22 @ 07:01  -  07-23-22 @ 07:00  --------------------------------------------------------  IN: 1080 mL / OUT: 5225 mL / NET: -4145 mL    07-23-22 @ 07:01  -  07-23-22 @ 17:05  --------------------------------------------------------  IN: 979.8 mL / OUT: 1880 mL / NET: -900.2 mL                            11.0   15.39 )-----------( 377      ( 23 Jul 2022 01:15 )             33.1               07-23    136  |  100  |  7   ----------------------------<  154<H>  3.5   |  22  |  0.70    Ca    8.2<L>      23 Jul 2022 01:15  Phos  1.9     07-23  Mg     1.80     07-23    TPro  6.3  /  Alb  3.1<L>  /  TBili  0.3  /  DBili  x   /  AST  25  /  ALT  14  /  AlkPhos  119  07-23    PT/INR - ( 22 Jul 2022 12:30 )   PT: 13.9 sec;   INR: 1.20 ratio         PTT - ( 22 Jul 2022 12:30 )  PTT:27.3 sec       CARDIAC MARKERS ( 22 Jul 2022 12:30 )  x     / x     / 249 U/L / x     / 5.4 ng/mL            ABG - ( 23 Jul 2022 01:38 )  pH, Arterial: 7.47  pH, Blood: x     /  pCO2: 33    /  pO2: 96    / HCO3: 24    / Base Excess: 0.7   /  SaO2: 97.6

## 2022-07-23 NOTE — PROGRESS NOTE ADULT - ASSESSMENT
72F with hx of right hemicolectomy for colon ca in 2017, with incarcerated, bowel containing ventral hernia causing an SBO. S/P ex lap with ventral hernia repair on 7/20    Plan:  - Requires better multimodal pain control - Encouraged PCA use and standing Ofirmev  - Strict I's/O's  - Continue Preveena to LWS  - PT Consult for OOB Ambulate  - DVT Prophylaxis  - CT PE performed this AM    A team surgery  [id number] 72F with hx of right hemicolectomy for colon ca in 2017, with incarcerated, bowel containing ventral hernia causing an SBO. S/P ex lap with ventral hernia repair on 7/20. Patient with low oxygen saturation and increased WOB on 7/22. Transferred to SICU.     Plan:  - Pain: Tylenol, Dilaudid PRN  - Strict I's/O's  - PT Consult for OOB Ambulate  - Prevena removed today at bedside  - DVT Prophylaxis  - Continue excellent care per SICU       A team surgery  [id number]

## 2022-07-23 NOTE — PROGRESS NOTE ADULT - ASSESSMENT
Patient is a 73 Y/O female with PMhhx of right hemicolectomy in 2017 for colon ca, with adjuvant chemo, DM, HTN, HLD, presenting with 2 days of abdominal pain, N/V. She stated once the pain began, she noted a hard bulge in the RLQ that she is unsure if it has ever been there before. Pain is at the side of the bulge, does not radiate. Denies fevers, chills, chest pain. She last passed gas and had a BM this morning. Patient had an ex lap with ventral hernia repair on 7/20. Developed SOB overnight on 7/21 and CTA 7/22 negative for PE.     # Acute onset SOB   S/P ex lap   elevated D-dimer  PE ruled out   Serial CE and EKG , check Echo   likely fluid overload, improved after dose of lasix   nebs given, cont   oral meds as tolerated for BP control   SICU eval care appreciated   Pulm eval appreciated   incentive breezy     # S/P Ex lap  care as per surg team   pain control     # hypertensive urgency   BP control     # DM   sliding scale    diab diet     # HLD     DVT and gi PPX       Discussed with surg team

## 2022-07-23 NOTE — PROGRESS NOTE ADULT - ASSESSMENT
72F with hx of right hemicolectomy in 2017 for colon ca, with adjuvant chemo, DM, HTN, HLD is now s/p ex lap with ventral hernia repair on 7/20. Patient now with respiratory distress, CTA negative for PE. Admitted to SICU for respiratory distress.     PLAN:    Neurologic:   - pain control PRN with tylenol, dilaudid PRN    Respiratory:   - monitor SPO2  - wean HFNC as tolerated     Cardiovascular:   - monitor vitals  - enalaprilat 1.25 q6h    Gastrointestinal/Nutrition:   - adv to reg     Renal/Genitourinary:   - monitor UOP  - monitor electrolytes    Hematologic:   - monitor H/H  - DVT ppx: Lovenox    Infectious Disease:   - monitor WBC, off abx for now    Tubes/Lines/Drains:   - PIV    Endocrine:   - Lantus 15   - ISS    Disposition: SICU     72F with hx of right hemicolectomy in 2017 for colon ca, with adjuvant chemo, DM, HTN, HLD is now s/p ex lap with ventral hernia repair on 7/20. Patient now with respiratory distress, CTA negative for PE. Admitted to SICU for respiratory distress.     PLAN:    Neurologic:   - pain control PRN with tylenol, dilaudid PRN    Respiratory:   - monitor SPO2  - wean HFNC as tolerated, transition to NC  -POCUS + B lines profusely, Plan for further diuresis with 40 lasix    Cardiovascular:   - monitor vitals  - enalaprilat 1.25 q6h, transition to home PO dose    Gastrointestinal/Nutrition:   - adv to reg when respiratory status improves    Renal/Genitourinary:   - monitor UOP  - monitor electrolytes    Hematologic:   - monitor H/H  - DVT ppx: Lovenox    Infectious Disease:   - monitor WBC, off abx for now    Tubes/Lines/Drains:   - PIV    Endocrine:   - Lantus 15   - Monitor FS  - ISS    Disposition: SICU

## 2022-07-23 NOTE — PROGRESS NOTE ADULT - SUBJECTIVE AND OBJECTIVE BOX
24 HOUR EVENTS:  - Remains on HiFlo  - Diuresed with lasix     SUBJECTIVE/ROS:  Patient seen at bedside this AM.     [ ] Due to altered mental status/intubation, subjective information were not able to be obtained from the patient. History was obtained, to the extent possible, from review of the chart and collateral sources of information.    OBJECTIVE:    VITALS:  Vital Signs Last 24 Hrs  T(C): 36.7 (23 Jul 2022 00:00), Max: 36.8 (22 Jul 2022 02:28)  T(F): 98 (23 Jul 2022 00:00), Max: 98.2 (22 Jul 2022 02:28)  HR: 106 (23 Jul 2022 01:00) (90 - 118)  BP: 158/95 (23 Jul 2022 01:00) (149/85 - 191/102)  BP(mean): 114 (23 Jul 2022 01:00) (102 - 114)  RR: 25 (23 Jul 2022 01:00) (18 - 29)  SpO2: 96% (23 Jul 2022 01:00) (90% - 100%)    Parameters below as of 23 Jul 2022 01:00  Patient On (Oxygen Delivery Method): nasal cannula w/ humidification  O2 Flow (L/min): 30  O2 Concentration (%): 40    I&O's Summary    21 Jul 2022 07:01  -  22 Jul 2022 07:00  --------------------------------------------------------  IN: 240 mL / OUT: 1200 mL / NET: -960 mL    22 Jul 2022 07:01  -  23 Jul 2022 02:23  --------------------------------------------------------  IN: 1080 mL / OUT: 4725 mL / NET: -3645 mL        PHYSICAL EXAM:    NEUROLOGY  Exam: Normal, NAD, alert, oriented x 3,      HEENT  Exam: Normocephalic, atraumatic.       RESPIRATORY  Exam: + accessory muscle usage, RR of 40. + expiratory wheezes in right chest.     CARDIOVASCULAR  Exam: S1, S2.  Regular rate and rhythm. 2+ Peripheral edema    GI/NUTRITION  Exam: soft, NTND, prevena VAC in place      METABOLIC/FLUIDS/ELECTROLYTES  dextrose 5%. 1000 milliLiter(s) IV Continuous <Continuous>      HEMATOLOGIC  [x] DVT Prophylaxis: enoxaparin Injectable 40 milliGRAM(s) SubCutaneous every 24 hours    Tubes/Lines/Drains   [x] Peripheral IV    LABS:                        11.0   15.39 )-----------( 377      ( 23 Jul 2022 01:15 )             33.1   07-23    136  |  100  |  7   ----------------------------<  154<H>  3.5   |  22  |  0.70    Ca    8.2<L>      23 Jul 2022 01:15  Phos  1.9     07-23  Mg     1.80     07-23    TPro  6.3  /  Alb  3.1<L>  /  TBili  0.3  /  DBili  x   /  AST  25  /  ALT  14  /  AlkPhos  119  07-23    CAPILLARY BLOOD GLUCOSE      POCT Blood Glucose.: 145 mg/dL (23 Jul 2022 00:53)  POCT Blood Glucose.: 106 mg/dL (23 Jul 2022 00:35)  POCT Blood Glucose.: 99 mg/dL (23 Jul 2022 00:24)  POCT Blood Glucose.: 91 mg/dL (22 Jul 2022 23:50)  POCT Blood Glucose.: 69 mg/dL (22 Jul 2022 23:15)  POCT Blood Glucose.: 72 mg/dL (22 Jul 2022 23:14)  POCT Blood Glucose.: 95 mg/dL (22 Jul 2022 16:52)  POCT Blood Glucose.: 154 mg/dL (22 Jul 2022 12:18)  POCT Blood Glucose.: 163 mg/dL (22 Jul 2022 09:38)  POCT Blood Glucose.: 122 mg/dL (22 Jul 2022 08:00)      MEDICATIONS:   MEDICATIONS  (STANDING):  dextrose 5%. 1000 milliLiter(s) (100 mL/Hr) IV Continuous <Continuous>  dextrose 50% Injectable 25 Gram(s) IV Push once  dextrose 50% Injectable 12.5 Gram(s) IV Push once  enalaprilat Injectable 1.25 milliGRAM(s) IV Push every 6 hours  enoxaparin Injectable 40 milliGRAM(s) SubCutaneous every 24 hours  insulin detemir injectable (LEVEMIR) 15 Unit(s) SubCutaneous daily  insulin lispro (ADMELOG) corrective regimen sliding scale   SubCutaneous Before meals and at bedtime  pantoprazole  Injectable 40 milliGRAM(s) IV Push two times a day    MEDICATIONS  (PRN):  HYDROmorphone  Injectable 0.5 milliGRAM(s) IV Push every 3 hours PRN Moderate to Severe Pain (4 - 10)  HYDROmorphone  Injectable 0.25 milliGRAM(s) IV Push every 3 hours PRN Severe breakthrough Pain (7 - 10)  levalbuterol Inhalation 0.63 milliGRAM(s) Inhalation every 6 hours PRN sob  naloxone Injectable 0.1 milliGRAM(s) IV Push every 3 minutes PRN For ANY of the following changes in patient status:  A. RR LESS THAN 10 breaths per minute, B. Oxygen saturation LESS THAN 90%, C. Sedation score of 6  ondansetron Injectable 4 milliGRAM(s) IV Push every 6 hours PRN Nausea  tetracaine/benzocaine/butamben Spray 1 Spray(s) Topical every 6 hours PRN pain      IMAGING: 24 HOUR EVENTS:  - Remains on HiFlo overnight  - Diuresed with lasix     SUBJECTIVE/ROS:  Patient seen at bedside this AM.         OBJECTIVE:    VITALS:  Vital Signs Last 24 Hrs  T(C): 36.7 (23 Jul 2022 00:00), Max: 36.8 (22 Jul 2022 02:28)  T(F): 98 (23 Jul 2022 00:00), Max: 98.2 (22 Jul 2022 02:28)  HR: 106 (23 Jul 2022 01:00) (90 - 118)  BP: 158/95 (23 Jul 2022 01:00) (149/85 - 191/102)  BP(mean): 114 (23 Jul 2022 01:00) (102 - 114)  RR: 25 (23 Jul 2022 01:00) (18 - 29)  SpO2: 96% (23 Jul 2022 01:00) (90% - 100%)    Parameters below as of 23 Jul 2022 01:00  Patient On (Oxygen Delivery Method): nasal cannula w/ humidification  O2 Flow (L/min): 30  O2 Concentration (%): 40    I&O's Summary    21 Jul 2022 07:01  -  22 Jul 2022 07:00  --------------------------------------------------------  IN: 240 mL / OUT: 1200 mL / NET: -960 mL    22 Jul 2022 07:01  -  23 Jul 2022 02:23  --------------------------------------------------------  IN: 1080 mL / OUT: 4725 mL / NET: -3645 mL        PHYSICAL EXAM:    NEUROLOGY  Exam: Normal, NAD, alert, oriented x 3,      HEENT  Exam: Normocephalic, atraumatic.       RESPIRATORY  Exam: + accessory muscle usage, RR of 40. + expiratory wheezes in right chest.     CARDIOVASCULAR  Exam: S1, S2.  Regular rate and rhythm. 2+ Peripheral edema    GI/NUTRITION  Exam: soft, NTND, prevena VAC in place      METABOLIC/FLUIDS/ELECTROLYTES  dextrose 5%. 1000 milliLiter(s) IV Continuous <Continuous>      HEMATOLOGIC  [x] DVT Prophylaxis: enoxaparin Injectable 40 milliGRAM(s) SubCutaneous every 24 hours    Tubes/Lines/Drains   [x] Peripheral IV    LABS:                        11.0   15.39 )-----------( 377      ( 23 Jul 2022 01:15 )             33.1   07-23    136  |  100  |  7   ----------------------------<  154<H>  3.5   |  22  |  0.70    Ca    8.2<L>      23 Jul 2022 01:15  Phos  1.9     07-23  Mg     1.80     07-23    TPro  6.3  /  Alb  3.1<L>  /  TBili  0.3  /  DBili  x   /  AST  25  /  ALT  14  /  AlkPhos  119  07-23    CAPILLARY BLOOD GLUCOSE      POCT Blood Glucose.: 145 mg/dL (23 Jul 2022 00:53)  POCT Blood Glucose.: 106 mg/dL (23 Jul 2022 00:35)  POCT Blood Glucose.: 99 mg/dL (23 Jul 2022 00:24)  POCT Blood Glucose.: 91 mg/dL (22 Jul 2022 23:50)  POCT Blood Glucose.: 69 mg/dL (22 Jul 2022 23:15)  POCT Blood Glucose.: 72 mg/dL (22 Jul 2022 23:14)  POCT Blood Glucose.: 95 mg/dL (22 Jul 2022 16:52)  POCT Blood Glucose.: 154 mg/dL (22 Jul 2022 12:18)  POCT Blood Glucose.: 163 mg/dL (22 Jul 2022 09:38)  POCT Blood Glucose.: 122 mg/dL (22 Jul 2022 08:00)      MEDICATIONS:   MEDICATIONS  (STANDING):  dextrose 5%. 1000 milliLiter(s) (100 mL/Hr) IV Continuous <Continuous>  dextrose 50% Injectable 25 Gram(s) IV Push once  dextrose 50% Injectable 12.5 Gram(s) IV Push once  enalaprilat Injectable 1.25 milliGRAM(s) IV Push every 6 hours  enoxaparin Injectable 40 milliGRAM(s) SubCutaneous every 24 hours  insulin detemir injectable (LEVEMIR) 15 Unit(s) SubCutaneous daily  insulin lispro (ADMELOG) corrective regimen sliding scale   SubCutaneous Before meals and at bedtime  pantoprazole  Injectable 40 milliGRAM(s) IV Push two times a day    MEDICATIONS  (PRN):  HYDROmorphone  Injectable 0.5 milliGRAM(s) IV Push every 3 hours PRN Moderate to Severe Pain (4 - 10)  HYDROmorphone  Injectable 0.25 milliGRAM(s) IV Push every 3 hours PRN Severe breakthrough Pain (7 - 10)  levalbuterol Inhalation 0.63 milliGRAM(s) Inhalation every 6 hours PRN sob  naloxone Injectable 0.1 milliGRAM(s) IV Push every 3 minutes PRN For ANY of the following changes in patient status:  A. RR LESS THAN 10 breaths per minute, B. Oxygen saturation LESS THAN 90%, C. Sedation score of 6  ondansetron Injectable 4 milliGRAM(s) IV Push every 6 hours PRN Nausea  tetracaine/benzocaine/butamben Spray 1 Spray(s) Topical every 6 hours PRN pain      IMAGING:

## 2022-07-24 LAB
ALBUMIN SERPL ELPH-MCNC: 3.5 G/DL — SIGNIFICANT CHANGE UP (ref 3.3–5)
ALP SERPL-CCNC: 126 U/L — HIGH (ref 40–120)
ALT FLD-CCNC: 17 U/L — SIGNIFICANT CHANGE UP (ref 4–33)
ANION GAP SERPL CALC-SCNC: 16 MMOL/L — HIGH (ref 7–14)
AST SERPL-CCNC: 23 U/L — SIGNIFICANT CHANGE UP (ref 4–32)
BILIRUB SERPL-MCNC: 0.4 MG/DL — SIGNIFICANT CHANGE UP (ref 0.2–1.2)
BUN SERPL-MCNC: 7 MG/DL — SIGNIFICANT CHANGE UP (ref 7–23)
CALCIUM SERPL-MCNC: 8.6 MG/DL — SIGNIFICANT CHANGE UP (ref 8.4–10.5)
CHLORIDE SERPL-SCNC: 93 MMOL/L — LOW (ref 98–107)
CO2 SERPL-SCNC: 22 MMOL/L — SIGNIFICANT CHANGE UP (ref 22–31)
CREAT SERPL-MCNC: 0.81 MG/DL — SIGNIFICANT CHANGE UP (ref 0.5–1.3)
EGFR: 78 ML/MIN/1.73M2 — SIGNIFICANT CHANGE UP
GLUCOSE BLDC GLUCOMTR-MCNC: 141 MG/DL — HIGH (ref 70–99)
GLUCOSE BLDC GLUCOMTR-MCNC: 154 MG/DL — HIGH (ref 70–99)
GLUCOSE BLDC GLUCOMTR-MCNC: 156 MG/DL — HIGH (ref 70–99)
GLUCOSE BLDC GLUCOMTR-MCNC: 201 MG/DL — HIGH (ref 70–99)
GLUCOSE SERPL-MCNC: 130 MG/DL — HIGH (ref 70–99)
HCT VFR BLD CALC: 35.8 % — SIGNIFICANT CHANGE UP (ref 34.5–45)
HGB BLD-MCNC: 11.8 G/DL — SIGNIFICANT CHANGE UP (ref 11.5–15.5)
MAGNESIUM SERPL-MCNC: 1.6 MG/DL — SIGNIFICANT CHANGE UP (ref 1.6–2.6)
MCHC RBC-ENTMCNC: 29 PG — SIGNIFICANT CHANGE UP (ref 27–34)
MCHC RBC-ENTMCNC: 33 GM/DL — SIGNIFICANT CHANGE UP (ref 32–36)
MCV RBC AUTO: 88 FL — SIGNIFICANT CHANGE UP (ref 80–100)
NRBC # BLD: 0 /100 WBCS — SIGNIFICANT CHANGE UP
NRBC # FLD: 0 K/UL — SIGNIFICANT CHANGE UP
PHOSPHATE SERPL-MCNC: 2.7 MG/DL — SIGNIFICANT CHANGE UP (ref 2.5–4.5)
PLATELET # BLD AUTO: 388 K/UL — SIGNIFICANT CHANGE UP (ref 150–400)
POTASSIUM SERPL-MCNC: 3.8 MMOL/L — SIGNIFICANT CHANGE UP (ref 3.5–5.3)
POTASSIUM SERPL-SCNC: 3.8 MMOL/L — SIGNIFICANT CHANGE UP (ref 3.5–5.3)
PROT SERPL-MCNC: 7.1 G/DL — SIGNIFICANT CHANGE UP (ref 6–8.3)
RBC # BLD: 4.07 M/UL — SIGNIFICANT CHANGE UP (ref 3.8–5.2)
RBC # FLD: 14.4 % — SIGNIFICANT CHANGE UP (ref 10.3–14.5)
SODIUM SERPL-SCNC: 131 MMOL/L — LOW (ref 135–145)
WBC # BLD: 12.47 K/UL — HIGH (ref 3.8–10.5)
WBC # FLD AUTO: 12.47 K/UL — HIGH (ref 3.8–10.5)

## 2022-07-24 PROCEDURE — 99232 SBSQ HOSP IP/OBS MODERATE 35: CPT

## 2022-07-24 PROCEDURE — 71045 X-RAY EXAM CHEST 1 VIEW: CPT | Mod: 26

## 2022-07-24 RX ORDER — LANOLIN ALCOHOL/MO/W.PET/CERES
5 CREAM (GRAM) TOPICAL ONCE
Refills: 0 | Status: DISCONTINUED | OUTPATIENT
Start: 2022-07-24 | End: 2022-07-24

## 2022-07-24 RX ORDER — LANOLIN ALCOHOL/MO/W.PET/CERES
3 CREAM (GRAM) TOPICAL ONCE
Refills: 0 | Status: COMPLETED | OUTPATIENT
Start: 2022-07-24 | End: 2022-07-24

## 2022-07-24 RX ORDER — FUROSEMIDE 40 MG
40 TABLET ORAL ONCE
Refills: 0 | Status: COMPLETED | OUTPATIENT
Start: 2022-07-24 | End: 2022-07-24

## 2022-07-24 RX ORDER — ACETAMINOPHEN 500 MG
975 TABLET ORAL EVERY 6 HOURS
Refills: 0 | Status: DISCONTINUED | OUTPATIENT
Start: 2022-07-24 | End: 2022-07-25

## 2022-07-24 RX ORDER — POTASSIUM CHLORIDE 20 MEQ
20 PACKET (EA) ORAL ONCE
Refills: 0 | Status: COMPLETED | OUTPATIENT
Start: 2022-07-24 | End: 2022-07-24

## 2022-07-24 RX ORDER — MAGNESIUM SULFATE 500 MG/ML
2 VIAL (ML) INJECTION ONCE
Refills: 0 | Status: DISCONTINUED | OUTPATIENT
Start: 2022-07-24 | End: 2022-07-24

## 2022-07-24 RX ORDER — OXYCODONE HYDROCHLORIDE 5 MG/1
2.5 TABLET ORAL EVERY 4 HOURS
Refills: 0 | Status: DISCONTINUED | OUTPATIENT
Start: 2022-07-24 | End: 2022-07-25

## 2022-07-24 RX ORDER — SODIUM,POTASSIUM PHOSPHATES 278-250MG
1 POWDER IN PACKET (EA) ORAL ONCE
Refills: 0 | Status: COMPLETED | OUTPATIENT
Start: 2022-07-24 | End: 2022-07-24

## 2022-07-24 RX ORDER — OXYCODONE HYDROCHLORIDE 5 MG/1
5 TABLET ORAL EVERY 4 HOURS
Refills: 0 | Status: DISCONTINUED | OUTPATIENT
Start: 2022-07-24 | End: 2022-07-25

## 2022-07-24 RX ADMIN — Medication 975 MILLIGRAM(S): at 23:01

## 2022-07-24 RX ADMIN — Medication 4: at 22:52

## 2022-07-24 RX ADMIN — Medication 975 MILLIGRAM(S): at 18:13

## 2022-07-24 RX ADMIN — ENOXAPARIN SODIUM 40 MILLIGRAM(S): 100 INJECTION SUBCUTANEOUS at 22:49

## 2022-07-24 RX ADMIN — Medication 2: at 08:48

## 2022-07-24 RX ADMIN — Medication 3 MILLIGRAM(S): at 22:49

## 2022-07-24 RX ADMIN — Medication 2: at 16:09

## 2022-07-24 RX ADMIN — Medication 20 MILLIGRAM(S): at 06:39

## 2022-07-24 RX ADMIN — Medication 40 MILLIGRAM(S): at 09:56

## 2022-07-24 RX ADMIN — Medication 20 MILLIEQUIVALENT(S): at 06:39

## 2022-07-24 RX ADMIN — PANTOPRAZOLE SODIUM 40 MILLIGRAM(S): 20 TABLET, DELAYED RELEASE ORAL at 18:12

## 2022-07-24 RX ADMIN — Medication 25 GRAM(S): at 03:15

## 2022-07-24 RX ADMIN — PANTOPRAZOLE SODIUM 40 MILLIGRAM(S): 20 TABLET, DELAYED RELEASE ORAL at 06:39

## 2022-07-24 RX ADMIN — Medication 15 UNIT(S): at 08:48

## 2022-07-24 RX ADMIN — Medication 1 PACKET(S): at 06:38

## 2022-07-24 RX ADMIN — Medication 975 MILLIGRAM(S): at 18:40

## 2022-07-24 NOTE — PROGRESS NOTE ADULT - SUBJECTIVE AND OBJECTIVE BOX
Surgery Progress Note    SUBJECTIVE: Pt seen and examined at bedside. Patient comfortable and in no-apparent distress. No nausea, vomiting, diarrhea. Pain is controlled. +Gas/+BM. Tolerating CLD. Patient is breathing comfortably on RA, satting well.     Vital Signs Last 24 Hrs  T(C): 37.1 (24 Jul 2022 04:00), Max: 37.4 (23 Jul 2022 12:00)  T(F): 98.7 (24 Jul 2022 04:00), Max: 99.3 (23 Jul 2022 12:00)  HR: 95 (24 Jul 2022 07:00) (89 - 121)  BP: 150/81 (24 Jul 2022 07:00) (130/75 - 172/80)  BP(mean): 101 (24 Jul 2022 07:00) (92 - 107)  RR: 26 (24 Jul 2022 07:00) (19 - 27)  SpO2: 96% (24 Jul 2022 07:00) (94% - 100%)    Parameters below as of 24 Jul 2022 07:00  Patient On (Oxygen Delivery Method): room air        Physical Exam:  General Appearance: Appears well, NAD  Respiratory: No labored breathing  CV: Pulse regularly present  Abdomen: Soft, nontender, incision c/d/i, HI drain with serosanginous output     LABS:                        11.8   12.47 )-----------( 388      ( 24 Jul 2022 03:40 )             35.8     07-24    131<L>  |  93<L>  |  7   ----------------------------<  130<H>  3.8   |  22  |  0.81    Ca    8.6      24 Jul 2022 03:40  Phos  2.7     07-24  Mg     1.60     07-24    TPro  7.1  /  Alb  3.5  /  TBili  0.4  /  DBili  x   /  AST  23  /  ALT  17  /  AlkPhos  126<H>  07-24    PT/INR - ( 22 Jul 2022 12:30 )   PT: 13.9 sec;   INR: 1.20 ratio         PTT - ( 22 Jul 2022 12:30 )  PTT:27.3 sec      INs and OUTs:    07-23-22 @ 07:01  -  07-24-22 @ 07:00  --------------------------------------------------------  IN: 1469.8 mL / OUT: 4100 mL / NET: -2630.2 mL

## 2022-07-24 NOTE — PROGRESS NOTE ADULT - ASSESSMENT
Patient is a 72 year old female with a PMHx of right hemicolectomy (in 2017 for colon cancer with adjuvant chemo), DM2, HTN and HLD who presented with abdominal pain, nausea and vomiting x2 days.  CT Abdomen / Pelvis performed showing high-grade small bowel obstruction to the level of a ventral abdominal wall hernia containing loops of jejunum. Intra-abdominal mesenteric edema is present.  No pneumatosis, portal venous gas or bowel wall hypoenhancement.  Patient is now S/P ex-lap and ventral hernia repair on 7/20/22.  Hospital course complicated by respiratory distress requiring HiFlow nasal cannula and SICU admission.      PLAN:    NEUROLOGY:  - No active issues  - Pain control with Dilaudid PRN    RESPIRATORY:  - Weaned off HiFlow nasal cannula  - Saturating well on room air  - Continuous pulse oximetry  - Maintain O2 saturation >92%  - Continue with Xopenex PRN    CARDIOVASCULAR:  - Normotensive  - No pressor requirements  - Continue with home dose Enalapril   - Keep MAP >65    GI / NUTRITION:  - Clear liquid diet  - GI prophylaxis with Protonix    RENAL / GENITOURINARY:  - Indwelling telles catheter  - Monitor electrolytes and replete PRN  - Continue to monitor strict ins and outs q1 hour    HEMATOLOGIC:  - Hemoglobin and hematocrit stable  - VTE prophylaxis with Lovenox subcutaneous    INFECTIOUS DISEASE:  - Currently afebrile with leukocytosis of 15.39  - Continue to monitor off antibiotics    ENDOCRINOLOGY:  - Monitor fingersticks before meals and at bedtime  - Continue with Lantus 15 units qHS and insulin sliding scale      Disposition: SICU    --------------------------------------------------------------------------------------   Patient is a 72 year old female with a PMHx of right hemicolectomy (in 2017 for colon cancer with adjuvant chemo), DM2, HTN and HLD who presented with abdominal pain, nausea and vomiting x2 days.  CT Abdomen / Pelvis performed showing high-grade small bowel obstruction to the level of a ventral abdominal wall hernia containing loops of jejunum. Intra-abdominal mesenteric edema is present.  No pneumatosis, portal venous gas or bowel wall hypoenhancement.  Patient is now S/P ex-lap and ventral hernia repair on 7/20/22.  Hospital course complicated by respiratory distress requiring HiFlow nasal cannula and SICU admission.      PLAN:    NEUROLOGY:  - No active issues  - Pain control with Dilaudid PRN    RESPIRATORY:  - Weaned off HiFlow nasal cannula  - Saturating well on room air  - Continuous pulse oximetry  - Maintain O2 saturation >92%  - Continue with Xopenex PRN    CARDIOVASCULAR:  - Normotensive  - No pressor requirements  - Continue with home dose Enalapril   - Keep MAP >65    GI / NUTRITION:  - Clear liquid diet  - GI prophylaxis with Protonix    RENAL / GENITOURINARY:  - Indwelling telles catheter  - Monitor electrolytes and replete PRN  - Continue to monitor strict ins and outs q1 hour  - To POCUS and give 40 lasix    HEMATOLOGIC:  - Hemoglobin and hematocrit stable  - VTE prophylaxis with Lovenox subcutaneous    INFECTIOUS DISEASE:  - Currently afebrile with leukocytosis of 15.39  - Continue to monitor off antibiotics    ENDOCRINOLOGY:  - Monitor fingersticks before meals and at bedtime  - Continue with Lantus 15 units qHS and insulin sliding scale      Disposition: floor    --------------------------------------------------------------------------------------

## 2022-07-24 NOTE — PROGRESS NOTE ADULT - SUBJECTIVE AND OBJECTIVE BOX
SICU Daily Progress Note  =====================================================  Interval / Overnight Events: No acute events overnight.      HPI:  Patient is a 72 year old female with a PMHx of right hemicolectomy (in 2017 for colon cancer with adjuvant chemo), DM2, HTN and HLD who presented with abdominal pain, nausea and vomiting x2 days. (19 Jul 2022 21:11)      PAST MEDICAL & SURGICAL HISTORY:  Essential hypertension  Diabetes mellitus  type 2  Hyperlipidemia  Diabetes mellitus, type 2  Essential hypertension  Hyperlipidemia, unspecified hyperlipidemia type  Obesity (BMI 30.0-34.9)  Hepatic flexure mass  malignant neoplasm  Malignant neoplasm of hepatic flexure  No significant past surgical history  H/O hemicolectomy  right - 1/30/2017      ALLERGIES:  No Known Allergies    --------------------------------------------------------------------------------------    MEDICATIONS:    Neurologic Medications  HYDROmorphone  Injectable 0.5 milliGRAM(s) IV Push every 3 hours PRN Moderate to Severe Pain (4 - 10)  HYDROmorphone  Injectable 0.25 milliGRAM(s) IV Push every 3 hours PRN Severe breakthrough Pain (7 - 10)  ondansetron Injectable 4 milliGRAM(s) IV Push every 6 hours PRN Nausea    Respiratory Medications  levalbuterol Inhalation 0.63 milliGRAM(s) Inhalation every 6 hours PRN sob    Cardiovascular Medications  enalapril 20 milliGRAM(s) Oral daily    Gastrointestinal Medications  dextrose 5%. 1000 milliLiter(s) IV Continuous <Continuous>  magnesium sulfate  IVPB 2 Gram(s) IV Intermittent once  pantoprazole    Tablet 40 milliGRAM(s) Oral two times a day    Hematologic/Oncologic Medications  enoxaparin Injectable 40 milliGRAM(s) SubCutaneous every 24 hours    Endocrine/Metabolic Medications  dextrose 50% Injectable 25 Gram(s) IV Push once  dextrose 50% Injectable 12.5 Gram(s) IV Push once  insulin detemir injectable (LEVEMIR) 15 Unit(s) SubCutaneous daily  insulin lispro (ADMELOG) corrective regimen sliding scale   SubCutaneous Before meals and at bedtime    Topical/Other Medications  naloxone Injectable 0.1 milliGRAM(s) IV Push every 3 minutes PRN For ANY of the following changes in patient status:  A. RR LESS THAN 10 breaths per minute, B. Oxygen saturation LESS THAN 90%, C. Sedation score of 6    --------------------------------------------------------------------------------------    VITAL SIGNS:  T(C): 36.9 (23 Jul 2022 20:00), Max: 37.7 (23 Jul 2022 04:00)  T(F): 98.5 (23 Jul 2022 20:00), Max: 99.8 (23 Jul 2022 04:00)  HR: 96 (23 Jul 2022 21:00) (95 - 121)  BP: 148/82 (23 Jul 2022 21:00) (106/82 - 172/80)  BP(mean): 103 (23 Jul 2022 21:00) (89 - 117)  RR: 24 (23 Jul 2022 21:00) (14 - 27)  SpO2: 98% (23 Jul 2022 21:00) (95% - 100%)    --------------------------------------------------------------------------------------    INS AND OUTS:    22 Jul 2022 07:01  -  23 Jul 2022 07:00  --------------------------------------------------------  IN:    IV PiggyBack: 400 mL    Oral Fluid: 680 mL  Total IN: 1080 mL    OUT:    Drain (mL): 75 mL    Voided (mL): 5150 mL  Total OUT: 5225 mL    Total NET: -4145 mL      23 Jul 2022 07:01  -  24 Jul 2022 00:06  --------------------------------------------------------  IN:    IV PiggyBack: 499.8 mL    Oral Fluid: 720 mL  Total IN: 1219.8 mL    OUT:    Drain (mL): 30 mL    Voided (mL): 3450 mL  Total OUT: 3480 mL    Total NET: -2260.2 mL    --------------------------------------------------------------------------------------    EXAM    NEUROLOGY  Exam: Normal, in no acute distress.    HEENT  Exam: Normocephalic, atraumatic.    RESPIRATORY  Exam: Normal expansion / effort.    CARDIOVASCULAR  Exam: S1, S2.  Regular rate and rhythm.    GI/NUTRITION  Exam: Abdomen soft, Non-tender, Non-distended.  Incision clean, dry and intact.  HI drain x1 with serosanguinous output.  Current Diet: Clear liquid diet    MUSCULOSKELETAL  Exam: All extremities moving spontaneously without limitations.      METABOLIC / FLUIDS / ELECTROLYTES  dextrose 5%. 1000 milliLiter(s) IV Continuous <Continuous>  magnesium sulfate  IVPB 2 Gram(s) IV Intermittent once      HEMATOLOGIC  [x] VTE Prophylaxis: enoxaparin Injectable 40 milliGRAM(s) SubCutaneous every 24 hours      TUBES / LINES / DRAINS  [x] Peripheral IV  [] Central Venous Line     	[] R	[] L	[] IJ	[] Fem	[] SC	Date Placed:   [] Arterial Line		[] R	[] L	[] Fem	[] Rad	[] Ax	Date Placed:   [] PICC		[] Midline		[] Mediport  [] Urinary Catheter		Date Placed:   [x] Necessity of urinary, arterial, and venous catheters discussed    --------------------------------------------------------------------------------------    LABS    ((Insert SICU Labs here))***  --------------------------------------------------------------------------------------    OTHER LABORATORY:     IMAGING STUDIES:   CXR:     ASSESSMENT:  72y Female    ((insert from previous))***    PLAN:   ***  NEUROLOGY:    RESPIRATORY:  - Maintain O2 saturation >92%    CARDIOVASCULAR:    GI / NUTRITION:    RENAL / GENITOURINARY:  - Indwelling telles catheter  - Monitor electrolytes and replete PRN  - Continue to monitor strict ins and outs q1 hour    HEMATOLOGIC:    INFECTIOUS DISEASE:  - Currently afebrile with no leukocytosis  - Continue to monitor off antibiotics    ENDOCRINOLOGY:  - No active issues  - Continue to monitor glucose on BMP (goal 140-180)    Disposition: SICU    --------------------------------------------------------------------------------------    Critical Care Diagnoses: SICU Daily Progress Note  =====================================================  Interval / Overnight Events: No acute events overnight.      HPI:  Patient is a 72 year old female with a PMHx of right hemicolectomy (in 2017 for colon cancer with adjuvant chemo), DM2, HTN and HLD who presented with abdominal pain, nausea and vomiting x2 days. (19 Jul 2022 21:11)      PAST MEDICAL & SURGICAL HISTORY:  Essential hypertension  Diabetes mellitus  type 2  Hyperlipidemia  Diabetes mellitus, type 2  Essential hypertension  Hyperlipidemia, unspecified hyperlipidemia type  Obesity (BMI 30.0-34.9)  Hepatic flexure mass  malignant neoplasm  Malignant neoplasm of hepatic flexure  No significant past surgical history  H/O hemicolectomy  right - 1/30/2017      ALLERGIES:  No Known Allergies    --------------------------------------------------------------------------------------    MEDICATIONS:    Neurologic Medications  HYDROmorphone  Injectable 0.5 milliGRAM(s) IV Push every 3 hours PRN Moderate to Severe Pain (4 - 10)  HYDROmorphone  Injectable 0.25 milliGRAM(s) IV Push every 3 hours PRN Severe breakthrough Pain (7 - 10)  ondansetron Injectable 4 milliGRAM(s) IV Push every 6 hours PRN Nausea    Respiratory Medications  levalbuterol Inhalation 0.63 milliGRAM(s) Inhalation every 6 hours PRN sob    Cardiovascular Medications  enalapril 20 milliGRAM(s) Oral daily    Gastrointestinal Medications  dextrose 5%. 1000 milliLiter(s) IV Continuous <Continuous>  magnesium sulfate  IVPB 2 Gram(s) IV Intermittent once  pantoprazole    Tablet 40 milliGRAM(s) Oral two times a day    Hematologic/Oncologic Medications  enoxaparin Injectable 40 milliGRAM(s) SubCutaneous every 24 hours    Endocrine/Metabolic Medications  dextrose 50% Injectable 25 Gram(s) IV Push once  dextrose 50% Injectable 12.5 Gram(s) IV Push once  insulin detemir injectable (LEVEMIR) 15 Unit(s) SubCutaneous daily  insulin lispro (ADMELOG) corrective regimen sliding scale   SubCutaneous Before meals and at bedtime    Topical/Other Medications  naloxone Injectable 0.1 milliGRAM(s) IV Push every 3 minutes PRN For ANY of the following changes in patient status:  A. RR LESS THAN 10 breaths per minute, B. Oxygen saturation LESS THAN 90%, C. Sedation score of 6    --------------------------------------------------------------------------------------    VITAL SIGNS:  T(C): 36.9 (23 Jul 2022 20:00), Max: 37.7 (23 Jul 2022 04:00)  T(F): 98.5 (23 Jul 2022 20:00), Max: 99.8 (23 Jul 2022 04:00)  HR: 96 (23 Jul 2022 21:00) (95 - 121)  BP: 148/82 (23 Jul 2022 21:00) (106/82 - 172/80)  BP(mean): 103 (23 Jul 2022 21:00) (89 - 117)  RR: 24 (23 Jul 2022 21:00) (14 - 27)  SpO2: 98% (23 Jul 2022 21:00) (95% - 100%)    --------------------------------------------------------------------------------------    INS AND OUTS:    22 Jul 2022 07:01  -  23 Jul 2022 07:00  --------------------------------------------------------  IN:    IV PiggyBack: 400 mL    Oral Fluid: 680 mL  Total IN: 1080 mL    OUT:    Drain (mL): 75 mL    Voided (mL): 5150 mL  Total OUT: 5225 mL    Total NET: -4145 mL      23 Jul 2022 07:01  -  24 Jul 2022 00:06  --------------------------------------------------------  IN:    IV PiggyBack: 499.8 mL    Oral Fluid: 720 mL  Total IN: 1219.8 mL    OUT:    Drain (mL): 30 mL    Voided (mL): 3450 mL  Total OUT: 3480 mL    Total NET: -2260.2 mL    --------------------------------------------------------------------------------------    EXAM    NEUROLOGY  Exam: Normal, in no acute distress.    HEENT  Exam: Normocephalic, atraumatic.    RESPIRATORY  Exam: Normal expansion / effort.    CARDIOVASCULAR  Exam: S1, S2.  Regular rate and rhythm.    GI/NUTRITION  Exam: Abdomen soft, Non-tender, Non-distended.  Incision clean, dry and intact.  HI drain x1 with serosanguinous output.  Current Diet: Clear liquid diet    MUSCULOSKELETAL  Exam: All extremities moving spontaneously without limitations.      METABOLIC / FLUIDS / ELECTROLYTES  dextrose 5%. 1000 milliLiter(s) IV Continuous <Continuous>  magnesium sulfate  IVPB 2 Gram(s) IV Intermittent once      HEMATOLOGIC  [x] VTE Prophylaxis: enoxaparin Injectable 40 milliGRAM(s) SubCutaneous every 24 hours      TUBES / LINES / DRAINS  [x] Peripheral IV  [] Central Venous Line     	[] R	[] L	[] IJ	[] Fem	[] SC	Date Placed:   [] Arterial Line		[] R	[] L	[] Fem	[] Rad	[] Ax	Date Placed:   [] PICC		[] Midline		[] Mediport  [] Urinary Catheter		Date Placed:   [x] Necessity of urinary, arterial, and venous catheters discussed    --------------------------------------------------------------------------------------

## 2022-07-24 NOTE — PHYSICAL THERAPY INITIAL EVALUATION ADULT - PERTINENT HX OF CURRENT PROBLEM, REHAB EVAL
patient is a 72 year old female admitted for SBO S/P ex-lap and ventral hernia repair on 7/20/22.  Hospital course complicated by respiratory distress requiring HiFlow nasal cannula and SICU admission

## 2022-07-24 NOTE — PROGRESS NOTE ADULT - SUBJECTIVE AND OBJECTIVE BOX
Name of Patient : DAVON ALCALA  MRN: [id number]  Date of visit: 07-24-22       Subjective: Patient seen and examined. No new events except as noted.   Doing okay     REVIEW OF SYSTEMS:    CONSTITUTIONAL: No weakness, fevers or chills  EYES/ENT: No visual changes;  No vertigo or throat pain   NECK: No pain or stiffness  RESPIRATORY: No cough, wheezing, hemoptysis; No shortness of breath  CARDIOVASCULAR: No chest pain or palpitations  GASTROINTESTINAL: No abdominal or epigastric pain. No nausea, vomiting, or hematemesis; No diarrhea or constipation. No melena or hematochezia.  GENITOURINARY: No dysuria, frequency or hematuria  NEUROLOGICAL: No numbness or weakness  SKIN: No itching, burning, rashes, or lesions   All other review of systems is negative unless indicated above.    MEDICATIONS:  MEDICATIONS  (STANDING):  acetaminophen     Tablet .. 975 milliGRAM(s) Oral every 6 hours  enalapril 20 milliGRAM(s) Oral daily  enoxaparin Injectable 40 milliGRAM(s) SubCutaneous every 24 hours  insulin detemir injectable (LEVEMIR) 15 Unit(s) SubCutaneous daily  insulin lispro (ADMELOG) corrective regimen sliding scale   SubCutaneous Before meals and at bedtime  pantoprazole    Tablet 40 milliGRAM(s) Oral two times a day      PHYSICAL EXAM:  T(C): 36.8 (07-24-22 @ 20:00), Max: 37.2 (07-24-22 @ 16:00)  HR: 112 (07-24-22 @ 20:00) (89 - 112)  BP: 133/77 (07-24-22 @ 20:00) (128/68 - 159/97)  RR: 24 (07-24-22 @ 20:00) (19 - 31)  SpO2: 98% (07-24-22 @ 20:00) (90% - 98%)  Wt(kg): --  I&O's Summary    23 Jul 2022 07:01  -  24 Jul 2022 07:00  --------------------------------------------------------  IN: 1469.8 mL / OUT: 4100 mL / NET: -2630.2 mL    24 Jul 2022 07:01  -  24 Jul 2022 22:10  --------------------------------------------------------  IN: 0 mL / OUT: 1410 mL / NET: -1410 mL          Appearance: Normal	  HEENT:  PERRLA   Lymphatic: No lymphadenopathy   Cardiovascular: Normal S1 S2, no JVD  Respiratory: normal effort , clear  Gastrointestinal:  Soft, Non-tender  Skin: No rashes,  warm to touch  Psychiatry:  Mood & affect appropriate  Musculuskeletal: No edema      All labs, Imaging and EKGs personally reviewed     07-23-22 @ 07:01  -  07-24-22 @ 07:00  --------------------------------------------------------  IN: 1469.8 mL / OUT: 4100 mL / NET: -2630.2 mL    07-24-22 @ 07:01  -  07-24-22 @ 22:10  --------------------------------------------------------  IN: 0 mL / OUT: 1410 mL / NET: -1410 mL                          11.8   12.47 )-----------( 388      ( 24 Jul 2022 03:40 )             35.8               07-24    131<L>  |  93<L>  |  7   ----------------------------<  130<H>  3.8   |  22  |  0.81    Ca    8.6      24 Jul 2022 03:40  Phos  2.7     07-24  Mg     1.60     07-24    TPro  7.1  /  Alb  3.5  /  TBili  0.4  /  DBili  x   /  AST  23  /  ALT  17  /  AlkPhos  126<H>  07-24                     ABG - ( 23 Jul 2022 01:38 )  pH, Arterial: 7.47  pH, Blood: x     /  pCO2: 33    /  pO2: 96    / HCO3: 24    / Base Excess: 0.7   /  SaO2: 97.6

## 2022-07-24 NOTE — PROGRESS NOTE ADULT - ASSESSMENT
72F with hx of right hemicolectomy for colon ca in 2017, with incarcerated, bowel containing ventral hernia causing an SBO. S/P ex lap with ventral hernia repair on 7/20. Patient with low oxygen saturation and increased WOB on 7/22. Transferred to SICU.     Plan:  - Pain: Tylenol, Dilaudid PRN  - Strict I's/O's  - Advance diet as tolerated   - PT Consult for OOB Ambulate  - DVT Prophylaxis  - Continue excellent care per SICU     A team surgery  [id number]

## 2022-07-24 NOTE — PROGRESS NOTE ADULT - ASSESSMENT
72F with hx of right hemicolectomy for colon ca in 2017, with incarcerated, bowel containing ventral hernia causing an SBO    now with post op resp distress: currently on high flow at 40%::    D Dimer is very high: cta is negative:   has atelectasis and subsegmental atelectasis in all lobes: ? hypoxia secondary to atelectasis:  she is not retaining co2: ph is doing Ok at this time:  encourage incentive spirometry she has no significant pain :  she has high wbc count but no fever; not on any [id number]:  dvt prophylaxis: cont o monitro in sicu;  dw  and pt       7/24:    D Dimer is very high: cta is negative: responded to Lasix  and now on room air:    has atelectasis and subsegmental atelectasis in all lobes: ? hypoxia secondary to atelectasis and chf: now better  encourage incentive spirometry she has no significant pain :  she has high wbc count but no fever; not on any antibiotics:   dvt prophylaxis: cont to monitor in sicu;  dw  and pt and PMD

## 2022-07-24 NOTE — PROGRESS NOTE ADULT - SUBJECTIVE AND OBJECTIVE BOX
Date of Service: 07-24-22 @ 10:21    Patient is a 72y old  Female who presents with a chief complaint of SBO (24 Jul 2022 08:56)      Any change in ROS: Doing much better after diuresis  she is on room air now:  no sob:  on room air: 93% sao2:     MEDICATIONS  (STANDING):  enalapril 20 milliGRAM(s) Oral daily  enoxaparin Injectable 40 milliGRAM(s) SubCutaneous every 24 hours  insulin detemir injectable (LEVEMIR) 15 Unit(s) SubCutaneous daily  insulin lispro (ADMELOG) corrective regimen sliding scale   SubCutaneous Before meals and at bedtime  pantoprazole    Tablet 40 milliGRAM(s) Oral two times a day    MEDICATIONS  (PRN):  HYDROmorphone  Injectable 0.5 milliGRAM(s) IV Push every 3 hours PRN Moderate to Severe Pain (4 - 10)  HYDROmorphone  Injectable 0.25 milliGRAM(s) IV Push every 3 hours PRN Severe breakthrough Pain (7 - 10)  levalbuterol Inhalation 0.63 milliGRAM(s) Inhalation every 6 hours PRN sob  naloxone Injectable 0.1 milliGRAM(s) IV Push every 3 minutes PRN For ANY of the following changes in patient status:  A. RR LESS THAN 10 breaths per minute, B. Oxygen saturation LESS THAN 90%, C. Sedation score of 6  ondansetron Injectable 4 milliGRAM(s) IV Push every 6 hours PRN Nausea    Vital Signs Last 24 Hrs  T(C): 36.9 (24 Jul 2022 08:00), Max: 37.4 (23 Jul 2022 12:00)  T(F): 98.4 (24 Jul 2022 08:00), Max: 99.3 (23 Jul 2022 12:00)  HR: 94 (24 Jul 2022 09:00) (89 - 121)  BP: 142/76 (24 Jul 2022 09:00) (130/75 - 172/80)  BP(mean): 92 (24 Jul 2022 09:00) (92 - 107)  RR: 27 (24 Jul 2022 09:00) (19 - 27)  SpO2: 95% (24 Jul 2022 09:00) (94% - 100%)    Parameters below as of 24 Jul 2022 08:00  Patient On (Oxygen Delivery Method): room air        I&O's Summary    23 Jul 2022 07:01  -  24 Jul 2022 07:00  --------------------------------------------------------  IN: 1469.8 mL / OUT: 4100 mL / NET: -2630.2 mL          Physical Exam:   GENERAL: NAD, well-groomed, well-developed  HEENT: SULAIMAN/   Atraumatic, Normocephalic  ENMT: No tonsillar erythema, exudates, or enlargement; Moist mucous membranes, Good dentition, No lesions  NECK: Supple, No JVD, Normal thyroid  CHEST/LUNG: Clear to auscultaion  CVS: Regular rate and rhythm; No murmurs, rubs, or gallops  GI: : Soft, Nontender, Nondistended; Bowel sounds present  NERVOUS SYSTEM:  Alert & Oriented X3  EXTREMITIES:  - edema  LYMPH: No lymphadenopathy noted  SKIN: No rashes or lesions  ENDOCRINOLOGY: No Thyromegaly  PSYCH: Appropriate    Labs:  ABG - ( 23 Jul 2022 01:38 )  pH, Arterial: 7.47  pH, Blood: x     /  pCO2: 33    /  pO2: 96    / HCO3: 24    / Base Excess: 0.7   /  SaO2: 97.6            23, 23, 25  CARDIAC MARKERS ( 22 Jul 2022 12:30 )  x     / x     / 249 U/L / x     / 5.4 ng/mL                            11.8   12.47 )-----------( 388      ( 24 Jul 2022 03:40 )             35.8                         11.0   15.39 )-----------( 377      ( 23 Jul 2022 01:15 )             33.1                         10.8   15.65 )-----------( 340      ( 22 Jul 2022 12:30 )             34.1                         11.2   14.16 )-----------( 339      ( 21 Jul 2022 23:20 )             34.6                         11.2   11.59 )-----------( 297      ( 21 Jul 2022 05:55 )             33.4                         11.9   7.52  )-----------( 333      ( 20 Jul 2022 19:21 )             36.5     07-24    131<L>  |  93<L>  |  7   ----------------------------<  130<H>  3.8   |  22  |  0.81  07-23    133<L>  |  94<L>  |  7   ----------------------------<  156<H>  4.2   |  23  |  0.73  07-23    136  |  100  |  7   ----------------------------<  154<H>  3.5   |  22  |  0.70  07-22    132<L>  |  101  |  7   ----------------------------<  169<H>  3.5   |  19<L>  |  0.65  07-21    134<L>  |  104  |  9   ----------------------------<  103<H>  3.5   |  18<L>  |  0.72  07-21    134<L>  |  102  |  11  ----------------------------<  148<H>  4.3   |  21<L>  |  0.96  07-20    135  |  104  |  11  ----------------------------<  153<H>  3.8   |  20<L>  |  0.81    Ca    8.6      24 Jul 2022 03:40  Ca    8.6      23 Jul 2022 17:43  Ca    8.2<L>      23 Jul 2022 01:15  Ca    7.1<L>      22 Jul 2022 12:30  Phos  2.7     07-24  Phos  2.8     07-23  Phos  1.9     07-23  Phos  2.1     07-22  Mg     1.60     07-24  Mg     1.60     07-23  Mg     1.80     07-23  Mg     1.60     07-22    TPro  7.1  /  Alb  3.5  /  TBili  0.4  /  DBili  x   /  AST  23  /  ALT  17  /  AlkPhos  126<H>  07-24  TPro  6.3  /  Alb  3.1<L>  /  TBili  0.3  /  DBili  x   /  AST  25  /  ALT  14  /  AlkPhos  119  07-23    CAPILLARY BLOOD GLUCOSE      POCT Blood Glucose.: 156 mg/dL (24 Jul 2022 08:30)  POCT Blood Glucose.: 131 mg/dL (23 Jul 2022 23:03)  POCT Blood Glucose.: 144 mg/dL (23 Jul 2022 17:12)  POCT Blood Glucose.: 136 mg/dL (23 Jul 2022 11:24)      LIVER FUNCTIONS - ( 24 Jul 2022 03:40 )  Alb: 3.5 g/dL / Pro: 7.1 g/dL / ALK PHOS: 126 U/L / ALT: 17 U/L / AST: 23 U/L / GGT: x           PT/INR - ( 22 Jul 2022 12:30 )   PT: 13.9 sec;   INR: 1.20 ratio         PTT - ( 22 Jul 2022 12:30 )  PTT:27.3 sec    D-Dimer Assay, Quantitative: 3873 ng/mL DDU (07-22 @ 00:12)  Lactate, Blood: 1.4 mmol/L (07-22 @ 00:12)        RECENT CULTURES:    rd< from: Xray Chest 1 View- PORTABLE-Routine (Xray Chest 1 View- PORTABLE-Routine in AM.) (07.23.22 @ 00:43) >    ACC: [id number] EXAM:  XR CHEST PORTABLE ROUTINE 1V                          PROCEDURE DATE:  07/23/2022          INTERPRETATION:  XR CHEST. One view.    INDICATION: Tachycardia, shortness of breath    COMPARISON: 7/22/2022    FINDINGS/  IMPRESSION:    Stable bilateral areas of atelectasis. Small pleural effusions.    --- End of Report ---            BERNADETTE CASTANEDA MD; Attending Radiologist  This document has been electronically signed. Jul 23 2022 11:29AM    < end of copied text >      RESPIRATORY CULTURES:          Studies  Chest X-RAY  CT SCAN Chest   Venous Dopplers: LE:   CT Abdomen  Others

## 2022-07-25 ENCOUNTER — TRANSCRIPTION ENCOUNTER (OUTPATIENT)
Age: 72
End: 2022-07-25

## 2022-07-25 VITALS
OXYGEN SATURATION: 95 % | SYSTOLIC BLOOD PRESSURE: 128 MMHG | HEART RATE: 97 BPM | DIASTOLIC BLOOD PRESSURE: 78 MMHG | RESPIRATION RATE: 24 BRPM

## 2022-07-25 LAB
A1C WITH ESTIMATED AVERAGE GLUCOSE RESULT: 9.4 % — HIGH (ref 4–5.6)
ANION GAP SERPL CALC-SCNC: 12 MMOL/L — SIGNIFICANT CHANGE UP (ref 7–14)
BUN SERPL-MCNC: 13 MG/DL — SIGNIFICANT CHANGE UP (ref 7–23)
CALCIUM SERPL-MCNC: 8.5 MG/DL — SIGNIFICANT CHANGE UP (ref 8.4–10.5)
CHLORIDE SERPL-SCNC: 95 MMOL/L — LOW (ref 98–107)
CO2 SERPL-SCNC: 26 MMOL/L — SIGNIFICANT CHANGE UP (ref 22–31)
CREAT SERPL-MCNC: 1.02 MG/DL — SIGNIFICANT CHANGE UP (ref 0.5–1.3)
EGFR: 59 ML/MIN/1.73M2 — LOW
ESTIMATED AVERAGE GLUCOSE: 223 — SIGNIFICANT CHANGE UP
GLUCOSE BLDC GLUCOMTR-MCNC: 231 MG/DL — HIGH (ref 70–99)
GLUCOSE BLDC GLUCOMTR-MCNC: 235 MG/DL — HIGH (ref 70–99)
GLUCOSE SERPL-MCNC: 158 MG/DL — HIGH (ref 70–99)
HCT VFR BLD CALC: 33.1 % — LOW (ref 34.5–45)
HGB BLD-MCNC: 10.9 G/DL — LOW (ref 11.5–15.5)
MAGNESIUM SERPL-MCNC: 1.7 MG/DL — SIGNIFICANT CHANGE UP (ref 1.6–2.6)
MCHC RBC-ENTMCNC: 28.8 PG — SIGNIFICANT CHANGE UP (ref 27–34)
MCHC RBC-ENTMCNC: 32.9 GM/DL — SIGNIFICANT CHANGE UP (ref 32–36)
MCV RBC AUTO: 87.3 FL — SIGNIFICANT CHANGE UP (ref 80–100)
NRBC # BLD: 0 /100 WBCS — SIGNIFICANT CHANGE UP
NRBC # FLD: 0.02 K/UL — HIGH
PHOSPHATE SERPL-MCNC: 2.8 MG/DL — SIGNIFICANT CHANGE UP (ref 2.5–4.5)
PLATELET # BLD AUTO: 419 K/UL — HIGH (ref 150–400)
POTASSIUM SERPL-MCNC: 3.9 MMOL/L — SIGNIFICANT CHANGE UP (ref 3.5–5.3)
POTASSIUM SERPL-SCNC: 3.9 MMOL/L — SIGNIFICANT CHANGE UP (ref 3.5–5.3)
RBC # BLD: 3.79 M/UL — LOW (ref 3.8–5.2)
RBC # FLD: 14.6 % — HIGH (ref 10.3–14.5)
SODIUM SERPL-SCNC: 133 MMOL/L — LOW (ref 135–145)
WBC # BLD: 13.79 K/UL — HIGH (ref 3.8–10.5)
WBC # FLD AUTO: 13.79 K/UL — HIGH (ref 3.8–10.5)

## 2022-07-25 PROCEDURE — 99232 SBSQ HOSP IP/OBS MODERATE 35: CPT | Mod: GC

## 2022-07-25 PROCEDURE — 71045 X-RAY EXAM CHEST 1 VIEW: CPT | Mod: 26

## 2022-07-25 RX ORDER — SITAGLIPTIN AND METFORMIN HYDROCHLORIDE 500; 50 MG/1; MG/1
1 TABLET, FILM COATED ORAL
Qty: 0 | Refills: 0 | DISCHARGE

## 2022-07-25 RX ORDER — SIMVASTATIN 20 MG/1
1 TABLET, FILM COATED ORAL
Qty: 0 | Refills: 0 | DISCHARGE

## 2022-07-25 RX ORDER — GLIMEPIRIDE 1 MG
1 TABLET ORAL
Qty: 0 | Refills: 0 | DISCHARGE

## 2022-07-25 RX ORDER — ENOXAPARIN SODIUM 100 MG/ML
40 INJECTION SUBCUTANEOUS
Qty: 0 | Refills: 0 | DISCHARGE

## 2022-07-25 RX ORDER — NABUMETONE 750 MG
1 TABLET ORAL
Qty: 0 | Refills: 0 | DISCHARGE

## 2022-07-25 RX ORDER — LISINOPRIL 2.5 MG/1
1 TABLET ORAL
Qty: 0 | Refills: 0 | DISCHARGE

## 2022-07-25 RX ORDER — SITAGLIPTIN AND METFORMIN HYDROCHLORIDE 500; 50 MG/1; MG/1
2 TABLET, FILM COATED ORAL
Qty: 0 | Refills: 0 | DISCHARGE

## 2022-07-25 RX ORDER — ACETAMINOPHEN 500 MG
3 TABLET ORAL
Qty: 0 | Refills: 0 | DISCHARGE
Start: 2022-07-25

## 2022-07-25 RX ORDER — INSULIN LISPRO 100/ML
2 VIAL (ML) SUBCUTANEOUS
Refills: 0 | Status: DISCONTINUED | OUTPATIENT
Start: 2022-07-25 | End: 2022-07-25

## 2022-07-25 RX ORDER — PIOGLITAZONE HYDROCHLORIDE 15 MG/1
1 TABLET ORAL
Qty: 0 | Refills: 0 | DISCHARGE

## 2022-07-25 RX ORDER — METFORMIN HYDROCHLORIDE 850 MG/1
1 TABLET ORAL
Qty: 0 | Refills: 0 | DISCHARGE

## 2022-07-25 RX ORDER — INSULIN DETEMIR 100/ML (3)
30 INSULIN PEN (ML) SUBCUTANEOUS
Qty: 0 | Refills: 0 | DISCHARGE

## 2022-07-25 RX ORDER — OXYCODONE HYDROCHLORIDE 5 MG/1
1 TABLET ORAL
Qty: 8 | Refills: 0
Start: 2022-07-25 | End: 2022-07-26

## 2022-07-25 RX ADMIN — Medication 975 MILLIGRAM(S): at 07:10

## 2022-07-25 RX ADMIN — Medication 975 MILLIGRAM(S): at 11:40

## 2022-07-25 RX ADMIN — Medication 20 MILLIGRAM(S): at 06:40

## 2022-07-25 RX ADMIN — PANTOPRAZOLE SODIUM 40 MILLIGRAM(S): 20 TABLET, DELAYED RELEASE ORAL at 06:40

## 2022-07-25 RX ADMIN — Medication 4: at 07:44

## 2022-07-25 RX ADMIN — Medication 975 MILLIGRAM(S): at 06:40

## 2022-07-25 RX ADMIN — Medication 975 MILLIGRAM(S): at 12:10

## 2022-07-25 RX ADMIN — Medication 4: at 11:41

## 2022-07-25 RX ADMIN — Medication 15 UNIT(S): at 07:50

## 2022-07-25 NOTE — DIETITIAN INITIAL EVALUATION ADULT - PHYSCIAL ASSESSMENT
Otis Delaney,  This looks fantastic!  So much better than last year.  We will fax your biometrics form in and send you the original.    Bessy Reddy MD obese

## 2022-07-25 NOTE — PROGRESS NOTE ADULT - PROVIDER SPECIALTY LIST ADULT
Internal Medicine
Pain Medicine
Pulmonology
Surgery
Surgery
Pain Medicine
SICU
Surgery
Surgery
Pain Medicine
Pain Medicine
SICU
Surgery
Internal Medicine
Internal Medicine
SICU
Surgery
Pulmonology

## 2022-07-25 NOTE — DIETITIAN INITIAL EVALUATION ADULT - OTHER INFO
71 y/o Malayalam speaking female with PMHx of right hemicolectomy (in 2017 for colon cancer with adjuvant chemo), DM2, HTN and HLD who presented with abdominal pain, nausea and vomiting x2 days.  CT Abdomen / Pelvis performed showing high-grade small bowel obstruction to the level of a ventral abdominal wall hernia containing loops of jejunum. Intra-abdominal mesenteric edema is present.  No pneumatosis, portal venous gas or bowel wall hypoenhancement.  Patient is now S/P ex-lap and ventral hernia repair on 7/20/22.  Hospital course complicated by respiratory distress requiring HiFlow nasal cannula and SICU critical care management. Visited with pt to obtain nutrition hx. Unit staff member who speaks pt's native language assisted with translation. Pt reported fair oral intake due to dislike of hospital food. Food preferences taken and menu alternatives discussed. She had experienced N/V with abdominal pain over 2 days PTA which has now resolved; last BM 7/25. Pt followed a diabetic diet PTA - request Hb A1C lab taken to assess glycemic control PTA.  - 231 mg/dl with Levemir and Admelog insulins ordered for coverage. Reviewed low fiber dietary restrictions with pt with very good understanding. Suggest Glucerna Therapeutic Nutrition Shake 240mls 2x daily (440kcals, 20g protein) to optimize oral intake. Encourage and monitor oral intake, especially of nutrition supplement. RDN services to remain available as needed.

## 2022-07-25 NOTE — DIETITIAN INITIAL EVALUATION ADULT - PERTINENT LABORATORY DATA
07-25    133<L>  |  95<L>  |  13  ----------------------------<  158<H>  3.9   |  26  |  1.02    Ca    8.5      25 Jul 2022 01:45  Phos  2.8     07-25  Mg     1.70     07-25    TPro  7.1  /  Alb  3.5  /  TBili  0.4  /  DBili  x   /  AST  23  /  ALT  17  /  AlkPhos  126<H>  07-24  POCT Blood Glucose.: 231 mg/dL (07-25-22 @ 07:43)

## 2022-07-25 NOTE — PROGRESS NOTE ADULT - SUBJECTIVE AND OBJECTIVE BOX
SICU Daily Progress Note  =====================================================  Interval / Overnight Events: No acute events overnight.      HPI:  Patient is a 72 year old female with a PMHx of right hemicolectomy (in 2017 for colon cancer with adjuvant chemo), DM2, HTN and HLD who presented with abdominal pain, nausea and vomiting x2 days. (19 Jul 2022 21:11)      PAST MEDICAL & SURGICAL HISTORY:  Essential hypertension  Diabetes mellitus  type 2  Hyperlipidemia  Diabetes mellitus, type 2  Essential hypertension  Hyperlipidemia, unspecified hyperlipidemia type  Obesity (BMI 30.0-34.9)  Hepatic flexure mass  malignant neoplasm  Malignant neoplasm of hepatic flexure  No significant past surgical history  H/O hemicolectomy  right - 1/30/2017      ALLERGIES:  No Known Allergies    --------------------------------------------------------------------------------------    MEDICATIONS:    Neurologic Medications  acetaminophen     Tablet .. 975 milliGRAM(s) Oral every 6 hours  ondansetron Injectable 4 milliGRAM(s) IV Push every 6 hours PRN Nausea  oxyCODONE    IR 5 milliGRAM(s) Oral every 4 hours PRN Severe Pain (7 - 10)  oxyCODONE    IR 2.5 milliGRAM(s) Oral every 4 hours PRN Moderate Pain (4 - 6)    Respiratory Medications  levalbuterol Inhalation 0.63 milliGRAM(s) Inhalation every 6 hours PRN sob    Cardiovascular Medications  enalapril 20 milliGRAM(s) Oral daily    Gastrointestinal Medications  pantoprazole    Tablet 40 milliGRAM(s) Oral two times a day    Hematologic/Oncologic Medications  enoxaparin Injectable 40 milliGRAM(s) SubCutaneous every 24 hours    Endocrine/Metabolic Medications  insulin detemir injectable (LEVEMIR) 15 Unit(s) SubCutaneous daily  insulin lispro (ADMELOG) corrective regimen sliding scale   SubCutaneous Before meals and at bedtime    Topical/Other Medications  naloxone Injectable 0.1 milliGRAM(s) IV Push every 3 minutes PRN For ANY of the following changes in patient status:  A. RR LESS THAN 10 breaths per minute, B. Oxygen saturation LESS THAN 90%, C. Sedation score of 6    --------------------------------------------------------------------------------------    VITAL SIGNS:  T(C): 36.8 (24 Jul 2022 20:00), Max: 37.2 (24 Jul 2022 16:00)  T(F): 98.3 (24 Jul 2022 20:00), Max: 98.9 (24 Jul 2022 16:00)  HR: 112 (24 Jul 2022 20:00) (89 - 112)  BP: 133/77 (24 Jul 2022 20:00) (128/68 - 159/97)  BP(mean): 94 (24 Jul 2022 20:00) (86 - 116)  RR: 24 (24 Jul 2022 20:00) (19 - 31)  SpO2: 98% (24 Jul 2022 20:00) (90% - 98%)    --------------------------------------------------------------------------------------    INS AND OUTS:    23 Jul 2022 07:01  -  24 Jul 2022 07:00  --------------------------------------------------------  IN:    IV PiggyBack: 549.8 mL    Oral Fluid: 920 mL  Total IN: 1469.8 mL    OUT:    Drain (mL): 50 mL    Voided (mL): 4050 mL  Total OUT: 4100 mL    Total NET: -2630.2 mL      24 Jul 2022 07:01  -  25 Jul 2022 00:17  --------------------------------------------------------  IN:    Oral Fluid: 100 mL  Total IN: 100 mL    OUT:    Drain (mL): 10 mL    Voided (mL): 1475 mL  Total OUT: 1485 mL    Total NET: -1385 mL    --------------------------------------------------------------------------------------    EXAM    NEUROLOGY  Exam: Normal, in no acute distress.    HEENT  Exam: Normocephalic, atraumatic.    RESPIRATORY  Exam: Normal expansion / effort.    CARDIOVASCULAR  Exam: S1, S2.  Regular rate and rhythm.    GI/NUTRITION  Exam: Abdomen soft, Non-tender, Non-distended.  Incision clean, dry and intact.  HI drain x1 with serosanguinous output.  Current Diet: Low fiber diet    MUSCULOSKELETAL  Exam: All extremities moving spontaneously without limitations.      HEMATOLOGIC  [x] VTE Prophylaxis: enoxaparin Injectable 40 milliGRAM(s) SubCutaneous every 24 hours      TUBES / LINES / DRAINS  [x] Peripheral IV  [] Central Venous Line     	[] R	[] L	[] IJ	[] Fem	[] SC	Date Placed:   [] Arterial Line		[] R	[] L	[] Fem	[] Rad	[] Ax	Date Placed:   [] PICC		[] Midline		[] Mediport  [] Urinary Catheter		Date Placed:   [x] Necessity of urinary, arterial, and venous catheters discussed    -------------------------------------------------------------------------------------- SICU Daily Progress Note    Interval / Overnight Events:   - POCUS PM 7/24 revealing lung B-lines, lasix 40mg given x1  - POCUS AM 7/25 no B-lines  - sleeping desats to mid/high 80s, resolve to low/mid 90s upon awakening    HPI: Patient is a 72 year old female with a PMHx of right hemicolectomy (in 2017 for colon cancer with adjuvant chemo), DM2, HTN and HLD who presented with abdominal pain, nausea and vomiting x2 days. (19 Jul 2022 21:11)    PAST MEDICAL & SURGICAL HISTORY:  Essential hypertension  Diabetes mellitus  type 2  Hyperlipidemia  Diabetes mellitus, type 2  Essential hypertension  Hyperlipidemia, unspecified hyperlipidemia type  Obesity (BMI 30.0-34.9)  Hepatic flexure mass  malignant neoplasm  Malignant neoplasm of hepatic flexure  No significant past surgical history  H/O hemicolectomy  right - 1/30/2017    ALLERGIES:  No Known Allergies  --------------------------------------------------------------------------------------  MEDICATIONS  (STANDING):  acetaminophen     Tablet .. 975 milliGRAM(s) Oral every 6 hours  enalapril 20 milliGRAM(s) Oral daily  enoxaparin Injectable 40 milliGRAM(s) SubCutaneous every 24 hours  insulin detemir injectable (LEVEMIR) 15 Unit(s) SubCutaneous daily  insulin lispro (ADMELOG) corrective regimen sliding scale   SubCutaneous Before meals and at bedtime  pantoprazole    Tablet 40 milliGRAM(s) Oral two times a day    MEDICATIONS  (PRN):  levalbuterol Inhalation 0.63 milliGRAM(s) Inhalation every 6 hours PRN sob  naloxone Injectable 0.1 milliGRAM(s) IV Push every 3 minutes PRN For ANY of the following changes in patient status:  A. RR LESS THAN 10 breaths per minute, B. Oxygen saturation LESS THAN 90%, C. Sedation score of 6  ondansetron Injectable 4 milliGRAM(s) IV Push every 6 hours PRN Nausea  oxyCODONE    IR 5 milliGRAM(s) Oral every 4 hours PRN Severe Pain (7 - 10)  oxyCODONE    IR 2.5 milliGRAM(s) Oral every 4 hours PRN Moderate Pain (4 - 6)  --------------------------------------------------------------------------------------  ICU Vital Signs Last 24 Hrs  T(C): 36.4 (25 Jul 2022 08:00), Max: 37.2 (24 Jul 2022 16:00)  T(F): 97.6 (25 Jul 2022 08:00), Max: 98.9 (24 Jul 2022 16:00)  HR: 102 (25 Jul 2022 08:20) (96 - 112)  BP: 141/59 (25 Jul 2022 08:00) (128/68 - 154/81)  BP(mean): 83 (25 Jul 2022 08:00) (83 - 104)  RR: 24 (25 Jul 2022 08:20) (20 - 28)  SpO2: 95% (25 Jul 2022 08:20) (93% - 98%)    O2 Parameters below as of 25 Jul 2022 08:20  Patient On (Oxygen Delivery Method): room air  --------------------------------------------------------------------------------------  I&O's Detail  24 Jul 2022 07:01  -  25 Jul 2022 07:00  IN:    Oral Fluid: 100 mL  Total IN: 100 mL  OUT:    Drain (mL): 20 mL    Voided (mL): 2175 mL  Total OUT: 2195 mL    Total NET: -2095 mL  --------------------------------------------------------------------------------------  PHYSICAL EXAM    NEUROLOGY:  Exam: Normal, in no acute distress.    HEENT:  Exam: Normocephalic, atraumatic.    RESPIRATORY:  Exam: Mild wheezes b/l, normal chest expansion b/l, satting 92% on RA.    CARDIOVASCULAR:  Exam: S1, S2.  Regular rate and rhythm.    GI/NUTRITION:  Exam: Abdomen soft, non-tender, non-distended. Surgical Incision sites clean, dry and intact. HI drain x1 with serosanguinous output.  Current Diet: regular diet, consistent carb.    MUSCULOSKELETAL:  Exam: All extremities moving spontaneously without limitations.    TUBES / LINES / DRAINS  [x] Peripheral IV  [x] HI x1  [x] Necessity of urinary, arterial, and venous catheters discussed

## 2022-07-25 NOTE — DISCHARGE NOTE NURSING/CASE MANAGEMENT/SOCIAL WORK - NSDCPEFALRISK_GEN_ALL_CORE
For information on Fall & Injury Prevention, visit: [email or web address] OR  [email or web address] OR  https://www.cdc.gov/steadi/patient.html

## 2022-07-25 NOTE — PROGRESS NOTE ADULT - ASSESSMENT
Patient is a 73 Y/O female with PMhhx of right hemicolectomy in 2017 for colon ca, with adjuvant chemo, DM, HTN, HLD, presenting with 2 days of abdominal pain, N/V. She stated once the pain began, she noted a hard bulge in the RLQ that she is unsure if it has ever been there before. Pain is at the side of the bulge, does not radiate. Denies fevers, chills, chest pain. She last passed gas and had a BM this morning. Patient had an ex lap with ventral hernia repair on 7/20. Developed SOB overnight on 7/21 and CTA 7/22 negative for PE.     # Acute onset SOB   S/P ex lap   elevated D-dimer  PE ruled out   Serial CE and EKG   Echo noted   likely fluid overload, improved after dose of lasix   nebs given, cont   oral meds as tolerated for BP control   SICU eval care appreciated   Pulm eval appreciated   incentive breezy     # S/P Ex lap  care as per surg team   pain control     # hypertensive urgency   BP control     # DM   sliding scale    diab diet     # HLD     DVT and gi PPX       Discussed with surg team   D.C Planing iht outpatient followu p

## 2022-07-25 NOTE — PROGRESS NOTE ADULT - SUBJECTIVE AND OBJECTIVE BOX
Date of Service: 07-25-22 @ 11:28    Patient is a 72y old  Female who presents with a chief complaint of Intestinal obstruction     (25 Jul 2022 10:38)      Any change in ROS: Seems to be doing pretty good:  no sob: no cough:       MEDICATIONS  (STANDING):  acetaminophen     Tablet .. 975 milliGRAM(s) Oral every 6 hours  enalapril 20 milliGRAM(s) Oral daily  enoxaparin Injectable 40 milliGRAM(s) SubCutaneous every 24 hours  insulin detemir injectable (LEVEMIR) 15 Unit(s) SubCutaneous daily  insulin lispro (ADMELOG) corrective regimen sliding scale   SubCutaneous Before meals and at bedtime  pantoprazole    Tablet 40 milliGRAM(s) Oral two times a day    MEDICATIONS  (PRN):  levalbuterol Inhalation 0.63 milliGRAM(s) Inhalation every 6 hours PRN sob  naloxone Injectable 0.1 milliGRAM(s) IV Push every 3 minutes PRN For ANY of the following changes in patient status:  A. RR LESS THAN 10 breaths per minute, B. Oxygen saturation LESS THAN 90%, C. Sedation score of 6  ondansetron Injectable 4 milliGRAM(s) IV Push every 6 hours PRN Nausea  oxyCODONE    IR 5 milliGRAM(s) Oral every 4 hours PRN Severe Pain (7 - 10)  oxyCODONE    IR 2.5 milliGRAM(s) Oral every 4 hours PRN Moderate Pain (4 - 6)    Vital Signs Last 24 Hrs  T(C): 36.4 (25 Jul 2022 08:00), Max: 37.2 (24 Jul 2022 16:00)  T(F): 97.6 (25 Jul 2022 08:00), Max: 98.9 (24 Jul 2022 16:00)  HR: 102 (25 Jul 2022 08:20) (96 - 112)  BP: 141/59 (25 Jul 2022 08:00) (128/68 - 154/81)  BP(mean): 83 (25 Jul 2022 08:00) (83 - 104)  RR: 24 (25 Jul 2022 08:20) (20 - 28)  SpO2: 95% (25 Jul 2022 08:20) (93% - 98%)    Parameters below as of 25 Jul 2022 08:20  Patient On (Oxygen Delivery Method): room air        I&O's Summary    24 Jul 2022 07:01  -  25 Jul 2022 07:00  --------------------------------------------------------  IN: 100 mL / OUT: 2195 mL / NET: -2095 mL          Physical Exam:   GENERAL: NAD, well-groomed, well-developed  HEENT: SULAIMAN/   Atraumatic, Normocephalic  ENMT: No tonsillar erythema, exudates, or enlargement; Moist mucous membranes, Good dentition, No lesions  NECK: Supple, No JVD, Normal thyroid  CHEST/LUNG: Clear to auscultaion  CVS: Regular rate and rhythm; No murmurs, rubs, or gallops  GI: : Soft, Nontender, Nondistended; Bowel sounds present  NERVOUS SYSTEM:  Alert & Oriented X3  EXTREMITIES:  2+ Peripheral Pulses, No clubbing, cyanosis, or edema  LYMPH: No lymphadenopathy noted  SKIN: No rashes or lesions  ENDOCRINOLOGY: No Thyromegaly  PSYCH: Appropriate    Labs:  23, 23, 25                            10.9   13.79 )-----------( 419      ( 25 Jul 2022 01:45 )             33.1                         11.8   12.47 )-----------( 388      ( 24 Jul 2022 03:40 )             35.8                         11.0   15.39 )-----------( 377      ( 23 Jul 2022 01:15 )             33.1                         10.8   15.65 )-----------( 340      ( 22 Jul 2022 12:30 )             34.1                         11.2   14.16 )-----------( 339      ( 21 Jul 2022 23:20 )             34.6     07-25    133<L>  |  95<L>  |  13  ----------------------------<  158<H>  3.9   |  26  |  1.02  07-24    131<L>  |  93<L>  |  7   ----------------------------<  130<H>  3.8   |  22  |  0.81  07-23    133<L>  |  94<L>  |  7   ----------------------------<  156<H>  4.2   |  23  |  0.73  07-23    136  |  100  |  7   ----------------------------<  154<H>  3.5   |  22  |  0.70  07-22    132<L>  |  101  |  7   ----------------------------<  169<H>  3.5   |  19<L>  |  0.65  07-21    134<L>  |  104  |  9   ----------------------------<  103<H>  3.5   |  18<L>  |  0.72    Ca    8.5      25 Jul 2022 01:45  Ca    8.6      24 Jul 2022 03:40  Ca    8.6      23 Jul 2022 17:43  Phos  2.8     07-25  Phos  2.7     07-24  Phos  2.8     07-23  Mg     1.70     07-25  Mg     1.60     07-24  Mg     1.60     07-23    TPro  7.1  /  Alb  3.5  /  TBili  0.4  /  DBili  x   /  AST  23  /  ALT  17  /  AlkPhos  126<H>  07-24  TPro  6.3  /  Alb  3.1<L>  /  TBili  0.3  /  DBili  x   /  AST  25  /  ALT  14  /  AlkPhos  119  07-23    CAPILLARY BLOOD GLUCOSE      POCT Blood Glucose.: 231 mg/dL (25 Jul 2022 07:43)  POCT Blood Glucose.: 201 mg/dL (24 Jul 2022 22:51)  POCT Blood Glucose.: 154 mg/dL (24 Jul 2022 16:06)  POCT Blood Glucose.: 141 mg/dL (24 Jul 2022 11:49)      LIVER FUNCTIONS - ( 24 Jul 2022 03:40 )  Alb: 3.5 g/dL / Pro: 7.1 g/dL / ALK PHOS: 126 U/L / ALT: 17 U/L / AST: 23 U/L / GGT: x               D-Dimer Assay, Quantitative: 3873 ng/mL DDU (07-22 @ 00:12)  Lactate, Blood: 1.4 mmol/L (07-22 @ 00:12)        RECENT CULTURES:  rad< from: Xray Chest 1 View- PORTABLE-Routine (Xray Chest 1 View- PORTABLE-Routine in AM.) (07.25.22 @ 06:31) >  EXAM: Portable chest    FINDINGS:  Linear and streaky opacities seen in the bilateral lower lung   zones consistent with atelectasis. Upper lungs are clear. The heart is   not enlarged and there are no effusions or pneumothorax.        COMPARISON: July 23        IMPRESSION: Bibasilar postop streaky atelectasis unchanged.    --- End of Report ---            FIORELLA PRINCE MD; Attending Radiologist  This document has been electronically signed. Jul 25 2022 10:14AM    < end of copied text >        RESPIRATORY CULTURES:          Studies  Chest X-RAY  CT SCAN Chest   Venous Dopplers: LE:   CT Abdomen  Others

## 2022-07-25 NOTE — DISCHARGE NOTE NURSING/CASE MANAGEMENT/SOCIAL WORK - PATIENT PORTAL LINK FT
You can access the FollowMyHealth Patient Portal offered by [facility] by registering at the following website: [email or web address]. By joining [employer]’s FollowMyHealth portal, you will also be able to view your health information using other applications (apps) compatible with our system.

## 2022-07-25 NOTE — PROGRESS NOTE ADULT - SUBJECTIVE AND OBJECTIVE BOX
Name of Patient : DAVON ALCALA  MRN: [id number]  Date of visit: 07-25-22       Subjective: Patient seen and examined. No new events except as noted.   Doing okay     REVIEW OF SYSTEMS:    CONSTITUTIONAL: No weakness, fevers or chills  EYES/ENT: No visual changes;  No vertigo or throat pain   NECK: No pain or stiffness  RESPIRATORY: No cough, wheezing, hemoptysis; No shortness of breath  CARDIOVASCULAR: No chest pain or palpitations  GASTROINTESTINAL: No abdominal or epigastric pain. No nausea, vomiting, or hematemesis; No diarrhea or constipation. No melena or hematochezia.  GENITOURINARY: No dysuria, frequency or hematuria  NEUROLOGICAL: No numbness or weakness  SKIN: No itching, burning, rashes, or lesions   All other review of systems is negative unless indicated above.    MEDICATIONS:  MEDICATIONS  (STANDING):  acetaminophen     Tablet .. 975 milliGRAM(s) Oral every 6 hours  enalapril 20 milliGRAM(s) Oral daily  enoxaparin Injectable 40 milliGRAM(s) SubCutaneous every 24 hours  insulin detemir injectable (LEVEMIR) 15 Unit(s) SubCutaneous daily  insulin lispro (ADMELOG) corrective regimen sliding scale   SubCutaneous Before meals and at bedtime  insulin lispro Injectable (ADMELOG) 2 Unit(s) SubCutaneous three times a day before meals  pantoprazole    Tablet 40 milliGRAM(s) Oral two times a day      PHYSICAL EXAM:  T(C): 36.5 (07-25-22 @ 16:00), Max: 36.7 (07-25-22 @ 04:00)  HR: 97 (07-25-22 @ 16:30) (87 - 105)  BP: 128/78 (07-25-22 @ 16:30) (128/78 - 154/81)  RR: 24 (07-25-22 @ 16:30) (20 - 24)  SpO2: 95% (07-25-22 @ 16:30) (93% - 99%)  Wt(kg): --  I&O's Summary    24 Jul 2022 07:01  -  25 Jul 2022 07:00  --------------------------------------------------------  IN: 100 mL / OUT: 2195 mL / NET: -2095 mL    25 Jul 2022 07:01  -  25 Jul 2022 21:40  --------------------------------------------------------  IN: 700 mL / OUT: 515 mL / NET: 185 mL          Appearance: Normal	  HEENT:  PERRLA   Lymphatic: No lymphadenopathy   Cardiovascular: Normal S1 S2, no JVD  Respiratory: normal effort , clear  Gastrointestinal:  Soft, Non-tender, HI drain   Skin: No rashes,  warm to touch  Psychiatry:  Mood & affect appropriate  Musculuskeletal: No edema      All labs, Imaging and EKGs personally reviewed     07-24-22 @ 07:01  -  07-25-22 @ 07:00  --------------------------------------------------------  IN: 100 mL / OUT: 2195 mL / NET: -2095 mL    07-25-22 @ 07:01  -  07-25-22 @ 21:40  --------------------------------------------------------  IN: 700 mL / OUT: 515 mL / NET: 185 mL                          10.9   13.79 )-----------( 419      ( 25 Jul 2022 01:45 )             33.1               07-25    133<L>  |  95<L>  |  13  ----------------------------<  158<H>  3.9   |  26  |  1.02    Ca    8.5      25 Jul 2022 01:45  Phos  2.8     07-25  Mg     1.70     07-25    TPro  7.1  /  Alb  3.5  /  TBili  0.4  /  DBili  x   /  AST  23  /  ALT  17  /  AlkPhos  126<H>  07-24

## 2022-07-25 NOTE — PROGRESS NOTE ADULT - ATTENDING COMMENTS
date of service 7/19/22    pt seen and examined  pt chart and imaging reviewed in detail   agree with note above  72F s/p RHC for colon ca in 2017 with known ventral hernia on CT 2019. pt reports hernia normally reducible and hasn't seen Karis Higgins/Mary for a few years.  now with SBO secondary to ventral hernia. no signs of ischemia on CT.  pt resting in bed, NAD  softly distended, mild tender @ hernia, no r/g  no erythema, induration.   admit to A team surgery  npo, ivf, ngt  strict IOs  serial abd exams  f/u labs, lactate in am  attempt decompression via ngt overnight if pt worsens or clinically doesn't improve - may require operative reduction/repair.  d/w pt & family @bedside in detail.
date of service 7/19/22    pt seen and examined  pt chart and imaging reviewed in detail   agree with note above  72F s/p RHC for colon ca in 2017 with known ventral hernia on CT 2019. pt reports hernia normally reducible and hasn't seen Karis Higgins/Mary for a few years.  now with SBO secondary to ventral hernia. no signs of ischemia on CT.  pt resting in bed, NAD  softly distended, mild tender @ hernia, no r/g  no erythema, induration.   admit to A team surgery  npo, ivf, ngt  strict IOs  serial abd exams  f/u labs, lactate in am  attempt decompression via ngt overnight if pt worsens or clinically doesn't improve - may require operative reduction/repair.  d/w pt & family @bedside in detail.
pt seen and examined  agree with note above  pod 2 s/p xlap, MARY, reduction and rpr of incarcerated ventral hernia with sbo  pt developed SOB & tachy overnight  CTPA/EKG/CXR/ABG performed  pt transferred to SICU for supportive care  pt resting more comfortably in bed, NAD  + flatus, - BM  softly min distended, nt, no r/g  wound c/d/i  preveena in place  HI SS  tolerating CLD and asking for diet  consider adv to LRD if breathing stable   CTPA - for PE, + small effusions  appreciate med/pulm input   continue supportive care per SICU/med/pulm  f/u labs in am   oob to ambulate with assistance  await return of further gi fxn  d/w pt/family & SICU team @ bedside in detail .
pt seen and examined  agree with note above  pod 4 s/p xlap, MARY, reduction and rpr of incarcerated ventral hernia with sbo  pt developed SOB & tachy 7/21-7/22  CTPA/EKG/CXR/ABG performed  pt transferred to SICU for supportive care on 7/22  pt HR/saturation improved today   HR still low 100s today   pt resting more comfortably in bed, NAD  + flatus, + BM  soft NT, ND, no r/g  wound c/d/i  HI SS  adv to LRD  CTPA - for PE, + small effusions  appreciate med/pulm input   continue diuresis per SICU  continue supportive care per SICU/med/pulm  f/u labs in am   cont oob to ambulate with assistance  d/w pt/family & SICU team @ bedside in detail .
I agree with the detailed interval history, physical, and plan, which I have reviewed and edited where appropriate'; also agree with notes/assessment with my team on service.  I have personally examined the patient.  I was physically present for the key portions of the evaluation and management (E/M) service provided.  I reviewed all the pertinent data.  The patient is a critical care patient with life threatening hemodynamic and metabolic instability in SICU.  The SICU team has a constant risk benefit analyzes discussion and coordinating care with the primary team and all consultants.   The patient is in SICU with the chief complaint and diagnosis mentioned in the note.   The plan will be specified in the note.  72F with hx of right hemicolectomy; s/p ex lap with ventral hernia repair with respiratory distress in SICU   PHYSICAL EXAM:  NEUROLOGY  Exam: alert, oriented x 3,    RESPIRATORY  Exam: coarse BS  CARDIOVASCULAR  Exam:  Regular rate  GI/NUTRITION  Exam: soft, NTND,   PLAN:    Neurologic:   - tylenol, dilaudid PRN  Respiratory: acute respiratory insufficiency  - monitor SPO2  - wean HFNC as tolerated   Cardiovascular:   - enalaprilat 1.25 q6h  Gastrointestinal/Nutrition:   - adv to reg   Renal/Genitourinary:   - monitor electrolytes  Hematologic:   - DVT ppx: Lovenox  Infectious Disease:   - monitor WBC,  Endocrine:   - Lantus 15   - ISS  Disposition: SICU
I agree with the detailed interval history, physical, and plan, which I have reviewed and edited where appropriate'; also agree with notes/assessment with my team on service.  I have personally examined the patient.  I was physically present for the key portions of the evaluation and management (E/M) service provided.  I reviewed all the pertinent data.  The patient is a critical care patient with life threatening hemodynamic and metabolic instability in SICU.  The SICU team has a constant risk benefit analyzes discussion and coordinating care with the primary team and all consultants.   The patient is in SICU with the chief complaint and diagnosis mentioned in the note.   The plan will be specified in the note.  72 year old female S/P ex-lap and ventral hernia repair with fluid overload with acute respiratory distress requiring HiFlow in SICU.  EXAM  NEUROLOGY  Exam: no acute distress.  RESPIRATORY  Exam: coarse bs  CARDIOVASCULAR  Exam: Regular rate and rhythm.  GI/NUTRITION  Exam: Abdomen soft, Non-tender,   MUSCULOSKELETAL  Exam: All extremities moving     PLAN:  NEUROLOGY:  - Dilaudid PRN  RESPIRATORY:  - Weaned off HiFlow nasal cannula  -room air  - CARDIOVASCULAR:  - Keep MAP >65  GI / NUTRITION:  - Clear liquid diet  - GI prophylaxis with Protonix  RENAL / GENITOURINARY:  -lasix  HEMATOLOGIC:  - VTE prophylaxis   INFECTIOUS DISEASE:  - Continue to monitor off antibiotics  ENDOCRINOLOGY:  - Continue with Lantus 15 units qHS and insulin sliding scale  Disposition: floor
I agree with the history, physical, and plan, which I have reviewed and edited where appropriate.  I agree with notes/assessment of health care providers on my service.  I have personally examined the patient.  I was physically present for the key portions of the evaluation and management (E/M) service provided.  I reviewed data and laboratory tests/x-rays and all pertinent electronic images.  The patient is a critical care patient with life threatening hemodynamic and metabolic instability in SICU.  Risk benefit analyses discussed.    The patient is in SICU with diagnosis mentioned in the note.    The plan is specified below.    Patient is a 72 year old female with a PMHx of right hemicolectomy (in 2017 for colon cancer with adjuvant chemo), DM2, HTN and HLD who presented with abdominal pain, nausea and vomiting x2 days.  CT Abdomen / Pelvis performed showing high-grade small bowel obstruction to the level of a ventral abdominal wall hernia containing loops of jejunum. Intra-abdominal mesenteric edema is present.  No pneumatosis, portal venous gas or bowel wall hypo-enhancement.  Patient is now S/P ex-lap and ventral hernia repair on 7/20/22.  Hospital course complicated by respiratory distress requiring SICU admission. Weened of HFNC, now satting well on RA.    PLAN:  NEUROLOGY: no active issues.  - Pain control with acetaminophen Q6H, oxy PRN    RESPIRATORY: post-op respiratory insufficiency resolved.  - on RA  - c/w Xopenex Q6H PRN    CARDIOVASCULAR: h/o HTN  - c/w home enalapril 20mg PO QD    GI / NUTRITION: s/p ex-lap for ventral hernia repair 7/20.  - regular diet, consistent carb 7/24  - c/w home protonix    RENAL / GENITOURINARY: no active issues.  - voiding    HEMATOLOGIC: H/H stable.  - VTE prophylaxis: Lovenox 40mg SQ QD    INFECTIOUS DISEASE:  leukocytosis of 13  - Continue to monitor off antibiotics    ENDOCRINOLOGY: T2DM.  - Levemir 15u QHS  - add lispro 2 units pre-meal   - ISS
pt seen and examined  agree with note above  pod 3 s/p xlap, MARY, reduction and rpr of incarcerated ventral hernia with sbo  pt developed SOB & tachy 7/21-7/22  CTPA/EKG/CXR/ABG performed  pt transferred to SICU for supportive care on 7/22  pt HR/saturation improved today   pt resting more comfortably in bed, NAD  + flatus, + BM  soft NT, ND, no r/g  wound c/d/i  preveena removed  HI SS  tolerating CLD and asking for diet  consider adv to LRD if breathing stable per SICU   CTPA - for PE, + small effusions  appreciate med/pulm input   continue diuresis per SICU  continue supportive care per SICU/med/pulm  f/u labs in am   cont oob to ambulate with assistance  d/w pt/family & SICU team @ bedside in detail .
pt seen and examined  agree with note above  pod 1 s/p xlap, MARY, reduction and rpr of incarcerateat ventral hernia with sbo  pt resting in bed, NAD  + flatus, - BM  softly distended, nt, no r/g  wound c/d/i  preveena in place  HI SS  check ngt clamp trail +/- CLD  f/u labs in am   oob to ambulate  await return of further gi fxn  d/w pt & team @ bedside in detail

## 2022-07-25 NOTE — DIETITIAN INITIAL EVALUATION ADULT - PERTINENT MEDS FT
MEDICATIONS  (STANDING):  acetaminophen     Tablet .. 975 milliGRAM(s) Oral every 6 hours  enalapril 20 milliGRAM(s) Oral daily  enoxaparin Injectable 40 milliGRAM(s) SubCutaneous every 24 hours  insulin detemir injectable (LEVEMIR) 15 Unit(s) SubCutaneous daily  insulin lispro (ADMELOG) corrective regimen sliding scale   SubCutaneous Before meals and at bedtime  pantoprazole    Tablet 40 milliGRAM(s) Oral two times a day    MEDICATIONS  (PRN):  levalbuterol Inhalation 0.63 milliGRAM(s) Inhalation every 6 hours PRN sob  naloxone Injectable 0.1 milliGRAM(s) IV Push every 3 minutes PRN For ANY of the following changes in patient status:  A. RR LESS THAN 10 breaths per minute, B. Oxygen saturation LESS THAN 90%, C. Sedation score of 6  ondansetron Injectable 4 milliGRAM(s) IV Push every 6 hours PRN Nausea  oxyCODONE    IR 5 milliGRAM(s) Oral every 4 hours PRN Severe Pain (7 - 10)  oxyCODONE    IR 2.5 milliGRAM(s) Oral every 4 hours PRN Moderate Pain (4 - 6)

## 2022-07-25 NOTE — PROGRESS NOTE ADULT - ASSESSMENT
Patient is a 72 year old female with a PMHx of right hemicolectomy (in 2017 for colon cancer with adjuvant chemo), DM2, HTN and HLD who presented with abdominal pain, nausea and vomiting x2 days.  CT Abdomen / Pelvis performed showing high-grade small bowel obstruction to the level of a ventral abdominal wall hernia containing loops of jejunum. Intra-abdominal mesenteric edema is present.  No pneumatosis, portal venous gas or bowel wall hypoenhancement.  Patient is now S/P ex-lap and ventral hernia repair on 7/20/22.  Hospital course complicated by respiratory distress requiring HiFlow nasal cannula and SICU admission.      PLAN:    NEUROLOGY:  - No active issues  - Pain control with Dilaudid PRN    RESPIRATORY:  - Weaned off HiFlow nasal cannula  - Saturating well on room air  - Continuous pulse oximetry  - Maintain O2 saturation >92%  - Continue with Xopenex PRN    CARDIOVASCULAR:  - Normotensive  - No pressor requirements  - Continue with home dose Enalapril   - Keep MAP >65    GI / NUTRITION:  - Advanced to regular diet on 7/24/22  - GI prophylaxis with Protonix    RENAL / GENITOURINARY:  - Monitor electrolytes and replete PRN  - Continue to monitor strict ins and outs q1 hour    HEMATOLOGIC:  - Hemoglobin and hematocrit stable  - VTE prophylaxis with Lovenox subcutaneous    INFECTIOUS DISEASE:  - Currently afebrile with leukocytosis of 15.39  - Continue to monitor off antibiotics    ENDOCRINOLOGY:  - Monitor fingersticks before meals and at bedtime  - Continue with Lantus 15 units qHS and insulin sliding scale      Disposition: Floor    -------------------------------------------------------------------------------------- Patient is a 72 year old female with a PMHx of right hemicolectomy (in 2017 for colon cancer with adjuvant chemo), DM2, HTN and HLD who presented with abdominal pain, nausea and vomiting x2 days.  CT Abdomen / Pelvis performed showing high-grade small bowel obstruction to the level of a ventral abdominal wall hernia containing loops of jejunum. Intra-abdominal mesenteric edema is present.  No pneumatosis, portal venous gas or bowel wall hypoenhancement.  Patient is now S/P ex-lap and ventral hernia repair on 7/20/22.  Hospital course complicated by respiratory distress requiring HiFlow nasal cannula and SICU admission.      PLAN:    NEUROLOGY:  - No active issues  - Pain control with acetaminophen q6, oxy PRN     RESPIRATORY:  - Weaned off HiFlow nasal cannula  - Saturating well on room air  - Continuous pulse oximetry  - Maintain O2 saturation >92%  - Continue with Xopenex PRN    CARDIOVASCULAR:  - Normotensive  - No pressor requirements  - Continue with home dose Enalapril   - Keep MAP >65    GI / NUTRITION:  - Advanced to regular diet on 7/24/22  - GI prophylaxis with Protonix    RENAL / GENITOURINARY:  - Monitor electrolytes and replete PRN  - Continue to monitor strict ins and outs q1 hour    HEMATOLOGIC:  - Hemoglobin and hematocrit stable  - VTE prophylaxis with Lovenox subcutaneous    INFECTIOUS DISEASE:  - Currently afebrile with leukocytosis of 13  - Continue to monitor off antibiotics    ENDOCRINOLOGY:  - Monitor fingersticks before meals and at bedtime  - Continue with Lantus 15 units qHS and insulin sliding scale      Disposition: D/C home    -------------------------------------------------------------------------------------- Patient is a 72 year old female with a PMHx of right hemicolectomy (in 2017 for colon cancer with adjuvant chemo), DM2, HTN and HLD who presented with abdominal pain, nausea and vomiting x2 days.  CT Abdomen / Pelvis performed showing high-grade small bowel obstruction to the level of a ventral abdominal wall hernia containing loops of jejunum. Intra-abdominal mesenteric edema is present.  No pneumatosis, portal venous gas or bowel wall hypo-enhancement.  Patient is now S/P ex-lap and ventral hernia repair on 7/20/22.  Hospital course complicated by respiratory distress requiring SICU admission. Weened of HFNC, now satting well on RA.    PLAN:  NEUROLOGY: no active issues.  - Pain control with acetaminophen Q6H, oxy PRN    RESPIRATORY: weaned off HFNC, now saturating well on room air.  - Continuous pulse oximetry  - Maintain O2 saturation >92%  - c/w Xopenex Q6H PRN    CARDIOVASCULAR: normotensive, with no pressor requirements.  - c/w home enalapril 20mg PO QD  - Keep MAP >65    GI / NUTRITION: s/p ex-lap for ventral hernia repair 7/20.  - Advanced to regular diet, consistent carb 7/24  - c/w home protonix    RENAL / GENITOURINARY: no active issues.  - Monitor electrolytes and replete PRN  - Strict I&Os Q1H    HEMATOLOGIC: H/H stable.  - VTE prophylaxis: Lovenox 40mg SQ QD    INFECTIOUS DISEASE: No active concerns.  - Currently afebrile with leukocytosis of 13  - Continue to monitor off antibiotics    ENDOCRINOLOGY: T2DM.  - Monitor fingersticks before meals and at bedtime  - c/w Levemir 15u QHS  - ISS    Disposition: floor or d/c home from SICU

## 2022-07-25 NOTE — PROGRESS NOTE ADULT - SUBJECTIVE AND OBJECTIVE BOX
A TEAM Surgery Progress Note  Patient is a 72y old  Female who presents with a chief complaint of SBO (25 Jul 2022 00:15)      INTERVAL EVENTS: Patient is POD#5 s/p exploratory laparotomy and ventral hernia repair. No acute events overnight.    SUBJECTIVE: Patient seen and examined at bedside with surgical team, patient without complaints. Tolerating a regular diet without nausea, vomiting. Passing flatus, Having bowel movements. Denies dyspnea, SOB. Denies fever, chills, CP, abdominal pain.    OBJECTIVE:    Vital Signs Last 24 Hrs  T(C): 36.4 (25 Jul 2022 08:00), Max: 37.2 (24 Jul 2022 16:00)  T(F): 97.6 (25 Jul 2022 08:00), Max: 98.9 (24 Jul 2022 16:00)  HR: 102 (25 Jul 2022 08:20) (94 - 112)  BP: 141/59 (25 Jul 2022 08:00) (128/68 - 159/97)  BP(mean): 83 (25 Jul 2022 08:00) (83 - 116)  RR: 24 (25 Jul 2022 08:20) (20 - 31)  SpO2: 95% (25 Jul 2022 08:20) (90% - 98%)    Parameters below as of 25 Jul 2022 08:20  Patient On (Oxygen Delivery Method): room air    I&O's Detail    24 Jul 2022 07:01  -  25 Jul 2022 07:00  --------------------------------------------------------  IN:    Oral Fluid: 100 mL  Total IN: 100 mL    OUT:    Drain (mL): 20 mL    Voided (mL): 2175 mL  Total OUT: 2195 mL    Total NET: -2095 mL      MEDICATIONS  (STANDING):  acetaminophen     Tablet .. 975 milliGRAM(s) Oral every 6 hours  enalapril 20 milliGRAM(s) Oral daily  enoxaparin Injectable 40 milliGRAM(s) SubCutaneous every 24 hours  insulin detemir injectable (LEVEMIR) 15 Unit(s) SubCutaneous daily  insulin lispro (ADMELOG) corrective regimen sliding scale   SubCutaneous Before meals and at bedtime  pantoprazole    Tablet 40 milliGRAM(s) Oral two times a day    MEDICATIONS  (PRN):  levalbuterol Inhalation 0.63 milliGRAM(s) Inhalation every 6 hours PRN sob  naloxone Injectable 0.1 milliGRAM(s) IV Push every 3 minutes PRN For ANY of the following changes in patient status:  A. RR LESS THAN 10 breaths per minute, B. Oxygen saturation LESS THAN 90%, C. Sedation score of 6  ondansetron Injectable 4 milliGRAM(s) IV Push every 6 hours PRN Nausea  oxyCODONE    IR 5 milliGRAM(s) Oral every 4 hours PRN Severe Pain (7 - 10)  oxyCODONE    IR 2.5 milliGRAM(s) Oral every 4 hours PRN Moderate Pain (4 - 6)      PHYSICAL EXAM:  Constitutional: A&Ox3, NAD  Respiratory: Unlabored breathing  Abdomen: Soft, nondistended, NTTP. No rebound or guarding. Lower midline incision C/D/I with staples. HI drain with serous drainage.   Extremities: WWP, GARCIA spontaneously    LABS:                        10.9   13.79 )-----------( 419      ( 25 Jul 2022 01:45 )             33.1     07-25    133<L>  |  95<L>  |  13  ----------------------------<  158<H>  3.9   |  26  |  1.02    Ca    8.5      25 Jul 2022 01:45  Phos  2.8     07-25  Mg     1.70     07-25    TPro  7.1  /  Alb  3.5  /  TBili  0.4  /  DBili  x   /  AST  23  /  ALT  17  /  AlkPhos  126<H>  07-24      LIVER FUNCTIONS - ( 24 Jul 2022 03:40 )  Alb: 3.5 g/dL / Pro: 7.1 g/dL / ALK PHOS: 126 U/L / ALT: 17 U/L / AST: 23 U/L / GGT: x

## 2022-07-25 NOTE — PROGRESS NOTE ADULT - ASSESSMENT
72F PMHx of right hemicolectomy (in 2017 for colon cancer with adjuvant chemo), DM2, HTN and HLD who presented with high-grade small bowel obstruction 2/2 incarcerated ventral hernia S/P ex-lap and ventral hernia repair on 7/20/22.  Hospital course complicated by respiratory distress requiring HiFlow nasal cannula and SICU admission, now resolved. Patient recovering appropriately without supplemental oxygen.     PLAN:  - Pain control as needed  - OOB/ Ambulate/ Incentive spirometer while in bed  - HTN on home enalapril  - Regular diet  - DM on levemir, ISS; monitor FS  - VTE ppx: Lovenox  - HI drain teaching  - Possible discharge home today without PT needs; +/- VNS for HI drain care  - Appreciate care by SICU    A Team Surgery  [id number]

## 2022-07-25 NOTE — PROGRESS NOTE ADULT - ASSESSMENT
72F with hx of right hemicolectomy for colon ca in 2017, with incarcerated, bowel containing ventral hernia causing an SBO    now with post op resp distress: currently on high flow at 40%::    D Dimer is very high: cta is negative:   has atelectasis and subsegmental atelectasis in all lobes: ? hypoxia secondary to atelectasis:  she is not retaining co2: ph is doing Ok at this time:  encourage incentive spirometry she has no significant pain :  she has high wbc count but no fever; not on any [id number]:  dvt prophylaxis: cont o monitro in sicu;  dw  and pt       7/24:    D Dimer is very high: cta is negative: responded to Lasix  and now on room air:    has atelectasis and subsegmental atelectasis in all lobes: ? hypoxia secondary to atelectasis and chf: now better  encourage incentive spirometry she has no significant pain :  she has high wbc count but no fever; not on any antibiotics:   dvt prophylaxis: cont to monitor in sicu;  dw  and pt and PMD    7/25:    Hypoxic resp failure D Dimer is very high: cta is negative: responded to Lasix  and now on room air:  walking around in sicu without any sob: off lasix now:   has atelectasis and subsegmental atelectasis in all lobes: ? hypoxia secondary to atelectasis and chf: now better : on room air:   encourage incentive spirometry she has no significant pain :  she has high wbc count but no fever; not on any antibiotics:   dvt prophylaxis:   dw staff

## 2022-07-28 LAB — SURGICAL PATHOLOGY STUDY: SIGNIFICANT CHANGE UP

## 2022-08-29 ENCOUNTER — INPATIENT (INPATIENT)
Facility: HOSPITAL | Age: 72
LOS: 14 days | Discharge: HOME HEALTH | End: 2022-09-13

## 2022-08-29 VITALS
TEMPERATURE: 99 F | RESPIRATION RATE: 18 BRPM | HEART RATE: 112 BPM | DIASTOLIC BLOOD PRESSURE: 76 MMHG | HEIGHT: 58 IN | OXYGEN SATURATION: 100 % | SYSTOLIC BLOOD PRESSURE: 126 MMHG

## 2022-08-29 DIAGNOSIS — T81.9XXA UNSPECIFIED COMPLICATION OF PROCEDURE, INITIAL ENCOUNTER: ICD-10-CM

## 2022-08-29 DIAGNOSIS — Z90.49 ACQUIRED ABSENCE OF OTHER SPECIFIED PARTS OF DIGESTIVE TRACT: Chronic | ICD-10-CM

## 2022-08-29 LAB
ALBUMIN SERPL ELPH-MCNC: 3.3 G/DL — SIGNIFICANT CHANGE UP (ref 3.3–5)
ALP SERPL-CCNC: 237 U/L — HIGH (ref 40–120)
ALT FLD-CCNC: 107 U/L — HIGH (ref 4–33)
ANION GAP SERPL CALC-SCNC: 14 MMOL/L — SIGNIFICANT CHANGE UP (ref 7–14)
APPEARANCE UR: ABNORMAL
APPEARANCE UR: CLEAR — SIGNIFICANT CHANGE UP
APTT BLD: 31.7 SEC — SIGNIFICANT CHANGE UP (ref 27–36.3)
AST SERPL-CCNC: 89 U/L — HIGH (ref 4–32)
BACTERIA # UR AUTO: ABNORMAL
BASE EXCESS BLDV CALC-SCNC: -3.8 MMOL/L — LOW (ref -2–3)
BASOPHILS # BLD AUTO: 0 K/UL — SIGNIFICANT CHANGE UP (ref 0–0.2)
BASOPHILS NFR BLD AUTO: 0 % — SIGNIFICANT CHANGE UP (ref 0–2)
BILIRUB SERPL-MCNC: 0.2 MG/DL — SIGNIFICANT CHANGE UP (ref 0.2–1.2)
BILIRUB UR-MCNC: NEGATIVE — SIGNIFICANT CHANGE UP
BILIRUB UR-MCNC: NEGATIVE — SIGNIFICANT CHANGE UP
BLD GP AB SCN SERPL QL: NEGATIVE — SIGNIFICANT CHANGE UP
BLOOD GAS VENOUS COMPREHENSIVE RESULT: SIGNIFICANT CHANGE UP
BUN SERPL-MCNC: 20 MG/DL — SIGNIFICANT CHANGE UP (ref 7–23)
CALCIUM SERPL-MCNC: 10.2 MG/DL — SIGNIFICANT CHANGE UP (ref 8.4–10.5)
CHLORIDE BLDV-SCNC: 98 MMOL/L — SIGNIFICANT CHANGE UP (ref 96–108)
CHLORIDE SERPL-SCNC: 97 MMOL/L — LOW (ref 98–107)
CO2 BLDV-SCNC: 22.7 MMOL/L — SIGNIFICANT CHANGE UP (ref 22–26)
CO2 SERPL-SCNC: 20 MMOL/L — LOW (ref 22–31)
COLOR SPEC: SIGNIFICANT CHANGE UP
COLOR SPEC: YELLOW — SIGNIFICANT CHANGE UP
CREAT SERPL-MCNC: 0.83 MG/DL — SIGNIFICANT CHANGE UP (ref 0.5–1.3)
DIFF PNL FLD: ABNORMAL
DIFF PNL FLD: NEGATIVE — SIGNIFICANT CHANGE UP
EGFR: 76 ML/MIN/1.73M2 — SIGNIFICANT CHANGE UP
EOSINOPHIL # BLD AUTO: 0.14 K/UL — SIGNIFICANT CHANGE UP (ref 0–0.5)
EOSINOPHIL NFR BLD AUTO: 0.9 % — SIGNIFICANT CHANGE UP (ref 0–6)
EPI CELLS # UR: ABNORMAL
FLUAV AG NPH QL: SIGNIFICANT CHANGE UP
FLUBV AG NPH QL: SIGNIFICANT CHANGE UP
GAS PNL BLDV: 129 MMOL/L — LOW (ref 136–145)
GLUCOSE BLDV-MCNC: 302 MG/DL — HIGH (ref 70–99)
GLUCOSE SERPL-MCNC: 284 MG/DL — HIGH (ref 70–99)
GLUCOSE UR QL: ABNORMAL
GLUCOSE UR QL: NEGATIVE — SIGNIFICANT CHANGE UP
GRAM STN FLD: SIGNIFICANT CHANGE UP
HCO3 BLDV-SCNC: 22 MMOL/L — SIGNIFICANT CHANGE UP (ref 22–29)
HCT VFR BLD CALC: 27.2 % — LOW (ref 34.5–45)
HCT VFR BLDA CALC: 28 % — LOW (ref 34.5–46.5)
HGB BLD CALC-MCNC: 9.2 G/DL — LOW (ref 11.5–15.5)
HGB BLD-MCNC: 9 G/DL — LOW (ref 11.5–15.5)
HYALINE CASTS # UR AUTO: 1 /LPF — SIGNIFICANT CHANGE UP (ref 0–7)
IANC: 11.14 K/UL — HIGH (ref 1.8–7.4)
INR BLD: 1.27 RATIO — HIGH (ref 0.88–1.16)
KETONES UR-MCNC: NEGATIVE — SIGNIFICANT CHANGE UP
KETONES UR-MCNC: NEGATIVE — SIGNIFICANT CHANGE UP
LACTATE BLDV-MCNC: 2.5 MMOL/L — HIGH (ref 0.5–2)
LEUKOCYTE ESTERASE UR-ACNC: ABNORMAL
LEUKOCYTE ESTERASE UR-ACNC: NEGATIVE — SIGNIFICANT CHANGE UP
LIDOCAIN IGE QN: 418 U/L — HIGH (ref 7–60)
LYMPHOCYTES # BLD AUTO: 1.45 K/UL — SIGNIFICANT CHANGE UP (ref 1–3.3)
LYMPHOCYTES # BLD AUTO: 9.6 % — LOW (ref 13–44)
MACROCYTES BLD QL: SLIGHT — SIGNIFICANT CHANGE UP
MAGNESIUM SERPL-MCNC: 1.6 MG/DL — SIGNIFICANT CHANGE UP (ref 1.6–2.6)
MANUAL SMEAR VERIFICATION: SIGNIFICANT CHANGE UP
MCHC RBC-ENTMCNC: 28.1 PG — SIGNIFICANT CHANGE UP (ref 27–34)
MCHC RBC-ENTMCNC: 33.1 GM/DL — SIGNIFICANT CHANGE UP (ref 32–36)
MCV RBC AUTO: 85 FL — SIGNIFICANT CHANGE UP (ref 80–100)
METAMYELOCYTES # FLD: 1.8 % — HIGH (ref 0–1)
MICROCYTES BLD QL: SIGNIFICANT CHANGE UP
MONOCYTES # BLD AUTO: 0.92 K/UL — HIGH (ref 0–0.9)
MONOCYTES NFR BLD AUTO: 6.1 % — SIGNIFICANT CHANGE UP (ref 2–14)
MYELOCYTES NFR BLD: 0.9 % — HIGH (ref 0–0)
NEUTROPHILS # BLD AUTO: 12.19 K/UL — HIGH (ref 1.8–7.4)
NEUTROPHILS NFR BLD AUTO: 72.8 % — SIGNIFICANT CHANGE UP (ref 43–77)
NEUTS BAND # BLD: 7.9 % — HIGH (ref 0–6)
NITRITE UR-MCNC: NEGATIVE — SIGNIFICANT CHANGE UP
NITRITE UR-MCNC: NEGATIVE — SIGNIFICANT CHANGE UP
PCO2 BLDV: 39 MMHG — SIGNIFICANT CHANGE UP (ref 39–42)
PH BLDV: 7.35 — SIGNIFICANT CHANGE UP (ref 7.32–7.43)
PH UR: 6 — SIGNIFICANT CHANGE UP (ref 5–8)
PH UR: 6.5 — SIGNIFICANT CHANGE UP (ref 5–8)
PLAT MORPH BLD: NORMAL — SIGNIFICANT CHANGE UP
PLATELET # BLD AUTO: 688 K/UL — HIGH (ref 150–400)
PLATELET COUNT - ESTIMATE: ABNORMAL
PO2 BLDV: 26 MMHG — SIGNIFICANT CHANGE UP
POLYCHROMASIA BLD QL SMEAR: SLIGHT — SIGNIFICANT CHANGE UP
POTASSIUM BLDV-SCNC: 4.8 MMOL/L — SIGNIFICANT CHANGE UP (ref 3.5–5.1)
POTASSIUM SERPL-MCNC: 5.3 MMOL/L — SIGNIFICANT CHANGE UP (ref 3.5–5.3)
POTASSIUM SERPL-SCNC: 5.3 MMOL/L — SIGNIFICANT CHANGE UP (ref 3.5–5.3)
PROT SERPL-MCNC: 7.8 G/DL — SIGNIFICANT CHANGE UP (ref 6–8.3)
PROT UR-MCNC: ABNORMAL
PROT UR-MCNC: ABNORMAL
PROTHROM AB SERPL-ACNC: 14.8 SEC — HIGH (ref 10.5–13.4)
RBC # BLD: 3.2 M/UL — LOW (ref 3.8–5.2)
RBC # FLD: 14.3 % — SIGNIFICANT CHANGE UP (ref 10.3–14.5)
RBC BLD AUTO: NORMAL — SIGNIFICANT CHANGE UP
RBC CASTS # UR COMP ASSIST: 2 /HPF — SIGNIFICANT CHANGE UP (ref 0–4)
RH IG SCN BLD-IMP: POSITIVE — SIGNIFICANT CHANGE UP
RSV RNA NPH QL NAA+NON-PROBE: SIGNIFICANT CHANGE UP
SAO2 % BLDV: 37.7 % — SIGNIFICANT CHANGE UP
SARS-COV-2 RNA SPEC QL NAA+PROBE: SIGNIFICANT CHANGE UP
SODIUM SERPL-SCNC: 131 MMOL/L — LOW (ref 135–145)
SP GR SPEC: 1.02 — SIGNIFICANT CHANGE UP (ref 1.01–1.05)
SP GR SPEC: 1.02 — SIGNIFICANT CHANGE UP (ref 1.01–1.05)
SPECIMEN SOURCE: SIGNIFICANT CHANGE UP
UROBILINOGEN FLD QL: SIGNIFICANT CHANGE UP
UROBILINOGEN FLD QL: SIGNIFICANT CHANGE UP
WBC # BLD: 15.11 K/UL — HIGH (ref 3.8–10.5)
WBC # FLD AUTO: 15.11 K/UL — HIGH (ref 3.8–10.5)
WBC UR QL: 15 /HPF — SIGNIFICANT CHANGE UP (ref 0–5)

## 2022-08-29 PROCEDURE — 99291 CRITICAL CARE FIRST HOUR: CPT

## 2022-08-29 PROCEDURE — 74177 CT ABD & PELVIS W/CONTRAST: CPT | Mod: 26,QQ

## 2022-08-29 RX ORDER — HYDROMORPHONE HYDROCHLORIDE 2 MG/ML
1 INJECTION INTRAMUSCULAR; INTRAVENOUS; SUBCUTANEOUS ONCE
Refills: 0 | Status: DISCONTINUED | OUTPATIENT
Start: 2022-08-29 | End: 2022-08-29

## 2022-08-29 RX ORDER — HYDROMORPHONE HYDROCHLORIDE 2 MG/ML
0.5 INJECTION INTRAMUSCULAR; INTRAVENOUS; SUBCUTANEOUS EVERY 6 HOURS
Refills: 0 | Status: DISCONTINUED | OUTPATIENT
Start: 2022-08-29 | End: 2022-09-01

## 2022-08-29 RX ORDER — ACETAMINOPHEN 500 MG
650 TABLET ORAL EVERY 6 HOURS
Refills: 0 | Status: DISCONTINUED | OUTPATIENT
Start: 2022-08-29 | End: 2022-09-03

## 2022-08-29 RX ORDER — SODIUM CHLORIDE 9 MG/ML
1000 INJECTION, SOLUTION INTRAVENOUS
Refills: 0 | Status: DISCONTINUED | OUTPATIENT
Start: 2022-08-29 | End: 2022-08-30

## 2022-08-29 RX ORDER — PIPERACILLIN AND TAZOBACTAM 4; .5 G/20ML; G/20ML
3.38 INJECTION, POWDER, LYOPHILIZED, FOR SOLUTION INTRAVENOUS EVERY 8 HOURS
Refills: 0 | Status: DISCONTINUED | OUTPATIENT
Start: 2022-08-29 | End: 2022-08-31

## 2022-08-29 RX ORDER — PIPERACILLIN AND TAZOBACTAM 4; .5 G/20ML; G/20ML
3.38 INJECTION, POWDER, LYOPHILIZED, FOR SOLUTION INTRAVENOUS ONCE
Refills: 0 | Status: DISCONTINUED | OUTPATIENT
Start: 2022-08-29 | End: 2022-08-29

## 2022-08-29 RX ORDER — PIPERACILLIN AND TAZOBACTAM 4; .5 G/20ML; G/20ML
3.38 INJECTION, POWDER, LYOPHILIZED, FOR SOLUTION INTRAVENOUS ONCE
Refills: 0 | Status: COMPLETED | OUTPATIENT
Start: 2022-08-29 | End: 2022-08-29

## 2022-08-29 RX ORDER — VANCOMYCIN HCL 1 G
1000 VIAL (EA) INTRAVENOUS ONCE
Refills: 0 | Status: COMPLETED | OUTPATIENT
Start: 2022-08-29 | End: 2022-08-29

## 2022-08-29 RX ORDER — SODIUM CHLORIDE 9 MG/ML
1000 INJECTION INTRAMUSCULAR; INTRAVENOUS; SUBCUTANEOUS ONCE
Refills: 0 | Status: COMPLETED | OUTPATIENT
Start: 2022-08-29 | End: 2022-08-29

## 2022-08-29 RX ORDER — ACETAMINOPHEN 500 MG
1000 TABLET ORAL ONCE
Refills: 0 | Status: COMPLETED | OUTPATIENT
Start: 2022-08-29 | End: 2022-08-29

## 2022-08-29 RX ADMIN — Medication 1000 MILLIGRAM(S): at 11:07

## 2022-08-29 RX ADMIN — PIPERACILLIN AND TAZOBACTAM 25 GRAM(S): 4; .5 INJECTION, POWDER, LYOPHILIZED, FOR SOLUTION INTRAVENOUS at 17:34

## 2022-08-29 RX ADMIN — Medication 400 MILLIGRAM(S): at 09:44

## 2022-08-29 RX ADMIN — SODIUM CHLORIDE 65 MILLILITER(S): 9 INJECTION, SOLUTION INTRAVENOUS at 20:43

## 2022-08-29 RX ADMIN — SODIUM CHLORIDE 1000 MILLILITER(S): 9 INJECTION INTRAMUSCULAR; INTRAVENOUS; SUBCUTANEOUS at 10:52

## 2022-08-29 RX ADMIN — Medication 250 MILLIGRAM(S): at 10:52

## 2022-08-29 RX ADMIN — SODIUM CHLORIDE 1000 MILLILITER(S): 9 INJECTION INTRAMUSCULAR; INTRAVENOUS; SUBCUTANEOUS at 09:44

## 2022-08-29 RX ADMIN — HYDROMORPHONE HYDROCHLORIDE 1 MILLIGRAM(S): 2 INJECTION INTRAMUSCULAR; INTRAVENOUS; SUBCUTANEOUS at 16:02

## 2022-08-29 RX ADMIN — PIPERACILLIN AND TAZOBACTAM 200 GRAM(S): 4; .5 INJECTION, POWDER, LYOPHILIZED, FOR SOLUTION INTRAVENOUS at 11:56

## 2022-08-29 NOTE — H&P ADULT - HISTORY OF PRESENT ILLNESS
Patient is a 73 yo Female with PMH of Colon CA, DM, HTN, HLD, diabetic neuropathy, Hernia presents c/o yellow/green drainage from wound on lower abdomen s/p hernia repair on 7/20, and PSH of colonic neoplasm resection with anastomosis. Stitches removed 8/1 however since that time patient states the area has been tender, erythematous, and as of yesterday clear/yellow discharge from an open wound over the incision site in the lower mid abdomen.    Patient seen and evaluated in the ED. She is comfortable in bed, hemodynamically stable. Denies f/c, cp, sob, n/v/d. Abdomen is soft, nondistended. There is a midline postsurgical incision scar with 1cm opening in the middle of the incision. There is significant induration, erythema around the opening and tender to palpation.     Patient was seen with Dr. Hernandez. Bedside I&D was performed with wound packing. Wound dressing applied.

## 2022-08-29 NOTE — ED PROVIDER NOTE - OBJECTIVE STATEMENT
73 yo F pmHX Colon CA, DM (), HTN, HLD, neuropathy, Hernia presents c/o yellow/green drainage from wound on lower abdomen s/p hernia repair on 7/20, and PSH of colonic neoplasm resection with anastomosis. Stitches removed 8/1 however since that time pt states the area has been tender, erythematous, and as of yesterday clear/yellow discharge from an open wound over the incision site in the lower mid abdomen. Pt has felt weak with decreased appetite. Recent surgical stay complicated by hypoxemia requiring SICU admission. Denies f/c, cp, sob, n/v/d. No palliative or provocative factors. No other current complaints at this time.

## 2022-08-29 NOTE — ED PROVIDER NOTE - NS ED ROS FT
ROS   GENERAL: No fever, no chills, no malaise, no fatigue   NECK: No stiffness, no swollen glands, no dysphagia   RESPIRATORY: No cough, no dyspnea, no pleuritic chest pain   HEART: no chest pain, no palpitations, no edema, no jvd   ABDOMEN: see hpi  MUSCULAR-SKELETAL: No myalgia, no arthralgia   NEUROLOGY: No headache, no vertigo, no paresthesia, no focal deficits, no diplopia   SKIN: see hpi  All other ROS are negative

## 2022-08-29 NOTE — ED ADULT NURSE REASSESSMENT NOTE - NS ED NURSE REASSESS COMMENT FT1
blood cultures drawn as per MD orders. vancomycin and second NS bolus running at this time. would culture obtained by MD Flores. gauze over wound removed, serosanguineous drainage and blood noted. dressing changed. awaiting CT.  at bedside.

## 2022-08-29 NOTE — H&P ADULT - NSHPLABSRESULTS_GEN_ALL_CORE
WBC Count: 15.11 K/uL (08-29-22 @ 09:32)   Hematocrit: 27.2 % (08-29-22 @ 09:32)   Creatinine, Serum: 0.83 mg/dL (08-29-22 @ 09:32)   ,--,--,Negative,Negative,--,--  ,Few,--,Large,Negative,15,2

## 2022-08-29 NOTE — ED ADULT NURSE NOTE - BP NONINVASIVE DIASTOLIC (MM HG)
Received a call from post op carson jennifer Ochoa (3/7/08) stating that she is in 9/10 pain abd pain. Patient denies n/v, denies fever. She is eating and tolerating a soft diet. Patient reports taking Percocet 2 tabs every 4 hours and would like a refill to \"save her a trip to the ER\"     Encouraged patient that to try to wean off percocet by trying other OTC meds but she is insistent that she needs narcotics. Dr. Char Bennett notified for instructions. Per Dr. Char Bennett she will call patient to discuss pain control.
67

## 2022-08-29 NOTE — ED ADULT TRIAGE NOTE - CHIEF COMPLAINT QUOTE
c/o yellow/green drainage from wound on lower abdomen. pt had hernia surgery on 7/20. Stitches removed 8/1, area bandaged. Appears swollen, red, and painful on palpation.  HX Colon CA, DM (), HTN, HLD, neuropathy, Hernia

## 2022-08-29 NOTE — ED PROVIDER NOTE - CLINICAL SUMMARY MEDICAL DECISION MAKING FREE TEXT BOX
73 yo F pmHX Colon CA, DM (), HTN, HLD, neuropathy, Hernia presents c/o yellow/green drainage from wound on lower abdomen s/p hernia repair on 7/20. Likely infection of operative wound vs intraabdominal abscess vs sepsis vs electrolyte abnormality. Obtain cbc, cmp, lactate, vbg, bc, ua, uc, ct a/p w/ con. Give IVF and abx. Discuss case with surgery.

## 2022-08-29 NOTE — ED ADULT NURSE NOTE - ED STAT RN HANDOFF DETAILS
· Handoff details	Report given to Carli BEARD, pt updated on plan of care, questions asked and answered.

## 2022-08-29 NOTE — ED ADULT NURSE REASSESSMENT NOTE - NS ED NURSE REASSESS COMMENT FT1
dressing removed and redressed with gauze. serosanguineous drainage noted. wound approximately 0.5 cm in size with tunneling noted. wound is tender upon palpation. MD made aware. continuing to monitor. 20G to RAC, labs drawn and sent. awaiting results.

## 2022-08-29 NOTE — ED ADULT NURSE REASSESSMENT NOTE - NS ED NURSE REASSESS COMMENT FT1
surgery team at bedside, assessing wound and new dressing placed. medicated pt as per surgical MDs order. pt tolerated well. awaiting bed assignment. surgery team at bedside, assessing wound and new dressing placed. medicated pt as per surgical MDs order (MD Hernandez). pt tolerated well. awaiting bed assignment.

## 2022-08-29 NOTE — ED ADULT NURSE REASSESSMENT NOTE - NS ED NURSE REASSESS COMMENT FT1
pt resting comfortably in the hallway, offering no complaints at this time. respirations even and unlabored, VSS as noted in flowsheet. comfort measures provided. awaiting bed assignment.  at beside

## 2022-08-29 NOTE — ED ADULT NURSE NOTE - OBJECTIVE STATEMENT
71 y/o F presents to ED room 2 A&Ox4 c/o wound. Wound located on lower abdomen, no drainage noted at this time. as per , pt had surgery on 7/20 to repair hernia. on 8/1 pt had sutures removed. on 8/14, pt noted redness and swelling around area, pt went to MD and prescribed amoxicillin on 8/15. bleeding and "watery" discharge x 1 day as per . endorses weakness, fatigue and pain with wound. denies chest pain, SOB, fevers. abd is soft and nondistended, non tender upon palpation. respirations even and unlabored. VS as noted in flowsheet, tachycardic 108, pt afebrile rectally. awaiting MD orders. safety maintained.  at bedside. 73 y/o F presents to ED room 2 A&Ox4 c/o wound. Wound located on lower abdomen, no drainage noted at this time. wound bandaged at this time. as per , pt had surgery on 7/20 to repair hernia. on 8/1 pt had sutures removed. on 8/14, pt noted redness and swelling around area, pt went to MD and prescribed amoxicillin on 8/15. bleeding and "watery" discharge x 1 day as per . endorses weakness, fatigue and pain with wound. denies chest pain, SOB, fevers. abd is soft and nondistended, non tender upon palpation. respirations even and unlabored. VS as noted in flowsheet, tachycardic 108, pt afebrile rectally. PMHx of DM (insulin and metformin), HTN, HLD. awaiting MD orders. safety maintained.  at bedside.

## 2022-08-29 NOTE — ED PROCEDURE NOTE - ATTENDING CONTRIBUTION TO CARE
I have personally performed a history and physical examination of the patient and discussed management with the resident as well as the patient.  I reviewed the resident's note and agree with the documented findings and plan of care.  I have authored and modified critical sections of the Provider Note, including but not limited to HPI, Physical Exam and MDM.    Uncomplicated educational US.

## 2022-08-29 NOTE — H&P ADULT - ATTENDING COMMENTS
Pt seen/examined, S/P emergency repair of incarcerated incisional hernia on 7/20/22, presenting with partially, spontaneously drained seroma.  I&D abd wall infected seroma done at bedside (see brief op note)    - admit for IV antibx and wound care

## 2022-08-29 NOTE — H&P ADULT - ASSESSMENT
73 yo Female with PMH of Colon CA, DM, HTN, HLD, diabetic neuropathy, Hernia presents c/o yellow/green drainage from wound on lower abdomen s/p hernia repair on 7/20, and PSH of colonic neoplasm resection with anastomosis. Bedside I&D performed with wound packing.     Plan:    - Admit to surgery under Dr. Pimentel  - Bedside I&D and wound packing  - Wound dressing change by the team every AM  - C/w antibiotics: Zosyn  - Pain control: Tylenol, Dilaudid 1mg q6h PRN  - IV fluids  - AM labs    Patient seen and assessed, plan discussed with Dr. Hernandez.    Colorectal surgery

## 2022-08-29 NOTE — H&P ADULT - NSHPPHYSICALEXAM_GEN_ALL_CORE
General: WN/WD NAD  Neurology: A&Ox3, nonfocal, GARCIA x 4  Head:  Normocephalic, atraumatic  ENT:  Mucosa moist, no ulcerations  Neck:  Supple, no sinuses or palpable masses  Lymphatic:  No palpable cervical, supraclavicular, axillary or inguinal adenopathy  Respiratory: CTA B/L  CV: RRR, S1S2, no murmur  Abdominal: Soft, NT, ND. Surgical scar with 1cm opening in the middle. there is significant induration and erythema around the opening.   MSK: No edema, + peripheral pulses, FROM all 4 extremity  Incisions: intact, no erythema or drainage

## 2022-08-29 NOTE — ED PROVIDER NOTE - ATTENDING CONTRIBUTION TO CARE
I have personally performed a history and physical examination of the patient and discussed management with the resident as well as the patient.  I reviewed the resident's note and agree with the documented findings and plan of care.  I have authored and modified critical sections of the Provider Note, including but not limited to HPI, Physical Exam and MDM.    73 yo F pmHX Colon CA, DM (), HTN, HLD, neuropathy, Hernia presents c/o yellow/green drainage from wound on lower abdomen s/p hernia repair on 7/20. Likely infection of operative wound vs intraabdominal abscess vs sepsis vs electrolyte abnormality. Obtain cbc, cmp, lactate, vbg, bc, ua, uc, ct a/p w/ con. Give IVF and abx. Discuss case with surgery.

## 2022-08-29 NOTE — ED ADULT NURSE REASSESSMENT NOTE - NS ED NURSE REASSESS COMMENT FT1
Received report from day RN. Pt AOx4, VS as charted, lab called with critical result for + culture in abd wound, attempt to page surgery [id number] for result. will continue to monitor.

## 2022-08-29 NOTE — ED PROVIDER NOTE - PHYSICAL EXAMINATION
GEN - lethargic; A&O x3   HEAD - NC/AT   EYES- PERRL, EOMI  ENT: Airway patent, mmm  PULMONARY - CTA b/l, symmetric breath sounds. No W/R/R.  CARDIAC -s1s2, RRR, no M,G,R, No JVD  ABDOMEN - +BS, ND, TTP over the lower abdomen, soft, no guarding, no rebound, no masses , no rigidity  : No CVA TTP, no suprapubic TTP  BACK - Normal  spine   EXTREMITIES - FROM, symmetric pulses  SKIN - 7cm x 6cm erythematous patch on the lower mid abdomen with 1cm x 0.5cm x 1cm open wound with clear/yellow drainage.  NEUROLOGIC - alert, speech clear, no focal deficits, CN II-XII grossly intact  PSYCH -nl mood/affect, nl insight.

## 2022-08-30 DIAGNOSIS — R74.01 ELEVATION OF LEVELS OF LIVER TRANSAMINASE LEVELS: ICD-10-CM

## 2022-08-30 DIAGNOSIS — L02.211 CUTANEOUS ABSCESS OF ABDOMINAL WALL: ICD-10-CM

## 2022-08-30 DIAGNOSIS — E78.5 HYPERLIPIDEMIA, UNSPECIFIED: ICD-10-CM

## 2022-08-30 DIAGNOSIS — E11.9 TYPE 2 DIABETES MELLITUS WITHOUT COMPLICATIONS: ICD-10-CM

## 2022-08-30 DIAGNOSIS — I10 ESSENTIAL (PRIMARY) HYPERTENSION: ICD-10-CM

## 2022-08-30 LAB
ALBUMIN SERPL ELPH-MCNC: 3 G/DL — LOW (ref 3.3–5)
ALP SERPL-CCNC: 195 U/L — HIGH (ref 40–120)
ALT FLD-CCNC: 92 U/L — HIGH (ref 4–33)
ANION GAP SERPL CALC-SCNC: 10 MMOL/L — SIGNIFICANT CHANGE UP (ref 7–14)
AST SERPL-CCNC: 61 U/L — HIGH (ref 4–32)
BASOPHILS # BLD AUTO: 0.07 K/UL — SIGNIFICANT CHANGE UP (ref 0–0.2)
BASOPHILS NFR BLD AUTO: 0.5 % — SIGNIFICANT CHANGE UP (ref 0–2)
BILIRUB DIRECT SERPL-MCNC: <0.2 MG/DL — SIGNIFICANT CHANGE UP (ref 0–0.3)
BILIRUB INDIRECT FLD-MCNC: >0 MG/DL — SIGNIFICANT CHANGE UP (ref 0–1)
BILIRUB SERPL-MCNC: <0.2 MG/DL — SIGNIFICANT CHANGE UP (ref 0.2–1.2)
BUN SERPL-MCNC: 13 MG/DL — SIGNIFICANT CHANGE UP (ref 7–23)
CALCIUM SERPL-MCNC: 9.4 MG/DL — SIGNIFICANT CHANGE UP (ref 8.4–10.5)
CHLORIDE SERPL-SCNC: 97 MMOL/L — LOW (ref 98–107)
CO2 SERPL-SCNC: 22 MMOL/L — SIGNIFICANT CHANGE UP (ref 22–31)
CREAT SERPL-MCNC: 0.76 MG/DL — SIGNIFICANT CHANGE UP (ref 0.5–1.3)
EGFR: 84 ML/MIN/1.73M2 — SIGNIFICANT CHANGE UP
EOSINOPHIL # BLD AUTO: 0.3 K/UL — SIGNIFICANT CHANGE UP (ref 0–0.5)
EOSINOPHIL NFR BLD AUTO: 2 % — SIGNIFICANT CHANGE UP (ref 0–6)
GLUCOSE BLDC GLUCOMTR-MCNC: 340 MG/DL — HIGH (ref 70–99)
GLUCOSE BLDC GLUCOMTR-MCNC: 376 MG/DL — HIGH (ref 70–99)
GLUCOSE BLDC GLUCOMTR-MCNC: 399 MG/DL — HIGH (ref 70–99)
GLUCOSE BLDC GLUCOMTR-MCNC: 419 MG/DL — HIGH (ref 70–99)
GLUCOSE BLDC GLUCOMTR-MCNC: 427 MG/DL — HIGH (ref 70–99)
GLUCOSE BLDC GLUCOMTR-MCNC: 448 MG/DL — HIGH (ref 70–99)
GLUCOSE SERPL-MCNC: 286 MG/DL — HIGH (ref 70–99)
HCT VFR BLD CALC: 25.8 % — LOW (ref 34.5–45)
HGB BLD-MCNC: 8.7 G/DL — LOW (ref 11.5–15.5)
IANC: 9.91 K/UL — HIGH (ref 1.8–7.4)
IMM GRANULOCYTES NFR BLD AUTO: 4.5 % — HIGH (ref 0–1.5)
LYMPHOCYTES # BLD AUTO: 2.99 K/UL — SIGNIFICANT CHANGE UP (ref 1–3.3)
LYMPHOCYTES # BLD AUTO: 20.1 % — SIGNIFICANT CHANGE UP (ref 13–44)
MAGNESIUM SERPL-MCNC: 1.4 MG/DL — LOW (ref 1.6–2.6)
MCHC RBC-ENTMCNC: 28.7 PG — SIGNIFICANT CHANGE UP (ref 27–34)
MCHC RBC-ENTMCNC: 33.7 GM/DL — SIGNIFICANT CHANGE UP (ref 32–36)
MCV RBC AUTO: 85.1 FL — SIGNIFICANT CHANGE UP (ref 80–100)
MONOCYTES # BLD AUTO: 0.96 K/UL — HIGH (ref 0–0.9)
MONOCYTES NFR BLD AUTO: 6.4 % — SIGNIFICANT CHANGE UP (ref 2–14)
NEUTROPHILS # BLD AUTO: 9.91 K/UL — HIGH (ref 1.8–7.4)
NEUTROPHILS NFR BLD AUTO: 66.5 % — SIGNIFICANT CHANGE UP (ref 43–77)
NRBC # BLD: 0 /100 WBCS — SIGNIFICANT CHANGE UP (ref 0–0)
NRBC # FLD: 0 K/UL — SIGNIFICANT CHANGE UP (ref 0–0)
PHOSPHATE SERPL-MCNC: 3.3 MG/DL — SIGNIFICANT CHANGE UP (ref 2.5–4.5)
PLATELET # BLD AUTO: 728 K/UL — HIGH (ref 150–400)
POTASSIUM SERPL-MCNC: 4.9 MMOL/L — SIGNIFICANT CHANGE UP (ref 3.5–5.3)
POTASSIUM SERPL-SCNC: 4.9 MMOL/L — SIGNIFICANT CHANGE UP (ref 3.5–5.3)
PROT SERPL-MCNC: 7.5 G/DL — SIGNIFICANT CHANGE UP (ref 6–8.3)
RBC # BLD: 3.03 M/UL — LOW (ref 3.8–5.2)
RBC # FLD: 14.6 % — HIGH (ref 10.3–14.5)
SODIUM SERPL-SCNC: 129 MMOL/L — LOW (ref 135–145)
WBC # BLD: 15.5 K/UL — HIGH (ref 3.8–10.5)
WBC # FLD AUTO: 15.5 K/UL — HIGH (ref 3.8–10.5)

## 2022-08-30 PROCEDURE — 76705 ECHO EXAM OF ABDOMEN: CPT | Mod: 26

## 2022-08-30 RX ORDER — DEXTROSE 50 % IN WATER 50 %
25 SYRINGE (ML) INTRAVENOUS ONCE
Refills: 0 | Status: DISCONTINUED | OUTPATIENT
Start: 2022-08-30 | End: 2022-09-13

## 2022-08-30 RX ORDER — LISINOPRIL 2.5 MG/1
20 TABLET ORAL DAILY
Refills: 0 | Status: DISCONTINUED | OUTPATIENT
Start: 2022-08-30 | End: 2022-09-13

## 2022-08-30 RX ORDER — SODIUM CHLORIDE 9 MG/ML
1000 INJECTION, SOLUTION INTRAVENOUS
Refills: 0 | Status: DISCONTINUED | OUTPATIENT
Start: 2022-08-30 | End: 2022-09-13

## 2022-08-30 RX ORDER — ENOXAPARIN SODIUM 100 MG/ML
40 INJECTION SUBCUTANEOUS EVERY 24 HOURS
Refills: 0 | Status: DISCONTINUED | OUTPATIENT
Start: 2022-08-30 | End: 2022-09-13

## 2022-08-30 RX ORDER — INSULIN GLARGINE 100 [IU]/ML
30 INJECTION, SOLUTION SUBCUTANEOUS AT BEDTIME
Refills: 0 | Status: DISCONTINUED | OUTPATIENT
Start: 2022-08-30 | End: 2022-08-31

## 2022-08-30 RX ORDER — INSULIN LISPRO 100/ML
VIAL (ML) SUBCUTANEOUS
Refills: 0 | Status: DISCONTINUED | OUTPATIENT
Start: 2022-08-30 | End: 2022-09-13

## 2022-08-30 RX ORDER — DEXTROSE 50 % IN WATER 50 %
15 SYRINGE (ML) INTRAVENOUS ONCE
Refills: 0 | Status: DISCONTINUED | OUTPATIENT
Start: 2022-08-30 | End: 2022-09-13

## 2022-08-30 RX ORDER — GLUCAGON INJECTION, SOLUTION 0.5 MG/.1ML
1 INJECTION, SOLUTION SUBCUTANEOUS ONCE
Refills: 0 | Status: DISCONTINUED | OUTPATIENT
Start: 2022-08-30 | End: 2022-09-13

## 2022-08-30 RX ORDER — SODIUM CHLORIDE 9 MG/ML
1000 INJECTION INTRAMUSCULAR; INTRAVENOUS; SUBCUTANEOUS
Refills: 0 | Status: DISCONTINUED | OUTPATIENT
Start: 2022-08-30 | End: 2022-09-01

## 2022-08-30 RX ORDER — SIMVASTATIN 20 MG/1
40 TABLET, FILM COATED ORAL AT BEDTIME
Refills: 0 | Status: DISCONTINUED | OUTPATIENT
Start: 2022-08-30 | End: 2022-08-30

## 2022-08-30 RX ORDER — DEXTROSE 50 % IN WATER 50 %
12.5 SYRINGE (ML) INTRAVENOUS ONCE
Refills: 0 | Status: DISCONTINUED | OUTPATIENT
Start: 2022-08-30 | End: 2022-09-13

## 2022-08-30 RX ORDER — INSULIN GLARGINE 100 [IU]/ML
20 INJECTION, SOLUTION SUBCUTANEOUS AT BEDTIME
Refills: 0 | Status: DISCONTINUED | OUTPATIENT
Start: 2022-08-30 | End: 2022-08-30

## 2022-08-30 RX ORDER — MAGNESIUM SULFATE 500 MG/ML
2 VIAL (ML) INJECTION
Refills: 0 | Status: COMPLETED | OUTPATIENT
Start: 2022-08-30 | End: 2022-08-30

## 2022-08-30 RX ORDER — INSULIN GLARGINE 100 [IU]/ML
10 INJECTION, SOLUTION SUBCUTANEOUS ONCE
Refills: 0 | Status: COMPLETED | OUTPATIENT
Start: 2022-08-30 | End: 2022-08-30

## 2022-08-30 RX ADMIN — PIPERACILLIN AND TAZOBACTAM 25 GRAM(S): 4; .5 INJECTION, POWDER, LYOPHILIZED, FOR SOLUTION INTRAVENOUS at 10:49

## 2022-08-30 RX ADMIN — PIPERACILLIN AND TAZOBACTAM 25 GRAM(S): 4; .5 INJECTION, POWDER, LYOPHILIZED, FOR SOLUTION INTRAVENOUS at 02:13

## 2022-08-30 RX ADMIN — Medication 25 GRAM(S): at 14:55

## 2022-08-30 RX ADMIN — INSULIN GLARGINE 30 UNIT(S): 100 INJECTION, SOLUTION SUBCUTANEOUS at 21:21

## 2022-08-30 RX ADMIN — ENOXAPARIN SODIUM 40 MILLIGRAM(S): 100 INJECTION SUBCUTANEOUS at 21:21

## 2022-08-30 RX ADMIN — PIPERACILLIN AND TAZOBACTAM 25 GRAM(S): 4; .5 INJECTION, POWDER, LYOPHILIZED, FOR SOLUTION INTRAVENOUS at 17:52

## 2022-08-30 RX ADMIN — Medication 25 GRAM(S): at 11:11

## 2022-08-30 RX ADMIN — Medication 25 GRAM(S): at 17:52

## 2022-08-30 RX ADMIN — Medication 8: at 17:18

## 2022-08-30 RX ADMIN — Medication 12: at 13:30

## 2022-08-30 RX ADMIN — SODIUM CHLORIDE 75 MILLILITER(S): 9 INJECTION INTRAMUSCULAR; INTRAVENOUS; SUBCUTANEOUS at 11:11

## 2022-08-30 RX ADMIN — INSULIN GLARGINE 10 UNIT(S): 100 INJECTION, SOLUTION SUBCUTANEOUS at 17:00

## 2022-08-30 NOTE — CONSULT NOTE ADULT - NSCONSULTADDITIONALINFOA_GEN_ALL_CORE
discussed with patient in detail, expresses understanding of treatment plans.  discussed with patient's  at bedside

## 2022-08-30 NOTE — PROGRESS NOTE ADULT - SUBJECTIVE AND OBJECTIVE BOX
[facility] Surgery Daily Progress Note    Patient seen/examined this morning. S/P I&D of anterior abdominal wall wound yesterday. Reports feeling tired and some mild abdominal discomfort.     Vital Signs Last 24 Hrs  T(C): 36.9 (30 Aug 2022 07:40), Max: 37.4 (29 Aug 2022 09:04)  T(F): 98.5 (30 Aug 2022 07:40), Max: 99.3 (29 Aug 2022 09:04)  HR: 111 (30 Aug 2022 07:40) (95 - 111)  BP: 116/94 (30 Aug 2022 07:40) (113/54 - 158/70)  RR: 18 (30 Aug 2022 07:40) (15 - 18)  SpO2: 100% (30 Aug 2022 07:40) (99% - 100%)    O2 Parameters below as of 30 Aug 2022 07:40  Patient On (Oxygen Delivery Method): room air    I&O's Summary    Physical Exam:  Gen: NAD, A&Ox3  Chest: non labored breathing   Cardiac: Regular rate and rhythm   Abd: soft, mildly tender to palpation surrounding wound, wound appears clean, packing removed without any evidence of bleeding or purulence  Ext: warm, well perfused    LABS: Pending

## 2022-08-30 NOTE — PROGRESS NOTE ADULT - ASSESSMENT
72yoF admitted with anterior abdominal wall abscess, now s/p I&D    -Pain Control  -Continue abx  -F/U AM Labs  -Initiate sliding scale   -DVT PPx     A Team Surgery  P. [id number]

## 2022-08-30 NOTE — CONSULT NOTE ADULT - ASSESSMENT
Patient is a 73 yo Female with PMH of Colon CA, DM 2, HTN, HLD, diabetic neuropathy, Hernia presents c/o yellow/green drainage from wound on lower abdomen s/p hernia repair on 7/20 admitted for treatment

## 2022-08-30 NOTE — PATIENT PROFILE ADULT - FALL HARM RISK - HARM RISK INTERVENTIONS

## 2022-08-31 ENCOUNTER — TRANSCRIPTION ENCOUNTER (OUTPATIENT)
Age: 72
End: 2022-08-31

## 2022-08-31 LAB
-  AMIKACIN: SIGNIFICANT CHANGE UP
-  AMPICILLIN/SULBACTAM: SIGNIFICANT CHANGE UP
-  AZTREONAM: SIGNIFICANT CHANGE UP
-  CEFAZOLIN: SIGNIFICANT CHANGE UP
-  CEFEPIME: SIGNIFICANT CHANGE UP
-  CEFTAZIDIME: SIGNIFICANT CHANGE UP
-  CIPROFLOXACIN: SIGNIFICANT CHANGE UP
-  CLINDAMYCIN: SIGNIFICANT CHANGE UP
-  ERYTHROMYCIN: SIGNIFICANT CHANGE UP
-  GENTAMICIN: SIGNIFICANT CHANGE UP
-  GENTAMICIN: SIGNIFICANT CHANGE UP
-  IMIPENEM: SIGNIFICANT CHANGE UP
-  LEVOFLOXACIN: SIGNIFICANT CHANGE UP
-  MEROPENEM: SIGNIFICANT CHANGE UP
-  OXACILLIN: SIGNIFICANT CHANGE UP
-  PENICILLIN: SIGNIFICANT CHANGE UP
-  PIPERACILLIN/TAZOBACTAM: SIGNIFICANT CHANGE UP
-  RIFAMPIN: SIGNIFICANT CHANGE UP
-  TETRACYCLINE: SIGNIFICANT CHANGE UP
-  TOBRAMYCIN: SIGNIFICANT CHANGE UP
-  TRIMETHOPRIM/SULFAMETHOXAZOLE: SIGNIFICANT CHANGE UP
-  VANCOMYCIN: SIGNIFICANT CHANGE UP
ALBUMIN SERPL ELPH-MCNC: 3 G/DL — LOW (ref 3.3–5)
ALP SERPL-CCNC: 177 U/L — HIGH (ref 40–120)
ALT FLD-CCNC: 76 U/L — HIGH (ref 4–33)
ANION GAP SERPL CALC-SCNC: 12 MMOL/L — SIGNIFICANT CHANGE UP (ref 7–14)
AST SERPL-CCNC: 39 U/L — HIGH (ref 4–32)
BILIRUB DIRECT SERPL-MCNC: <0.2 MG/DL — SIGNIFICANT CHANGE UP (ref 0–0.3)
BILIRUB INDIRECT FLD-MCNC: SIGNIFICANT CHANGE UP MG/DL (ref 0–1)
BILIRUB SERPL-MCNC: <0.2 MG/DL — SIGNIFICANT CHANGE UP (ref 0.2–1.2)
BUN SERPL-MCNC: 16 MG/DL — SIGNIFICANT CHANGE UP (ref 7–23)
CALCIUM SERPL-MCNC: 8.7 MG/DL — SIGNIFICANT CHANGE UP (ref 8.4–10.5)
CHLORIDE SERPL-SCNC: 97 MMOL/L — LOW (ref 98–107)
CHOLEST SERPL-MCNC: 114 MG/DL — SIGNIFICANT CHANGE UP
CO2 SERPL-SCNC: 23 MMOL/L — SIGNIFICANT CHANGE UP (ref 22–31)
CREAT SERPL-MCNC: 0.87 MG/DL — SIGNIFICANT CHANGE UP (ref 0.5–1.3)
CULTURE RESULTS: SIGNIFICANT CHANGE UP
EGFR: 72 ML/MIN/1.73M2 — SIGNIFICANT CHANGE UP
GLUCOSE BLDC GLUCOMTR-MCNC: 214 MG/DL — HIGH (ref 70–99)
GLUCOSE BLDC GLUCOMTR-MCNC: 315 MG/DL — HIGH (ref 70–99)
GLUCOSE BLDC GLUCOMTR-MCNC: 332 MG/DL — HIGH (ref 70–99)
GLUCOSE BLDC GLUCOMTR-MCNC: 379 MG/DL — HIGH (ref 70–99)
GLUCOSE SERPL-MCNC: 316 MG/DL — HIGH (ref 70–99)
HCT VFR BLD CALC: 27 % — LOW (ref 34.5–45)
HDLC SERPL-MCNC: 30 MG/DL — LOW
HGB BLD-MCNC: 8.8 G/DL — LOW (ref 11.5–15.5)
LIPID PNL WITH DIRECT LDL SERPL: 54 MG/DL — SIGNIFICANT CHANGE UP
MAGNESIUM SERPL-MCNC: 2.3 MG/DL — SIGNIFICANT CHANGE UP (ref 1.6–2.6)
MCHC RBC-ENTMCNC: 28 PG — SIGNIFICANT CHANGE UP (ref 27–34)
MCHC RBC-ENTMCNC: 32.6 GM/DL — SIGNIFICANT CHANGE UP (ref 32–36)
MCV RBC AUTO: 86 FL — SIGNIFICANT CHANGE UP (ref 80–100)
METHOD TYPE: SIGNIFICANT CHANGE UP
METHOD TYPE: SIGNIFICANT CHANGE UP
NON HDL CHOLESTEROL: 84 MG/DL — SIGNIFICANT CHANGE UP
NRBC # BLD: 0 /100 WBCS — SIGNIFICANT CHANGE UP (ref 0–0)
NRBC # FLD: 0.02 K/UL — HIGH (ref 0–0)
ORGANISM # SPEC MICROSCOPIC CNT: SIGNIFICANT CHANGE UP
ORGANISM # SPEC MICROSCOPIC CNT: SIGNIFICANT CHANGE UP
PHOSPHATE SERPL-MCNC: 3.5 MG/DL — SIGNIFICANT CHANGE UP (ref 2.5–4.5)
PLATELET # BLD AUTO: 804 K/UL — HIGH (ref 150–400)
POTASSIUM SERPL-MCNC: 4.6 MMOL/L — SIGNIFICANT CHANGE UP (ref 3.5–5.3)
POTASSIUM SERPL-SCNC: 4.6 MMOL/L — SIGNIFICANT CHANGE UP (ref 3.5–5.3)
PROT SERPL-MCNC: 7.3 G/DL — SIGNIFICANT CHANGE UP (ref 6–8.3)
RBC # BLD: 3.14 M/UL — LOW (ref 3.8–5.2)
RBC # FLD: 14.7 % — HIGH (ref 10.3–14.5)
SODIUM SERPL-SCNC: 132 MMOL/L — LOW (ref 135–145)
SPECIMEN SOURCE: SIGNIFICANT CHANGE UP
TRIGL SERPL-MCNC: 152 MG/DL — HIGH
WBC # BLD: 12.07 K/UL — HIGH (ref 3.8–10.5)
WBC # FLD AUTO: 12.07 K/UL — HIGH (ref 3.8–10.5)

## 2022-08-31 PROCEDURE — 99223 1ST HOSP IP/OBS HIGH 75: CPT

## 2022-08-31 RX ORDER — INSULIN GLARGINE 100 [IU]/ML
50 INJECTION, SOLUTION SUBCUTANEOUS AT BEDTIME
Refills: 0 | Status: DISCONTINUED | OUTPATIENT
Start: 2022-08-31 | End: 2022-09-02

## 2022-08-31 RX ORDER — INSULIN LISPRO 100/ML
8 VIAL (ML) SUBCUTANEOUS
Refills: 0 | Status: DISCONTINUED | OUTPATIENT
Start: 2022-08-31 | End: 2022-09-01

## 2022-08-31 RX ORDER — INSULIN LISPRO 100/ML
5 VIAL (ML) SUBCUTANEOUS
Refills: 0 | Status: DISCONTINUED | OUTPATIENT
Start: 2022-08-31 | End: 2022-08-31

## 2022-08-31 RX ORDER — INSULIN GLARGINE 100 [IU]/ML
10 INJECTION, SOLUTION SUBCUTANEOUS ONCE
Refills: 0 | Status: COMPLETED | OUTPATIENT
Start: 2022-08-31 | End: 2022-08-31

## 2022-08-31 RX ORDER — VANCOMYCIN HCL 1 G
1000 VIAL (EA) INTRAVENOUS EVERY 12 HOURS
Refills: 0 | Status: DISCONTINUED | OUTPATIENT
Start: 2022-08-31 | End: 2022-08-31

## 2022-08-31 RX ORDER — CEFAZOLIN SODIUM 1 G
1000 VIAL (EA) INJECTION EVERY 8 HOURS
Refills: 0 | Status: DISCONTINUED | OUTPATIENT
Start: 2022-08-31 | End: 2022-09-06

## 2022-08-31 RX ORDER — PIPERACILLIN AND TAZOBACTAM 4; .5 G/20ML; G/20ML
3.38 INJECTION, POWDER, LYOPHILIZED, FOR SOLUTION INTRAVENOUS EVERY 8 HOURS
Refills: 0 | Status: DISCONTINUED | OUTPATIENT
Start: 2022-08-31 | End: 2022-08-31

## 2022-08-31 RX ORDER — INSULIN GLARGINE 100 [IU]/ML
40 INJECTION, SOLUTION SUBCUTANEOUS AT BEDTIME
Refills: 0 | Status: DISCONTINUED | OUTPATIENT
Start: 2022-08-31 | End: 2022-08-31

## 2022-08-31 RX ADMIN — PIPERACILLIN AND TAZOBACTAM 25 GRAM(S): 4; .5 INJECTION, POWDER, LYOPHILIZED, FOR SOLUTION INTRAVENOUS at 02:36

## 2022-08-31 RX ADMIN — ENOXAPARIN SODIUM 40 MILLIGRAM(S): 100 INJECTION SUBCUTANEOUS at 21:59

## 2022-08-31 RX ADMIN — PIPERACILLIN AND TAZOBACTAM 25 GRAM(S): 4; .5 INJECTION, POWDER, LYOPHILIZED, FOR SOLUTION INTRAVENOUS at 12:12

## 2022-08-31 RX ADMIN — INSULIN GLARGINE 50 UNIT(S): 100 INJECTION, SOLUTION SUBCUTANEOUS at 22:01

## 2022-08-31 RX ADMIN — LISINOPRIL 20 MILLIGRAM(S): 2.5 TABLET ORAL at 05:11

## 2022-08-31 RX ADMIN — SODIUM CHLORIDE 75 MILLILITER(S): 9 INJECTION INTRAMUSCULAR; INTRAVENOUS; SUBCUTANEOUS at 12:13

## 2022-08-31 RX ADMIN — Medication 8: at 08:39

## 2022-08-31 RX ADMIN — Medication 8 UNIT(S): at 17:23

## 2022-08-31 RX ADMIN — Medication 10: at 12:35

## 2022-08-31 RX ADMIN — Medication 250 MILLIGRAM(S): at 10:45

## 2022-08-31 RX ADMIN — Medication 8: at 17:23

## 2022-08-31 RX ADMIN — Medication 8 UNIT(S): at 12:36

## 2022-08-31 RX ADMIN — Medication 100 MILLIGRAM(S): at 22:06

## 2022-08-31 RX ADMIN — INSULIN GLARGINE 10 UNIT(S): 100 INJECTION, SOLUTION SUBCUTANEOUS at 10:34

## 2022-08-31 NOTE — DISCHARGE NOTE PROVIDER - CARE PROVIDER_API CALL
Judith Hernandez (MD)  ColonRectal Surgery; Surgery  [address] [facility] [address] [facility], [address]  [place] [address]  Phone: [phone]  Fax: [phone]  Follow Up Time: 1 week   Judith Hernandez)  ColonRectal Surgery; Surgery  [address] [facility] [address], [address]  [place] [address]  Phone: [phone]  Fax: [phone]  Follow Up Time: 1 week    Javier Reed)  Internal Medicine  [address] [place] [address]  Phone: [phone]  Fax: [phone]  Follow Up Time:     Abbey Tran)  EndocrinologyMetabDiabetes  [address] [place] [id number]  Phone: [phone]  Fax: [phone]  Follow Up Time:

## 2022-08-31 NOTE — DISCHARGE NOTE PROVIDER - NSDCFUADDAPPT_GEN_ALL_CORE_FT
Follow up with your surgeon Dr. Hernandez, call for an appointment.  Please follow up with your primary care physician regarding your hospitalization  Follow up with your surgeon Dr. Hernandez, call for an appointment.  Please follow up with your primary care physician regarding your hospitalization   Woundcare: To be changed daily: dry monique to be packed to abdominal wound, covered with dry gauze and tape. Follow up with your surgeon Dr. Hernandez within 7-10 days following discharge, call for an appointment.  Please follow up with your primary care physician regarding your hospitalization    Follow up with your surgeon Dr. Hernandez within 7-10 days following discharge, call for an appointment.    Follow up with your medicine doctor or cardiologist to have your blood pressure checked and for continued care.    Follow-up with endocrinologist within 2 weeks following discharge. Call to schedule an appointment.    Follow up with your surgeon Dr. Hernandez within 7-10 days following discharge, call for an appointment.    Follow up with your medicine doctor or cardiologist to have your blood pressure checked and for continued care.    Follow-up with endocrinologist within 4 weeks following discharge. Call to schedule an appointment.

## 2022-08-31 NOTE — DISCHARGE NOTE PROVIDER - HOSPITAL COURSE
Patient is a 73 yo Female with PMH of Colon CA, DM, HTN, HLD, diabetic neuropathy, Hernia presents c/o yellow/green drainage from wound on lower abdomen s/p hernia repair on 7/20, and PSH of colonic neoplasm resection with anastomosis. Stitches removed 8/1 however since that time patient states the area has been tender, erythematous, and as of yesterday clear/yellow discharge from an open wound over the incision site in the lower mid abdomen.    Patient seen and evaluated in the ED. She is comfortable in bed, hemodynamically stable. Denies f/c, cp, sob, n/v/d. Abdomen is soft, nondistended. There is a midline postsurgical incision scar with 1cm opening in the middle of the incision. There is significant induration, erythema around the opening and tender to palpation.     Patient was seen with Dr. Hernandez. Bedside I&D was performed with wound packing. Wound dressing applied.       Patient admitted to surgical service and given IV antibiotics.   72F with a PMHx of Colon CA, DM, HTN, HLD, diabetic neuropathy, hernia, presented to [facility] 8/29 c/o yellow/green drainage from wound on lower abdomen s/p hernia repair on 7/20, and PSH of colonic neoplasm resection with anastomosis. Stitches had been removed 8/1.  Patient seen and evaluated in the ED. Midline postsurgical incision scar with 1cm opening in the middle of the incision and found to have significant induration, erythema around the opening and tender to palpation.   Patient was seen with Dr. Hernandez on 8/29, bedside I&D was performed with wound packing. Wound dressing applied.   Patient admitted to surgical service and given IV antibiotics. Infectious disease was consulted and recommended to discontinue vancomycin and zosyn, and continue cefazolin 1g q8h.  Endocrine was consulted for help with diabetes management.   9/2 repeat I&D was performed by Dr. Gonzales and team. Wound packed with kerlix.   9/3 patient went to OR for wound debridement.  Patient tolerated procedure well and there were no post-operative complications identified. Patient remained hemodynamically stable in the PACU and transferred to the surgical floor. Diet was restarted and advanced as tolerated. Pain control was transitioned from IV to PO pain meds.   9/6, PT was consulted and recommended:  * Wound care team was consulted 72F with a PMHx of Colon CA, DM, HTN, HLD, diabetic neuropathy, hernia, presented to [facility] 8/29 c/o yellow/green drainage from wound on lower abdomen s/p hernia repair on 7/20, and PSH of colonic neoplasm resection with anastomosis. Stitches had been removed 8/1.  Patient seen and evaluated in the ED. Midline postsurgical incision scar with 1cm opening in the middle of the incision and found to have significant induration, erythema around the opening and tender to palpation.   Patient was seen with Dr. Hernandez on 8/29, bedside I&D was performed with wound packing. Wound dressing applied.   Patient admitted to surgical service and given IV antibiotics. Infectious disease was consulted and recommended to discontinue vancomycin and zosyn, and continue cefazolin 1g q8h.  Endocrine was consulted for help with diabetes management.   9/2 repeat I&D was performed by Dr. Gonzales and team. Wound packed with kerlix.   9/3 patient went to OR for wound debridement.  Patient tolerated procedure well and there were no post-operative complications identified. Patient remained hemodynamically stable in the PACU and transferred to the surgical floor. Diet was restarted and advanced as tolerated. Pain control was transitioned from IV to PO pain meds.   9/6, PT was consulted and recommended:  * Wound care team was consulted for wound vac placement 72F with a PMHx of Colon CA, DM, HTN, HLD, diabetic neuropathy, hernia, presented to [facility] 8/29 c/o yellow/green drainage from wound on lower abdomen s/p hernia repair on 7/20, and PSH of colonic neoplasm resection with anastomosis. Stitches had been removed 8/1.  Patient seen and evaluated in the ED. Midline postsurgical incision scar with 1cm opening in the middle of the incision and found to have significant induration, erythema around the opening and tender to palpation.   Patient was seen with Dr. Hernandez on 8/29, bedside I&D was performed with wound packing. Wound dressing applied.   Patient admitted to surgical service and given IV antibiotics. Infectious disease was consulted and recommended to discontinue vancomycin and zosyn, and continue cefazolin 1g q8h.  Endocrine was consulted for help with diabetes management.   9/2 repeat I&D was performed by Dr. Gonzales and team. Wound packed with kerlix.   9/3 patient went to OR for wound debridement. Findings in OR : Debridement of indurated tissue. Wound extended and unhealthy tissue excised. Wound packed with 3 moist kerlex and abdominal pads.  Patient tolerated procedure well and there were no post-operative complications identified. Patient remained hemodynamically stable in the PACU and transferred to the surgical floor. Diet was restarted and advanced as tolerated. Pain control was transitioned from IV to PO pain meds.   9/6 PT was consulted and recommended: no skilled PT Needs.  9/7 Patient was slightly more tachycardic and WBC increased from 14 to 16. Decision was made to hold off on Vac placement until increasing SIRS response deescalated. Gan Culture was sent. Medicine was consulted fluid resuscitation was initiated due to slight CONNIE.   9/8 CONNIE resolved, tachycardia improved, WBC decreased back to 15.    Completed through 9/8  * Wound care team was consulted for wound vac placement 72F with a PMHx of Colon CA, DM, HTN, HLD, diabetic neuropathy, hernia, presented to [facility] 8/29 c/o yellow/green drainage from wound on lower abdomen s/p hernia repair on 7/20, and PSH of colonic neoplasm resection with anastomosis. Stitches had been removed 8/1.  Patient seen and evaluated in the ED. Midline postsurgical incision scar with 1cm opening in the middle of the incision and found to have significant induration, erythema around the opening and tender to palpation.   Patient was seen with Dr. Hernandez on 8/29, bedside I&D was performed with wound packing. Wound dressing applied.   Patient admitted to surgical service and given IV antibiotics. Infectious disease was consulted and recommended to discontinue vancomycin and zosyn, and continue cefazolin 1g q8h.  Endocrine was consulted for help with diabetes management.   9/2 repeat I&D was performed by Dr. Gonzales and team. Wound packed with kerlix.   9/3 patient went to OR for wound debridement. Findings in OR : Debridement of indurated tissue. Wound extended and unhealthy tissue excised. Wound packed with 3 moist kerlex and abdominal pads.  Patient tolerated procedure well and there were no post-operative complications identified. Patient remained hemodynamically stable in the PACU and transferred to the surgical floor. Diet was restarted and advanced as tolerated. Pain control was transitioned from IV to PO pain meds.   9/6 PT was consulted and recommended: no skilled PT Needs.  9/7 Patient was slightly more tachycardic and WBC increased from 14 to 16. Decision was made to hold off on Vac placement until increasing SIRS response deescalated. Gan Culture was sent. Medicine was consulted fluid resuscitation was initiated due to slight CONNIE.   9/8 CONNIE resolved, tachycardia improved, WBC decreased back to 15.  9/9 1U pRBC was given for downtrending Hgb. Post-transfusion Hgb stable. Wound care team was consulted for wound vac placement.  9/10-9/11 patient was stable over the weekend without any acute events.  9/12 wound vac was replaced and paperwork was submitted by  for home delivery. Blood glucose control was improved. Confirmed that patient can go home on current insulin regimen per endocrinologist Dr. Richards. Patient's pain was well controlled with non-narcotic PO pain medications, she was ambulating out of bed to chair, voiding with adequate urine output, and tolerating her diet without any issues. Decision was made to discharge patient pending wound vac delivery to home. 72F with a PMHx of Colon CA, DM, HTN, HLD, diabetic neuropathy, hernia, presented to [facility] 8/29 c/o yellow/green drainage from wound on lower abdomen s/p hernia repair on 7/20, and PSH of colonic neoplasm resection with anastomosis. Stitches had been removed 8/1.  Patient seen and evaluated in the ED. Midline postsurgical incision scar with 1cm opening in the middle of the incision and found to have significant induration, erythema around the opening and tender to palpation.   Patient was seen with Dr. Hernandez on 8/29, bedside I&D was performed with wound packing. Wound dressing applied.   Patient admitted to surgical service and given IV antibiotics. Infectious disease was consulted and recommended to discontinue vancomycin and zosyn, and continue cefazolin 1g q8h.  Endocrine was consulted for help with diabetes management.   9/2 repeat I&D was performed by Dr. Gonzales and team. Wound packed with kerlix.   9/3 patient went to OR for wound debridement. Findings in OR : Debridement of indurated tissue. Wound extended and unhealthy tissue excised. Wound packed with 3 moist kerlex and abdominal pads.  Patient tolerated procedure well and there were no post-operative complications identified. Patient remained hemodynamically stable in the PACU and transferred to the surgical floor. Diet was restarted and advanced as tolerated. Pain control was transitioned from IV to PO pain meds.   9/6 PT was consulted and recommended: no skilled PT Needs.  9/7 Patient was slightly more tachycardic and WBC increased from 14 to 16. Decision was made to hold off on Vac placement until increasing SIRS response deescalated. Gan Culture was sent. Medicine was consulted fluid resuscitation was initiated due to slight CONNIE.   9/8 CONNIE resolved, tachycardia improved, WBC decreased back to 15.  9/9 1U pRBC was given for downtrending Hgb. Post-transfusion Hgb stable. Wound care team was consulted for wound vac placement.  9/10-9/11 patient was stable over the weekend without any acute events.  9/12 wound vac was replaced and paperwork was submitted by  for home delivery. Blood glucose control was improved. Confirmed that patient can go home on current insulin regimen per endocrinologist Dr. Richards. Patient's pain was well controlled with non-narcotic PO pain medications, she was ambulating out of bed to chair, voiding with adequate urine output, and tolerating her diet without any issues. Decision was made to discharge patient pending wound vac delivery to home.

## 2022-08-31 NOTE — PROGRESS NOTE ADULT - ASSESSMENT
72F admitted with anterior abdominal wall abscess, now s/p bedside I&D.     - Continue IV abx  - Continue IVF   - Continue with daily dressing change   - Pain Control  - Elevated blood sugars, will evaluate lantus dosing and SSI   - DVT PPx     [facility] Surgery  P# [id number]

## 2022-08-31 NOTE — DISCHARGE NOTE PROVIDER - NSDCHHHOMEBOUNDOTHER_GEN_ALL_CORE_FT
Patient is amenable to YAG capsulotomy so plan YAG capsulotomy OD consult DWS YAG OD. Please, call pt to schedule MCL & lab appt-   Thanks    VNS for wound care

## 2022-08-31 NOTE — DISCHARGE NOTE PROVIDER - NSDCCPCAREPLAN_GEN_ALL_CORE_FT
PRINCIPAL DISCHARGE DIAGNOSIS  Diagnosis: Postoperative complication  Assessment and Plan of Treatment:        PRINCIPAL DISCHARGE DIAGNOSIS  Diagnosis: Postoperative complication  Assessment and Plan of Treatment: WOUND CARE:  Please keep incisions clean and dry. Please do not Scrub or rub incisions. Do not use lotion or powder on incisions.   Wound Vac: To be changed three times per week.   BATHING: Please do not submerge wound underwater. You may shower and/or sponge bathe.  ACTIVITY: No heavy lifting or straining. Otherwise, you may return to your usual level of physical activity. If you are taking narcotic pain medication (such as Percocet) DO NOT drive a car, operate machinery or make important decisions.  DIET: Return to your usual diet.  NOTIFY YOUR SURGEON IF: You have any bleeding that does not stop, any pus draining from your wound(s), any fever (over 100.4 F) or chills, persistent nausea/vomiting, persistent diarrhea, or if your pain is not controlled on your discharge pain medications.  FOLLOW-UP: Please follow up with your primary care physician in one week regarding your hospitalization. Please follow-up with your surgeon, within 7 days following discharge- please call to schedule an appointment.       PRINCIPAL DISCHARGE DIAGNOSIS  Diagnosis: Postoperative complication  Assessment and Plan of Treatment: WOUND CARE:  Please keep incisions clean and dry. Please do not Scrub or rub incisions. Do not use lotion or powder on incisions.   Wound Vac: To be changed three times per week.   BATHING: Please do not submerge wound underwater. You may shower and/or sponge bathe.  ACTIVITY: No heavy lifting or straining. Otherwise, you may return to your usual level of physical activity. If you are taking narcotic pain medication (such as Percocet) DO NOT drive a car, operate machinery or make important decisions.  DIET: Return to your usual diet.  NOTIFY YOUR SURGEON IF: You have any bleeding that does not stop, any pus draining from your wound(s), any fever (over 100.4 F) or chills, persistent nausea/vomiting, persistent diarrhea, or if your pain is not controlled on your discharge pain medications.  FOLLOW-UP: Please follow up with your primary care physician in one week regarding your hospitalization. Please follow-up with your surgeon, within 7 days following discharge- please call to schedule an appointment.      SECONDARY DISCHARGE DIAGNOSES  Diagnosis: Diabetes mellitus  Assessment and Plan of Treatment: - Continue consistent carbohydrate diet.    - Monitor blood glucose levels throughout the day before meals and at bedtime.   - Record blood sugars and bring to outpatient providers appointment in order to be reviewed by your doctor for management modifications.    -Be aware of diabetes management symptoms including feeling cool and clammy may be related to low glucose levels.  Feeling hot and dry may indicate high glucose levels.  If  you feel these symptoms, check your blood sugar.  Make regular podiatry appointments in order to have feet checked for wounds and toe nails cut by a doctor to prevent infections.   MEDICATION REGIMEN:  **Your medications were adjusted while you were admitted to the hospital. Please follow-up as below:  - 13 units humalog before meals  - 50 UNITS Basalglasr Kwikpen at night (You will use your existing pen and increase the dose from what you previously took)       Diagnosis: Hypertension  Assessment and Plan of Treatment: A new medication was added to your regimen to control your blood pressure.   -You will be given a 14 day supply. Please continue to take this medication but you MUST follow-up with your PCP or cardiologist to have your blood pressure checked within 1 week.     PRINCIPAL DISCHARGE DIAGNOSIS  Diagnosis: Postoperative complication  Assessment and Plan of Treatment: WOUND CARE:  Please keep incisions clean and dry. Please do not Scrub or rub incisions. Do not use lotion or powder on incisions.   Wound Vac: To be changed three times per week.   BATHING: Please do not submerge wound underwater. You may shower and/or sponge bathe.  ACTIVITY: No heavy lifting or straining. Otherwise, you may return to your usual level of physical activity. If you are taking narcotic pain medication (such as Percocet) DO NOT drive a car, operate machinery or make important decisions.  DIET: Return to your usual diet.  NOTIFY YOUR SURGEON IF: You have any bleeding that does not stop, any pus draining from your wound(s), any fever (over 100.4 F) or chills, persistent nausea/vomiting, persistent diarrhea, or if your pain is not controlled on your discharge pain medications.  FOLLOW-UP: Please follow up with your primary care physician in one week regarding your hospitalization. Please follow-up with your surgeon, within 7 days following discharge- please call to schedule an appointment.      SECONDARY DISCHARGE DIAGNOSES  Diagnosis: Diabetes mellitus  Assessment and Plan of Treatment: - Continue consistent carbohydrate diet.    - Monitor blood glucose levels throughout the day before meals and at bedtime.   - Record blood sugars and bring to outpatient providers appointment in order to be reviewed by your doctor for management modifications.    -Be aware of diabetes management symptoms including feeling cool and clammy may be related to low glucose levels.  Feeling hot and dry may indicate high glucose levels.  If  you feel these symptoms, check your blood sugar.  Make regular podiatry appointments in order to have feet checked for wounds and toe nails cut by a doctor to prevent infections.   MEDICATION REGIMEN:  **Your medications were adjusted while you were admitted to the hospital. Please follow-up as below:  - 13 units humalog before meals  - 50 UNITS Basalglasr Kwikpen at night (You will use your existing pen and increase the dose from what you previously took)   -  Stop Glimeperide at home.   - Continue Actos and Metformin.       Diagnosis: Hypertension  Assessment and Plan of Treatment: A new medication was added to your regimen to control your blood pressure.   -You will be given a 14 day supply. Please continue to take this medication but you MUST follow-up with your PCP or cardiologist to have your blood pressure checked within 1 week.

## 2022-08-31 NOTE — DISCHARGE NOTE PROVIDER - NSDCMRMEDTOKEN_GEN_ALL_CORE_FT
acetaminophen 325 mg oral tablet: 3 tab(s) orally every 6 hours  amoxicillin-clavulanate 875 mg-125 mg oral tablet: 1 tab(s) orally 2 times a day   Basaglar KwikPen 100 units/mL subcutaneous solution: 40 unit(s) subcutaneous once a day after lunch  glimepiride 4 mg oral tablet: 1 tab(s) orally 2 times a day  lisinopril 20 mg oral tablet: 1 tab(s) orally once a day  metFORMIN 1000 mg oral tablet: 1 tab(s) orally once a day in the morning  oxyCODONE 5 mg oral tablet: 1 tab(s) orally every 6 hours, As Needed -for severe pain not controlled by over the counter medications MDD:4 tabs       iSTOP Reference #: [id number]     pioglitazone 15 mg oral tablet: 1 tab(s) orally once a day  simvastatin 40 mg oral tablet: 1 tab(s) orally once a day (at bedtime)   acetaminophen 325 mg oral tablet: 3 tab(s) orally every 6 hours  amoxicillin-clavulanate 875 mg-125 mg oral tablet: 1 tab(s) orally 2 times a day MDD:2 tabs  aspirin 81 mg oral tablet, chewable: 1 tab(s) orally once a day  Basaglar KwikPen 100 units/mL subcutaneous solution: 50 unit(s) subcutaneous once a day (at bedtime) MDD:50 units  HumaLOG KwikPen 200 units/mL (Concentrated) subcutaneous solution: 17 unit(s) subcutaneous 3 times a day (before meals) MDD:51U  lisinopril 20 mg oral tablet: 1 tab(s) orally once a day  metFORMIN 1000 mg oral tablet: 1 tab(s) orally once a day in the morning  oxyCODONE 5 mg oral tablet: 1 tab(s) orally every 6 hours, As Needed -for severe pain not controlled by over the counter medications MDD:4 tabs       iSTOP Reference #: [id number]     pioglitazone 15 mg oral tablet: 1 tab(s) orally once a day  simvastatin 40 mg oral tablet: 1 tab(s) orally once a day (at bedtime)   acetaminophen 325 mg oral tablet: 3 tab(s) orally every 6 hours  amoxicillin-clavulanate 875 mg-125 mg oral tablet: 1 tab(s) orally 2 times a day MDD:2 tabs  aspirin 81 mg oral tablet, chewable: 1 tab(s) orally once a day  Basaglar KwikPen 100 units/mL subcutaneous solution: 50 unit(s) subcutaneous once a day (at bedtime) MDD:50 units  HumaLOG KwikPen 100 units/mL injectable solution: 13 unit(s) subcutaneous 3 times a day (with meals)   lisinopril 20 mg oral tablet: 1 tab(s) orally once a day  metFORMIN 1000 mg oral tablet: 1 tab(s) orally once a day in the morning  oxyCODONE 5 mg oral tablet: 1 tab(s) orally every 6 hours, As Needed -for severe pain not controlled by over the counter medications MDD:4 tabs       iSTOP Reference #: [id number]     pioglitazone 15 mg oral tablet: 1 tab(s) orally once a day  polyethylene glycol 3350 oral powder for reconstitution: 17 gram(s) orally once a day  senna leaf extract oral tablet: 2 tab(s) orally once a day (at bedtime)  simvastatin 40 mg oral tablet: 1 tab(s) orally once a day (at bedtime)   acetaminophen 325 mg oral tablet: 3 tab(s) orally every 6 hours  amoxicillin-clavulanate 875 mg-125 mg oral tablet: 1 tab(s) orally 2 times a day MDD:2 tabs  aspirin 81 mg oral tablet, chewable: 1 tab(s) orally once a day  Basaglar KwikPen 100 units/mL subcutaneous solution: 50 unit(s) subcutaneous once a day (at bedtime) MDD:50 units  Insulin Pen Needles, 4mm: 1 application subcutaneously 4 times a day. ** Use with insulin pen **   lisinopril 20 mg oral tablet: 1 tab(s) orally once a day  metFORMIN 1000 mg oral tablet: 1 tab(s) orally once a day in the morning  metoprolol tartrate 25 mg oral tablet: 1 tab(s) orally 2 times a day  NovoLOG FlexPen 100 units/mL injectable solution: 13 unit(s) subcutaneous 3 times a day (with meals)   oxyCODONE 5 mg oral tablet: 1 tab(s) orally every 6 hours, As Needed -for severe pain not controlled by over the counter medications MDD:4 tabs       iSTOP Reference #: [id number]     pioglitazone 15 mg oral tablet: 1 tab(s) orally once a day  polyethylene glycol 3350 oral powder for reconstitution: 17 gram(s) orally once a day  senna leaf extract oral tablet: 2 tab(s) orally once a day (at bedtime)  simvastatin 40 mg oral tablet: 1 tab(s) orally once a day (at bedtime)

## 2022-08-31 NOTE — CONSULT NOTE ADULT - ASSESSMENT
73 yo F colon CA, DM, diabetic neuropathy, recent hernia repair, presenting with discharge from wound  No fevers, has leukocytosis  Recent hernia report  Concern for possible abscess s/p I+D on day of admission  Culture now growing MSSA  UCX with low CFU Pseudomonas, 2x UA (mild positive to negative)  CT with lower abd wall fluid collection, extending to RLQ  Patient has no symptoms UTI  Overall,  1) Abscess  - Growing MSSA  - Cefazolin 1g q 8  - Would DC Vanco/Zosyn  - F/U pending cultures  - Wound care per team  - Anticipate when discharge planning, would send out on Keflex to complete 10 day course (from I+D)  2) Positive UCX  - Suspect asymptomatic bacteriuria, low CFU; no symptoms UTI  - Would hold on Zosyn  - Monitor for signs/symptoms UTI  - F/U UCX but would not treat unless new signs UTI  3) Leukocytosis  - Improving?  - Trend to normal    Anderson Trotter MD  Contact on TEAMS messaging from 9am - 5pm  From 5pm-9am, on weekends, or if no response call [phone] 73 yo F colon CA, DM, diabetic neuropathy, recent hernia repair, presenting with discharge from wound  No fevers, has leukocytosis  Recent hernia report  Concern for possible abscess s/p I+D on day of admission  Culture now growing MSSA  UCX with low CFU Pseudomonas, 2x UA (mild positive to negative)  CT with lower abd wall fluid collection, extending to RLQ  Patient has no symptoms UTI  Overall,  1) Abscess  - Growing MSSA  - Cefazolin 1g q 8  - Would DC Vanco/Zosyn  - F/U pending cultures  - Wound care per team  - Anticipate when discharge planning, would send out on Keflex 500mg q 6 (presuming no change in CrCl) to complete 10 day course (from I+D)  2) Positive UCX  - Suspect asymptomatic bacteriuria, low CFU; no symptoms UTI  - Would hold on Zosyn  - Monitor for signs/symptoms UTI  - F/U UCX but would not treat unless new signs UTI  3) Leukocytosis  - Improving?  - Trend to normal    My colleagues will be covering this patient starting on 9/1/22, I will return 9/2/22. Please call [phone] or on call fellow with any questions or change in status.     Anderson Trotter MD  Contact on TEAMS messaging from 9am - 5pm  From 5pm-9am, on weekends, or if no response call [phone]

## 2022-08-31 NOTE — DISCHARGE NOTE PROVIDER - DISCHARGE SERVICE FOR PATIENT
Physical Therapy  Visit Type: initial evaluation  Precautions:  Medical precautions:  fall risk; standard precautions.    Lines:     Basic: capped IV    Complex: J.P. drain      Lines in chart and on patient reviewed, precautions maintained throughout session.  Spine:     Lumbar: LSO, no lifting greater than 10 #, no twisting, no bending and lumbar brace on when out of bed    SUBJECTIVE  Patient agreed to participate in therapy this date.  Patient verbally agrees to allow the following to be present during session: spouse  \"I'm very nauseous\"  Patient / Family Goal: return to previous functional status    Pain   RN informed on pain level     OBJECTIVE     Oriented to person, place, time and situation   Patient activity tolerance: 1 to 2 activity to rest  Strength (out of 5 unless otherwise indicated)   WFL: LLE, RLE    Bed Mobility:    Rolling left: minimal assist      Supine to sit: minimal assist    Sit to supine: minimal assist  Training completed:    Tasks: supine to sit and sit to supine    Education details: body mechanics and patient safety    Instructed in log roll technique. Required min assist for rolling and elevation of trunk  Transfers:      Sit to stand: not attempted due to medical condition      Interventions     Supine    Lower Extremity: Bilateral: ankle pumps, 10 reps, 1 sets  Additional exercise details: Patient was educated in post op lumbar precautions, handout given  Training provided: bed mobility training, activity tolerance and safety training    Skilled input: Verbal instruction/cues  Verbal Consent: Writer verbally educated and received verbal consent for hand placement, positioning of patient, and techniques to be performed today from patient for therapist position for techniques as described above and how they are pertinent to the patient's plan of care.       ASSESSMENT    Impairments: strength, activity tolerance, balance deficits, safety awareness, endurance and pain  Functional  Limitations: all functional mobility  Patient presents below baseline which was independent with mobility, DOS s/p L3-4-5 laminectomy with facetectomy and foraminotomy, L3-4, 4-5 interbody fusion. Patient lives with spouse in multi level home with one step to enter. Patient was issued and fit for LSO brace, to be worn when out of bed per orders. Patient limited at this time by high pain levels, dizziness and nausea. Patient required minimal assist for bed mobility, while sitting EOB patient reports significant dizziness/nausea and began vomiting, was returned to bed for safety. BP assessed at 98/54, SpO2 99% on room air, RN aware of status. Patient will benefit from skilled PT services during hospital stay to progress functional independence and safety with mobility. Recommendation for dc is home one medically stable, pending further progress in therapy.          Discharge Recommendations  Recommendation for Discharge: PT WI: Home  PT/OT Mobility Equipment for Discharge: has 2ww, cane  PT Identified Barriers to Discharge: pain      Skilled therapy is required to address these limitations in attempt to maximize the patient's independence.  Predicted patient presentation: Low (stable) - Patient comorbidities and complexities, as defined above, will have little effect on progress for prescribed plan of care.    End of Session:   Location: in bed  Safety measures: call light within reach  Handoff to: nurse  Education Provided:   Learning assessment:  - Primary learner: patient  - Are they ready to learn: yes  - Preferred learning style: verbal  - Barriers to learning: no barriers apparent at this time  Education provided during session:  - Receiving education: patient  - Results of above outlined education: Verbalizes understanding and Demonstrates understanding    PLAN    Suggestions for next session as indicated: Progress mobility, stairs, HEP    PT Frequency: 5 days/week  Frequency Comments: Thurs (Apr27 1/5)       Interventions: stairs retraining, functional transfer training, endurance training, safety education, HEP train/position, strengthening and gait training  Agreement to plan and goals: patient agrees with goals and treatment plan        GOALS  Review Date: 5/4/2022  Long Term Goals: (to be met by time of discharge from hospital)  Sit to supine: Patient will complete sit to supine modified independent.  Supine to sit: Patient will complete supine to sit modified independent.  Sit to stand: Patient will complete sit to stand transfer with modified independent. Sit stand device: LRAD.    Ambulation (even): Patient will ambulate on even surface for 150 feet with modified independent. Even device: LRAD.    Stairs: Patient will ambulate 2 steps with using one rail, modified independent.     Documented in the chart in the following areas: Pain. Assessment.      Therapy procedure time and total treatment time can be found documented on the Time Entry flowsheet   on the discharge service for the patient. I have reviewed and made amendments to the documentation where necessary.

## 2022-08-31 NOTE — PROGRESS NOTE ADULT - SUBJECTIVE AND OBJECTIVE BOX
Patient is a 72y old  Female who presents with a chief complaint of wound infection (31 Aug 2022 10:31)      DATE OF SERVICE: 22 @ 13:58    SUBJECTIVE / OVERNIGHT EVENTS: overnight events noted    ROS:  Resp: No cough no sputum production  CVS: No chest pain no palpitations no orthopnea  GI: no N/V/D  "I feel OK'        MEDICATIONS  (STANDING):  ceFAZolin   IVPB 1000 milliGRAM(s) IV Intermittent every 8 hours  dextrose 5%. 1000 milliLiter(s) (100 mL/Hr) IV Continuous <Continuous>  dextrose 5%. 1000 milliLiter(s) (50 mL/Hr) IV Continuous <Continuous>  dextrose 50% Injectable 25 Gram(s) IV Push once  dextrose 50% Injectable 12.5 Gram(s) IV Push once  dextrose 50% Injectable 25 Gram(s) IV Push once  enoxaparin Injectable 40 milliGRAM(s) SubCutaneous every 24 hours  glucagon  Injectable 1 milliGRAM(s) IntraMuscular once  insulin glargine Injectable (LANTUS) 40 Unit(s) SubCutaneous at bedtime  insulin lispro (ADMELOG) corrective regimen sliding scale   SubCutaneous three times a day before meals  insulin lispro Injectable (ADMELOG) 8 Unit(s) SubCutaneous three times a day before meals  lisinopril 20 milliGRAM(s) Oral daily  sodium chloride 0.9%. 1000 milliLiter(s) (75 mL/Hr) IV Continuous <Continuous>    MEDICATIONS  (PRN):  acetaminophen     Tablet .. 650 milliGRAM(s) Oral every 6 hours PRN Mild Pain (1 - 3), Moderate Pain (4 - 6)  dextrose Oral Gel 15 Gram(s) Oral once PRN Blood Glucose LESS THAN 70 milliGRAM(s)/deciliter  HYDROmorphone  Injectable 0.5 milliGRAM(s) IV Push every 6 hours PRN Severe Pain (7 - 10)        CAPILLARY BLOOD GLUCOSE      POCT Blood Glucose.: 379 mg/dL (31 Aug 2022 12:20)  POCT Blood Glucose.: 315 mg/dL (31 Aug 2022 08:32)  POCT Blood Glucose.: 376 mg/dL (30 Aug 2022 21:20)  POCT Blood Glucose.: 340 mg/dL (30 Aug 2022 16:32)  POCT Blood Glucose.: 399 mg/dL (30 Aug 2022 15:24)  POCT Blood Glucose.: 448 mg/dL (30 Aug 2022 14:18)    I&O's Summary    30 Aug 2022 07:01  -  31 Aug 2022 07:00  --------------------------------------------------------  IN: 1260 mL / OUT: 900 mL / NET: 360 mL        Vital Signs Last 24 Hrs  T(C): 36.8 (31 Aug 2022 09:19), Max: 36.9 (30 Aug 2022 15:13)  T(F): 98.2 (31 Aug 2022 09:19), Max: 98.4 (30 Aug 2022 15:13)  HR: 93 (31 Aug 2022 09:19) (80 - 107)  BP: 125/76 (31 Aug 2022 09:19) (120/74 - 135/67)  BP(mean): --  RR: 18 (31 Aug 2022 09:19) (18 - 18)  SpO2: 99% (31 Aug 2022 09:19) (97% - 100%)    PHYSICAL EXAM:  GENERAL: NAD  EYES: EOMI, PERRLA,  NECK: Supple, No JVD  CHEST/LUNG: Clear   HEART: S1S2;  no murmurs  ABDOMEN: Soft, + abdominal wall bandage  EXTREMITIES: no edema  NEUROLOGY: Alert, nonfocal  SKIN: No rashes or lesions    LABS:                        8.8    12.07 )-----------( 804      ( 31 Aug 2022 06:50 )             27.0     08-    132<L>  |  97<L>  |  16  ----------------------------<  316<H>  4.6   |  23  |  0.87    Ca    8.7      31 Aug 2022 06:50  Phos  3.5     08-  Mg     2.30     08-    TPro  7.3  /  Alb  3.0<L>  /  TBili  <0.2  /  DBili  <0.2  /  AST  39<H>  /  ALT  76<H>  /  AlkPhos  177<H>  08          Urinalysis Basic - ( 29 Aug 2022 14:22 )    Color: Light Yellow / Appearance: Clear / S.024 / pH: x  Gluc: x / Ketone: Negative  / Bili: Negative / Urobili: <2 mg/dL   Blood: x / Protein: Trace / Nitrite: Negative   Leuk Esterase: Negative / RBC: x / WBC x   Sq Epi: x / Non Sq Epi: x / Bacteria: x          All consultant(s) notes reviewed and care discussed with other providers        Contact Number, Dr Reed [phone]

## 2022-08-31 NOTE — PROGRESS NOTE ADULT - SUBJECTIVE AND OBJECTIVE BOX
DATE OF SERVICE: 22 @ 09:57    INTERVAL HPI/OVERNIGHT EVENTS: Patient seen and examined, resting comfortably at bedside awake and alert. No acute events overnight. Patient reports tolerating diet without nausea, vomiting. Admits to passing flatus and having bowel movements. Patient endorses voiding without issues and has been out of bed and ambulating. Denies fever, chills, SOB, or chest pain. Pain well controlled.      MEDICATIONS  (STANDING):  dextrose 5%. 1000 milliLiter(s) (50 mL/Hr) IV Continuous <Continuous>  dextrose 5%. 1000 milliLiter(s) (100 mL/Hr) IV Continuous <Continuous>  dextrose 50% Injectable 25 Gram(s) IV Push once  dextrose 50% Injectable 12.5 Gram(s) IV Push once  dextrose 50% Injectable 25 Gram(s) IV Push once  enoxaparin Injectable 40 milliGRAM(s) SubCutaneous every 24 hours  glucagon  Injectable 1 milliGRAM(s) IntraMuscular once  insulin glargine Injectable (LANTUS) 10 Unit(s) SubCutaneous once  insulin glargine Injectable (LANTUS) 40 Unit(s) SubCutaneous at bedtime  insulin lispro (ADMELOG) corrective regimen sliding scale   SubCutaneous three times a day before meals  insulin lispro Injectable (ADMELOG) 5 Unit(s) SubCutaneous three times a day before meals  lisinopril 20 milliGRAM(s) Oral daily  piperacillin/tazobactam IVPB.. 3.375 Gram(s) IV Intermittent every 8 hours  sodium chloride 0.9%. 1000 milliLiter(s) (75 mL/Hr) IV Continuous <Continuous>    MEDICATIONS  (PRN):  acetaminophen     Tablet .. 650 milliGRAM(s) Oral every 6 hours PRN Mild Pain (1 - 3), Moderate Pain (4 - 6)  dextrose Oral Gel 15 Gram(s) Oral once PRN Blood Glucose LESS THAN 70 milliGRAM(s)/deciliter  HYDROmorphone  Injectable 0.5 milliGRAM(s) IV Push every 6 hours PRN Severe Pain (7 - 10)      Vital Signs Last 24 Hrs  T(C): 36.8 (31 Aug 2022 09:19), Max: 36.9 (30 Aug 2022 15:13)  T(F): 98.2 (31 Aug 2022 09:19), Max: 98.4 (30 Aug 2022 15:13)  HR: 93 (31 Aug 2022 09:19) (80 - 107)  BP: 125/76 (31 Aug 2022 09:19) (120/74 - 135/67)  BP(mean): --  RR: 18 (31 Aug 2022 09:19) (18 - 18)  SpO2: 99% (31 Aug 2022 09:19) (97% - 100%)    Parameters below as of 31 Aug 2022 09:19  Patient On (Oxygen Delivery Method): room air      I&O's Detail    30 Aug 2022 07:01  -  31 Aug 2022 07:00  --------------------------------------------------------  IN:    Oral Fluid: 360 mL    sodium chloride 0.9%: 900 mL  Total IN: 1260 mL    OUT:    Voided (mL): 900 mL  Total OUT: 900 mL    Total NET: 360 mL            Physical Exam:  General: WN/WD NAD  Respiratory: airway patent, respirations unlabored, no increased WoB  Neurology: A&Ox3, nonfocal, GARCIA x 4  Abd: soft, mildly tender to palpation surrounding wound with induration, wound appears clean, packing removed without any evidence of bleeding or purulence  Dressing changed during AM rounds      LABS:                        8.8    12.07 )-----------( 804      ( 31 Aug 2022 06:50 )             27.0     08-31    132<L>  |  97<L>  |  16  ----------------------------<  316<H>  4.6   |  23  |  0.87    Ca    8.7      31 Aug 2022 06:50  Phos  3.5     08-31  Mg     2.30     08-31    TPro  7.3  /  Alb  3.0<L>  /  TBili  <0.2  /  DBili  <0.2  /  AST  39<H>  /  ALT  76<H>  /  AlkPhos  177<H>  08-31      Urinalysis Basic - ( 29 Aug 2022 14:22 )    Color: Light Yellow / Appearance: Clear / S.024 / pH: x  Gluc: x / Ketone: Negative  / Bili: Negative / Urobili: <2 mg/dL   Blood: x / Protein: Trace / Nitrite: Negative   Leuk Esterase: Negative / RBC: x / WBC x   Sq Epi: x / Non Sq Epi: x / Bacteria: x        RADIOLOGY & ADDITIONAL STUDIES:

## 2022-08-31 NOTE — PROGRESS NOTE ADULT - SUBJECTIVE AND OBJECTIVE BOX
A Team Surgery Daily Progress Note    Patient seen/examined this morning. S/P I&D of anterior abdominal wall wound yesterday. Endorses mild abdominal discomfort.     Vital Signs Last 24 Hrs  T(C): 36.9 (30 Aug 2022 07:40), Max: 37.4 (29 Aug 2022 09:04)  T(F): 98.5 (30 Aug 2022 07:40), Max: 99.3 (29 Aug 2022 09:04)  HR: 111 (30 Aug 2022 07:40) (95 - 111)  BP: 116/94 (30 Aug 2022 07:40) (113/54 - 158/70)  RR: 18 (30 Aug 2022 07:40) (15 - 18)  SpO2: 100% (30 Aug 2022 07:40) (99% - 100%)    O2 Parameters below as of 30 Aug 2022 07:40  Patient On (Oxygen Delivery Method): room air    I&O's Summary    Physical Exam:  Gen: NAD, A&Ox3  Chest: non labored breathing   Cardiac: Regular rate and rhythm   Abd: soft, mildly tender to palpation surrounding wound with induration, wound appears clean, packing removed without any evidence of bleeding or purulence  Ext: warm, well perfused    LABS: Pending

## 2022-08-31 NOTE — PROGRESS NOTE ADULT - ASSESSMENT
72 year old woman admitted with anterior abdominal wall abscess, now s/p bedside I&D, recovering well on the floors.    Plan:  - c/w IV abx  - Continue IVF   - Continue with daily dressing change   - f/u wound cultures  - Pain Control PO PRN  - Elevated blood sugars, will evaluate lantus dosing and SSI  - DVT PPx     A Team Surgery [id number] year old woman admitted with anterior abdominal wall abscess, now s/p bedside I&D, recovering well on the floors.    Plan:  - c/w IV abx  - Continue IVF   - Continue with daily dressing change   - f/u wound cultures  - Pain Control PO PRN  - Elevated blood sugars, will evaluate lantus dosing and SSI  - DVT PPx     A Team Surgery [id number]

## 2022-08-31 NOTE — DISCHARGE NOTE PROVIDER - NSDCFUADDINST_GEN_ALL_CORE_FT
Patient is being discharged with default welt to dry dressing. Vac will be reapplied by wound care nurse at home on 9/14/2022

## 2022-08-31 NOTE — PROGRESS NOTE ADULT - ASSESSMENT
Patient is a 71 yo Female with PMH of Colon CA, DM 2, HTN, HLD, diabetic neuropathy, Hernia presents c/o yellow/green drainage from wound on lower abdomen s/p hernia repair on 7/20 admitted for treatment

## 2022-08-31 NOTE — DISCHARGE NOTE PROVIDER - NSDCCPTREATMENT_GEN_ALL_CORE_FT
PRINCIPAL PROCEDURE  Procedure: Incision and drainage, wound, abdomen  Findings and Treatment:

## 2022-08-31 NOTE — DISCHARGE NOTE PROVIDER - CARE PROVIDERS DIRECT ADDRESSES
,DirectAddress_Unknown ,DirectAddress_Unknown,DirectAddress_Unknown,[email or web address]

## 2022-08-31 NOTE — DISCHARGE NOTE PROVIDER - PROVIDER TOKENS
PROVIDER:[TOKEN:[[id number]],FOLLOWUP:[1 week]] PROVIDER:[TOKEN:[[id number]],FOLLOWUP:[1 week]],PROVIDER:[TOKEN:[[id number]]],PROVIDER:[TOKEN:[[id number]]]

## 2022-09-01 LAB
ALBUMIN SERPL ELPH-MCNC: 3 G/DL — LOW (ref 3.3–5)
ALP SERPL-CCNC: 156 U/L — HIGH (ref 40–120)
ALT FLD-CCNC: 63 U/L — HIGH (ref 4–33)
ANION GAP SERPL CALC-SCNC: 11 MMOL/L — SIGNIFICANT CHANGE UP (ref 7–14)
AST SERPL-CCNC: 37 U/L — HIGH (ref 4–32)
BILIRUB DIRECT SERPL-MCNC: <0.2 MG/DL — SIGNIFICANT CHANGE UP (ref 0–0.3)
BILIRUB INDIRECT FLD-MCNC: SIGNIFICANT CHANGE UP MG/DL (ref 0–1)
BILIRUB SERPL-MCNC: <0.2 MG/DL — SIGNIFICANT CHANGE UP (ref 0.2–1.2)
BUN SERPL-MCNC: 23 MG/DL — SIGNIFICANT CHANGE UP (ref 7–23)
CALCIUM SERPL-MCNC: 9 MG/DL — SIGNIFICANT CHANGE UP (ref 8.4–10.5)
CHLORIDE SERPL-SCNC: 102 MMOL/L — SIGNIFICANT CHANGE UP (ref 98–107)
CHOLEST SERPL-MCNC: 132 MG/DL — SIGNIFICANT CHANGE UP
CO2 SERPL-SCNC: 23 MMOL/L — SIGNIFICANT CHANGE UP (ref 22–31)
CREAT SERPL-MCNC: 0.91 MG/DL — SIGNIFICANT CHANGE UP (ref 0.5–1.3)
EGFR: 68 ML/MIN/1.73M2 — SIGNIFICANT CHANGE UP
GLUCOSE BLDC GLUCOMTR-MCNC: 204 MG/DL — HIGH (ref 70–99)
GLUCOSE BLDC GLUCOMTR-MCNC: 224 MG/DL — HIGH (ref 70–99)
GLUCOSE BLDC GLUCOMTR-MCNC: 294 MG/DL — HIGH (ref 70–99)
GLUCOSE BLDC GLUCOMTR-MCNC: 312 MG/DL — HIGH (ref 70–99)
GLUCOSE SERPL-MCNC: 229 MG/DL — HIGH (ref 70–99)
HCT VFR BLD CALC: 27.3 % — LOW (ref 34.5–45)
HDLC SERPL-MCNC: 32 MG/DL — LOW
HGB BLD-MCNC: 9.2 G/DL — LOW (ref 11.5–15.5)
LIPID PNL WITH DIRECT LDL SERPL: 67 MG/DL — SIGNIFICANT CHANGE UP
MAGNESIUM SERPL-MCNC: 1.8 MG/DL — SIGNIFICANT CHANGE UP (ref 1.6–2.6)
MCHC RBC-ENTMCNC: 29 PG — SIGNIFICANT CHANGE UP (ref 27–34)
MCHC RBC-ENTMCNC: 33.7 GM/DL — SIGNIFICANT CHANGE UP (ref 32–36)
MCV RBC AUTO: 86.1 FL — SIGNIFICANT CHANGE UP (ref 80–100)
NON HDL CHOLESTEROL: 100 MG/DL — SIGNIFICANT CHANGE UP
NRBC # BLD: 0 /100 WBCS — SIGNIFICANT CHANGE UP (ref 0–0)
NRBC # FLD: 0.05 K/UL — HIGH (ref 0–0)
PHOSPHATE SERPL-MCNC: 3.7 MG/DL — SIGNIFICANT CHANGE UP (ref 2.5–4.5)
PLATELET # BLD AUTO: 806 K/UL — HIGH (ref 150–400)
POTASSIUM SERPL-MCNC: 4.7 MMOL/L — SIGNIFICANT CHANGE UP (ref 3.5–5.3)
POTASSIUM SERPL-SCNC: 4.7 MMOL/L — SIGNIFICANT CHANGE UP (ref 3.5–5.3)
PROT SERPL-MCNC: 6.7 G/DL — SIGNIFICANT CHANGE UP (ref 6–8.3)
RBC # BLD: 3.17 M/UL — LOW (ref 3.8–5.2)
RBC # FLD: 14.7 % — HIGH (ref 10.3–14.5)
SODIUM SERPL-SCNC: 136 MMOL/L — SIGNIFICANT CHANGE UP (ref 135–145)
TRIGL SERPL-MCNC: 164 MG/DL — HIGH
WBC # BLD: 14.14 K/UL — HIGH (ref 3.8–10.5)
WBC # FLD AUTO: 14.14 K/UL — HIGH (ref 3.8–10.5)

## 2022-09-01 PROCEDURE — 99232 SBSQ HOSP IP/OBS MODERATE 35: CPT

## 2022-09-01 RX ORDER — INFLUENZA VIRUS VACCINE 15; 15; 15; 15 UG/.5ML; UG/.5ML; UG/.5ML; UG/.5ML
0.7 SUSPENSION INTRAMUSCULAR ONCE
Refills: 0 | Status: DISCONTINUED | OUTPATIENT
Start: 2022-09-01 | End: 2022-09-13

## 2022-09-01 RX ORDER — MAGNESIUM SULFATE 500 MG/ML
1 VIAL (ML) INJECTION ONCE
Refills: 0 | Status: COMPLETED | OUTPATIENT
Start: 2022-09-01 | End: 2022-09-01

## 2022-09-01 RX ORDER — OXYCODONE HYDROCHLORIDE 5 MG/1
10 TABLET ORAL EVERY 4 HOURS
Refills: 0 | Status: DISCONTINUED | OUTPATIENT
Start: 2022-09-01 | End: 2022-09-08

## 2022-09-01 RX ORDER — MAGNESIUM SULFATE 500 MG/ML
2 VIAL (ML) INJECTION ONCE
Refills: 0 | Status: DISCONTINUED | OUTPATIENT
Start: 2022-09-01 | End: 2022-09-01

## 2022-09-01 RX ORDER — ACETAMINOPHEN 500 MG
650 TABLET ORAL EVERY 6 HOURS
Refills: 0 | Status: DISCONTINUED | OUTPATIENT
Start: 2022-09-01 | End: 2022-09-03

## 2022-09-01 RX ORDER — INSULIN LISPRO 100/ML
10 VIAL (ML) SUBCUTANEOUS
Refills: 0 | Status: DISCONTINUED | OUTPATIENT
Start: 2022-09-01 | End: 2022-09-02

## 2022-09-01 RX ORDER — OXYCODONE HYDROCHLORIDE 5 MG/1
5 TABLET ORAL EVERY 4 HOURS
Refills: 0 | Status: DISCONTINUED | OUTPATIENT
Start: 2022-09-01 | End: 2022-09-06

## 2022-09-01 RX ADMIN — Medication 10 UNIT(S): at 17:38

## 2022-09-01 RX ADMIN — Medication 4: at 08:48

## 2022-09-01 RX ADMIN — ENOXAPARIN SODIUM 40 MILLIGRAM(S): 100 INJECTION SUBCUTANEOUS at 21:37

## 2022-09-01 RX ADMIN — Medication 10 UNIT(S): at 12:53

## 2022-09-01 RX ADMIN — INSULIN GLARGINE 50 UNIT(S): 100 INJECTION, SOLUTION SUBCUTANEOUS at 21:37

## 2022-09-01 RX ADMIN — Medication 4: at 17:38

## 2022-09-01 RX ADMIN — LISINOPRIL 20 MILLIGRAM(S): 2.5 TABLET ORAL at 05:16

## 2022-09-01 RX ADMIN — Medication 100 MILLIGRAM(S): at 05:16

## 2022-09-01 RX ADMIN — Medication 100 MILLIGRAM(S): at 21:37

## 2022-09-01 RX ADMIN — Medication 100 GRAM(S): at 08:48

## 2022-09-01 RX ADMIN — Medication 8: at 12:53

## 2022-09-01 RX ADMIN — Medication 100 MILLIGRAM(S): at 13:33

## 2022-09-01 NOTE — CONSULT NOTE ADULT - PROBLEM SELECTOR RECOMMENDATION 2
Suggest to continue medications, monitoring, FU primary/Sx team recommendations. .
fasting lipid panel in AM  hold statin for now secondary to transaminitis

## 2022-09-01 NOTE — PROGRESS NOTE ADULT - SUBJECTIVE AND OBJECTIVE BOX
A Team Surgery Daily Progress Note    SUBJECTIVE    Patient seen/examined this morning. Patient continues to endorse abdominal discomfort at site of I&D. Patient tolerating diet with no N/V.       OBJECTIVE    Vital Signs Last 24 Hrs  T(C): 36.9 (30 Aug 2022 07:40), Max: 37.4 (29 Aug 2022 09:04)  T(F): 98.5 (30 Aug 2022 07:40), Max: 99.3 (29 Aug 2022 09:04)  HR: 111 (30 Aug 2022 07:40) (95 - 111)  BP: 116/94 (30 Aug 2022 07:40) (113/54 - 158/70)  RR: 18 (30 Aug 2022 07:40) (15 - 18)  SpO2: 100% (30 Aug 2022 07:40) (99% - 100%)    O2 Parameters below as of 30 Aug 2022 07:40  Patient On (Oxygen Delivery Method): room air    I&O's Summary    Physical Exam:  Gen: NAD, A&Ox3  Chest: non labored breathing   Cardiac: Regular rate and rhythm   Abd: soft, mildly tender to palpation surrounding wound with induration, wound appears clean, packing removed without any evidence of bleeding or purulence  Ext: warm, well perfused    LABS: Pending

## 2022-09-01 NOTE — CONSULT NOTE ADULT - ASSESSMENT
Assessment  DMT2: 72y Female with DM T2 with hyperglycemia admitted with abdominal wall wound, patient was on insulin at home, now on basal bolus and insulin coverage, blood sugars running high,  eating meals, compliant with low carb diet.  Abdominal wall wound: : S/P drain, on medications, monitored.  HTN: On antihypertensive medications, monitored, asymptomatic.  Overweight: No strict exercise routines, neither on low calorie diet.                 Brayden Richards MD  Cell: [phone]  Office: [phone]

## 2022-09-01 NOTE — PROGRESS NOTE ADULT - SUBJECTIVE AND OBJECTIVE BOX
Patient is a 72y old  Female who presents with a chief complaint of abscess (31 Aug 2022 13:58)      DATE OF SERVICE: 09-01-22 @ 10:09    SUBJECTIVE / OVERNIGHT EVENTS: overnight events noted    ROS:  Resp: No cough no sputum production  CVS: No chest pain no palpitations no orthopnea  GI: no N/V/D  : no dysuria, no hematuria  Neuro: no weakness no paresthesias  Heme: No petechiae no easy bruising  Msk: No joint pain no swelling  Skin: No rash no itching        MEDICATIONS  (STANDING):  ceFAZolin   IVPB 1000 milliGRAM(s) IV Intermittent every 8 hours  dextrose 5%. 1000 milliLiter(s) (50 mL/Hr) IV Continuous <Continuous>  dextrose 5%. 1000 milliLiter(s) (100 mL/Hr) IV Continuous <Continuous>  dextrose 50% Injectable 25 Gram(s) IV Push once  dextrose 50% Injectable 12.5 Gram(s) IV Push once  dextrose 50% Injectable 25 Gram(s) IV Push once  enoxaparin Injectable 40 milliGRAM(s) SubCutaneous every 24 hours  glucagon  Injectable 1 milliGRAM(s) IntraMuscular once  influenza  Vaccine (HIGH DOSE) 0.7 milliLiter(s) IntraMuscular once  insulin glargine Injectable (LANTUS) 50 Unit(s) SubCutaneous at bedtime  insulin lispro (ADMELOG) corrective regimen sliding scale   SubCutaneous three times a day before meals  insulin lispro Injectable (ADMELOG) 10 Unit(s) SubCutaneous three times a day before meals  lisinopril 20 milliGRAM(s) Oral daily  magnesium sulfate  IVPB 2 Gram(s) IV Intermittent once  sodium chloride 0.9%. 1000 milliLiter(s) (75 mL/Hr) IV Continuous <Continuous>    MEDICATIONS  (PRN):  acetaminophen     Tablet .. 650 milliGRAM(s) Oral every 6 hours PRN Mild Pain (1 - 3)  acetaminophen     Tablet .. 650 milliGRAM(s) Oral every 6 hours PRN Mild Pain (1 - 3), Moderate Pain (4 - 6)  dextrose Oral Gel 15 Gram(s) Oral once PRN Blood Glucose LESS THAN 70 milliGRAM(s)/deciliter  oxyCODONE    IR 5 milliGRAM(s) Oral every 4 hours PRN Moderate Pain (4 - 6)  oxyCODONE    IR 10 milliGRAM(s) Oral every 4 hours PRN Severe Pain (7 - 10)        CAPILLARY BLOOD GLUCOSE      POCT Blood Glucose.: 204 mg/dL (01 Sep 2022 08:25)  POCT Blood Glucose.: 214 mg/dL (31 Aug 2022 21:42)  POCT Blood Glucose.: 332 mg/dL (31 Aug 2022 17:02)  POCT Blood Glucose.: 379 mg/dL (31 Aug 2022 12:20)    I&O's Summary    31 Aug 2022 07:01  -  01 Sep 2022 07:00  --------------------------------------------------------  IN: 1075 mL / OUT: 475 mL / NET: 600 mL        Vital Signs Last 24 Hrs  T(C): 36.3 (01 Sep 2022 09:36), Max: 36.8 (01 Sep 2022 05:16)  T(F): 97.4 (01 Sep 2022 09:36), Max: 98.3 (01 Sep 2022 05:16)  HR: 97 (01 Sep 2022 09:36) (59 - 99)  BP: 159/87 (01 Sep 2022 09:36) (102/56 - 159/87)  BP(mean): --  RR: 16 (01 Sep 2022 09:36) (16 - 18)  SpO2: 99% (01 Sep 2022 09:36) (99% - 100%)    PHYSICAL EXAM:  GENERAL: NAD  EYES: EOMI, PERRLA,  NECK: Supple, No JVD  CHEST/LUNG: Clear   HEART: S1S2;  no murmurs  ABDOMEN: Soft nontender  EXTREMITIES: no edema  NEUROLOGY: Alert, nonfocal  SKIN: No rashes or lesions    LABS:                        9.2    14.14 )-----------( 806      ( 01 Sep 2022 05:56 )             27.3     09-01    136  |  102  |  23  ----------------------------<  229<H>  4.7   |  23  |  0.91    Ca    9.0      01 Sep 2022 05:56  Phos  3.7     09-01  Mg     1.80     09-01    TPro  7.3  /  Alb  3.0<L>  /  TBili  <0.2  /  DBili  <0.2  /  AST  39<H>  /  ALT  76<H>  /  AlkPhos  177<H>  08-31                All consultant(s) notes reviewed and care discussed with other providers        Contact Number, Dr Reed [phone]

## 2022-09-01 NOTE — PROGRESS NOTE ADULT - ASSESSMENT
71 yo F colon CA, DM, diabetic neuropathy, recent hernia repair, presenting with discharge from wound    #Abdominal wall abscess  I+D performed on day of admission with cultures growing MSSA  On cefazolin - tolerating well  Blood cultures negative to date    #Positive urine culture  Patient is asymptomatic  Pansensitive pseudomonas growth  Unlikely to be clinically significant at this time    #Leukocytosis  WBC 14.1    #Thrombocytosis  Reactive ?      #Elevated LFTs  AST/ALT/ALP all elevated  downtrending    Recommendations  Continue cefazolin 1 gq8hr while inpatient  If discharged, can send on cephalexin 500 mg q6hr  Plan for a total of 10 days of therapy from I+D on 8/29/22 (end date: 9/8/22)  Surgery input  Follow pending blood cultures  Follow fever curve and WBC count    Stephon Reyes MD  Division of Infectious Diseases  Available on Teams

## 2022-09-01 NOTE — PROGRESS NOTE ADULT - ASSESSMENT
72F admitted with anterior abdominal wall abscess, now s/p bedside I&D.     Plan:   - Will follow up AM CBC and assess antibiotics  - Possibly open wound more today   - Glucose now in 200s, better control on home Lantus 50U, 8U pre-meal and SSI. Will continue to monitor   - Continue IV abx  - Continue IVF   - Continue with daily dressing change   - Pain Control   - DVT PPx: LVX    A Team Surgery  P# [id number]

## 2022-09-01 NOTE — CONSULT NOTE ADULT - SUBJECTIVE AND OBJECTIVE BOX
"HPI:  Patient is a 73 yo Female with PMH of Colon CA, DM, HTN, HLD, diabetic neuropathy, Hernia presents c/o yellow/green drainage from wound on lower abdomen s/p hernia repair on , and PSH of colonic neoplasm resection with anastomosis. Stitches removed  however since that time patient states the area has been tender, erythematous, and as of yesterday clear/yellow discharge from an open wound over the incision site in the lower mid abdomen.    Patient seen and evaluated in the ED. She is comfortable in bed, hemodynamically stable. Denies f/c, cp, sob, n/v/d. Abdomen is soft, nondistended. There is a midline postsurgical incision scar with 1cm opening in the middle of the incision. There is significant induration, erythema around the opening and tender to palpation.     Patient was seen with Dr. Hernandez. Bedside I&D was performed with wound packing. Wound dressing applied.  (29 Aug 2022 16:39)"    Above reviewed. 73 yo F colon CA, DM, diabetic neuropathy, recent hernia repair, presenting with discharge from wound  No fevers, no chills  Patient with mild pain at incision site  No cough, sob, no dysuria, no pyuria, no other new focal complaints  ID called for further eval    PAST MEDICAL & SURGICAL HISTORY:  Essential hypertension      Diabetes mellitus  type 2      Hyperlipidemia      Diabetes mellitus, type 2      Essential hypertension      Hyperlipidemia, unspecified hyperlipidemia type      Obesity (BMI 30.0-34.9)      Hepatic flexure mass  malignant neoplasm      Malignant neoplasm of hepatic flexure      No significant past surgical history      H/O hemicolectomy  right - 2017      Allergies    No Known Allergies    Intolerances    ANTIMICROBIALS:  piperacillin/tazobactam IVPB.. 3.375 every 8 hours  vancomycin  IVPB 1000 every 12 hours    OTHER MEDS:  acetaminophen     Tablet .. 650 milliGRAM(s) Oral every 6 hours PRN  dextrose 5%. 1000 milliLiter(s) IV Continuous <Continuous>  dextrose 5%. 1000 milliLiter(s) IV Continuous <Continuous>  dextrose 50% Injectable 25 Gram(s) IV Push once  dextrose 50% Injectable 12.5 Gram(s) IV Push once  dextrose 50% Injectable 25 Gram(s) IV Push once  dextrose Oral Gel 15 Gram(s) Oral once PRN  enoxaparin Injectable 40 milliGRAM(s) SubCutaneous every 24 hours  glucagon  Injectable 1 milliGRAM(s) IntraMuscular once  HYDROmorphone  Injectable 0.5 milliGRAM(s) IV Push every 6 hours PRN  insulin glargine Injectable (LANTUS) 40 Unit(s) SubCutaneous at bedtime  insulin lispro (ADMELOG) corrective regimen sliding scale   SubCutaneous three times a day before meals  insulin lispro Injectable (ADMELOG) 8 Unit(s) SubCutaneous three times a day before meals  lisinopril 20 milliGRAM(s) Oral daily  sodium chloride 0.9%. 1000 milliLiter(s) IV Continuous <Continuous>    SOCIAL HISTORY: No tobacco, no alcohol, no illicit drugs    FAMILY HISTORY:  No pertinent family history in first degree relatives relating to chief complaint    Drug Dosing Weight  Height (cm): 147.3 (29 Aug 2022 07:46)  Weight (kg): 70.3 (2022 12:36)  BMI (kg/m2): 32.4 (29 Aug 2022 07:46)  BSA (m2): 1.63 (29 Aug 2022 07:46)    PE:    Vital Signs Last 24 Hrs  T(C): 36.8 (31 Aug 2022 09:19), Max: 36.9 (30 Aug 2022 15:13)  T(F): 98.2 (31 Aug 2022 09:19), Max: 98.4 (30 Aug 2022 15:13)  HR: 93 (31 Aug 2022 09:19) (80 - 107)  BP: 125/76 (31 Aug 2022 09:19) (120/74 - 135/67)  RR: 18 (31 Aug 2022 09:19) (18 - 18)  SpO2: 99% (31 Aug 2022 09:19) (97% - 100%)    Gen: AOx3, NAD, non-toxic  CV: S1+S2 normal, nontachycardic  Resp: Clear bilat, no resp distress, no crackles/wheezes  Abd: Soft, nontender, +BS  Ext: No LE edema, no wounds  : No [place]  IV/Skin: No thrombophlebitis  Msk: No low back pain, no arthralgias, no joint swelling  Neuro: No sensory deficits, no motor deficits    LABS:                        8.8    12.07 )-----------( 804      ( 31 Aug 2022 06:50 )             27.0     08-31    132<L>  |  97<L>  |  16  ----------------------------<  316<H>  4.6   |  23  |  0.87    Ca    8.7      31 Aug 2022 06:50  Phos  3.5       Mg     2.30         TPro  7.3  /  Alb  3.0<L>  /  TBili  <0.2  /  DBili  <0.2  /  AST  39<H>  /  ALT  76<H>  /  AlkPhos  177<H>      Urinalysis Basic - ( 29 Aug 2022 14:22 )    Color: Light Yellow / Appearance: Clear / S.024 / pH: x  Gluc: x / Ketone: Negative  / Bili: Negative / Urobili: <2 mg/dL   Blood: x / Protein: Trace / Nitrite: Negative   Leuk Esterase: Negative / RBC: x / WBC x   Sq Epi: x / Non Sq Epi: x / Bacteria: x    MICROBIOLOGY:    Clean Catch Clean Catch (Midstream)  22   10,000 - 49,000 CFU/mL Pseudomonas aeruginosa  --  --    Abdominal Fl Abdominal Fluid  22   Moderate Staphylococcus aureus  --  Staphylococcus aureus    .Blood Blood-Peripheral  22   No growth to date.  --  --    .Blood Blood-Peripheral  22   No growth to date.  --  --    (otherwise reviewed)    RADIOLOGY:     CT:    IMPRESSION:  A 6.0 x 2.0 cm fluid collection of the lower abdominal wall, likely an   abscess given the extensive surrounding infiltration and tract extending   to the right lower quadrant abdominal wall skin surface.     USG:    FINDINGS:  Liver: Increased echogenicity.  Bile ducts: Normal caliber. Common bile duct measures 3 mm.  Gallbladder: Within normal limits.  Pancreas: Visualized portions are within normal limits.  Right kidney: 9.6 cm. No hydronephrosis.  Ascites: None.  IVC: Visualized portions are within normal limits.    IMPRESSION:  Hepatic steatosis.
DATE OF SERVICE: 22 @ 11:19    Patient is a 72y old  Female who presents with a chief complaint of abdominal wall abscess (30 Aug 2022 11:12)      HPI:    Patient is a 73 yo Female with PMH of Colon CA, DM 2, HTN, HLD, diabetic neuropathy, Hernia presents c/o yellow/green drainage from wound on lower abdomen s/p hernia repair on and PSH of colonic neoplasm resection with anastomosis. Stitches removed  however since that time patient states the area has been tender, erythematous, and as of yesterday clear/yellow discharge from an open wound over the incision site in the lower mid abdomen. Currently asymptomatic     PAST MEDICAL & SURGICAL HISTORY:    Essential hypertension      Diabetes mellitus  type 2      Hyperlipidemia      Diabetes mellitus, type 2      Essential hypertension      Hyperlipidemia, unspecified hyperlipidemia type      Obesity (BMI 30.0-34.9)      Hepatic flexure mass  malignant neoplasm      Malignant neoplasm of hepatic flexure      No significant past surgical history      H/O hemicolectomy  right - 2017          Review of Systems:   CONSTITUTIONAL: No fever, weight loss, or fatigue  EYES: No eye pain, visual disturbances, or discharge  ENMT:  No difficulty hearing, tinnitus, vertigo; No sinus or throat pain  NECK: No pain or stiffness  RESPIRATORY: No cough, wheezing, chills or hemoptysis; No shortness of breath  CARDIOVASCULAR: No chest pain, palpitations, dizziness, leg swelling or sob  GASTROINTESTINAL: No abdominal pain. No nausea, vomiting, diarrhea or constipation  GENITOURINARY: No dysuria, frequency, hematuria, or incontinence  NEUROLOGICAL: No headaches, memory loss, loss of strength, numbness, or tremors  SKIN: see above HPI   LYMPH NODES: No enlarged glands  ENDOCRINE: No heat or cold intolerance; No hair loss  MUSCULOSKELETAL: No joint pain or swelling; No muscle, back, or extremity pain  PSYCHIATRIC: No depression, anxiety, mood swings, or difficulty sleeping  HEME/LYMPH: No easy bruising, or bleeding gums  ALLERGY AND IMMUNOLOGIC: No hives or eczema    Allergies    No Known Allergies          Social History: non smoker  no IVDA  no ETOH abuse   lives with family     FAMILY HISTORY:  No pertinent family history in first degree relatives        MEDICATIONS  (STANDING):  dextrose 5%. 1000 milliLiter(s) (50 mL/Hr) IV Continuous <Continuous>  dextrose 5%. 1000 milliLiter(s) (100 mL/Hr) IV Continuous <Continuous>  dextrose 50% Injectable 25 Gram(s) IV Push once  dextrose 50% Injectable 12.5 Gram(s) IV Push once  dextrose 50% Injectable 25 Gram(s) IV Push once  enoxaparin Injectable 40 milliGRAM(s) SubCutaneous every 24 hours  glucagon  Injectable 1 milliGRAM(s) IntraMuscular once  insulin lispro (ADMELOG) corrective regimen sliding scale   SubCutaneous three times a day before meals  lisinopril 20 milliGRAM(s) Oral daily  magnesium sulfate  IVPB 2 Gram(s) IV Intermittent every 1 hour  piperacillin/tazobactam IVPB.. 3.375 Gram(s) IV Intermittent every 8 hours  simvastatin 40 milliGRAM(s) Oral at bedtime  sodium chloride 0.9%. 1000 milliLiter(s) (75 mL/Hr) IV Continuous <Continuous>    MEDICATIONS  (PRN):  acetaminophen     Tablet .. 650 milliGRAM(s) Oral every 6 hours PRN Mild Pain (1 - 3), Moderate Pain (4 - 6)  dextrose Oral Gel 15 Gram(s) Oral once PRN Blood Glucose LESS THAN 70 milliGRAM(s)/deciliter  HYDROmorphone  Injectable 0.5 milliGRAM(s) IV Push every 6 hours PRN Severe Pain (7 - 10)      CAPILLARY BLOOD GLUCOSE        I&O's Summary      24hrs Vital:  T(C): 36.7 (22 @ 09:00), Max: 37.1 (22 @ 20:50)  HR: 107 (22 @ 09:00) (95 - 111)  BP: 131/68 (22 @ 09:00) (113/54 - 158/70)  RR: 18 (22 @ 09:00) (15 - 18)  SpO2: 100% (22 @ 09:00) (99% - 100%)    PHYSICAL EXAM:  GENERAL: NAD, well-developed  HEAD:  Atraumatic, Normocephalic  EYES: EOMI, PERRLA, conjunctiva and sclera clear  NECK: Supple, No JVD  CHEST/LUNG: Clear to auscultation bilaterally; No wheeze  HEART: S1S2; No rubs, or gallops, no murmurs  ABDOMEN: Soft, Nontender; Bowel sounds present  + abdominal wall bandage  EXTREMITIES:  + Peripheral Pulses, No clubbing or cyanosis, no edema  PSYCH: AO x 3,   NEUROLOGY: Alert, no focal motor or sensory deficits  SKIN: No rashes or lesions    LABS:                        8.7    15.50 )-----------( 728      ( 30 Aug 2022 09:17 )             25.8     08-30    129<L>  |  97<L>  |  13  ----------------------------<  286<H>  4.9   |  22  |  0.76    Ca    9.4      30 Aug 2022 09:17  Phos  3.3     08-  Mg     1.40         TPro  7.8  /  Alb  3.3  /  TBili  0.2  /  DBili  x   /  AST  89<H>  /  ALT  107<H>  /  AlkPhos  237<H>  08    PT/INR - ( 29 Aug 2022 09:32 )   PT: 14.8 sec;   INR: 1.27 ratio         PTT - ( 29 Aug 2022 09:32 )  PTT:31.7 sec      Urinalysis Basic - ( 29 Aug 2022 14:22 )    Color: Light Yellow / Appearance: Clear / S.024 / pH: x  Gluc: x / Ketone: Negative  / Bili: Negative / Urobili: <2 mg/dL   Blood: x / Protein: Trace / Nitrite: Negative   Leuk Esterase: Negative / RBC: x / WBC x   Sq Epi: x / Non Sq Epi: x / Bacteria: x        RADIOLOGY & ADDITIONAL TESTS:    Consultant(s) Notes Reviewed:      Care Discussed with Consultants/Other Providers:  
      HPI:  Patient is a 73 yo Female with PMH of Colon CA, DM, HTN, HLD, diabetic neuropathy, Hernia presents c/o yellow/green drainage from wound on lower abdomen s/p hernia repair on 7/20, and PSH of colonic neoplasm resection with anastomosis. Stitches removed 8/1 however since that time patient states the area has been tender, erythematous, and as of yesterday clear/yellow discharge from an open wound over the incision site in the lower mid abdomen.    Patient seen and evaluated in the ED. She is comfortable in bed, hemodynamically stable. Denies f/c, cp, sob, n/v/d. Abdomen is soft, nondistended. There is a midline postsurgical incision scar with 1cm opening in the middle of the incision. There is significant induration, erythema around the opening and tender to palpation.     Patient was seen with Dr. Hernandez. Bedside I&D was performed with wound packing. Wound dressing applied.  (29 Aug 2022 16:39)  Patient has history of diabetes, on insulin at home, no recent hypoglycemic episodes. Patient follows up with PCP for diabetes management.    PAST MEDICAL & SURGICAL HISTORY:  Essential hypertension      Diabetes mellitus  type 2      Hyperlipidemia      Diabetes mellitus, type 2      Essential hypertension      Hyperlipidemia, unspecified hyperlipidemia type      Obesity (BMI 30.0-34.9)      Hepatic flexure mass  malignant neoplasm      Malignant neoplasm of hepatic flexure      No significant past surgical history      H/O hemicolectomy  right - 1/30/2017          FAMILY HISTORY:  No pertinent family history in first degree relatives        Social History:    Outpatient Medications:    MEDICATIONS  (STANDING):  ceFAZolin   IVPB 1000 milliGRAM(s) IV Intermittent every 8 hours  dextrose 5%. 1000 milliLiter(s) (50 mL/Hr) IV Continuous <Continuous>  dextrose 5%. 1000 milliLiter(s) (100 mL/Hr) IV Continuous <Continuous>  dextrose 50% Injectable 25 Gram(s) IV Push once  dextrose 50% Injectable 12.5 Gram(s) IV Push once  dextrose 50% Injectable 25 Gram(s) IV Push once  enoxaparin Injectable 40 milliGRAM(s) SubCutaneous every 24 hours  glucagon  Injectable 1 milliGRAM(s) IntraMuscular once  insulin glargine Injectable (LANTUS) 50 Unit(s) SubCutaneous at bedtime  insulin lispro (ADMELOG) corrective regimen sliding scale   SubCutaneous three times a day before meals  insulin lispro Injectable (ADMELOG) 10 Unit(s) SubCutaneous three times a day before meals  lisinopril 20 milliGRAM(s) Oral daily  magnesium sulfate  IVPB 1 Gram(s) IV Intermittent once  sodium chloride 0.9%. 1000 milliLiter(s) (75 mL/Hr) IV Continuous <Continuous>    MEDICATIONS  (PRN):  acetaminophen     Tablet .. 650 milliGRAM(s) Oral every 6 hours PRN Mild Pain (1 - 3), Moderate Pain (4 - 6)  acetaminophen     Tablet .. 650 milliGRAM(s) Oral every 6 hours PRN Mild Pain (1 - 3)  dextrose Oral Gel 15 Gram(s) Oral once PRN Blood Glucose LESS THAN 70 milliGRAM(s)/deciliter  oxyCODONE    IR 5 milliGRAM(s) Oral every 4 hours PRN Moderate Pain (4 - 6)  oxyCODONE    IR 10 milliGRAM(s) Oral every 4 hours PRN Severe Pain (7 - 10)      Allergies    No Known Allergies    Intolerances      Review of Systems:  Constitutional: No fever, no chills  Eyes: No blurry vision  Neuro: No tremors  HEENT: No pain, no neck swelling  Cardiovascular: No chest pain, no palpitations  Respiratory: No SOB, no cough  GI: No nausea, vomiting, abdominal pain  : No dysuria  Skin: no rash  MSK: Has leg swelling.  Psych: no depression  Endocrine: no polyuria, polydipsia    UNABLE TO OBTAIN    ALL OTHER SYSTEMS REVIEWED AND NEGATIVE        PHYSICAL EXAM:  VITALS: T(C): 36.8 (09-01-22 @ 05:16)  T(F): 98.3 (09-01-22 @ 05:16), Max: 98.3 (09-01-22 @ 05:16)  HR: 86 (09-01-22 @ 05:16) (59 - 99)  BP: 137/77 (09-01-22 @ 05:16) (102/56 - 141/72)  RR:  (16 - 18)  SpO2:  (99% - 100%)  Wt(kg): --  GENERAL: NAD, well-groomed, well-developed  EYES: No proptosis, no lid lag  HEENT:  Atraumatic, Normocephalic  THYROID: Normal size, no palpable nodules  RESPIRATORY: Clear to auscultation bilaterally; No rales, rhonchi, wheezing  CARDIOVASCULAR: Si S2, No murmurs;  GI: Soft, non distended, normal bowel sounds  SKIN: Dry, intact, No rashes or lesions  MUSCULOSKELETAL: Has BL lower extremity edema.  NEURO:  no tremor, sensation decreased in feet BL,    POCT Blood Glucose.: 214 mg/dL (08-31-22 @ 21:42)  POCT Blood Glucose.: 332 mg/dL (08-31-22 @ 17:02)  POCT Blood Glucose.: 379 mg/dL (08-31-22 @ 12:20)  POCT Blood Glucose.: 315 mg/dL (08-31-22 @ 08:32)  POCT Blood Glucose.: 376 mg/dL (08-30-22 @ 21:20)  POCT Blood Glucose.: 340 mg/dL (08-30-22 @ 16:32)  POCT Blood Glucose.: 399 mg/dL (08-30-22 @ 15:24)  POCT Blood Glucose.: 448 mg/dL (08-30-22 @ 14:18)  POCT Blood Glucose.: 427 mg/dL (08-30-22 @ 13:27)  POCT Blood Glucose.: 419 mg/dL (08-30-22 @ 13:25)                            9.2    14.14 )-----------( 806      ( 01 Sep 2022 05:56 )             27.3       09-01    136  |  102  |  23  ----------------------------<  229<H>  4.7   |  23  |  0.91    eGFR: 68    Ca    9.0      09-01  Mg     1.80     09-01  Phos  3.7     09-01    TPro  7.3  /  Alb  3.0<L>  /  TBili  <0.2  /  DBili  <0.2  /  AST  39<H>  /  ALT  76<H>  /  AlkPhos  177<H>  08-31      Thyroid Function Tests:          09-01 Chol 132 Direct LDL -- LDL calculated 67 HDL 32<L> Trig 164<H>, 08-31 Chol 114 Direct LDL -- LDL calculated 54 HDL 30<L> Trig 152<H>    Radiology:

## 2022-09-01 NOTE — CONSULT NOTE ADULT - PROBLEM SELECTOR RECOMMENDATION 3
unclear etiology  hold statin  hep profile in AM
Continue medications, monitoring, FU primary team recommendations. .

## 2022-09-01 NOTE — PROGRESS NOTE ADULT - SUBJECTIVE AND OBJECTIVE BOX
Follow Up:  abdominal wall abscess    Interval History/ROS:  Overnight: no acute events. Patient afebrile.    Patinet seen and examined at bedside.  is bedside during encounter. Patient apepars comfortable and non-toxic. Patient denies any new pain or discomfort.    Allergies  No Known Allergies        ANTIMICROBIALS:  ceFAZolin   IVPB 1000 every 8 hours      OTHER MEDS:  MEDICATIONS  (STANDING):  acetaminophen     Tablet .. 650 every 6 hours PRN  acetaminophen     Tablet .. 650 every 6 hours PRN  dextrose 50% Injectable 25 once  dextrose 50% Injectable 12.5 once  dextrose 50% Injectable 25 once  dextrose Oral Gel 15 once PRN  enoxaparin Injectable 40 every 24 hours  glucagon  Injectable 1 once  influenza  Vaccine (HIGH DOSE) 0.7 once  insulin glargine Injectable (LANTUS) 50 at bedtime  insulin lispro (ADMELOG) corrective regimen sliding scale  three times a day before meals  insulin lispro Injectable (ADMELOG) 10 three times a day before meals  lisinopril 20 daily  oxyCODONE    IR 5 every 4 hours PRN  oxyCODONE    IR 10 every 4 hours PRN      Vital Signs Last 24 Hrs  T(C): 36.7 (01 Sep 2022 13:40), Max: 36.8 (01 Sep 2022 05:16)  T(F): 98 (01 Sep 2022 13:40), Max: 98.3 (01 Sep 2022 05:16)  HR: 103 (01 Sep 2022 13:40) (59 - 103)  BP: 140/81 (01 Sep 2022 13:40) (102/56 - 159/87)  BP(mean): --  RR: 16 (01 Sep 2022 13:40) (16 - 18)  SpO2: 98% (01 Sep 2022 13:40) (98% - 100%)    Parameters below as of 01 Sep 2022 13:40  Patient On (Oxygen Delivery Method): room air        PHYSICAL EXAM:  Constitutional: non-toxic, no distress  HEAD/EYES: anicteric, no conjunctival injection  Cardiovascular:   normal S1, S2, no murmur, no edema  Respiratory:  no wheezes, no rales  GI:  soft, non-tender, normal bowel sounds  Musculoskeletal:  no synovitis, normal ROM  Neurologic: awake and alert, normal strength, no focal findings  Skin:  abdominal wound examined, area of induration around wound however non-tender, wound is packed, no drainage noted                                9.2    14.14 )-----------( 806      ( 01 Sep 2022 05:56 )             27.3       09-01    136  |  102  |  23  ----------------------------<  229<H>  4.7   |  23  |  0.91    Ca    9.0      01 Sep 2022 05:56  Phos  3.7     09-01  Mg     1.80     09-01    TPro  6.7  /  Alb  3.0<L>  /  TBili  <0.2  /  DBili  <0.2  /  AST  37<H>  /  ALT  63<H>  /  AlkPhos  156<H>  09-01          MICROBIOLOGY:  v    Culture - Urine (collected 29 Aug 2022 12:26)  Source: Clean Catch Clean Catch (Midstream)  Final Report (31 Aug 2022 15:33):    10,000 - 49,000 CFU/mL Pseudomonas aeruginosa  Organism: Pseudomonas aeruginosa (31 Aug 2022 15:33)  Organism: Pseudomonas aeruginosa (31 Aug 2022 15:33)      -  Amikacin: S <=16      -  Aztreonam: S <=4      -  Cefepime: S <=2      -  Ceftazidime: S <=1      -  Ciprofloxacin: S <=0.25      -  Gentamicin: S <=2      -  Imipenem: S 2      -  Levofloxacin: S <=0.5      -  Meropenem: S <=1      -  Piperacillin/Tazobactam: S <=8      -  Tobramycin: S <=2      Method Type: KEVIN    Culture - Body Fluid with Gram Stain (collected 29 Aug 2022 10:50)  Source: Abdominal Fl Abdominal Fluid  Gram Stain (29 Aug 2022 20:13):    Numerous polymorphonuclear leukocytes per low power field    Few Gram positive cocci in pairs per oil power field  Preliminary Report (30 Aug 2022 12:06):    Moderate Staphylococcus aureus  Organism: Staphylococcus aureus (31 Aug 2022 10:51)  Organism: Staphylococcus aureus (31 Aug 2022 10:51)      -  Ampicillin/Sulbactam: S <=8/4      -  Cefazolin: S <=4      -  Clindamycin: S <=0.25      -  Erythromycin: S <=0.25      -  Gentamicin: S <=1 Should not be used as monotherapy      -  Oxacillin: S 0.5 Oxacillin predicts susceptibility for dicloxacillin, methicillin, and nafcillin      -  Penicillin: R >8      -  Rifampin: S <=1 Should not be used as monotherapy      -  Tetra/Doxy: S <=1      -  Trimethoprim/Sulfamethoxazole: S <=0.5/9.5      -  Vancomycin: S 2      Method Type: KEVIN    Culture - Blood (collected 29 Aug 2022 10:45)  Source: .Blood Blood-Peripheral  Preliminary Report (30 Aug 2022 14:01):    No growth to date.    Culture - Blood (collected 29 Aug 2022 10:30)  Source: .Blood Blood-Peripheral  Preliminary Report (30 Aug 2022 14:01):    No growth to date.                    RADIOLOGY:

## 2022-09-02 ENCOUNTER — TRANSCRIPTION ENCOUNTER (OUTPATIENT)
Age: 72
End: 2022-09-02

## 2022-09-02 LAB
ALBUMIN SERPL ELPH-MCNC: 3.4 G/DL — SIGNIFICANT CHANGE UP (ref 3.3–5)
ALP SERPL-CCNC: 152 U/L — HIGH (ref 40–120)
ALT FLD-CCNC: 42 U/L — HIGH (ref 4–33)
ANION GAP SERPL CALC-SCNC: 10 MMOL/L — SIGNIFICANT CHANGE UP (ref 7–14)
APTT BLD: 38.3 SEC — HIGH (ref 27–36.3)
AST SERPL-CCNC: 32 U/L — SIGNIFICANT CHANGE UP (ref 4–32)
BASOPHILS # BLD AUTO: 0 K/UL — SIGNIFICANT CHANGE UP (ref 0–0.2)
BASOPHILS NFR BLD AUTO: 0 % — SIGNIFICANT CHANGE UP (ref 0–2)
BILIRUB SERPL-MCNC: <0.2 MG/DL — SIGNIFICANT CHANGE UP (ref 0.2–1.2)
BUN SERPL-MCNC: 21 MG/DL — SIGNIFICANT CHANGE UP (ref 7–23)
CALCIUM SERPL-MCNC: 9.5 MG/DL — SIGNIFICANT CHANGE UP (ref 8.4–10.5)
CHLORIDE SERPL-SCNC: 100 MMOL/L — SIGNIFICANT CHANGE UP (ref 98–107)
CO2 SERPL-SCNC: 26 MMOL/L — SIGNIFICANT CHANGE UP (ref 22–31)
CREAT SERPL-MCNC: 0.9 MG/DL — SIGNIFICANT CHANGE UP (ref 0.5–1.3)
EGFR: 69 ML/MIN/1.73M2 — SIGNIFICANT CHANGE UP
EOSINOPHIL # BLD AUTO: 0.73 K/UL — HIGH (ref 0–0.5)
EOSINOPHIL NFR BLD AUTO: 4.3 % — SIGNIFICANT CHANGE UP (ref 0–6)
GLUCOSE BLDC GLUCOMTR-MCNC: 167 MG/DL — HIGH (ref 70–99)
GLUCOSE BLDC GLUCOMTR-MCNC: 197 MG/DL — HIGH (ref 70–99)
GLUCOSE BLDC GLUCOMTR-MCNC: 269 MG/DL — HIGH (ref 70–99)
GLUCOSE BLDC GLUCOMTR-MCNC: 88 MG/DL — SIGNIFICANT CHANGE UP (ref 70–99)
GLUCOSE SERPL-MCNC: 163 MG/DL — HIGH (ref 70–99)
HCT VFR BLD CALC: 27.9 % — LOW (ref 34.5–45)
HGB BLD-MCNC: 9.3 G/DL — LOW (ref 11.5–15.5)
IANC: 9.1 K/UL — HIGH (ref 1.8–7.4)
INR BLD: 1.11 RATIO — SIGNIFICANT CHANGE UP (ref 0.88–1.16)
LYMPHOCYTES # BLD AUTO: 33.9 % — SIGNIFICANT CHANGE UP (ref 13–44)
LYMPHOCYTES # BLD AUTO: 5.77 K/UL — HIGH (ref 1–3.3)
MCHC RBC-ENTMCNC: 28.8 PG — SIGNIFICANT CHANGE UP (ref 27–34)
MCHC RBC-ENTMCNC: 33.3 GM/DL — SIGNIFICANT CHANGE UP (ref 32–36)
MCV RBC AUTO: 86.4 FL — SIGNIFICANT CHANGE UP (ref 80–100)
MONOCYTES # BLD AUTO: 0.89 K/UL — SIGNIFICANT CHANGE UP (ref 0–0.9)
MONOCYTES NFR BLD AUTO: 5.2 % — SIGNIFICANT CHANGE UP (ref 2–14)
NEUTROPHILS # BLD AUTO: 8.88 K/UL — HIGH (ref 1.8–7.4)
NEUTROPHILS NFR BLD AUTO: 52.2 % — SIGNIFICANT CHANGE UP (ref 43–77)
NRBC # BLD: 0 /100 WBCS — SIGNIFICANT CHANGE UP (ref 0–0)
NRBC # FLD: 0.09 K/UL — HIGH (ref 0–0)
PLATELET # BLD AUTO: 858 K/UL — HIGH (ref 150–400)
POTASSIUM SERPL-MCNC: 4.7 MMOL/L — SIGNIFICANT CHANGE UP (ref 3.5–5.3)
POTASSIUM SERPL-SCNC: 4.7 MMOL/L — SIGNIFICANT CHANGE UP (ref 3.5–5.3)
PROT SERPL-MCNC: 7.4 G/DL — SIGNIFICANT CHANGE UP (ref 6–8.3)
PROTHROM AB SERPL-ACNC: 12.9 SEC — SIGNIFICANT CHANGE UP (ref 10.5–13.4)
RBC # BLD: 3.23 M/UL — LOW (ref 3.8–5.2)
RBC # FLD: 15 % — HIGH (ref 10.3–14.5)
SARS-COV-2 RNA SPEC QL NAA+PROBE: SIGNIFICANT CHANGE UP
SODIUM SERPL-SCNC: 136 MMOL/L — SIGNIFICANT CHANGE UP (ref 135–145)
WBC # BLD: 17.02 K/UL — HIGH (ref 3.8–10.5)
WBC # FLD AUTO: 17.02 K/UL — HIGH (ref 3.8–10.5)

## 2022-09-02 PROCEDURE — 99232 SBSQ HOSP IP/OBS MODERATE 35: CPT

## 2022-09-02 RX ORDER — INSULIN GLARGINE 100 [IU]/ML
50 INJECTION, SOLUTION SUBCUTANEOUS AT BEDTIME
Refills: 0 | Status: DISCONTINUED | OUTPATIENT
Start: 2022-09-03 | End: 2022-09-10

## 2022-09-02 RX ORDER — SIMVASTATIN 20 MG/1
40 TABLET, FILM COATED ORAL AT BEDTIME
Refills: 0 | Status: DISCONTINUED | OUTPATIENT
Start: 2022-09-02 | End: 2022-09-13

## 2022-09-02 RX ORDER — LIDOCAINE HYDROCHLORIDE AND EPINEPHRINE 10; 10 MG/ML; UG/ML
20 INJECTION, SOLUTION INFILTRATION; PERINEURAL ONCE
Refills: 0 | Status: DISCONTINUED | OUTPATIENT
Start: 2022-09-02 | End: 2022-09-09

## 2022-09-02 RX ORDER — INSULIN GLARGINE 100 [IU]/ML
45 INJECTION, SOLUTION SUBCUTANEOUS AT BEDTIME
Refills: 0 | Status: COMPLETED | OUTPATIENT
Start: 2022-09-02 | End: 2022-09-02

## 2022-09-02 RX ORDER — INSULIN LISPRO 100/ML
13 VIAL (ML) SUBCUTANEOUS
Refills: 0 | Status: DISCONTINUED | OUTPATIENT
Start: 2022-09-02 | End: 2022-09-09

## 2022-09-02 RX ORDER — SODIUM CHLORIDE 9 MG/ML
1000 INJECTION, SOLUTION INTRAVENOUS
Refills: 0 | Status: DISCONTINUED | OUTPATIENT
Start: 2022-09-02 | End: 2022-09-03

## 2022-09-02 RX ADMIN — Medication 13 UNIT(S): at 12:31

## 2022-09-02 RX ADMIN — Medication 650 MILLIGRAM(S): at 13:13

## 2022-09-02 RX ADMIN — INSULIN GLARGINE 45 UNIT(S): 100 INJECTION, SOLUTION SUBCUTANEOUS at 21:35

## 2022-09-02 RX ADMIN — ENOXAPARIN SODIUM 40 MILLIGRAM(S): 100 INJECTION SUBCUTANEOUS at 21:35

## 2022-09-02 RX ADMIN — Medication 100 MILLIGRAM(S): at 05:19

## 2022-09-02 RX ADMIN — LISINOPRIL 20 MILLIGRAM(S): 2.5 TABLET ORAL at 05:20

## 2022-09-02 RX ADMIN — Medication 13 UNIT(S): at 08:44

## 2022-09-02 RX ADMIN — SIMVASTATIN 40 MILLIGRAM(S): 20 TABLET, FILM COATED ORAL at 21:35

## 2022-09-02 RX ADMIN — Medication 650 MILLIGRAM(S): at 12:43

## 2022-09-02 RX ADMIN — Medication 100 MILLIGRAM(S): at 14:10

## 2022-09-02 RX ADMIN — Medication 2: at 18:09

## 2022-09-02 RX ADMIN — Medication 100 MILLIGRAM(S): at 21:35

## 2022-09-02 RX ADMIN — Medication 13 UNIT(S): at 18:09

## 2022-09-02 RX ADMIN — Medication 2: at 08:44

## 2022-09-02 NOTE — PROGRESS NOTE ADULT - ASSESSMENT
Assessment  DMT2: 72y Female with DM T2 with hyperglycemia admitted with abdominal wall wound, patient was on insulin at home, now on basal bolus and insulin coverage, blood sugars improving but not at target postprandially, eating meals, compliant with low carb diet.  Abdominal wall wound: : S/P drain, on medications, monitored.  HTN: On antihypertensive medications, monitored, asymptomatic.  Overweight: No strict exercise routines, neither on low calorie diet.                 Brayden Richards MD  Cell: [phone]  Office: [phone]

## 2022-09-02 NOTE — PROGRESS NOTE ADULT - ASSESSMENT
72F admitted with anterior abdominal wall abscess. Bedside incision and drainage procedure performed today. Leukocytosis up to 17.     Plan:  - Diet: Regular, NPO past midnight, IVF  - Plan for OR tomorrow: incision and drainage of abdominal wall abscess   - wound care consult for vac   - Continue lantus and premeal as per endo  - Continue IV abx   - Continue with daily dressing change   - VTE PPx: Lovenox    A Team Surgery

## 2022-09-02 NOTE — PROGRESS NOTE ADULT - ASSESSMENT
71 yo F colon CA, DM, diabetic neuropathy, recent hernia repair, presenting with discharge from wound  No fevers, has leukocytosis  Recent hernia report  Concern for possible abscess s/p I+D on day of admission  Culture now growing MSSA  UCX with low CFU Pseudomonas, 2x UA (mild positive to negative)  CT with lower abd wall fluid collection, extending to RLQ  Patient has no symptoms UTI  Overall,  1) Abscess  - Growing MSSA  - Cefazolin 1g q 8  - F/U pending cultures  - Wound care per team  - Anticipate when discharge planning, would send out on Keflex 500mg q 6 (presuming no change in CrCl) to complete 10 day course (from I+D)  2) Positive UCX  - Suspect asymptomatic bacteriuria, low CFU; no symptoms UTI; UA equivocal  - Would hold on Zosyn  - Monitor for signs/symptoms UTI  3) Leukocytosis  - Worsening, had further extension of wound done yesterday--reactive?  - Monitor for alternate sources infection, still no UTI symptoms at this point    Anderson Trotter MD  Contact on TEAMS messaging from 9am - 5pm  From 5pm-9am, on weekends, or if no response call [phone] 73 yo F colon CA, DM, diabetic neuropathy, recent hernia repair, presenting with discharge from wound  No fevers, has leukocytosis  Recent hernia report  Concern for possible abscess s/p I+D on day of admission  Culture now growing MSSA  UCX with low CFU Pseudomonas, 2x UA (mild positive to negative)  CT with lower abd wall fluid collection, extending to RLQ  Patient has no symptoms UTI  Overall,  1) Abscess  - Growing MSSA  - Cefazolin 1g q 8  - F/U pending cultures  - Wound care per team  - Anticipate when discharge planning, would send out on Keflex 500mg q 6 (presuming no change in CrCl) to complete 10 day course (from I+D)  - Planned for further OR with surgery team  2) Positive UCX  - Suspect asymptomatic bacteriuria, low CFU; no symptoms UTI; UA equivocal  - Would hold on Zosyn  - Monitor for signs/symptoms UTI  3) Leukocytosis  - Worsening, had further extension of wound done yesterday--reactive?  - Monitor for alternate sources infection, still no UTI symptoms at this point    Anderson Trotter MD  Contact on TEAMS messaging from 9am - 5pm  From 5pm-9am, on weekends, or if no response call [phone]

## 2022-09-02 NOTE — PROGRESS NOTE ADULT - SUBJECTIVE AND OBJECTIVE BOX
DATE OF SERVICE: 09-02-22 @ 14:26    INTERVAL HPI/OVERNIGHT EVENTS: Pt seen and examined. Reports tolerating diet without n/v. Reports abdominal pain near wound. Denies recent fever/chills.         MEDICATIONS  (STANDING):  ceFAZolin   IVPB 1000 milliGRAM(s) IV Intermittent every 8 hours  dextrose 5% + sodium chloride 0.9%. 1000 milliLiter(s) (100 mL/Hr) IV Continuous <Continuous>  dextrose 5%. 1000 milliLiter(s) (100 mL/Hr) IV Continuous <Continuous>  dextrose 5%. 1000 milliLiter(s) (50 mL/Hr) IV Continuous <Continuous>  dextrose 50% Injectable 25 Gram(s) IV Push once  dextrose 50% Injectable 12.5 Gram(s) IV Push once  dextrose 50% Injectable 25 Gram(s) IV Push once  enoxaparin Injectable 40 milliGRAM(s) SubCutaneous every 24 hours  glucagon  Injectable 1 milliGRAM(s) IntraMuscular once  influenza  Vaccine (HIGH DOSE) 0.7 milliLiter(s) IntraMuscular once  insulin glargine Injectable (LANTUS) 45 Unit(s) SubCutaneous at bedtime  insulin lispro (ADMELOG) corrective regimen sliding scale   SubCutaneous three times a day before meals  insulin lispro Injectable (ADMELOG) 13 Unit(s) SubCutaneous three times a day before meals  lidocaine 1%/epinephrine 1:100,000 Inj 20 milliLiter(s) Local Injection once  lisinopril 20 milliGRAM(s) Oral daily  silver nitrate Applicator 1 Application(s) Topical once  simvastatin 40 milliGRAM(s) Oral at bedtime    MEDICATIONS  (PRN):  acetaminophen     Tablet .. 650 milliGRAM(s) Oral every 6 hours PRN Mild Pain (1 - 3), Moderate Pain (4 - 6)  acetaminophen     Tablet .. 650 milliGRAM(s) Oral every 6 hours PRN Mild Pain (1 - 3)  dextrose Oral Gel 15 Gram(s) Oral once PRN Blood Glucose LESS THAN 70 milliGRAM(s)/deciliter  oxyCODONE    IR 5 milliGRAM(s) Oral every 4 hours PRN Moderate Pain (4 - 6)  oxyCODONE    IR 10 milliGRAM(s) Oral every 4 hours PRN Severe Pain (7 - 10)      Vital Signs Last 24 Hrs  T(C): 36.7 (02 Sep 2022 13:17), Max: 37.1 (01 Sep 2022 21:38)  T(F): 98 (02 Sep 2022 13:17), Max: 98.8 (01 Sep 2022 21:38)  HR: 100 (02 Sep 2022 13:17) (85 - 108)  BP: 129/66 (02 Sep 2022 13:17) (112/62 - 149/74)  BP(mean): --  RR: 18 (02 Sep 2022 13:17) (16 - 18)  SpO2: 97% (02 Sep 2022 13:17) (95% - 100%)    Parameters below as of 02 Sep 2022 13:17  Patient On (Oxygen Delivery Method): room air        PHYSICAL EXAM:      Constitutional: NAD awake and alert   Respiratory: non labored breathing   Gastrointestinal: abdomen soft, non distended, tender near incision. Midline incision opened more with packing in place and ss fluid on packing.             I&O's Detail    01 Sep 2022 07:01  -  02 Sep 2022 07:00  --------------------------------------------------------  IN:    IV PiggyBack: 100 mL    Oral Fluid: 1020 mL    sodium chloride 0.9%: 900 mL  Total IN: 2020 mL    OUT:    Voided (mL): 1250 mL  Total OUT: 1250 mL    Total NET: 770 mL          LABS:                        9.3    17.02 )-----------( 858      ( 02 Sep 2022 07:25 )             27.9     09-02    136  |  100  |  21  ----------------------------<  163<H>  4.7   |  26  |  0.90    Ca    9.5      02 Sep 2022 07:25  Phos  3.7     09-01  Mg     1.80     09-01    TPro  7.4  /  Alb  3.4  /  TBili  <0.2  /  DBili  x   /  AST  32  /  ALT  42<H>  /  AlkPhos  152<H>  09-02    PT/INR - ( 02 Sep 2022 07:25 )   PT: 12.9 sec;   INR: 1.11 ratio         PTT - ( 02 Sep 2022 07:25 )  PTT:38.3 sec

## 2022-09-02 NOTE — PROGRESS NOTE ADULT - SUBJECTIVE AND OBJECTIVE BOX
Morning Surgical Progress Note  Patient is a 72y old  Female who presents with a chief complaint of Abdominal wall abscess (01 Sep 2022 14:52)    SUBJECTIVE: Patient seen and examined at bedside with surgical team, patient without complaints.     Vital Signs Last 24 Hrs  T(C): 36.8 (02 Sep 2022 05:20), Max: 37.1 (01 Sep 2022 21:38)  T(F): 98.3 (02 Sep 2022 05:20), Max: 98.8 (01 Sep 2022 21:38)  HR: 88 (02 Sep 2022 05:20) (85 - 108)  BP: 145/72 (02 Sep 2022 05:20) (140/81 - 159/87)  BP(mean): --  RR: 16 (02 Sep 2022 05:20) (16 - 18)  SpO2: 100% (02 Sep 2022 05:20) (97% - 100%)    Parameters below as of 02 Sep 2022 05:20  Patient On (Oxygen Delivery Method): room air    I&O's Detail    31 Aug 2022 07:01  -  01 Sep 2022 07:00  --------------------------------------------------------  IN:    IV PiggyBack: 100 mL    Oral Fluid: 300 mL    sodium chloride 0.9%: 675 mL  Total IN: 1075 mL    OUT:    Voided (mL): 475 mL  Total OUT: 475 mL    Total NET: 600 mL      01 Sep 2022 07:01  -  02 Sep 2022 05:59  --------------------------------------------------------  IN:    IV PiggyBack: 100 mL    Oral Fluid: 1020 mL    sodium chloride 0.9%: 900 mL  Total IN: 2020 mL    OUT:    Voided (mL): 1250 mL  Total OUT: 1250 mL    Total NET: 770 mL        Medications  MEDICATIONS  (STANDING):  ceFAZolin   IVPB 1000 milliGRAM(s) IV Intermittent every 8 hours  dextrose 5%. 1000 milliLiter(s) (50 mL/Hr) IV Continuous <Continuous>  dextrose 5%. 1000 milliLiter(s) (100 mL/Hr) IV Continuous <Continuous>  dextrose 50% Injectable 25 Gram(s) IV Push once  dextrose 50% Injectable 12.5 Gram(s) IV Push once  dextrose 50% Injectable 25 Gram(s) IV Push once  enoxaparin Injectable 40 milliGRAM(s) SubCutaneous every 24 hours  glucagon  Injectable 1 milliGRAM(s) IntraMuscular once  influenza  Vaccine (HIGH DOSE) 0.7 milliLiter(s) IntraMuscular once  insulin glargine Injectable (LANTUS) 50 Unit(s) SubCutaneous at bedtime  insulin lispro (ADMELOG) corrective regimen sliding scale   SubCutaneous three times a day before meals  insulin lispro Injectable (ADMELOG) 10 Unit(s) SubCutaneous three times a day before meals  lisinopril 20 milliGRAM(s) Oral daily    MEDICATIONS  (PRN):  acetaminophen     Tablet .. 650 milliGRAM(s) Oral every 6 hours PRN Mild Pain (1 - 3)  acetaminophen     Tablet .. 650 milliGRAM(s) Oral every 6 hours PRN Mild Pain (1 - 3), Moderate Pain (4 - 6)  dextrose Oral Gel 15 Gram(s) Oral once PRN Blood Glucose LESS THAN 70 milliGRAM(s)/deciliter  oxyCODONE    IR 5 milliGRAM(s) Oral every 4 hours PRN Moderate Pain (4 - 6)  oxyCODONE    IR 10 milliGRAM(s) Oral every 4 hours PRN Severe Pain (7 - 10)    Physical Exam  Constitutional: A&Ox3, NAD  Gastrointestinal: Soft nontender, nondistended, midline wound appears clean, packing removed without any evidence of bleeding or purulence  Extremities: Moving all extremities, no edema  Skin: No Rashes, Hematoma, Ecchymosis  LABS:                        9.2    14.14 )-----------( 806      ( 01 Sep 2022 05:56 )             27.3     09-01    136  |  102  |  23  ----------------------------<  229<H>  4.7   |  23  |  0.91    Ca    9.0      01 Sep 2022 05:56  Phos  3.7     09-01  Mg     1.80     09-01    TPro  6.7  /  Alb  3.0<L>  /  TBili  <0.2  /  DBili  <0.2  /  AST  37<H>  /  ALT  63<H>  /  AlkPhos  156<H>  09-01      LIVER FUNCTIONS - ( 01 Sep 2022 05:56 )  Alb: 3.0 g/dL / Pro: 6.7 g/dL / ALK PHOS: 156 U/L / ALT: 63 U/L / AST: 37 U/L / GGT: x              Morning Surgical Progress Note  Patient is a 72y old  Female who presents with a chief complaint of Abdominal wall abscess (01 Sep 2022 14:52)    SUBJECTIVE: Patient seen and examined at bedside with surgical team, patient without complaints.     Vital Signs Last 24 Hrs  T(C): 36.8 (02 Sep 2022 05:20), Max: 37.1 (01 Sep 2022 21:38)  T(F): 98.3 (02 Sep 2022 05:20), Max: 98.8 (01 Sep 2022 21:38)  HR: 88 (02 Sep 2022 05:20) (85 - 108)  BP: 145/72 (02 Sep 2022 05:20) (140/81 - 159/87)  RR: 16 (02 Sep 2022 05:20) (16 - 18)  SpO2: 100% (02 Sep 2022 05:20) (97% - 100%)    Parameters below as of 02 Sep 2022 05:20  Patient On (Oxygen Delivery Method): room air    I&O's Detail  31 Aug 2022 07:01  -  01 Sep 2022 07:00  --------------------------------------------------------  IN:    IV PiggyBack: 100 mL    Oral Fluid: 300 mL    sodium chloride 0.9%: 675 mL  Total IN: 1075 mL  OUT:    Voided (mL): 475 mL  Total OUT: 475 mL  Total NET: 600 mL      01 Sep 2022 07:01  -  02 Sep 2022 05:59  --------------------------------------------------------  IN:    IV PiggyBack: 100 mL    Oral Fluid: 1020 mL    sodium chloride 0.9%: 900 mL  Total IN: 2020 mL    OUT:    Voided (mL): 1250 mL  Total OUT: 1250 mL    Total NET: 770 mL        Medications  MEDICATIONS  (STANDING):  ceFAZolin   IVPB 1000 milliGRAM(s) IV Intermittent every 8 hours  dextrose 5%. 1000 milliLiter(s) (50 mL/Hr) IV Continuous <Continuous>  dextrose 5%. 1000 milliLiter(s) (100 mL/Hr) IV Continuous <Continuous>  dextrose 50% Injectable 25 Gram(s) IV Push once  dextrose 50% Injectable 12.5 Gram(s) IV Push once  dextrose 50% Injectable 25 Gram(s) IV Push once  enoxaparin Injectable 40 milliGRAM(s) SubCutaneous every 24 hours  glucagon  Injectable 1 milliGRAM(s) IntraMuscular once  influenza  Vaccine (HIGH DOSE) 0.7 milliLiter(s) IntraMuscular once  insulin glargine Injectable (LANTUS) 50 Unit(s) SubCutaneous at bedtime  insulin lispro (ADMELOG) corrective regimen sliding scale   SubCutaneous three times a day before meals  insulin lispro Injectable (ADMELOG) 10 Unit(s) SubCutaneous three times a day before meals  lisinopril 20 milliGRAM(s) Oral daily    MEDICATIONS  (PRN):  acetaminophen     Tablet .. 650 milliGRAM(s) Oral every 6 hours PRN Mild Pain (1 - 3)  acetaminophen     Tablet .. 650 milliGRAM(s) Oral every 6 hours PRN Mild Pain (1 - 3), Moderate Pain (4 - 6)  dextrose Oral Gel 15 Gram(s) Oral once PRN Blood Glucose LESS THAN 70 milliGRAM(s)/deciliter  oxyCODONE    IR 5 milliGRAM(s) Oral every 4 hours PRN Moderate Pain (4 - 6)  oxyCODONE    IR 10 milliGRAM(s) Oral every 4 hours PRN Severe Pain (7 - 10)    Physical Exam  Constitutional: A&Ox3, NAD  Gastrointestinal: Soft nontender, nondistended, midline wound appears clean, packing removed without any evidence of bleeding or purulence  Extremities: Moving all extremities, no edema  Skin: No Rashes, Hematoma, Ecchymosis  LABS:                        9.2    14.14 )-----------( 806      ( 01 Sep 2022 05:56 )             27.3     09-01    136  |  102  |  23  ----------------------------<  229<H>  4.7   |  23  |  0.91    Ca    9.0      01 Sep 2022 05:56  Phos  3.7     09-01  Mg     1.80     09-01  TPro  6.7  /  Alb  3.0<L>  /  TBili  <0.2  /  DBili  <0.2  /  AST  37<H>  /  ALT  63<H>  /  AlkPhos  156<H>  09-01  LIVER FUNCTIONS - ( 01 Sep 2022 05:56 )  Alb: 3.0 g/dL / Pro: 6.7 g/dL / ALK PHOS: 156 U/L / ALT: 63 U/L / AST: 37 U/L / GGT: x              Morning Surgical Progress Note  Patient is a 72y old  Female who presents with a chief complaint of Abdominal wall abscess (01 Sep 2022 14:52)    SUBJECTIVE: Patient seen and examined at bedside with surgical team, patient without complaints.     Vital Signs Last 24 Hrs  T(C): 36.8 (02 Sep 2022 05:20), Max: 37.1 (01 Sep 2022 21:38)  T(F): 98.3 (02 Sep 2022 05:20), Max: 98.8 (01 Sep 2022 21:38)  HR: 88 (02 Sep 2022 05:20) (85 - 108)  BP: 145/72 (02 Sep 2022 05:20) (140/81 - 159/87)  RR: 16 (02 Sep 2022 05:20) (16 - 18)  SpO2: 100% (02 Sep 2022 05:20) (97% - 100%)    Parameters below as of 02 Sep 2022 05:20  Patient On (Oxygen Delivery Method): room air    I&O's Detail  31 Aug 2022 07:01  -  01 Sep 2022 07:00  --------------------------------------------------------  IN:    IV PiggyBack: 100 mL    Oral Fluid: 300 mL    sodium chloride 0.9%: 675 mL  Total IN: 1075 mL  OUT:    Voided (mL): 475 mL  Total OUT: 475 mL  Total NET: 600 mL      01 Sep 2022 07:01  -  02 Sep 2022 05:59  --------------------------------------------------------  IN:    IV PiggyBack: 100 mL    Oral Fluid: 1020 mL    sodium chloride 0.9%: 900 mL  Total IN: 2020 mL    OUT:    Voided (mL): 1250 mL  Total OUT: 1250 mL    Total NET: 770 mL        Medications  MEDICATIONS  (STANDING):  ceFAZolin   IVPB 1000 milliGRAM(s) IV Intermittent every 8 hours  dextrose 5%. 1000 milliLiter(s) (50 mL/Hr) IV Continuous <Continuous>  dextrose 5%. 1000 milliLiter(s) (100 mL/Hr) IV Continuous <Continuous>  dextrose 50% Injectable 25 Gram(s) IV Push once  dextrose 50% Injectable 12.5 Gram(s) IV Push once  dextrose 50% Injectable 25 Gram(s) IV Push once  enoxaparin Injectable 40 milliGRAM(s) SubCutaneous every 24 hours  glucagon  Injectable 1 milliGRAM(s) IntraMuscular once  influenza  Vaccine (HIGH DOSE) 0.7 milliLiter(s) IntraMuscular once  insulin glargine Injectable (LANTUS) 50 Unit(s) SubCutaneous at bedtime  insulin lispro (ADMELOG) corrective regimen sliding scale   SubCutaneous three times a day before meals  insulin lispro Injectable (ADMELOG) 10 Unit(s) SubCutaneous three times a day before meals  lisinopril 20 milliGRAM(s) Oral daily    MEDICATIONS  (PRN):  acetaminophen     Tablet .. 650 milliGRAM(s) Oral every 6 hours PRN Mild Pain (1 - 3)  acetaminophen     Tablet .. 650 milliGRAM(s) Oral every 6 hours PRN Mild Pain (1 - 3), Moderate Pain (4 - 6)  dextrose Oral Gel 15 Gram(s) Oral once PRN Blood Glucose LESS THAN 70 milliGRAM(s)/deciliter  oxyCODONE    IR 5 milliGRAM(s) Oral every 4 hours PRN Moderate Pain (4 - 6)  oxyCODONE    IR 10 milliGRAM(s) Oral every 4 hours PRN Severe Pain (7 - 10)    Physical Exam  Constitutional: A&Ox3, NAD  Gastrointestinal: Soft tender around opening, induration lateral to incision, nondistended, midline wound appears clean, packing removed without any evidence of bleeding or purulence  Extremities: Moving all extremities, no edema  Skin: No Rashes, Hematoma, Ecchymosis  LABS:                        9.2    14.14 )-----------( 806      ( 01 Sep 2022 05:56 )             27.3     09-01    136  |  102  |  23  ----------------------------<  229<H>  4.7   |  23  |  0.91    Ca    9.0      01 Sep 2022 05:56  Phos  3.7     09-01  Mg     1.80     09-01  TPro  6.7  /  Alb  3.0<L>  /  TBili  <0.2  /  DBili  <0.2  /  AST  37<H>  /  ALT  63<H>  /  AlkPhos  156<H>  09-01  LIVER FUNCTIONS - ( 01 Sep 2022 05:56 )  Alb: 3.0 g/dL / Pro: 6.7 g/dL / ALK PHOS: 156 U/L / ALT: 63 U/L / AST: 37 U/L / GGT: x

## 2022-09-02 NOTE — PROGRESS NOTE ADULT - SUBJECTIVE AND OBJECTIVE BOX
CC: F/U for wound infection    Saw/spoke to patient. Feels well. No dysuria, no bladder pain. Has some pain at wound site. No cough, no diarrhea.    Allergies  No Known Allergies    ANTIMICROBIALS:  ceFAZolin   IVPB 1000 every 8 hours    PE:    Vital Signs Last 24 Hrs  T(C): 36.7 (02 Sep 2022 09:48), Max: 37.1 (01 Sep 2022 21:38)  T(F): 98 (02 Sep 2022 09:48), Max: 98.8 (01 Sep 2022 21:38)  HR: 97 (02 Sep 2022 09:48) (85 - 108)  BP: 112/62 (02 Sep 2022 09:48) (112/62 - 149/74)  RR: 16 (02 Sep 2022 09:48) (16 - 18)  SpO2: 95% (02 Sep 2022 09:48) (95% - 100%)    Gen: AOx3, NAD, non-toxic  CV: Nontachycardic  Resp: Breathing comfortably, RA  Abd: Soft, nontender, wound clean, packing in place  IV/Skin: No thrombophlebitis    LABS:                        9.3    17.02 )-----------( 858      ( 02 Sep 2022 07:25 )             27.9     09-02    136  |  100  |  21  ----------------------------<  163<H>  4.7   |  26  |  0.90    Ca    9.5      02 Sep 2022 07:25  Phos  3.7     09-01  Mg     1.80     09-01    TPro  7.4  /  Alb  3.4  /  TBili  <0.2  /  DBili  x   /  AST  32  /  ALT  42<H>  /  AlkPhos  152<H>  09-02    MICROBIOLOGY:    Clean Catch Clean Catch (Midstream)  08-29-22   10,000 - 49,000 CFU/mL Pseudomonas aeruginosa  --  Pseudomonas aeruginosa    Abdominal Fl Abdominal Fluid  08-29-22   Moderate Staphylococcus aureus  --  Staphylococcus aureus    .Blood Blood-Peripheral  08-29-22   No growth to date.  --  --    .Blood Blood-Peripheral  08-29-22   No growth to date.  --  --    RADIOLOGY:    8/29 CT:    IMPRESSION:  A 6.0 x 2.0 cm fluid collection of the lower abdominal wall, likely an   abscess given the extensive surrounding infiltration and tract extending   to the right lower quadrant abdominal wall skin surface.

## 2022-09-02 NOTE — PROGRESS NOTE ADULT - SUBJECTIVE AND OBJECTIVE BOX
Chief complaint    Patient is a 72y old  Female who presents with a chief complaint of Abdominal wall abscess (01 Sep 2022 14:52)   Review of systems  Patient in bed, appears comfortable.    Labs and Fingersticks  CAPILLARY BLOOD GLUCOSE      POCT Blood Glucose.: 294 mg/dL (01 Sep 2022 21:35)  POCT Blood Glucose.: 224 mg/dL (01 Sep 2022 17:34)  POCT Blood Glucose.: 312 mg/dL (01 Sep 2022 12:21)  POCT Blood Glucose.: 204 mg/dL (01 Sep 2022 08:25)      Anion Gap, Serum: 11 (09-01 @ 05:56)      Calcium, Total Serum: 9.0 (09-01 @ 05:56)  Albumin, Serum: 3.0 *L* (09-01 @ 05:56)    Alanine Aminotransferase (ALT/SGPT): 63 *H* (09-01 @ 05:56)  Alkaline Phosphatase, Serum: 156 *H* (09-01 @ 05:56)  Aspartate Aminotransferase (AST/SGOT): 37 *H* (09-01 @ 05:56)        09-01    136  |  102  |  23  ----------------------------<  229<H>  4.7   |  23  |  0.91    Ca    9.0      01 Sep 2022 05:56  Phos  3.7     09-01  Mg     1.80     09-01    TPro  6.7  /  Alb  3.0<L>  /  TBili  <0.2  /  DBili  <0.2  /  AST  37<H>  /  ALT  63<H>  /  AlkPhos  156<H>  09-01                        9.2    14.14 )-----------( 806      ( 01 Sep 2022 05:56 )             27.3     Medications  MEDICATIONS  (STANDING):  ceFAZolin   IVPB 1000 milliGRAM(s) IV Intermittent every 8 hours  dextrose 5%. 1000 milliLiter(s) (50 mL/Hr) IV Continuous <Continuous>  dextrose 5%. 1000 milliLiter(s) (100 mL/Hr) IV Continuous <Continuous>  dextrose 50% Injectable 25 Gram(s) IV Push once  dextrose 50% Injectable 12.5 Gram(s) IV Push once  dextrose 50% Injectable 25 Gram(s) IV Push once  enoxaparin Injectable 40 milliGRAM(s) SubCutaneous every 24 hours  glucagon  Injectable 1 milliGRAM(s) IntraMuscular once  influenza  Vaccine (HIGH DOSE) 0.7 milliLiter(s) IntraMuscular once  insulin glargine Injectable (LANTUS) 50 Unit(s) SubCutaneous at bedtime  insulin lispro (ADMELOG) corrective regimen sliding scale   SubCutaneous three times a day before meals  insulin lispro Injectable (ADMELOG) 13 Unit(s) SubCutaneous three times a day before meals  lidocaine 1%/epinephrine 1:100,000 Inj 20 milliLiter(s) Local Injection once  lisinopril 20 milliGRAM(s) Oral daily      Physical Exam  General: Patient comfortable in bed  Vital Signs Last 12 Hrs  T(F): 98.3 (09-02-22 @ 05:20), Max: 98.8 (09-01-22 @ 21:38)  HR: 88 (09-02-22 @ 05:20) (85 - 106)  BP: 145/72 (09-02-22 @ 05:20) (142/77 - 145/72)  BP(mean): --  RR: 16 (09-02-22 @ 05:20) (16 - 18)  SpO2: 100% (09-02-22 @ 05:20) (97% - 100%)  Neck: No palpable thyroid nodules.  CVS: S1S2, No murmurs  Respiratory: No wheezing, no crepitations  GI: Abdomen soft, bowel sounds positive  Musculoskeletal:  edema lower extremities.     Diagnostics

## 2022-09-02 NOTE — PROGRESS NOTE ADULT - SUBJECTIVE AND OBJECTIVE BOX
Patient is a 72y old  Female who presents with a chief complaint of Abdominal wall abscess (01 Sep 2022 14:52)      DATE OF SERVICE: 09-02-22 @ 10:43    SUBJECTIVE / OVERNIGHT EVENTS: overnight events noted  "I feel much better"     ROS:  Resp: No cough no sputum production  CVS: No chest pain no palpitations no orthopnea  GI: no N/V/D  : no dysuria, no hematuria  Neuro: no weakness no paresthesias  Heme: No petechiae no easy bruising  Msk: No joint pain no swelling  Skin: No rash no itching        MEDICATIONS  (STANDING):  ceFAZolin   IVPB 1000 milliGRAM(s) IV Intermittent every 8 hours  dextrose 5%. 1000 milliLiter(s) (50 mL/Hr) IV Continuous <Continuous>  dextrose 5%. 1000 milliLiter(s) (100 mL/Hr) IV Continuous <Continuous>  dextrose 50% Injectable 25 Gram(s) IV Push once  dextrose 50% Injectable 12.5 Gram(s) IV Push once  dextrose 50% Injectable 25 Gram(s) IV Push once  enoxaparin Injectable 40 milliGRAM(s) SubCutaneous every 24 hours  glucagon  Injectable 1 milliGRAM(s) IntraMuscular once  influenza  Vaccine (HIGH DOSE) 0.7 milliLiter(s) IntraMuscular once  insulin glargine Injectable (LANTUS) 50 Unit(s) SubCutaneous at bedtime  insulin lispro (ADMELOG) corrective regimen sliding scale   SubCutaneous three times a day before meals  insulin lispro Injectable (ADMELOG) 13 Unit(s) SubCutaneous three times a day before meals  lidocaine 1%/epinephrine 1:100,000 Inj 20 milliLiter(s) Local Injection once  lisinopril 20 milliGRAM(s) Oral daily  silver nitrate Applicator 1 Application(s) Topical once    MEDICATIONS  (PRN):  acetaminophen     Tablet .. 650 milliGRAM(s) Oral every 6 hours PRN Mild Pain (1 - 3)  acetaminophen     Tablet .. 650 milliGRAM(s) Oral every 6 hours PRN Mild Pain (1 - 3), Moderate Pain (4 - 6)  dextrose Oral Gel 15 Gram(s) Oral once PRN Blood Glucose LESS THAN 70 milliGRAM(s)/deciliter  oxyCODONE    IR 5 milliGRAM(s) Oral every 4 hours PRN Moderate Pain (4 - 6)  oxyCODONE    IR 10 milliGRAM(s) Oral every 4 hours PRN Severe Pain (7 - 10)        CAPILLARY BLOOD GLUCOSE      POCT Blood Glucose.: 197 mg/dL (02 Sep 2022 08:38)  POCT Blood Glucose.: 294 mg/dL (01 Sep 2022 21:35)  POCT Blood Glucose.: 224 mg/dL (01 Sep 2022 17:34)  POCT Blood Glucose.: 312 mg/dL (01 Sep 2022 12:21)    I&O's Summary    01 Sep 2022 07:01  -  02 Sep 2022 07:00  --------------------------------------------------------  IN: 2020 mL / OUT: 1250 mL / NET: 770 mL        Vital Signs Last 24 Hrs  T(C): 36.7 (02 Sep 2022 09:48), Max: 37.1 (01 Sep 2022 21:38)  T(F): 98 (02 Sep 2022 09:48), Max: 98.8 (01 Sep 2022 21:38)  HR: 97 (02 Sep 2022 09:48) (85 - 108)  BP: 112/62 (02 Sep 2022 09:48) (112/62 - 149/74)  BP(mean): --  RR: 16 (02 Sep 2022 09:48) (16 - 18)  SpO2: 95% (02 Sep 2022 09:48) (95% - 100%)    PHYSICAL EXAM:  GENERAL: NAD  EYES: EOMI, PERRLA,  NECK: Supple, No JVD  CHEST/LUNG: Clear   HEART: S1S2;  no murmurs  ABDOMEN: Soft nontender  bandage in place  EXTREMITIES: no edema  NEUROLOGY: Alert, nonfocal  SKIN: No rashes or lesions    LABS:                        9.3    17.02 )-----------( 858      ( 02 Sep 2022 07:25 )             27.9     09-02    136  |  100  |  21  ----------------------------<  163<H>  4.7   |  26  |  0.90    Ca    9.5      02 Sep 2022 07:25  Phos  3.7     09-01  Mg     1.80     09-01    TPro  7.4  /  Alb  3.4  /  TBili  <0.2  /  DBili  x   /  AST  32  /  ALT  42<H>  /  AlkPhos  152<H>  09-02    PT/INR - ( 02 Sep 2022 07:25 )   PT: 12.9 sec;   INR: 1.11 ratio         PTT - ( 02 Sep 2022 07:25 )  PTT:38.3 sec            All consultant(s) notes reviewed and care discussed with other providers        Contact Number, Dr Reed [phone]

## 2022-09-02 NOTE — PROCEDURE NOTE - ADDITIONAL PROCEDURE DETAILS
Open midline wound extended by 1 inch. Internal septations opened to create one large pocket. Packed with kerlix

## 2022-09-02 NOTE — PROGRESS NOTE ADULT - ASSESSMENT
72F admitted with anterior abdominal wall abscess, now s/p bedside I&D.     Plan:   - Will follow up AM CBC   - Possibly open wound more today   - Continue lantus and premeal as per endo  - Continue IV abx change change to oral on dc until 9/8 as per id: cephalexin 500 mg q6hr  - Continue IVF   - Continue with daily dressing change   - Pain Control   - DVT PPx: LVX    A Team Surgery 72F admitted with anterior abdominal wall abscess, now s/p bedside I&D with increasing wbc to 17    Plan:  - Open wound more today   - Continue lantus and premeal as per endo  - Continue IV abx change change to oral on dc until 9/8 as per id: cephalexin 500 mg q6hr  - Continue IVF   - Continue with daily dressing change   - Pain Control   - DVT PPx: LVX    A Team Surgery 72F admitted with anterior abdominal wall abscess, now s/p bedside I&D with increasing wbc to 17    Plan:  - Open wound more today and wound care consult for vac   - Continue lantus and premeal as per endo  - Continue IV abx change change to oral on dc until 9/8 as per id: cephalexin 500 mg q6hr  - Continue IVF   - Continue with daily dressing change   - Pain Control   - DVT PPx: LVX    A Team Surgery

## 2022-09-03 ENCOUNTER — TRANSCRIPTION ENCOUNTER (OUTPATIENT)
Age: 72
End: 2022-09-03

## 2022-09-03 ENCOUNTER — RESULT REVIEW (OUTPATIENT)
Age: 72
End: 2022-09-03

## 2022-09-03 LAB
ALBUMIN SERPL ELPH-MCNC: 3.7 G/DL — SIGNIFICANT CHANGE UP (ref 3.3–5)
ALP SERPL-CCNC: 151 U/L — HIGH (ref 40–120)
ALT FLD-CCNC: 40 U/L — HIGH (ref 4–33)
ANION GAP SERPL CALC-SCNC: 13 MMOL/L — SIGNIFICANT CHANGE UP (ref 7–14)
AST SERPL-CCNC: 27 U/L — SIGNIFICANT CHANGE UP (ref 4–32)
BILIRUB DIRECT SERPL-MCNC: <0.2 MG/DL — SIGNIFICANT CHANGE UP (ref 0–0.3)
BILIRUB INDIRECT FLD-MCNC: SIGNIFICANT CHANGE UP MG/DL (ref 0–1)
BILIRUB SERPL-MCNC: <0.2 MG/DL — SIGNIFICANT CHANGE UP (ref 0.2–1.2)
BLD GP AB SCN SERPL QL: NEGATIVE — SIGNIFICANT CHANGE UP
BUN SERPL-MCNC: 23 MG/DL — SIGNIFICANT CHANGE UP (ref 7–23)
CALCIUM SERPL-MCNC: 9.5 MG/DL — SIGNIFICANT CHANGE UP (ref 8.4–10.5)
CHLORIDE SERPL-SCNC: 100 MMOL/L — SIGNIFICANT CHANGE UP (ref 98–107)
CO2 SERPL-SCNC: 22 MMOL/L — SIGNIFICANT CHANGE UP (ref 22–31)
CREAT SERPL-MCNC: 0.86 MG/DL — SIGNIFICANT CHANGE UP (ref 0.5–1.3)
CULTURE RESULTS: SIGNIFICANT CHANGE UP
EGFR: 73 ML/MIN/1.73M2 — SIGNIFICANT CHANGE UP
GLUCOSE BLDC GLUCOMTR-MCNC: 137 MG/DL — HIGH (ref 70–99)
GLUCOSE BLDC GLUCOMTR-MCNC: 160 MG/DL — HIGH (ref 70–99)
GLUCOSE BLDC GLUCOMTR-MCNC: 220 MG/DL — HIGH (ref 70–99)
GLUCOSE BLDC GLUCOMTR-MCNC: 254 MG/DL — HIGH (ref 70–99)
GLUCOSE BLDC GLUCOMTR-MCNC: 391 MG/DL — HIGH (ref 70–99)
GLUCOSE BLDC GLUCOMTR-MCNC: 93 MG/DL — SIGNIFICANT CHANGE UP (ref 70–99)
GLUCOSE SERPL-MCNC: 229 MG/DL — HIGH (ref 70–99)
HCT VFR BLD CALC: 28.9 % — LOW (ref 34.5–45)
HGB BLD-MCNC: 9.7 G/DL — LOW (ref 11.5–15.5)
INR BLD: 1.14 RATIO — SIGNIFICANT CHANGE UP (ref 0.88–1.16)
MAGNESIUM SERPL-MCNC: 1.6 MG/DL — SIGNIFICANT CHANGE UP (ref 1.6–2.6)
MCHC RBC-ENTMCNC: 28.8 PG — SIGNIFICANT CHANGE UP (ref 27–34)
MCHC RBC-ENTMCNC: 33.6 GM/DL — SIGNIFICANT CHANGE UP (ref 32–36)
MCV RBC AUTO: 85.8 FL — SIGNIFICANT CHANGE UP (ref 80–100)
NRBC # BLD: 0 /100 WBCS — SIGNIFICANT CHANGE UP (ref 0–0)
NRBC # FLD: 0.05 K/UL — HIGH (ref 0–0)
ORGANISM # SPEC MICROSCOPIC CNT: SIGNIFICANT CHANGE UP
ORGANISM # SPEC MICROSCOPIC CNT: SIGNIFICANT CHANGE UP
PHOSPHATE SERPL-MCNC: 4.2 MG/DL — SIGNIFICANT CHANGE UP (ref 2.5–4.5)
PLATELET # BLD AUTO: 965 K/UL — HIGH (ref 150–400)
POTASSIUM SERPL-MCNC: 4.6 MMOL/L — SIGNIFICANT CHANGE UP (ref 3.5–5.3)
POTASSIUM SERPL-SCNC: 4.6 MMOL/L — SIGNIFICANT CHANGE UP (ref 3.5–5.3)
PROT SERPL-MCNC: 8 G/DL — SIGNIFICANT CHANGE UP (ref 6–8.3)
PROTHROM AB SERPL-ACNC: 13.2 SEC — SIGNIFICANT CHANGE UP (ref 10.5–13.4)
RBC # BLD: 3.37 M/UL — LOW (ref 3.8–5.2)
RBC # FLD: 15.1 % — HIGH (ref 10.3–14.5)
RH IG SCN BLD-IMP: POSITIVE — SIGNIFICANT CHANGE UP
SODIUM SERPL-SCNC: 135 MMOL/L — SIGNIFICANT CHANGE UP (ref 135–145)
SPECIMEN SOURCE: SIGNIFICANT CHANGE UP
WBC # BLD: 20.06 K/UL — HIGH (ref 3.8–10.5)
WBC # FLD AUTO: 20.06 K/UL — HIGH (ref 3.8–10.5)

## 2022-09-03 PROCEDURE — 88304 TISSUE EXAM BY PATHOLOGIST: CPT | Mod: 26

## 2022-09-03 RX ORDER — FENTANYL CITRATE 50 UG/ML
50 INJECTION INTRAVENOUS
Refills: 0 | Status: DISCONTINUED | OUTPATIENT
Start: 2022-09-03 | End: 2022-09-04

## 2022-09-03 RX ORDER — ACETAMINOPHEN 500 MG
1000 TABLET ORAL EVERY 6 HOURS
Refills: 0 | Status: COMPLETED | OUTPATIENT
Start: 2022-09-03 | End: 2022-09-04

## 2022-09-03 RX ORDER — ASPIRIN/CALCIUM CARB/MAGNESIUM 324 MG
81 TABLET ORAL DAILY
Refills: 0 | Status: DISCONTINUED | OUTPATIENT
Start: 2022-09-03 | End: 2022-09-13

## 2022-09-03 RX ORDER — OXYCODONE HYDROCHLORIDE 5 MG/1
10 TABLET ORAL ONCE
Refills: 0 | Status: DISCONTINUED | OUTPATIENT
Start: 2022-09-03 | End: 2022-09-03

## 2022-09-03 RX ORDER — ONDANSETRON 8 MG/1
4 TABLET, FILM COATED ORAL ONCE
Refills: 0 | Status: DISCONTINUED | OUTPATIENT
Start: 2022-09-03 | End: 2022-09-04

## 2022-09-03 RX ORDER — HYDROMORPHONE HYDROCHLORIDE 2 MG/ML
0.5 INJECTION INTRAMUSCULAR; INTRAVENOUS; SUBCUTANEOUS
Refills: 0 | Status: DISCONTINUED | OUTPATIENT
Start: 2022-09-03 | End: 2022-09-04

## 2022-09-03 RX ORDER — MAGNESIUM SULFATE 500 MG/ML
2 VIAL (ML) INJECTION ONCE
Refills: 0 | Status: COMPLETED | OUTPATIENT
Start: 2022-09-03 | End: 2022-09-03

## 2022-09-03 RX ORDER — OXYCODONE HYDROCHLORIDE 5 MG/1
5 TABLET ORAL ONCE
Refills: 0 | Status: DISCONTINUED | OUTPATIENT
Start: 2022-09-03 | End: 2022-09-03

## 2022-09-03 RX ADMIN — HYDROMORPHONE HYDROCHLORIDE 0.5 MILLIGRAM(S): 2 INJECTION INTRAMUSCULAR; INTRAVENOUS; SUBCUTANEOUS at 11:30

## 2022-09-03 RX ADMIN — Medication 400 MILLIGRAM(S): at 17:36

## 2022-09-03 RX ADMIN — Medication 1000 MILLIGRAM(S): at 18:00

## 2022-09-03 RX ADMIN — Medication 6: at 07:13

## 2022-09-03 RX ADMIN — Medication 100 MILLIGRAM(S): at 17:35

## 2022-09-03 RX ADMIN — OXYCODONE HYDROCHLORIDE 10 MILLIGRAM(S): 5 TABLET ORAL at 23:10

## 2022-09-03 RX ADMIN — SIMVASTATIN 40 MILLIGRAM(S): 20 TABLET, FILM COATED ORAL at 22:09

## 2022-09-03 RX ADMIN — Medication 4: at 17:45

## 2022-09-03 RX ADMIN — ENOXAPARIN SODIUM 40 MILLIGRAM(S): 100 INJECTION SUBCUTANEOUS at 22:09

## 2022-09-03 RX ADMIN — Medication 25 GRAM(S): at 11:19

## 2022-09-03 RX ADMIN — SODIUM CHLORIDE 100 MILLILITER(S): 9 INJECTION, SOLUTION INTRAVENOUS at 00:41

## 2022-09-03 RX ADMIN — Medication 100 MILLIGRAM(S): at 22:09

## 2022-09-03 RX ADMIN — HYDROMORPHONE HYDROCHLORIDE 0.5 MILLIGRAM(S): 2 INJECTION INTRAMUSCULAR; INTRAVENOUS; SUBCUTANEOUS at 11:15

## 2022-09-03 RX ADMIN — Medication 100 MILLIGRAM(S): at 05:29

## 2022-09-03 RX ADMIN — OXYCODONE HYDROCHLORIDE 10 MILLIGRAM(S): 5 TABLET ORAL at 22:40

## 2022-09-03 RX ADMIN — Medication 13 UNIT(S): at 17:45

## 2022-09-03 RX ADMIN — INSULIN GLARGINE 50 UNIT(S): 100 INJECTION, SOLUTION SUBCUTANEOUS at 22:10

## 2022-09-03 RX ADMIN — Medication 81 MILLIGRAM(S): at 12:09

## 2022-09-03 RX ADMIN — LISINOPRIL 20 MILLIGRAM(S): 2.5 TABLET ORAL at 05:28

## 2022-09-03 NOTE — PROGRESS NOTE ADULT - SUBJECTIVE AND OBJECTIVE BOX
SURGERY DAILY PROGRESS NOTE:     SUBJECTIVE/ROS: Patient seen at bedside this AM.    24h Events:   - Pt unsure of surgery indication, day team will discuss w/pt prior to OR     OBJECTIVE:  Vital Signs Last 24 Hrs  T(C): 36.6 (03 Sep 2022 02:21), Max: 36.8 (02 Sep 2022 05:20)  T(F): 97.8 (03 Sep 2022 02:21), Max: 98.3 (02 Sep 2022 05:20)  HR: 96 (03 Sep 2022 02:21) (88 - 108)  BP: 118/67 (03 Sep 2022 02:21) (112/62 - 145/72)  BP(mean): --  RR: 16 (03 Sep 2022 02:21) (16 - 18)  SpO2: 97% (03 Sep 2022 02:21) (95% - 100%)    Parameters below as of 03 Sep 2022 02:21  Patient On (Oxygen Delivery Method): room air      I&O's Detail    01 Sep 2022 07:01  -  02 Sep 2022 07:00  --------------------------------------------------------  IN:    IV PiggyBack: 100 mL    Oral Fluid: 1020 mL    sodium chloride 0.9%: 900 mL  Total IN: 2020 mL    OUT:    Voided (mL): 1250 mL  Total OUT: 1250 mL    Total NET: 770 mL      02 Sep 2022 07:01  -  03 Sep 2022 05:11  --------------------------------------------------------  IN:    dextrose 5% + sodium chloride 0.9%: 200 mL    IV PiggyBack: 50 mL    Oral Fluid: 440 mL  Total IN: 690 mL    OUT:    Voided (mL): 900 mL  Total OUT: 900 mL    Total NET: -210 mL        Daily     Daily   MEDICATIONS  (STANDING):  ceFAZolin   IVPB 1000 milliGRAM(s) IV Intermittent every 8 hours  dextrose 5% + sodium chloride 0.9%. 1000 milliLiter(s) (100 mL/Hr) IV Continuous <Continuous>  dextrose 5%. 1000 milliLiter(s) (100 mL/Hr) IV Continuous <Continuous>  dextrose 5%. 1000 milliLiter(s) (50 mL/Hr) IV Continuous <Continuous>  dextrose 50% Injectable 25 Gram(s) IV Push once  dextrose 50% Injectable 12.5 Gram(s) IV Push once  dextrose 50% Injectable 25 Gram(s) IV Push once  enoxaparin Injectable 40 milliGRAM(s) SubCutaneous every 24 hours  glucagon  Injectable 1 milliGRAM(s) IntraMuscular once  influenza  Vaccine (HIGH DOSE) 0.7 milliLiter(s) IntraMuscular once  insulin glargine Injectable (LANTUS) 50 Unit(s) SubCutaneous at bedtime  insulin lispro (ADMELOG) corrective regimen sliding scale   SubCutaneous three times a day before meals  insulin lispro Injectable (ADMELOG) 13 Unit(s) SubCutaneous three times a day before meals  lidocaine 1%/epinephrine 1:100,000 Inj 20 milliLiter(s) Local Injection once  lisinopril 20 milliGRAM(s) Oral daily  silver nitrate Applicator 1 Application(s) Topical once  simvastatin 40 milliGRAM(s) Oral at bedtime    MEDICATIONS  (PRN):  acetaminophen     Tablet .. 650 milliGRAM(s) Oral every 6 hours PRN Mild Pain (1 - 3)  acetaminophen     Tablet .. 650 milliGRAM(s) Oral every 6 hours PRN Mild Pain (1 - 3), Moderate Pain (4 - 6)  dextrose Oral Gel 15 Gram(s) Oral once PRN Blood Glucose LESS THAN 70 milliGRAM(s)/deciliter  oxyCODONE    IR 5 milliGRAM(s) Oral every 4 hours PRN Moderate Pain (4 - 6)  oxyCODONE    IR 10 milliGRAM(s) Oral every 4 hours PRN Severe Pain (7 - 10)      LABS:                        9.7    20.06 )-----------( 965      ( 03 Sep 2022 04:30 )             28.9     09-02    136  |  100  |  21  ----------------------------<  163<H>  4.7   |  26  |  0.90    Ca    9.5      02 Sep 2022 07:25  Phos  3.7     09-01  Mg     1.80     09-01    TPro  7.4  /  Alb  3.4  /  TBili  <0.2  /  DBili  x   /  AST  32  /  ALT  42<H>  /  AlkPhos  152<H>  09-02    PT/INR - ( 03 Sep 2022 04:30 )   PT: 13.2 sec;   INR: 1.14 ratio         PTT - ( 02 Sep 2022 07:25 )  PTT:38.3 sec    PHYSICAL EXAM:  Gen: AAOx3, non-toxic  Head: NCAT  HEENT: EOMI, oral mucosa moist, normal conjunctiva  Lung: Breathing on RA, unlabored   Abd: Soft tender around opening, induration lateral to incision, nondistended, midline wound appears clean, packing removed without any evidence of bleeding or purulence  MSK: no visible deformities  Neuro: No focal sensory or motor deficits   SURGERY DAILY PROGRESS NOTE:     SUBJECTIVE/ROS: Patient seen at bedside this AM.     OBJECTIVE:  Vital Signs Last 24 Hrs  T(C): 36.6 (03 Sep 2022 02:21), Max: 36.8 (02 Sep 2022 05:20)  T(F): 97.8 (03 Sep 2022 02:21), Max: 98.3 (02 Sep 2022 05:20)  HR: 96 (03 Sep 2022 02:21) (88 - 108)  BP: 118/67 (03 Sep 2022 02:21) (112/62 - 145/72)  BP(mean): --  RR: 16 (03 Sep 2022 02:21) (16 - 18)  SpO2: 97% (03 Sep 2022 02:21) (95% - 100%)    Parameters below as of 03 Sep 2022 02:21  Patient On (Oxygen Delivery Method): room air      I&O's Detail    01 Sep 2022 07:01  -  02 Sep 2022 07:00  --------------------------------------------------------  IN:    IV PiggyBack: 100 mL    Oral Fluid: 1020 mL    sodium chloride 0.9%: 900 mL  Total IN: 2020 mL    OUT:    Voided (mL): 1250 mL  Total OUT: 1250 mL    Total NET: 770 mL      02 Sep 2022 07:01  -  03 Sep 2022 05:11  --------------------------------------------------------  IN:    dextrose 5% + sodium chloride 0.9%: 200 mL    IV PiggyBack: 50 mL    Oral Fluid: 440 mL  Total IN: 690 mL    OUT:    Voided (mL): 900 mL  Total OUT: 900 mL    Total NET: -210 mL        Daily     Daily   MEDICATIONS  (STANDING):  ceFAZolin   IVPB 1000 milliGRAM(s) IV Intermittent every 8 hours  dextrose 5% + sodium chloride 0.9%. 1000 milliLiter(s) (100 mL/Hr) IV Continuous <Continuous>  dextrose 5%. 1000 milliLiter(s) (100 mL/Hr) IV Continuous <Continuous>  dextrose 5%. 1000 milliLiter(s) (50 mL/Hr) IV Continuous <Continuous>  dextrose 50% Injectable 25 Gram(s) IV Push once  dextrose 50% Injectable 12.5 Gram(s) IV Push once  dextrose 50% Injectable 25 Gram(s) IV Push once  enoxaparin Injectable 40 milliGRAM(s) SubCutaneous every 24 hours  glucagon  Injectable 1 milliGRAM(s) IntraMuscular once  influenza  Vaccine (HIGH DOSE) 0.7 milliLiter(s) IntraMuscular once  insulin glargine Injectable (LANTUS) 50 Unit(s) SubCutaneous at bedtime  insulin lispro (ADMELOG) corrective regimen sliding scale   SubCutaneous three times a day before meals  insulin lispro Injectable (ADMELOG) 13 Unit(s) SubCutaneous three times a day before meals  lidocaine 1%/epinephrine 1:100,000 Inj 20 milliLiter(s) Local Injection once  lisinopril 20 milliGRAM(s) Oral daily  silver nitrate Applicator 1 Application(s) Topical once  simvastatin 40 milliGRAM(s) Oral at bedtime    MEDICATIONS  (PRN):  acetaminophen     Tablet .. 650 milliGRAM(s) Oral every 6 hours PRN Mild Pain (1 - 3)  acetaminophen     Tablet .. 650 milliGRAM(s) Oral every 6 hours PRN Mild Pain (1 - 3), Moderate Pain (4 - 6)  dextrose Oral Gel 15 Gram(s) Oral once PRN Blood Glucose LESS THAN 70 milliGRAM(s)/deciliter  oxyCODONE    IR 5 milliGRAM(s) Oral every 4 hours PRN Moderate Pain (4 - 6)  oxyCODONE    IR 10 milliGRAM(s) Oral every 4 hours PRN Severe Pain (7 - 10)      LABS:                        9.7    20.06 )-----------( 965      ( 03 Sep 2022 04:30 )             28.9     09-02    136  |  100  |  21  ----------------------------<  163<H>  4.7   |  26  |  0.90    Ca    9.5      02 Sep 2022 07:25  Phos  3.7     09-01  Mg     1.80     09-01    TPro  7.4  /  Alb  3.4  /  TBili  <0.2  /  DBili  x   /  AST  32  /  ALT  42<H>  /  AlkPhos  152<H>  09-02    PT/INR - ( 03 Sep 2022 04:30 )   PT: 13.2 sec;   INR: 1.14 ratio         PTT - ( 02 Sep 2022 07:25 )  PTT:38.3 sec    PHYSICAL EXAM:  Gen: AAOx3, non-toxic  Head: NCAT  HEENT: EOMI, oral mucosa moist, normal conjunctiva  Lung: Breathing on RA, unlabored   Abd: Soft tender around opening, induration lateral to incision, nondistended, midline wound appears clean, packing removed without any evidence of bleeding or purulence  MSK: no visible deformities  Neuro: No focal sensory or motor deficits

## 2022-09-03 NOTE — PROGRESS NOTE ADULT - SUBJECTIVE AND OBJECTIVE BOX
Patient is a 72y old  Female who presents with a chief complaint of cellulitis (02 Sep 2022 10:43)      DATE OF SERVICE: 09-03-22 @ 09:38    SUBJECTIVE / OVERNIGHT EVENTS: overnight events noted    ROS:  Resp: No cough no sputum production  CVS: No chest pain no palpitations no orthopnea  GI: no N/V/D  : no dysuria, no hematuria  Neuro: no weakness no paresthesias          MEDICATIONS  (STANDING):  ceFAZolin   IVPB 1000 milliGRAM(s) IV Intermittent every 8 hours  dextrose 5% + sodium chloride 0.9%. 1000 milliLiter(s) (100 mL/Hr) IV Continuous <Continuous>  dextrose 5%. 1000 milliLiter(s) (50 mL/Hr) IV Continuous <Continuous>  dextrose 5%. 1000 milliLiter(s) (100 mL/Hr) IV Continuous <Continuous>  dextrose 50% Injectable 25 Gram(s) IV Push once  dextrose 50% Injectable 12.5 Gram(s) IV Push once  dextrose 50% Injectable 25 Gram(s) IV Push once  enoxaparin Injectable 40 milliGRAM(s) SubCutaneous every 24 hours  glucagon  Injectable 1 milliGRAM(s) IntraMuscular once  influenza  Vaccine (HIGH DOSE) 0.7 milliLiter(s) IntraMuscular once  insulin glargine Injectable (LANTUS) 50 Unit(s) SubCutaneous at bedtime  insulin lispro (ADMELOG) corrective regimen sliding scale   SubCutaneous three times a day before meals  insulin lispro Injectable (ADMELOG) 13 Unit(s) SubCutaneous three times a day before meals  lidocaine 1%/epinephrine 1:100,000 Inj 20 milliLiter(s) Local Injection once  lisinopril 20 milliGRAM(s) Oral daily  magnesium sulfate  IVPB 2 Gram(s) IV Intermittent once  silver nitrate Applicator 1 Application(s) Topical once  simvastatin 40 milliGRAM(s) Oral at bedtime    MEDICATIONS  (PRN):  acetaminophen     Tablet .. 650 milliGRAM(s) Oral every 6 hours PRN Mild Pain (1 - 3)  acetaminophen     Tablet .. 650 milliGRAM(s) Oral every 6 hours PRN Mild Pain (1 - 3), Moderate Pain (4 - 6)  dextrose Oral Gel 15 Gram(s) Oral once PRN Blood Glucose LESS THAN 70 milliGRAM(s)/deciliter  oxyCODONE    IR 5 milliGRAM(s) Oral every 4 hours PRN Moderate Pain (4 - 6)  oxyCODONE    IR 10 milliGRAM(s) Oral every 4 hours PRN Severe Pain (7 - 10)        CAPILLARY BLOOD GLUCOSE      POCT Blood Glucose.: 160 mg/dL (03 Sep 2022 08:57)  POCT Blood Glucose.: 254 mg/dL (03 Sep 2022 07:02)  POCT Blood Glucose.: 269 mg/dL (02 Sep 2022 21:14)  POCT Blood Glucose.: 167 mg/dL (02 Sep 2022 17:33)  POCT Blood Glucose.: 88 mg/dL (02 Sep 2022 12:16)    I&O's Summary    02 Sep 2022 07:01  -  03 Sep 2022 07:00  --------------------------------------------------------  IN: 1140 mL / OUT: 900 mL / NET: 240 mL        Vital Signs Last 24 Hrs  T(C): 37.2 (03 Sep 2022 09:13), Max: 37.2 (03 Sep 2022 08:47)  T(F): 98.9 (03 Sep 2022 09:13), Max: 98.9 (03 Sep 2022 08:47)  HR: 110 (03 Sep 2022 09:13) (92 - 110)  BP: 145/77 (03 Sep 2022 09:13) (112/62 - 145/77)  BP(mean): --  RR: 16 (03 Sep 2022 09:13) (16 - 18)  SpO2: 97% (03 Sep 2022 09:13) (95% - 99%)    PHYSICAL EXAM:  GENERAL: NAD  EYES: EOMI, PERRLA,  NECK: Supple, No JVD  CHEST/LUNG: Clear   HEART: S1S2;  no murmurs  ABDOMEN: Soft nontender  bandage in place  EXTREMITIES: no edema  NEUROLOGY: Alert, nonfocal  SKIN: No rashes or lesions    LABS:                        9.7    20.06 )-----------( 965      ( 03 Sep 2022 04:30 )             28.9     09-03    135  |  100  |  23  ----------------------------<  229<H>  4.6   |  22  |  0.86    Ca    9.5      03 Sep 2022 04:30  Phos  4.2     09-03  Mg     1.60     09-03    TPro  8.0  /  Alb  3.7  /  TBili  <0.2  /  DBili  <0.2  /  AST  27  /  ALT  40<H>  /  AlkPhos  151<H>  09-03    PT/INR - ( 03 Sep 2022 04:30 )   PT: 13.2 sec;   INR: 1.14 ratio         PTT - ( 02 Sep 2022 07:25 )  PTT:38.3 sec            All consultant(s) notes reviewed and care discussed with other providers        Contact Number, Dr Reed [phone] Patient is a 72y old  Female who presents with a chief complaint of cellulitis (02 Sep 2022 10:43)      DATE OF SERVICE: 09-03-22 @ 09:38    SUBJECTIVE / OVERNIGHT EVENTS: overnight events noted    ROS:  Resp: No cough no sputum production  CVS: No chest pain no palpitations no orthopnea  GI: no N/V/D  : no dysuria, no hematuria  Neuro: no weakness no paresthesias          MEDICATIONS  (STANDING):  ceFAZolin   IVPB 1000 milliGRAM(s) IV Intermittent every 8 hours  dextrose 5% + sodium chloride 0.9%. 1000 milliLiter(s) (100 mL/Hr) IV Continuous <Continuous>  dextrose 5%. 1000 milliLiter(s) (50 mL/Hr) IV Continuous <Continuous>  dextrose 5%. 1000 milliLiter(s) (100 mL/Hr) IV Continuous <Continuous>  dextrose 50% Injectable 25 Gram(s) IV Push once  dextrose 50% Injectable 12.5 Gram(s) IV Push once  dextrose 50% Injectable 25 Gram(s) IV Push once  enoxaparin Injectable 40 milliGRAM(s) SubCutaneous every 24 hours  glucagon  Injectable 1 milliGRAM(s) IntraMuscular once  influenza  Vaccine (HIGH DOSE) 0.7 milliLiter(s) IntraMuscular once  insulin glargine Injectable (LANTUS) 50 Unit(s) SubCutaneous at bedtime  insulin lispro (ADMELOG) corrective regimen sliding scale   SubCutaneous three times a day before meals  insulin lispro Injectable (ADMELOG) 13 Unit(s) SubCutaneous three times a day before meals  lidocaine 1%/epinephrine 1:100,000 Inj 20 milliLiter(s) Local Injection once  lisinopril 20 milliGRAM(s) Oral daily  magnesium sulfate  IVPB 2 Gram(s) IV Intermittent once  silver nitrate Applicator 1 Application(s) Topical once  simvastatin 40 milliGRAM(s) Oral at bedtime    MEDICATIONS  (PRN):  acetaminophen     Tablet .. 650 milliGRAM(s) Oral every 6 hours PRN Mild Pain (1 - 3)  acetaminophen     Tablet .. 650 milliGRAM(s) Oral every 6 hours PRN Mild Pain (1 - 3), Moderate Pain (4 - 6)  dextrose Oral Gel 15 Gram(s) Oral once PRN Blood Glucose LESS THAN 70 milliGRAM(s)/deciliter  oxyCODONE    IR 5 milliGRAM(s) Oral every 4 hours PRN Moderate Pain (4 - 6)  oxyCODONE    IR 10 milliGRAM(s) Oral every 4 hours PRN Severe Pain (7 - 10)        CAPILLARY BLOOD GLUCOSE      POCT Blood Glucose.: 160 mg/dL (03 Sep 2022 08:57)  POCT Blood Glucose.: 254 mg/dL (03 Sep 2022 07:02)  POCT Blood Glucose.: 269 mg/dL (02 Sep 2022 21:14)  POCT Blood Glucose.: 167 mg/dL (02 Sep 2022 17:33)  POCT Blood Glucose.: 88 mg/dL (02 Sep 2022 12:16)    I&O's Summary    02 Sep 2022 07:01  -  03 Sep 2022 07:00  --------------------------------------------------------  IN: 1140 mL / OUT: 900 mL / NET: 240 mL        Vital Signs Last 24 Hrs  T(C): 37.2 (03 Sep 2022 09:13), Max: 37.2 (03 Sep 2022 08:47)  T(F): 98.9 (03 Sep 2022 09:13), Max: 98.9 (03 Sep 2022 08:47)  HR: 110 (03 Sep 2022 09:13) (92 - 110)  BP: 145/77 (03 Sep 2022 09:13) (112/62 - 145/77)  BP(mean): --  RR: 16 (03 Sep 2022 09:13) (16 - 18)  SpO2: 97% (03 Sep 2022 09:13) (95% - 99%)    PHYSICAL EXAM:  GENERAL: NAD  EYES: EOMI, PERRLA,  NECK: Supple, No JVD  CHEST/LUNG: Clear   HEART: S1S2;  no murmurs  ABDOMEN: Soft nontender  bandage in place  EXTREMITIES: no edema  NEUROLOGY: Alert, nonfocal  SKIN: No rashes or lesions    LABS:                        9.7    20.06 )-----------( 965      ( 03 Sep 2022 04:30 )             28.9     09-03    135  |  100  |  23  ----------------------------<  229<H>  4.6   |  22  |  0.86    Ca    9.5      03 Sep 2022 04:30  Phos  4.2     09-03  Mg     1.60     09-03    TPro  8.0  /  Alb  3.7  /  TBili  <0.2  /  DBili  <0.2  /  AST  27  /  ALT  40<H>  /  AlkPhos  151<H>  09-03    PT/INR - ( 03 Sep 2022 04:30 )   PT: 13.2 sec;   INR: 1.14 ratio         PTT - ( 02 Sep 2022 07:25 )  PTT:38.3 sec            All consultant(s) notes reviewed and care discussed with other providers        Contact Number, Dr Reed [phone]

## 2022-09-03 NOTE — BRIEF OPERATIVE NOTE - OPERATION/FINDINGS
Excision Debridement of indurated tissue. Wound extended and unhealthy tissue excised. Wound packed with 3 moist kerlex and abdominal pads.

## 2022-09-03 NOTE — PACU DISCHARGE NOTE - COMMENTS
T(C): 36.2 (09-03-22 @ 12:00), Max: 37.2 (09-03-22 @ 08:47)  HR: 79 (09-03-22 @ 12:30) (78 - 110)  BP: 119/72 (09-03-22 @ 12:30) (118/67 - 149/83)  RR: 15 (09-03-22 @ 12:30) (13 - 22)  SpO2: 98% (09-03-22 @ 12:30) (96% - 100%)    Vital signs stable, non-labored breathing with adequate oxygen saturation. Pain and nausea are controlled. All questions answered. Stable for PACU discharge and transfer to the floor.

## 2022-09-03 NOTE — PROGRESS NOTE ADULT - ASSESSMENT
72F admitted with anterior abdominal wall abscess, now s/p bedside I&D with increasing wbc to 17    Plan:  - Open wound more today and wound care consult for vac   - Continue lantus and premeal as per endo  - Continue IV abx change change to oral on dc until 9/8 as per id: cephalexin 500 mg q6hr  - Continue IVF   - Continue with daily dressing change   - Pain Control   - DVT PPx: LVX    A Team Surgery   72F admitted with anterior abdominal wall abscess, now s/p bedside I&D with increasing wbc to 17    Plan:  - OR today for incision and drainage   - Continue lantus and premeal as per endo  - Continue IV abx change change to oral on dc until 9/8 as per id: cephalexin 500 mg q6hr  - Continue IVF   - Continue with daily dressing change   - Pain Control   - DVT PPx: LVX    A Team Surgery

## 2022-09-03 NOTE — PRE-OP CHECKLIST - COMMENTS
Otezla Pregnancy And Lactation Text: This medication is Pregnancy Category C and it isn't known if it is safe during pregnancy. It is unknown if it is excreted in breast milk. pt took nothing this am

## 2022-09-04 LAB
ANION GAP SERPL CALC-SCNC: 13 MMOL/L — SIGNIFICANT CHANGE UP (ref 7–14)
BUN SERPL-MCNC: 16 MG/DL — SIGNIFICANT CHANGE UP (ref 7–23)
CALCIUM SERPL-MCNC: 8.7 MG/DL — SIGNIFICANT CHANGE UP (ref 8.4–10.5)
CHLORIDE SERPL-SCNC: 100 MMOL/L — SIGNIFICANT CHANGE UP (ref 98–107)
CO2 SERPL-SCNC: 22 MMOL/L — SIGNIFICANT CHANGE UP (ref 22–31)
CREAT SERPL-MCNC: 0.8 MG/DL — SIGNIFICANT CHANGE UP (ref 0.5–1.3)
EGFR: 79 ML/MIN/1.73M2 — SIGNIFICANT CHANGE UP
GLUCOSE BLDC GLUCOMTR-MCNC: 116 MG/DL — HIGH (ref 70–99)
GLUCOSE BLDC GLUCOMTR-MCNC: 308 MG/DL — HIGH (ref 70–99)
GLUCOSE BLDC GLUCOMTR-MCNC: 316 MG/DL — HIGH (ref 70–99)
GLUCOSE BLDC GLUCOMTR-MCNC: 89 MG/DL — SIGNIFICANT CHANGE UP (ref 70–99)
GLUCOSE SERPL-MCNC: 129 MG/DL — HIGH (ref 70–99)
HCT VFR BLD CALC: 25.2 % — LOW (ref 34.5–45)
HGB BLD-MCNC: 8.2 G/DL — LOW (ref 11.5–15.5)
MAGNESIUM SERPL-MCNC: 1.6 MG/DL — SIGNIFICANT CHANGE UP (ref 1.6–2.6)
MCHC RBC-ENTMCNC: 28.2 PG — SIGNIFICANT CHANGE UP (ref 27–34)
MCHC RBC-ENTMCNC: 32.5 GM/DL — SIGNIFICANT CHANGE UP (ref 32–36)
MCV RBC AUTO: 86.6 FL — SIGNIFICANT CHANGE UP (ref 80–100)
NRBC # BLD: 0 /100 WBCS — SIGNIFICANT CHANGE UP (ref 0–0)
NRBC # FLD: 0 K/UL — SIGNIFICANT CHANGE UP (ref 0–0)
PHOSPHATE SERPL-MCNC: 3.4 MG/DL — SIGNIFICANT CHANGE UP (ref 2.5–4.5)
PLATELET # BLD AUTO: 760 K/UL — HIGH (ref 150–400)
POTASSIUM SERPL-MCNC: 4.3 MMOL/L — SIGNIFICANT CHANGE UP (ref 3.5–5.3)
POTASSIUM SERPL-SCNC: 4.3 MMOL/L — SIGNIFICANT CHANGE UP (ref 3.5–5.3)
RBC # BLD: 2.91 M/UL — LOW (ref 3.8–5.2)
RBC # FLD: 15.6 % — HIGH (ref 10.3–14.5)
SODIUM SERPL-SCNC: 135 MMOL/L — SIGNIFICANT CHANGE UP (ref 135–145)
WBC # BLD: 14.56 K/UL — HIGH (ref 3.8–10.5)
WBC # FLD AUTO: 14.56 K/UL — HIGH (ref 3.8–10.5)

## 2022-09-04 PROCEDURE — 99232 SBSQ HOSP IP/OBS MODERATE 35: CPT

## 2022-09-04 RX ORDER — MAGNESIUM SULFATE 500 MG/ML
2 VIAL (ML) INJECTION ONCE
Refills: 0 | Status: COMPLETED | OUTPATIENT
Start: 2022-09-04 | End: 2022-09-04

## 2022-09-04 RX ORDER — HYDROMORPHONE HYDROCHLORIDE 2 MG/ML
0.25 INJECTION INTRAMUSCULAR; INTRAVENOUS; SUBCUTANEOUS ONCE
Refills: 0 | Status: DISCONTINUED | OUTPATIENT
Start: 2022-09-04 | End: 2022-09-04

## 2022-09-04 RX ADMIN — Medication 25 GRAM(S): at 10:59

## 2022-09-04 RX ADMIN — Medication 1000 MILLIGRAM(S): at 04:15

## 2022-09-04 RX ADMIN — Medication 1000 MILLIGRAM(S): at 11:00

## 2022-09-04 RX ADMIN — Medication 100 MILLIGRAM(S): at 05:29

## 2022-09-04 RX ADMIN — LISINOPRIL 20 MILLIGRAM(S): 2.5 TABLET ORAL at 05:33

## 2022-09-04 RX ADMIN — OXYCODONE HYDROCHLORIDE 10 MILLIGRAM(S): 5 TABLET ORAL at 23:46

## 2022-09-04 RX ADMIN — HYDROMORPHONE HYDROCHLORIDE 0.5 MILLIGRAM(S): 2 INJECTION INTRAMUSCULAR; INTRAVENOUS; SUBCUTANEOUS at 06:42

## 2022-09-04 RX ADMIN — INSULIN GLARGINE 50 UNIT(S): 100 INJECTION, SOLUTION SUBCUTANEOUS at 21:44

## 2022-09-04 RX ADMIN — Medication 8: at 17:20

## 2022-09-04 RX ADMIN — Medication 400 MILLIGRAM(S): at 10:32

## 2022-09-04 RX ADMIN — Medication 400 MILLIGRAM(S): at 03:45

## 2022-09-04 RX ADMIN — Medication 400 MILLIGRAM(S): at 17:20

## 2022-09-04 RX ADMIN — HYDROMORPHONE HYDROCHLORIDE 0.25 MILLIGRAM(S): 2 INJECTION INTRAMUSCULAR; INTRAVENOUS; SUBCUTANEOUS at 23:58

## 2022-09-04 RX ADMIN — Medication 1000 MILLIGRAM(S): at 18:00

## 2022-09-04 RX ADMIN — Medication 13 UNIT(S): at 17:21

## 2022-09-04 RX ADMIN — SIMVASTATIN 40 MILLIGRAM(S): 20 TABLET, FILM COATED ORAL at 21:44

## 2022-09-04 RX ADMIN — Medication 100 MILLIGRAM(S): at 21:43

## 2022-09-04 RX ADMIN — OXYCODONE HYDROCHLORIDE 10 MILLIGRAM(S): 5 TABLET ORAL at 23:16

## 2022-09-04 RX ADMIN — Medication 81 MILLIGRAM(S): at 11:02

## 2022-09-04 RX ADMIN — Medication 100 MILLIGRAM(S): at 13:19

## 2022-09-04 RX ADMIN — ENOXAPARIN SODIUM 40 MILLIGRAM(S): 100 INJECTION SUBCUTANEOUS at 21:44

## 2022-09-04 RX ADMIN — Medication 13 UNIT(S): at 08:44

## 2022-09-04 NOTE — PROGRESS NOTE ADULT - ASSESSMENT
72F admitted with anterior abdominal wall abscess, s/p bedside I&D x2 and s/p OR debridement and washout     Plan:  - Wound vac and plastics evaluation on tuesday  - 0.5 dilaudid 30 minutes before dressing changes  - ASA for thrombocytosis  - Continue lantus and premeal as per endo  - Continue IV abx change change to oral on dc until 9/8 as per id: cephalexin 500 mg q6hr  - Continue IVF   - Continue with daily dressing change   - Pain Control   - DVT PPx: LVX    A Team Surgery   73 yo F admitted with anterior abdominal wall abscess, s/p bedside I&D x2 and s/p OR debridement and washout on 9/3.     Plan:  -Wound vac placement and plastics evaluation next Tue 9/6  -Continue with daily dressing change (WTD w/ Kerlix)    - 0.5mg Dilaudid 30 min prior to dressing change   -ASA started i/s/o new onset thrombocytosis   - Improving (760 today from 965 yesterday)  -Continue to monitor blood glucose, appreciate endo recs: Lantus 50U at bedtime, Admelog 13U premeal and Admelog correction scale   -Abx: per ID, continue cephalexin, d/c on Keflex 500mg q6 for total 10 days post initial bedside I&D  -Diet: consistent carbs   -Activity: encourage OOB   -DVT ppx: Lovenox      A Team Surgery  P# [id number]

## 2022-09-04 NOTE — PROGRESS NOTE ADULT - SUBJECTIVE AND OBJECTIVE BOX
Chief complaint    Patient is a 72y old  Female who presents with a chief complaint of abscess (04 Sep 2022 10:01)   Review of systems  Patient in bed, appears comfortable.    Labs and Fingersticks  CAPILLARY BLOOD GLUCOSE      POCT Blood Glucose.: 316 mg/dL (04 Sep 2022 17:10)  POCT Blood Glucose.: 89 mg/dL (04 Sep 2022 12:22)  POCT Blood Glucose.: 116 mg/dL (04 Sep 2022 08:34)  POCT Blood Glucose.: 391 mg/dL (03 Sep 2022 21:58)      Anion Gap, Serum: 13 (09-04 @ 07:25)  Anion Gap, Serum: 13 (09-03 @ 04:30)      Calcium, Total Serum: 8.7 (09-04 @ 07:25)  Calcium, Total Serum: 9.5 (09-03 @ 04:30)  Albumin, Serum: 3.7 (09-03 @ 04:30)    Alanine Aminotransferase (ALT/SGPT): 40 *H* (09-03 @ 04:30)  Alkaline Phosphatase, Serum: 151 *H* (09-03 @ 04:30)  Aspartate Aminotransferase (AST/SGOT): 27 (09-03 @ 04:30)        09-04    135  |  100  |  16  ----------------------------<  129<H>  4.3   |  22  |  0.80    Ca    8.7      04 Sep 2022 07:25  Phos  3.4     09-04  Mg     1.60     09-04    TPro  8.0  /  Alb  3.7  /  TBili  <0.2  /  DBili  <0.2  /  AST  27  /  ALT  40<H>  /  AlkPhos  151<H>  09-03                        8.2    14.56 )-----------( 760      ( 04 Sep 2022 07:25 )             25.2     Medications  MEDICATIONS  (STANDING):  aspirin  chewable 81 milliGRAM(s) Oral daily  ceFAZolin   IVPB 1000 milliGRAM(s) IV Intermittent every 8 hours  dextrose 5%. 1000 milliLiter(s) (50 mL/Hr) IV Continuous <Continuous>  dextrose 5%. 1000 milliLiter(s) (100 mL/Hr) IV Continuous <Continuous>  dextrose 50% Injectable 25 Gram(s) IV Push once  dextrose 50% Injectable 12.5 Gram(s) IV Push once  dextrose 50% Injectable 25 Gram(s) IV Push once  enoxaparin Injectable 40 milliGRAM(s) SubCutaneous every 24 hours  glucagon  Injectable 1 milliGRAM(s) IntraMuscular once  influenza  Vaccine (HIGH DOSE) 0.7 milliLiter(s) IntraMuscular once  insulin glargine Injectable (LANTUS) 50 Unit(s) SubCutaneous at bedtime  insulin lispro (ADMELOG) corrective regimen sliding scale   SubCutaneous three times a day before meals  insulin lispro Injectable (ADMELOG) 13 Unit(s) SubCutaneous three times a day before meals  lidocaine 1%/epinephrine 1:100,000 Inj 20 milliLiter(s) Local Injection once  lisinopril 20 milliGRAM(s) Oral daily  silver nitrate Applicator 1 Application(s) Topical once  simvastatin 40 milliGRAM(s) Oral at bedtime      Physical Exam  General: Patient comfortable in bed  Vital Signs Last 12 Hrs  T(F): 97.6 (09-04-22 @ 17:13), Max: 98.3 (09-04-22 @ 13:56)  HR: 100 (09-04-22 @ 17:13) (95 - 100)  BP: 141/86 (09-04-22 @ 17:13) (114/60 - 141/86)  BP(mean): --  RR: 18 (09-04-22 @ 17:13) (18 - 18)  SpO2: 97% (09-04-22 @ 17:13) (96% - 97%)  Neck: No palpable thyroid nodules.  CVS: S1S2, No murmurs  Respiratory: No wheezing, no crepitations  GI: Abdomen soft, bowel sounds positive  Musculoskeletal:  edema lower extremities.     Diagnostics

## 2022-09-04 NOTE — PROGRESS NOTE ADULT - ASSESSMENT
73 yo F colon CA, DM, diabetic neuropathy, recent hernia repair, presenting with discharge from wound  No fevers, has leukocytosis  Recent hernia report  Concern for possible abscess s/p I+D on day of admission  Culture now growing MSSA  UCX with low CFU Pseudomonas, 2x UA (mild positive to negative)  CT with lower abd wall fluid collection, extending to RLQ  Patient has no symptoms UTI  9/3 OR I+D in OR  Overall,  1) Abscess  - Growing MSSA  - Cefazolin 1g q 8  - Wound care per team  - F/U surgery team  2) Positive UCX  - Suspect asymptomatic bacteriuria, low CFU; no symptoms UTI; UA equivocal  - Would hold on Zosyn  - Monitor for signs/symptoms UTI  3) Leukocytosis  - Monitor for alternate sources infection, still no UTI symptoms at this point  - Trend to normal    Anderson Trotter MD  Contact on TEAMS messaging from 9am - 5pm  From 5pm-9am, on weekends, or if no response call [phone]

## 2022-09-04 NOTE — PROGRESS NOTE ADULT - ASSESSMENT
Assessment  DMT2: 72y Female with DM T2 with hyperglycemia admitted with abdominal wall wound, patient was on insulin at home, on basal bolus and insulin coverage, sugars improving, eating meals, compliant with low carb diet.  Abdominal wall wound: : S/P drain, on medications, monitored.  HTN: On antihypertensive medications, monitored, asymptomatic.  Overweight: No strict exercise routines, neither on low calorie diet.                 Brayden Richards MD  Cell: [phone]  Office: [phone]

## 2022-09-04 NOTE — PROGRESS NOTE ADULT - SUBJECTIVE AND OBJECTIVE BOX
Patient is a 72y old  Female who presents with a chief complaint of abscess (03 Sep 2022 09:37)      DATE OF SERVICE: 09-04-22 @ 10:02    SUBJECTIVE / OVERNIGHT EVENTS: overnight events noted  "I feel much better"     ROS:  Resp: No cough no sputum production  CVS: No chest pain no palpitations no orthopnea  GI: no N/V NO DIARRHEA  : no dysuria, no hematuria          MEDICATIONS  (STANDING):  acetaminophen   IVPB .. 1000 milliGRAM(s) IV Intermittent every 6 hours  aspirin  chewable 81 milliGRAM(s) Oral daily  ceFAZolin   IVPB 1000 milliGRAM(s) IV Intermittent every 8 hours  dextrose 5%. 1000 milliLiter(s) (50 mL/Hr) IV Continuous <Continuous>  dextrose 5%. 1000 milliLiter(s) (100 mL/Hr) IV Continuous <Continuous>  dextrose 50% Injectable 25 Gram(s) IV Push once  dextrose 50% Injectable 12.5 Gram(s) IV Push once  dextrose 50% Injectable 25 Gram(s) IV Push once  enoxaparin Injectable 40 milliGRAM(s) SubCutaneous every 24 hours  glucagon  Injectable 1 milliGRAM(s) IntraMuscular once  influenza  Vaccine (HIGH DOSE) 0.7 milliLiter(s) IntraMuscular once  insulin glargine Injectable (LANTUS) 50 Unit(s) SubCutaneous at bedtime  insulin lispro (ADMELOG) corrective regimen sliding scale   SubCutaneous three times a day before meals  insulin lispro Injectable (ADMELOG) 13 Unit(s) SubCutaneous three times a day before meals  lidocaine 1%/epinephrine 1:100,000 Inj 20 milliLiter(s) Local Injection once  lisinopril 20 milliGRAM(s) Oral daily  magnesium sulfate  IVPB 2 Gram(s) IV Intermittent once  silver nitrate Applicator 1 Application(s) Topical once  simvastatin 40 milliGRAM(s) Oral at bedtime    MEDICATIONS  (PRN):  dextrose Oral Gel 15 Gram(s) Oral once PRN Blood Glucose LESS THAN 70 milliGRAM(s)/deciliter  fentaNYL    Injectable 50 MICROGram(s) IV Push every 10 minutes PRN Severe Pain (7 - 10)  HYDROmorphone  Injectable 0.5 milliGRAM(s) IV Push every 15 minutes PRN Moderate Pain (4 - 6)  ondansetron Injectable 4 milliGRAM(s) IV Push once PRN Nausea and/or Vomiting  oxyCODONE    IR 5 milliGRAM(s) Oral every 4 hours PRN Moderate Pain (4 - 6)  oxyCODONE    IR 10 milliGRAM(s) Oral every 4 hours PRN Severe Pain (7 - 10)        CAPILLARY BLOOD GLUCOSE      POCT Blood Glucose.: 116 mg/dL (04 Sep 2022 08:34)  POCT Blood Glucose.: 391 mg/dL (03 Sep 2022 21:58)  POCT Blood Glucose.: 220 mg/dL (03 Sep 2022 17:36)  POCT Blood Glucose.: 137 mg/dL (03 Sep 2022 12:51)  POCT Blood Glucose.: 93 mg/dL (03 Sep 2022 11:18)    I&O's Summary    03 Sep 2022 07:01  -  04 Sep 2022 07:00  --------------------------------------------------------  IN: 1330 mL / OUT: 500 mL / NET: 830 mL        Vital Signs Last 24 Hrs  T(C): 36.3 (04 Sep 2022 09:14), Max: 37.1 (03 Sep 2022 22:25)  T(F): 97.4 (04 Sep 2022 09:14), Max: 98.7 (03 Sep 2022 22:25)  HR: 100 (04 Sep 2022 09:14) (78 - 102)  BP: 138/77 (04 Sep 2022 09:14) (119/72 - 149/83)  BP(mean): 80 (03 Sep 2022 12:30) (80 - 105)  RR: 18 (04 Sep 2022 09:14) (13 - 22)  SpO2: 96% (04 Sep 2022 09:14) (96% - 100%)    PHYSICAL EXAM:  GENERAL: NAD  EYES: EOMI, PERRLA,  NECK: Supple, No JVD  CHEST/LUNG: Clear   HEART: S1S2;  no murmurs  ABDOMEN: Soft nontender  bandage in place  EXTREMITIES: no edema  NEUROLOGY: Alert, nonfocal    LABS:                        8.2    14.56 )-----------( 760      ( 04 Sep 2022 07:25 )             25.2     09-04    135  |  100  |  16  ----------------------------<  129<H>  4.3   |  22  |  0.80    Ca    8.7      04 Sep 2022 07:25  Phos  3.4     09-04  Mg     1.60     09-04    TPro  8.0  /  Alb  3.7  /  TBili  <0.2  /  DBili  <0.2  /  AST  27  /  ALT  40<H>  /  AlkPhos  151<H>  09-03    PT/INR - ( 03 Sep 2022 04:30 )   PT: 13.2 sec;   INR: 1.14 ratio                     All consultant(s) notes reviewed and care discussed with other providers        Contact Number, Dr Reed [phone]

## 2022-09-04 NOTE — PROGRESS NOTE ADULT - SUBJECTIVE AND OBJECTIVE BOX
CC: F/U abscess    Saw/spoke to patient. Unchanged, s/p OR. Doing well. Pain at abd, no dysuria.    Allergies  No Known Allergies    ANTIMICROBIALS:  ceFAZolin   IVPB 1000 every 8 hours    PE:    Vital Signs Last 24 Hrs  T(C): 36.3 (04 Sep 2022 09:14), Max: 37.1 (03 Sep 2022 22:25)  T(F): 97.4 (04 Sep 2022 09:14), Max: 98.7 (03 Sep 2022 22:25)  HR: 100 (04 Sep 2022 09:14) (78 - 102)  BP: 138/77 (04 Sep 2022 09:14) (119/72 - 149/83)  BP(mean): 80 (03 Sep 2022 12:30) (80 - 105)  RR: 18 (04 Sep 2022 09:14) (13 - 22)  SpO2: 96% (04 Sep 2022 09:14) (96% - 100%)    Gen: AOx3, NAD, non-toxic  CV: Nontachycardic  Resp: Breathing comfortably, RA  Abd: Soft, nontender  IV/Skin: No thrombophlebitis    LABS:                        8.2    14.56 )-----------( 760      ( 04 Sep 2022 07:25 )             25.2     09-04    135  |  100  |  16  ----------------------------<  129<H>  4.3   |  22  |  0.80    Ca    8.7      04 Sep 2022 07:25  Phos  3.4     09-04  Mg     1.60     09-04    TPro  8.0  /  Alb  3.7  /  TBili  <0.2  /  DBili  <0.2  /  AST  27  /  ALT  40<H>  /  AlkPhos  151<H>  09-03    MICROBIOLOGY:    Clean Catch Clean Catch (Midstream)  08-29-22   10,000 - 49,000 CFU/mL Pseudomonas aeruginosa  --  Pseudomonas aeruginosa    Abdominal Fl Abdominal Fluid  08-29-22   Moderate Staphylococcus aureus  --  Staphylococcus aureus    .Blood Blood-Peripheral  08-29-22   No Growth Final  --  --    .Blood Blood-Peripheral  08-29-22   No Growth Final  --  --    (otherwise reviewed)    RADIOLOGY:    8/29 CT:    IMPRESSION:  A 6.0 x 2.0 cm fluid collection of the lower abdominal wall, likely an   abscess given the extensive surrounding infiltration and tract extending   to the right lower quadrant abdominal wall skin surface.

## 2022-09-04 NOTE — PROGRESS NOTE ADULT - SUBJECTIVE AND OBJECTIVE BOX
SURGERY DAILY PROGRESS NOTE:     Overnight events: No acute events overnight.     SUBJECTIVE/ROS: Patient seen at bedside this AM.     OBJECTIVE:  ICU Vital Signs Last 24 Hrs  T(C): 36.8 (04 Sep 2022 02:03), Max: 37.2 (03 Sep 2022 08:47)  T(F): 98.3 (04 Sep 2022 02:03), Max: 98.9 (03 Sep 2022 08:47)  HR: 97 (04 Sep 2022 02:03) (78 - 110)  BP: 141/75 (04 Sep 2022 02:03) (119/72 - 149/83)  BP(mean): 80 (03 Sep 2022 12:30) (80 - 105)  ABP: --  ABP(mean): --  RR: 18 (04 Sep 2022 02:03) (13 - 22)  SpO2: 99% (04 Sep 2022 02:03) (96% - 100%)    O2 Parameters below as of 04 Sep 2022 02:03  Patient On (Oxygen Delivery Method): room air    I&O's Detail    02 Sep 2022 07:01  -  03 Sep 2022 07:00  --------------------------------------------------------  IN:    dextrose 5% + sodium chloride 0.9%: 600 mL    IV PiggyBack: 100 mL    Oral Fluid: 440 mL  Total IN: 1140 mL    OUT:    Voided (mL): 900 mL  Total OUT: 900 mL    Total NET: 240 mL      03 Sep 2022 07:01  -  04 Sep 2022 05:24  --------------------------------------------------------  IN:    IV PiggyBack: 250 mL    Oral Fluid: 480 mL  Total IN: 730 mL    OUT:    Voided (mL): 500 mL  Total OUT: 500 mL    Total NET: 230 mL    Daily Height in cm: 143.7 (03 Sep 2022 06:54)    Daily     LABS:                         9.7    20.06 )-----------( 965      ( 03 Sep 2022 04:30 )             28.9       09-03    135  |  100  |  23  ----------------------------<  229<H>  4.6   |  22  |  0.86    Ca    9.5      03 Sep 2022 04:30  Phos  4.2     09-03  Mg     1.60     09-03    TPro  8.0  /  Alb  3.7  /  TBili  <0.2  /  DBili  <0.2  /  AST  27  /  ALT  40<H>  /  AlkPhos  151<H>  09-03      PT/INR - ( 03 Sep 2022 04:30 )   PT: 13.2 sec;   INR: 1.14 ratio         PTT - ( 02 Sep 2022 07:25 )  PTT:38.3 sec    PHYSICAL EXAM:  Gen: AAOx3, non-toxic  Head: NCAT  HEENT: EOMI, oral mucosa moist, normal conjunctiva  Lung: Breathing on RA, unlabored   Abd: Wound with kerlex packing, abd pads  MSK: no visible deformities  Neuro: No focal sensory or motor deficits   SURGERY DAILY PROGRESS NOTE:     Overnight events: No acute events overnight.     SUBJECTIVE/ROS: Patient seen at bedside this AM. Patient with significant abdominal pain to palpation around wound.      OBJECTIVE:  ICU Vital Signs Last 24 Hrs  T(C): 36.8 (04 Sep 2022 02:03), Max: 37.2 (03 Sep 2022 08:47)  T(F): 98.3 (04 Sep 2022 02:03), Max: 98.9 (03 Sep 2022 08:47)  HR: 97 (04 Sep 2022 02:03) (78 - 110)  BP: 141/75 (04 Sep 2022 02:03) (119/72 - 149/83)  BP(mean): 80 (03 Sep 2022 12:30) (80 - 105)  ABP: --  ABP(mean): --  RR: 18 (04 Sep 2022 02:03) (13 - 22)  SpO2: 99% (04 Sep 2022 02:03) (96% - 100%)    O2 Parameters below as of 04 Sep 2022 02:03  Patient On (Oxygen Delivery Method): room air    I&O's Detail    02 Sep 2022 07:01  -  03 Sep 2022 07:00  --------------------------------------------------------  IN:    dextrose 5% + sodium chloride 0.9%: 600 mL    IV PiggyBack: 100 mL    Oral Fluid: 440 mL  Total IN: 1140 mL    OUT:    Voided (mL): 900 mL  Total OUT: 900 mL    Total NET: 240 mL      03 Sep 2022 07:01  -  04 Sep 2022 05:24  --------------------------------------------------------  IN:    IV PiggyBack: 250 mL    Oral Fluid: 480 mL  Total IN: 730 mL    OUT:    Voided (mL): 500 mL  Total OUT: 500 mL    Total NET: 230 mL    Daily Height in cm: 143.7 (03 Sep 2022 06:54)    Daily     LABS:                         9.7    20.06 )-----------( 965      ( 03 Sep 2022 04:30 )             28.9       09-03    135  |  100  |  23  ----------------------------<  229<H>  4.6   |  22  |  0.86    Ca    9.5      03 Sep 2022 04:30  Phos  4.2     09-03  Mg     1.60     09-03    TPro  8.0  /  Alb  3.7  /  TBili  <0.2  /  DBili  <0.2  /  AST  27  /  ALT  40<H>  /  AlkPhos  151<H>  09-03      PT/INR - ( 03 Sep 2022 04:30 )   PT: 13.2 sec;   INR: 1.14 ratio         PTT - ( 02 Sep 2022 07:25 )  PTT:38.3 sec    PHYSICAL EXAM:  Gen: AAOx3, non-toxic  Head: NCAT  HEENT: EOMI, oral mucosa moist, normal conjunctiva  Lung: Breathing on RA, unlabored   Abd: Wound with kerlex packing, abd pads  MSK: no visible deformities  Neuro: No focal sensory or motor deficits

## 2022-09-05 LAB
ALBUMIN SERPL ELPH-MCNC: 3.4 G/DL — SIGNIFICANT CHANGE UP (ref 3.3–5)
ALP SERPL-CCNC: 121 U/L — HIGH (ref 40–120)
ALT FLD-CCNC: 11 U/L — SIGNIFICANT CHANGE UP (ref 4–33)
ANION GAP SERPL CALC-SCNC: 12 MMOL/L — SIGNIFICANT CHANGE UP (ref 7–14)
AST SERPL-CCNC: 11 U/L — SIGNIFICANT CHANGE UP (ref 4–32)
BILIRUB DIRECT SERPL-MCNC: <0.2 MG/DL — SIGNIFICANT CHANGE UP (ref 0–0.3)
BILIRUB INDIRECT FLD-MCNC: SIGNIFICANT CHANGE UP MG/DL (ref 0–1)
BILIRUB SERPL-MCNC: <0.2 MG/DL — SIGNIFICANT CHANGE UP (ref 0.2–1.2)
BUN SERPL-MCNC: 17 MG/DL — SIGNIFICANT CHANGE UP (ref 7–23)
CALCIUM SERPL-MCNC: 9.2 MG/DL — SIGNIFICANT CHANGE UP (ref 8.4–10.5)
CHLORIDE SERPL-SCNC: 98 MMOL/L — SIGNIFICANT CHANGE UP (ref 98–107)
CO2 SERPL-SCNC: 24 MMOL/L — SIGNIFICANT CHANGE UP (ref 22–31)
CREAT SERPL-MCNC: 0.81 MG/DL — SIGNIFICANT CHANGE UP (ref 0.5–1.3)
EGFR: 78 ML/MIN/1.73M2 — SIGNIFICANT CHANGE UP
GLUCOSE BLDC GLUCOMTR-MCNC: 172 MG/DL — HIGH (ref 70–99)
GLUCOSE BLDC GLUCOMTR-MCNC: 219 MG/DL — HIGH (ref 70–99)
GLUCOSE BLDC GLUCOMTR-MCNC: 342 MG/DL — HIGH (ref 70–99)
GLUCOSE BLDC GLUCOMTR-MCNC: 84 MG/DL — SIGNIFICANT CHANGE UP (ref 70–99)
GLUCOSE SERPL-MCNC: 168 MG/DL — HIGH (ref 70–99)
HCT VFR BLD CALC: 25.9 % — LOW (ref 34.5–45)
HGB BLD-MCNC: 8.7 G/DL — LOW (ref 11.5–15.5)
MAGNESIUM SERPL-MCNC: 1.8 MG/DL — SIGNIFICANT CHANGE UP (ref 1.6–2.6)
MCHC RBC-ENTMCNC: 29 PG — SIGNIFICANT CHANGE UP (ref 27–34)
MCHC RBC-ENTMCNC: 33.6 GM/DL — SIGNIFICANT CHANGE UP (ref 32–36)
MCV RBC AUTO: 86.3 FL — SIGNIFICANT CHANGE UP (ref 80–100)
NRBC # BLD: 0 /100 WBCS — SIGNIFICANT CHANGE UP (ref 0–0)
NRBC # FLD: 0 K/UL — SIGNIFICANT CHANGE UP (ref 0–0)
PHOSPHATE SERPL-MCNC: 3.5 MG/DL — SIGNIFICANT CHANGE UP (ref 2.5–4.5)
PLATELET # BLD AUTO: 781 K/UL — HIGH (ref 150–400)
POTASSIUM SERPL-MCNC: 4.4 MMOL/L — SIGNIFICANT CHANGE UP (ref 3.5–5.3)
POTASSIUM SERPL-SCNC: 4.4 MMOL/L — SIGNIFICANT CHANGE UP (ref 3.5–5.3)
PROT SERPL-MCNC: 7.2 G/DL — SIGNIFICANT CHANGE UP (ref 6–8.3)
RBC # BLD: 3 M/UL — LOW (ref 3.8–5.2)
RBC # FLD: 15.9 % — HIGH (ref 10.3–14.5)
SODIUM SERPL-SCNC: 134 MMOL/L — LOW (ref 135–145)
WBC # BLD: 16.7 K/UL — HIGH (ref 3.8–10.5)
WBC # FLD AUTO: 16.7 K/UL — HIGH (ref 3.8–10.5)

## 2022-09-05 RX ORDER — MAGNESIUM SULFATE 500 MG/ML
2 VIAL (ML) INJECTION ONCE
Refills: 0 | Status: COMPLETED | OUTPATIENT
Start: 2022-09-05 | End: 2022-09-05

## 2022-09-05 RX ORDER — ACETAMINOPHEN 500 MG
650 TABLET ORAL EVERY 6 HOURS
Refills: 0 | Status: DISCONTINUED | OUTPATIENT
Start: 2022-09-05 | End: 2022-09-13

## 2022-09-05 RX ORDER — HYDROMORPHONE HYDROCHLORIDE 2 MG/ML
0.5 INJECTION INTRAMUSCULAR; INTRAVENOUS; SUBCUTANEOUS ONCE
Refills: 0 | Status: DISCONTINUED | OUTPATIENT
Start: 2022-09-05 | End: 2022-09-05

## 2022-09-05 RX ADMIN — HYDROMORPHONE HYDROCHLORIDE 0.25 MILLIGRAM(S): 2 INJECTION INTRAMUSCULAR; INTRAVENOUS; SUBCUTANEOUS at 00:28

## 2022-09-05 RX ADMIN — Medication 13 UNIT(S): at 08:49

## 2022-09-05 RX ADMIN — HYDROMORPHONE HYDROCHLORIDE 0.5 MILLIGRAM(S): 2 INJECTION INTRAMUSCULAR; INTRAVENOUS; SUBCUTANEOUS at 07:47

## 2022-09-05 RX ADMIN — Medication 2: at 08:49

## 2022-09-05 RX ADMIN — LISINOPRIL 20 MILLIGRAM(S): 2.5 TABLET ORAL at 05:09

## 2022-09-05 RX ADMIN — Medication 100 MILLIGRAM(S): at 13:09

## 2022-09-05 RX ADMIN — Medication 100 MILLIGRAM(S): at 21:25

## 2022-09-05 RX ADMIN — Medication 8: at 17:19

## 2022-09-05 RX ADMIN — Medication 650 MILLIGRAM(S): at 18:25

## 2022-09-05 RX ADMIN — Medication 650 MILLIGRAM(S): at 17:49

## 2022-09-05 RX ADMIN — HYDROMORPHONE HYDROCHLORIDE 0.5 MILLIGRAM(S): 2 INJECTION INTRAMUSCULAR; INTRAVENOUS; SUBCUTANEOUS at 07:32

## 2022-09-05 RX ADMIN — ENOXAPARIN SODIUM 40 MILLIGRAM(S): 100 INJECTION SUBCUTANEOUS at 21:25

## 2022-09-05 RX ADMIN — Medication 100 MILLIGRAM(S): at 05:15

## 2022-09-05 RX ADMIN — Medication 13 UNIT(S): at 17:20

## 2022-09-05 RX ADMIN — Medication 81 MILLIGRAM(S): at 11:53

## 2022-09-05 RX ADMIN — SIMVASTATIN 40 MILLIGRAM(S): 20 TABLET, FILM COATED ORAL at 21:25

## 2022-09-05 RX ADMIN — INSULIN GLARGINE 50 UNIT(S): 100 INJECTION, SOLUTION SUBCUTANEOUS at 21:24

## 2022-09-05 RX ADMIN — Medication 25 GRAM(S): at 10:20

## 2022-09-05 NOTE — DIETITIAN INITIAL EVALUATION ADULT - ADD RECOMMEND
1) Recommend add Glucerna 2x daily (440kcals, 20g protein)   2) Encourage PO intake and honor food preferences as able.   3) Monitor BMs, PO intake, Labs, weights, and skin integrity.   4) RDN remains available PRN.

## 2022-09-05 NOTE — DIETITIAN INITIAL EVALUATION ADULT - PERTINENT MEDS FT
MEDICATIONS  (STANDING):  aspirin  chewable 81 milliGRAM(s) Oral daily  ceFAZolin   IVPB 1000 milliGRAM(s) IV Intermittent every 8 hours  dextrose 5%. 1000 milliLiter(s) (50 mL/Hr) IV Continuous <Continuous>  dextrose 5%. 1000 milliLiter(s) (100 mL/Hr) IV Continuous <Continuous>  dextrose 50% Injectable 25 Gram(s) IV Push once  dextrose 50% Injectable 12.5 Gram(s) IV Push once  dextrose 50% Injectable 25 Gram(s) IV Push once  enoxaparin Injectable 40 milliGRAM(s) SubCutaneous every 24 hours  glucagon  Injectable 1 milliGRAM(s) IntraMuscular once  influenza  Vaccine (HIGH DOSE) 0.7 milliLiter(s) IntraMuscular once  insulin glargine Injectable (LANTUS) 50 Unit(s) SubCutaneous at bedtime  insulin lispro (ADMELOG) corrective regimen sliding scale   SubCutaneous three times a day before meals  insulin lispro Injectable (ADMELOG) 13 Unit(s) SubCutaneous three times a day before meals  lidocaine 1%/epinephrine 1:100,000 Inj 20 milliLiter(s) Local Injection once  lisinopril 20 milliGRAM(s) Oral daily  silver nitrate Applicator 1 Application(s) Topical once  simvastatin 40 milliGRAM(s) Oral at bedtime    MEDICATIONS  (PRN):  dextrose Oral Gel 15 Gram(s) Oral once PRN Blood Glucose LESS THAN 70 milliGRAM(s)/deciliter  oxyCODONE    IR 5 milliGRAM(s) Oral every 4 hours PRN Moderate Pain (4 - 6)  oxyCODONE    IR 10 milliGRAM(s) Oral every 4 hours PRN Severe Pain (7 - 10)

## 2022-09-05 NOTE — PROGRESS NOTE ADULT - SUBJECTIVE AND OBJECTIVE BOX
Patient is a 72y old  Female who presents with a chief complaint of abscess (04 Sep 2022 10:01)      DATE OF SERVICE: 09-05-22 @ 09:33    SUBJECTIVE / OVERNIGHT EVENTS: overnight events noted      ROS:  Resp: No cough no sputum production  CVS: No chest pain no palpitations no orthopnea  GI: no N/V/D  : no dysuria, no hematuria  Neuro: no weakness no paresthesias          MEDICATIONS  (STANDING):  aspirin  chewable 81 milliGRAM(s) Oral daily  ceFAZolin   IVPB 1000 milliGRAM(s) IV Intermittent every 8 hours  dextrose 5%. 1000 milliLiter(s) (50 mL/Hr) IV Continuous <Continuous>  dextrose 5%. 1000 milliLiter(s) (100 mL/Hr) IV Continuous <Continuous>  dextrose 50% Injectable 25 Gram(s) IV Push once  dextrose 50% Injectable 12.5 Gram(s) IV Push once  dextrose 50% Injectable 25 Gram(s) IV Push once  enoxaparin Injectable 40 milliGRAM(s) SubCutaneous every 24 hours  glucagon  Injectable 1 milliGRAM(s) IntraMuscular once  influenza  Vaccine (HIGH DOSE) 0.7 milliLiter(s) IntraMuscular once  insulin glargine Injectable (LANTUS) 50 Unit(s) SubCutaneous at bedtime  insulin lispro (ADMELOG) corrective regimen sliding scale   SubCutaneous three times a day before meals  insulin lispro Injectable (ADMELOG) 13 Unit(s) SubCutaneous three times a day before meals  lidocaine 1%/epinephrine 1:100,000 Inj 20 milliLiter(s) Local Injection once  lisinopril 20 milliGRAM(s) Oral daily  magnesium sulfate  IVPB 2 Gram(s) IV Intermittent once  silver nitrate Applicator 1 Application(s) Topical once  simvastatin 40 milliGRAM(s) Oral at bedtime    MEDICATIONS  (PRN):  dextrose Oral Gel 15 Gram(s) Oral once PRN Blood Glucose LESS THAN 70 milliGRAM(s)/deciliter  oxyCODONE    IR 5 milliGRAM(s) Oral every 4 hours PRN Moderate Pain (4 - 6)  oxyCODONE    IR 10 milliGRAM(s) Oral every 4 hours PRN Severe Pain (7 - 10)        CAPILLARY BLOOD GLUCOSE      POCT Blood Glucose.: 172 mg/dL (05 Sep 2022 08:36)  POCT Blood Glucose.: 308 mg/dL (04 Sep 2022 21:37)  POCT Blood Glucose.: 316 mg/dL (04 Sep 2022 17:10)  POCT Blood Glucose.: 89 mg/dL (04 Sep 2022 12:22)    I&O's Summary    04 Sep 2022 07:01  -  05 Sep 2022 07:00  --------------------------------------------------------  IN: 710 mL / OUT: 1450 mL / NET: -740 mL        Vital Signs Last 24 Hrs  T(C): 37.2 (05 Sep 2022 05:10), Max: 37.4 (05 Sep 2022 01:36)  T(F): 98.9 (05 Sep 2022 05:10), Max: 99.3 (05 Sep 2022 01:36)  HR: 99 (05 Sep 2022 05:10) (95 - 113)  BP: 136/79 (05 Sep 2022 05:10) (114/60 - 141/86)  BP(mean): --  RR: 18 (05 Sep 2022 05:10) (18 - 18)  SpO2: 99% (05 Sep 2022 05:10) (95% - 99%)    PHYSICAL EXAM:  GENERAL: NAD  EYES: EOMI, PERRLA,  NECK: Supple, No JVD  CHEST/LUNG: Clear   HEART: S1S2;  no murmurs  ABDOMEN: Soft nontender  bandage in place  EXTREMITIES: no edema  NEUROLOGY: Alert, nonfocal    LABS:                        8.7    16.70 )-----------( 781      ( 05 Sep 2022 06:15 )             25.9     09-05    134<L>  |  98  |  17  ----------------------------<  168<H>  4.4   |  24  |  0.81    Ca    9.2      05 Sep 2022 06:15  Phos  3.5     09-05  Mg     1.80     09-05    TPro  7.2  /  Alb  3.4  /  TBili  <0.2  /  DBili  <0.2  /  AST  11  /  ALT  11  /  AlkPhos  121<H>  09-05                All consultant(s) notes reviewed and care discussed with other providers        Contact Number, Dr Reed [phone]

## 2022-09-05 NOTE — DIETITIAN INITIAL EVALUATION ADULT - OTHER INFO
Per chart review, Patient is a 73 yo Female with PMH of Colon CA, DM 2, HTN, HLD, diabetic neuropathy, Hernia presents c/o yellow/green drainage from wound on lower abdomen s/p hernia repair on 7/20 admitted for treatment.     Pt seen at bedside. Reports poor PO intake, consumes less than 50% of her meals most of the time. Pt reports chicken is hard for her to chew. However, denies chewing and swallowing difficulties on other food items. Per pt's request chicken removed from her menu. Pt is amenable to Glucerna 2x daily (440kcals, 20g protein). Denies any GI issues (nausea/vomiting/diarrhea/constipation.)  ordered on magnesium sulfate.

## 2022-09-05 NOTE — DIETITIAN INITIAL EVALUATION ADULT - ORAL INTAKE PTA/DIET HISTORY
Patient reports her appetite has been decreasing for the past 1 month due to change of taste, only able to consume 50-75 of her usual intake. Denies getting any treatment PTA. Denies follow any diet at home. Pt reports take Vitamin C, D, Calcium at home. Pt reports approximately 20lbs of weight loss x 1 mo.

## 2022-09-05 NOTE — PROGRESS NOTE ADULT - ASSESSMENT
73 yo F admitted with anterior abdominal wall abscess, s/p bedside I&D x2 and s/p OR debridement and washout on 9/3.     Plan:  -Will consult wound vac team for placement tomorrow Tuesday, 9/6  -Continue with daily dressing change (WTD w/ Kerlix)    - 0.5mg Dilaudid 30 min prior to dressing change   -ASA started i/s/o new onset thrombocytosis  -Continue to monitor blood glucose, appreciate endo recs: Lantus 50U at bedtime, Admelog 13U premeal and Admelog correction scale    - Appreciate endo recs  -Abx: per ID, continue cephalexin, d/c on Keflex 500mg q6 for total 10 days post initial bedside I&D  -Diet: consistent carbs   -Activity: encourage OOB   -DVT ppx: Lovenox      [facility]  P# [id number]

## 2022-09-05 NOTE — PROGRESS NOTE ADULT - SUBJECTIVE AND OBJECTIVE BOX
Chief complaint    Patient is a 72y old  Female who presents with a chief complaint of surgery (05 Sep 2022 09:32)   Review of systems  Patient in bed, appears comfortable.    Labs and Fingersticks  CAPILLARY BLOOD GLUCOSE      POCT Blood Glucose.: 172 mg/dL (05 Sep 2022 08:36)  POCT Blood Glucose.: 308 mg/dL (04 Sep 2022 21:37)  POCT Blood Glucose.: 316 mg/dL (04 Sep 2022 17:10)  POCT Blood Glucose.: 89 mg/dL (04 Sep 2022 12:22)      Anion Gap, Serum: 12 (09-05 @ 06:15)  Anion Gap, Serum: 13 (09-04 @ 07:25)      Calcium, Total Serum: 9.2 (09-05 @ 06:15)  Calcium, Total Serum: 8.7 (09-04 @ 07:25)  Albumin, Serum: 3.4 (09-05 @ 06:15)    Alanine Aminotransferase (ALT/SGPT): 11 (09-05 @ 06:15)  Alkaline Phosphatase, Serum: 121 *H* (09-05 @ 06:15)  Aspartate Aminotransferase (AST/SGOT): 11 (09-05 @ 06:15)        09-05    134<L>  |  98  |  17  ----------------------------<  168<H>  4.4   |  24  |  0.81    Ca    9.2      05 Sep 2022 06:15  Phos  3.5     09-05  Mg     1.80     09-05    TPro  7.2  /  Alb  3.4  /  TBili  <0.2  /  DBili  <0.2  /  AST  11  /  ALT  11  /  AlkPhos  121<H>  09-05                        8.7    16.70 )-----------( 781      ( 05 Sep 2022 06:15 )             25.9     Medications  MEDICATIONS  (STANDING):  aspirin  chewable 81 milliGRAM(s) Oral daily  ceFAZolin   IVPB 1000 milliGRAM(s) IV Intermittent every 8 hours  dextrose 5%. 1000 milliLiter(s) (50 mL/Hr) IV Continuous <Continuous>  dextrose 5%. 1000 milliLiter(s) (100 mL/Hr) IV Continuous <Continuous>  dextrose 50% Injectable 25 Gram(s) IV Push once  dextrose 50% Injectable 12.5 Gram(s) IV Push once  dextrose 50% Injectable 25 Gram(s) IV Push once  enoxaparin Injectable 40 milliGRAM(s) SubCutaneous every 24 hours  glucagon  Injectable 1 milliGRAM(s) IntraMuscular once  influenza  Vaccine (HIGH DOSE) 0.7 milliLiter(s) IntraMuscular once  insulin glargine Injectable (LANTUS) 50 Unit(s) SubCutaneous at bedtime  insulin lispro (ADMELOG) corrective regimen sliding scale   SubCutaneous three times a day before meals  insulin lispro Injectable (ADMELOG) 13 Unit(s) SubCutaneous three times a day before meals  lidocaine 1%/epinephrine 1:100,000 Inj 20 milliLiter(s) Local Injection once  lisinopril 20 milliGRAM(s) Oral daily  magnesium sulfate  IVPB 2 Gram(s) IV Intermittent once  silver nitrate Applicator 1 Application(s) Topical once  simvastatin 40 milliGRAM(s) Oral at bedtime      Physical Exam  General: Patient comfortable in bed  Vital Signs Last 12 Hrs  T(F): 98.9 (09-05-22 @ 05:10), Max: 99.3 (09-05-22 @ 01:36)  HR: 99 (09-05-22 @ 05:10) (99 - 100)  BP: 136/79 (09-05-22 @ 05:10) (136/79 - 138/83)  BP(mean): --  RR: 18 (09-05-22 @ 05:10) (18 - 18)  SpO2: 99% (09-05-22 @ 05:10) (95% - 99%)  Neck: No palpable thyroid nodules.  CVS: S1S2, No murmurs  Respiratory: No wheezing, no crepitations  GI: Abdomen soft, bowel sounds positive  Musculoskeletal:  edema lower extremities.     Diagnostics

## 2022-09-05 NOTE — PROGRESS NOTE ADULT - ASSESSMENT
Assessment  DMT2: 72y Female with DM T2 with hyperglycemia admitted with abdominal wall wound, she is on basal bolus and insulin coverage, sugars fluctuating due to inconsistent carb intake, no compliant with low carb diet.  Abdominal wall wound: : S/P drain, on medications, monitored.  HTN: On antihypertensive medications, monitored, asymptomatic.  Overweight: No strict exercise routines, neither on low calorie diet.                 Brayden Richards MD  Cell: [phone]  Office: [phone]

## 2022-09-05 NOTE — DIETITIAN INITIAL EVALUATION ADULT - PERTINENT LABORATORY DATA
09-05    134<L>  |  98  |  17  ----------------------------<  168<H>  4.4   |  24  |  0.81    Ca    9.2      05 Sep 2022 06:15  Phos  3.5     09-05  Mg     1.80     09-05    TPro  7.2  /  Alb  3.4  /  TBili  <0.2  /  DBili  <0.2  /  AST  11  /  ALT  11  /  AlkPhos  121<H>  09-05  POCT Blood Glucose.: 84 mg/dL (09-05-22 @ 12:33)  A1C with Estimated Average Glucose Result: 9.4 % (07-25-22 @ 01:45)

## 2022-09-05 NOTE — PROGRESS NOTE ADULT - SUBJECTIVE AND OBJECTIVE BOX
COLORECTAL SURGERY PROGRESS NOTE      SUBJECTIVE    OVERNIGHT EVENTS: Patient dressing saturated and changed overnight.     Patient seen and evaluated at bedside this AM. Patient continues to endorse significant pain around wound, mostly with dressing changes. Patient otherwise tolerating diet. No N/V, passing gas and BM.     10-point review of systems completed and negative except as noted above.      OBJECTIVE    MEDICATIONS  aspirin  chewable 81 milliGRAM(s) Oral daily  ceFAZolin   IVPB 1000 milliGRAM(s) IV Intermittent every 8 hours  dextrose 5%. 1000 milliLiter(s) IV Continuous <Continuous>  dextrose 5%. 1000 milliLiter(s) IV Continuous <Continuous>  dextrose 50% Injectable 25 Gram(s) IV Push once  dextrose 50% Injectable 12.5 Gram(s) IV Push once  dextrose 50% Injectable 25 Gram(s) IV Push once  dextrose Oral Gel 15 Gram(s) Oral once PRN  enoxaparin Injectable 40 milliGRAM(s) SubCutaneous every 24 hours  glucagon  Injectable 1 milliGRAM(s) IntraMuscular once  influenza  Vaccine (HIGH DOSE) 0.7 milliLiter(s) IntraMuscular once  insulin glargine Injectable (LANTUS) 50 Unit(s) SubCutaneous at bedtime  insulin lispro (ADMELOG) corrective regimen sliding scale   SubCutaneous three times a day before meals  insulin lispro Injectable (ADMELOG) 13 Unit(s) SubCutaneous three times a day before meals  lidocaine 1%/epinephrine 1:100,000 Inj 20 milliLiter(s) Local Injection once  lisinopril 20 milliGRAM(s) Oral daily  magnesium sulfate  IVPB 2 Gram(s) IV Intermittent once  oxyCODONE    IR 5 milliGRAM(s) Oral every 4 hours PRN  oxyCODONE    IR 10 milliGRAM(s) Oral every 4 hours PRN  silver nitrate Applicator 1 Application(s) Topical once  simvastatin 40 milliGRAM(s) Oral at bedtime      PHYSICAL EXAM  T(C): 37.2 (09-05-22 @ 05:10), Max: 37.4 (09-05-22 @ 01:36)  HR: 99 (09-05-22 @ 05:10) (95 - 113)  BP: 136/79 (09-05-22 @ 05:10) (114/60 - 141/86)  RR: 18 (09-05-22 @ 05:10) (18 - 18)  SpO2: 99% (09-05-22 @ 05:10) (95% - 99%)    09-04-22 @ 07:01  -  09-05-22 @ 07:00  --------------------------------------------------------  IN: 710 mL / OUT: 1450 mL / NET: -740 mL        Gen: AAOx3, non-toxic  Head: NCAT  HEENT: EOMI, oral mucosa moist, normal conjunctiva  Lung: Breathing on RA, unlabored   Abd: Wound with kerlex packing, abd pads  MSK: no visible deformities  Neuro: No focal sensory or motor deficits      LABS                        8.7    16.70 )-----------( 781      ( 05 Sep 2022 06:15 )             25.9     09-05    134<L>  |  98  |  17  ----------------------------<  168<H>  4.4   |  24  |  0.81    Ca    9.2      05 Sep 2022 06:15  Phos  3.5     09-05  Mg     1.80     09-05    TPro  7.2  /  Alb  3.4  /  TBili  <0.2  /  DBili  <0.2  /  AST  11  /  ALT  11  /  AlkPhos  121<H>  09-05          RADIOLOGY & ADDITIONAL STUDIES

## 2022-09-06 LAB
ANION GAP SERPL CALC-SCNC: 14 MMOL/L — SIGNIFICANT CHANGE UP (ref 7–14)
BUN SERPL-MCNC: 23 MG/DL — SIGNIFICANT CHANGE UP (ref 7–23)
CALCIUM SERPL-MCNC: 9.8 MG/DL — SIGNIFICANT CHANGE UP (ref 8.4–10.5)
CHLORIDE SERPL-SCNC: 96 MMOL/L — LOW (ref 98–107)
CO2 SERPL-SCNC: 24 MMOL/L — SIGNIFICANT CHANGE UP (ref 22–31)
CREAT SERPL-MCNC: 0.9 MG/DL — SIGNIFICANT CHANGE UP (ref 0.5–1.3)
EGFR: 69 ML/MIN/1.73M2 — SIGNIFICANT CHANGE UP
GLUCOSE BLDC GLUCOMTR-MCNC: 167 MG/DL — HIGH (ref 70–99)
GLUCOSE BLDC GLUCOMTR-MCNC: 222 MG/DL — HIGH (ref 70–99)
GLUCOSE BLDC GLUCOMTR-MCNC: 271 MG/DL — HIGH (ref 70–99)
GLUCOSE BLDC GLUCOMTR-MCNC: 329 MG/DL — HIGH (ref 70–99)
GLUCOSE SERPL-MCNC: 211 MG/DL — HIGH (ref 70–99)
HCT VFR BLD CALC: 26.1 % — LOW (ref 34.5–45)
HGB BLD-MCNC: 8.3 G/DL — LOW (ref 11.5–15.5)
MAGNESIUM SERPL-MCNC: 1.8 MG/DL — SIGNIFICANT CHANGE UP (ref 1.6–2.6)
MCHC RBC-ENTMCNC: 28 PG — SIGNIFICANT CHANGE UP (ref 27–34)
MCHC RBC-ENTMCNC: 31.8 GM/DL — LOW (ref 32–36)
MCV RBC AUTO: 88.2 FL — SIGNIFICANT CHANGE UP (ref 80–100)
NRBC # BLD: 0 /100 WBCS — SIGNIFICANT CHANGE UP (ref 0–0)
NRBC # FLD: 0 K/UL — SIGNIFICANT CHANGE UP (ref 0–0)
PHOSPHATE SERPL-MCNC: 4.4 MG/DL — SIGNIFICANT CHANGE UP (ref 2.5–4.5)
PLATELET # BLD AUTO: 779 K/UL — HIGH (ref 150–400)
POTASSIUM SERPL-MCNC: 5.1 MMOL/L — SIGNIFICANT CHANGE UP (ref 3.5–5.3)
POTASSIUM SERPL-SCNC: 5.1 MMOL/L — SIGNIFICANT CHANGE UP (ref 3.5–5.3)
RBC # BLD: 2.96 M/UL — LOW (ref 3.8–5.2)
RBC # FLD: 16.3 % — HIGH (ref 10.3–14.5)
SODIUM SERPL-SCNC: 134 MMOL/L — LOW (ref 135–145)
WBC # BLD: 14.6 K/UL — HIGH (ref 3.8–10.5)
WBC # FLD AUTO: 14.6 K/UL — HIGH (ref 3.8–10.5)

## 2022-09-06 PROCEDURE — 99232 SBSQ HOSP IP/OBS MODERATE 35: CPT

## 2022-09-06 RX ORDER — MAGNESIUM SULFATE 500 MG/ML
2 VIAL (ML) INJECTION ONCE
Refills: 0 | Status: COMPLETED | OUTPATIENT
Start: 2022-09-06 | End: 2022-09-06

## 2022-09-06 RX ORDER — HYDROMORPHONE HYDROCHLORIDE 2 MG/ML
0.5 INJECTION INTRAMUSCULAR; INTRAVENOUS; SUBCUTANEOUS ONCE
Refills: 0 | Status: DISCONTINUED | OUTPATIENT
Start: 2022-09-06 | End: 2022-09-06

## 2022-09-06 RX ORDER — PIPERACILLIN AND TAZOBACTAM 4; .5 G/20ML; G/20ML
3.38 INJECTION, POWDER, LYOPHILIZED, FOR SOLUTION INTRAVENOUS EVERY 8 HOURS
Refills: 0 | Status: DISCONTINUED | OUTPATIENT
Start: 2022-09-06 | End: 2022-09-09

## 2022-09-06 RX ADMIN — OXYCODONE HYDROCHLORIDE 10 MILLIGRAM(S): 5 TABLET ORAL at 20:36

## 2022-09-06 RX ADMIN — Medication 4: at 18:06

## 2022-09-06 RX ADMIN — HYDROMORPHONE HYDROCHLORIDE 0.5 MILLIGRAM(S): 2 INJECTION INTRAMUSCULAR; INTRAVENOUS; SUBCUTANEOUS at 05:56

## 2022-09-06 RX ADMIN — Medication 100 MILLIGRAM(S): at 13:12

## 2022-09-06 RX ADMIN — Medication 13 UNIT(S): at 12:30

## 2022-09-06 RX ADMIN — INSULIN GLARGINE 50 UNIT(S): 100 INJECTION, SOLUTION SUBCUTANEOUS at 22:14

## 2022-09-06 RX ADMIN — ENOXAPARIN SODIUM 40 MILLIGRAM(S): 100 INJECTION SUBCUTANEOUS at 21:08

## 2022-09-06 RX ADMIN — PIPERACILLIN AND TAZOBACTAM 25 GRAM(S): 4; .5 INJECTION, POWDER, LYOPHILIZED, FOR SOLUTION INTRAVENOUS at 21:08

## 2022-09-06 RX ADMIN — Medication 25 GRAM(S): at 12:28

## 2022-09-06 RX ADMIN — Medication 2: at 12:29

## 2022-09-06 RX ADMIN — Medication 100 MILLIGRAM(S): at 05:11

## 2022-09-06 RX ADMIN — OXYCODONE HYDROCHLORIDE 10 MILLIGRAM(S): 5 TABLET ORAL at 21:06

## 2022-09-06 RX ADMIN — Medication 13 UNIT(S): at 08:43

## 2022-09-06 RX ADMIN — Medication 13 UNIT(S): at 18:06

## 2022-09-06 RX ADMIN — Medication 6: at 08:42

## 2022-09-06 RX ADMIN — LISINOPRIL 20 MILLIGRAM(S): 2.5 TABLET ORAL at 05:11

## 2022-09-06 RX ADMIN — OXYCODONE HYDROCHLORIDE 5 MILLIGRAM(S): 5 TABLET ORAL at 09:57

## 2022-09-06 RX ADMIN — SIMVASTATIN 40 MILLIGRAM(S): 20 TABLET, FILM COATED ORAL at 21:08

## 2022-09-06 RX ADMIN — Medication 81 MILLIGRAM(S): at 12:29

## 2022-09-06 RX ADMIN — OXYCODONE HYDROCHLORIDE 10 MILLIGRAM(S): 5 TABLET ORAL at 02:02

## 2022-09-06 RX ADMIN — OXYCODONE HYDROCHLORIDE 5 MILLIGRAM(S): 5 TABLET ORAL at 09:27

## 2022-09-06 NOTE — PROGRESS NOTE ADULT - SUBJECTIVE AND OBJECTIVE BOX
CC: F/U for Wound infection    Saw/spoke to patient. No fevers, no chills. No new complaints.    Allergies  No Known Allergies    ANTIMICROBIALS:  ceFAZolin   IVPB 1000 every 8 hours    PE:    Vital Signs Last 24 Hrs  T(C): 36.7 (06 Sep 2022 10:30), Max: 36.9 (05 Sep 2022 21:21)  T(F): 98 (06 Sep 2022 10:30), Max: 98.5 (05 Sep 2022 21:21)  HR: 108 (06 Sep 2022 10:30) (96 - 108)  BP: 120/68 (06 Sep 2022 10:30) (106/59 - 125/70)  RR: 17 (06 Sep 2022 10:30) (17 - 18)  SpO2: 97% (06 Sep 2022 10:30) (95% - 99%)    Gen: AOx3, NAD, non-toxic  CV: Nontachycardic  Resp: Breathing comfortably, RA  Abd: Soft, nontender, OR wound noted  IV/Skin: No thrombophlebitis    LABS:                        8.3    14.60 )-----------( 779      ( 06 Sep 2022 05:15 )             26.1     09-06    134<L>  |  96<L>  |  23  ----------------------------<  211<H>  5.1   |  24  |  0.90    Ca    9.8      06 Sep 2022 05:15  Phos  4.4     09-06  Mg     1.80     09-06    TPro  7.2  /  Alb  3.4  /  TBili  <0.2  /  DBili  <0.2  /  AST  11  /  ALT  11  /  AlkPhos  121<H>  09-05    MICROBIOLOGY:    Clean Catch Clean Catch (Midstream)  08-29-22   10,000 - 49,000 CFU/mL Pseudomonas aeruginosa  --  Pseudomonas aeruginosa    Abdominal Fl Abdominal Fluid  08-29-22   Moderate Staphylococcus aureus  --  Staphylococcus aureus    .Blood Blood-Peripheral  08-29-22   No Growth Final  --  --    .Blood Blood-Peripheral  08-29-22   No Growth Final  --  --    (otherwise reviewed)    RADIOLOGY:    8/29    IMPRESSION:  A 6.0 x 2.0 cm fluid collection of the lower abdominal wall, likely an   abscess given the extensive surrounding infiltration and tract extending   to the right lower quadrant abdominal wall skin surface.

## 2022-09-06 NOTE — PROGRESS NOTE ADULT - SUBJECTIVE AND OBJECTIVE BOX
Chief complaint  Patient is a 72y old  Female who presents with a chief complaint of abscess (06 Sep 2022 10:10)   Review of systems  Patient in bed, looks comfortable.  Labs and Fingersticks  CAPILLARY BLOOD GLUCOSE      POCT Blood Glucose.: 167 mg/dL (06 Sep 2022 12:10)  POCT Blood Glucose.: 271 mg/dL (06 Sep 2022 08:18)  POCT Blood Glucose.: 219 mg/dL (05 Sep 2022 21:00)  POCT Blood Glucose.: 342 mg/dL (05 Sep 2022 17:12)      Anion Gap, Serum: 14 (09-06 @ 05:15)  Anion Gap, Serum: 12 (09-05 @ 06:15)      Calcium, Total Serum: 9.8 (09-06 @ 05:15)  Calcium, Total Serum: 9.2 (09-05 @ 06:15)  Albumin, Serum: 3.4 (09-05 @ 06:15)    Alanine Aminotransferase (ALT/SGPT): 11 (09-05 @ 06:15)  Alkaline Phosphatase, Serum: 121 *H* (09-05 @ 06:15)  Aspartate Aminotransferase (AST/SGOT): 11 (09-05 @ 06:15)        09-06    134<L>  |  96<L>  |  23  ----------------------------<  211<H>  5.1   |  24  |  0.90    Ca    9.8      06 Sep 2022 05:15  Phos  4.4     09-06  Mg     1.80     09-06    TPro  7.2  /  Alb  3.4  /  TBili  <0.2  /  DBili  <0.2  /  AST  11  /  ALT  11  /  AlkPhos  121<H>  09-05                        8.3    14.60 )-----------( 779      ( 06 Sep 2022 05:15 )             26.1     Medications  MEDICATIONS  (STANDING):  aspirin  chewable 81 milliGRAM(s) Oral daily  ceFAZolin   IVPB 1000 milliGRAM(s) IV Intermittent every 8 hours  dextrose 5%. 1000 milliLiter(s) (100 mL/Hr) IV Continuous <Continuous>  dextrose 5%. 1000 milliLiter(s) (50 mL/Hr) IV Continuous <Continuous>  dextrose 50% Injectable 25 Gram(s) IV Push once  dextrose 50% Injectable 12.5 Gram(s) IV Push once  dextrose 50% Injectable 25 Gram(s) IV Push once  enoxaparin Injectable 40 milliGRAM(s) SubCutaneous every 24 hours  glucagon  Injectable 1 milliGRAM(s) IntraMuscular once  influenza  Vaccine (HIGH DOSE) 0.7 milliLiter(s) IntraMuscular once  insulin glargine Injectable (LANTUS) 50 Unit(s) SubCutaneous at bedtime  insulin lispro (ADMELOG) corrective regimen sliding scale   SubCutaneous three times a day before meals  insulin lispro Injectable (ADMELOG) 13 Unit(s) SubCutaneous three times a day before meals  lidocaine 1%/epinephrine 1:100,000 Inj 20 milliLiter(s) Local Injection once  lisinopril 20 milliGRAM(s) Oral daily  silver nitrate Applicator 1 Application(s) Topical once  simvastatin 40 milliGRAM(s) Oral at bedtime      Physical Exam  General: Patient comfortable in bed  Vital Signs Last 12 Hrs  T(F): 98.5 (09-06-22 @ 13:36), Max: 98.5 (09-06-22 @ 13:36)  HR: 100 (09-06-22 @ 13:36) (100 - 108)  BP: 110/63 (09-06-22 @ 13:36) (110/63 - 120/68)  BP(mean): --  RR: 16 (09-06-22 @ 13:36) (16 - 17)  SpO2: 98% (09-06-22 @ 13:36) (97% - 98%)  Neck: No palpable thyroid nodules.  CVS: S1S2, No murmurs  Respiratory: No wheezing, no crepitations  GI: Abdomen soft, bowel sounds positive  Musculoskeletal:  edema lower extremities.   Skin: No skin rashes, no ecchymosis    Diagnostics

## 2022-09-06 NOTE — PROGRESS NOTE ADULT - SUBJECTIVE AND OBJECTIVE BOX
Patient is a 72y old  Female who presents with a chief complaint of Unspecified complication of procedure, initial encounter      DATE OF SERVICE: 09-06-22 @ 10:11    SUBJECTIVE / OVERNIGHT EVENTS: overnight events noted    ROS:  Resp: No cough no sputum production  CVS: No chest pain no palpitations no orthopnea  GI: no N/V/D  : no dysuria, no hematuria      MEDICATIONS  (STANDING):  aspirin  chewable 81 milliGRAM(s) Oral daily  ceFAZolin   IVPB 1000 milliGRAM(s) IV Intermittent every 8 hours  dextrose 5%. 1000 milliLiter(s) (50 mL/Hr) IV Continuous <Continuous>  dextrose 5%. 1000 milliLiter(s) (100 mL/Hr) IV Continuous <Continuous>  dextrose 50% Injectable 25 Gram(s) IV Push once  dextrose 50% Injectable 12.5 Gram(s) IV Push once  dextrose 50% Injectable 25 Gram(s) IV Push once  enoxaparin Injectable 40 milliGRAM(s) SubCutaneous every 24 hours  glucagon  Injectable 1 milliGRAM(s) IntraMuscular once  influenza  Vaccine (HIGH DOSE) 0.7 milliLiter(s) IntraMuscular once  insulin glargine Injectable (LANTUS) 50 Unit(s) SubCutaneous at bedtime  insulin lispro (ADMELOG) corrective regimen sliding scale   SubCutaneous three times a day before meals  insulin lispro Injectable (ADMELOG) 13 Unit(s) SubCutaneous three times a day before meals  lidocaine 1%/epinephrine 1:100,000 Inj 20 milliLiter(s) Local Injection once  lisinopril 20 milliGRAM(s) Oral daily  magnesium sulfate  IVPB 2 Gram(s) IV Intermittent once  silver nitrate Applicator 1 Application(s) Topical once  simvastatin 40 milliGRAM(s) Oral at bedtime    MEDICATIONS  (PRN):  acetaminophen     Tablet .. 650 milliGRAM(s) Oral every 6 hours PRN Mild Pain (1 - 3), Moderate Pain (4 - 6), Severe Pain (7 - 10)  dextrose Oral Gel 15 Gram(s) Oral once PRN Blood Glucose LESS THAN 70 milliGRAM(s)/deciliter  oxyCODONE    IR 5 milliGRAM(s) Oral every 4 hours PRN Moderate Pain (4 - 6)  oxyCODONE    IR 10 milliGRAM(s) Oral every 4 hours PRN Severe Pain (7 - 10)        CAPILLARY BLOOD GLUCOSE      POCT Blood Glucose.: 271 mg/dL (06 Sep 2022 08:18)  POCT Blood Glucose.: 219 mg/dL (05 Sep 2022 21:00)  POCT Blood Glucose.: 342 mg/dL (05 Sep 2022 17:12)  POCT Blood Glucose.: 84 mg/dL (05 Sep 2022 12:33)    I&O's Summary    05 Sep 2022 07:01  -  06 Sep 2022 07:00  --------------------------------------------------------  IN: 510 mL / OUT: 1250 mL / NET: -740 mL        Vital Signs Last 24 Hrs  T(C): 36.4 (06 Sep 2022 02:40), Max: 36.9 (05 Sep 2022 21:21)  T(F): 97.6 (06 Sep 2022 02:40), Max: 98.5 (05 Sep 2022 21:21)  HR: 96 (06 Sep 2022 02:40) (96 - 105)  BP: 125/70 (06 Sep 2022 02:40) (106/59 - 125/70)  BP(mean): --  RR: 18 (06 Sep 2022 02:40) (18 - 18)  SpO2: 97% (06 Sep 2022 02:40) (95% - 99%)    PHYSICAL EXAM:  GENERAL: NAD  EYES: EOMI, PERRLA,  NECK: Supple, No JVD  CHEST/LUNG: Clear   HEART: S1S2;  no murmurs  ABDOMEN: Soft nontender  bandage in place  EXTREMITIES: no edema  NEUROLOGY: Alert, nonfocal    LABS:                        8.3    14.60 )-----------( 779      ( 06 Sep 2022 05:15 )             26.1     09-06    134<L>  |  96<L>  |  23  ----------------------------<  211<H>  5.1   |  24  |  0.90    Ca    9.8      06 Sep 2022 05:15  Phos  4.4     09-06  Mg     1.80     09-06    TPro  7.2  /  Alb  3.4  /  TBili  <0.2  /  DBili  <0.2  /  AST  11  /  ALT  11  /  AlkPhos  121<H>  09-05                All consultant(s) notes reviewed and care discussed with other providers        Contact Number, Dr Reed [phone]

## 2022-09-06 NOTE — PHYSICAL THERAPY INITIAL EVALUATION ADULT - PERTINENT HX OF CURRENT PROBLEM, REHAB EVAL
72 year old woman admitted with anterior abdominal wall abscess, now s/p bedside I&D, recovering well on the floors.

## 2022-09-06 NOTE — PROGRESS NOTE ADULT - ASSESSMENT
71 yo F colon CA, DM, diabetic neuropathy, recent hernia repair, presenting with discharge from wound  No fevers, has leukocytosis  Recent hernia report  Concern for possible abscess s/p I+D on day of admission  Culture now growing MSSA  UCX with low CFU Pseudomonas, 2x UA (mild positive to negative)  CT with lower abd wall fluid collection, extending to RLQ  Patient has no symptoms UTI  9/3 OR I+D in OR  Overall,  1) Abscess  - Growing MSSA  - Cefazolin 1g q 8  - Wound care per team  - F/U surgery team  - I suspect persistent WBC was due to need for source control (which has now been obtained) and due to OR stress reaction; anticipate WBC will gradually decrease at this point with cefazolin (covering the MSSA). If WBC worsens or patient has worsening clinical status, can consider broadening to Zosyn, but for now would observe on Cefazolin.  2) Positive UCX  - Suspect asymptomatic bacteriuria, low CFU; no symptoms UTI; UA equivocal  - Would hold on Zosyn  - Monitor for signs/symptoms UTI  3) Leukocytosis  - Monitor for alternate sources infection, still no UTI symptoms at this point  - Trend to normal    My colleagues will be covering this patient starting on 9/7/22, I will return 9/8/22. Please call [phone] or on call fellow with any questions or change in status.     Anderson Trotter MD  Contact on TEAMS messaging from 9am - 5pm  From 5pm-9am, on weekends, or if no response call [phone]

## 2022-09-06 NOTE — PHYSICAL THERAPY INITIAL EVALUATION ADULT - ADDITIONAL COMMENTS
Pt lives in a private house with her , +5-6 steps to enter, no steps to negotiate. Pt was independent with all mobility and ADLs prior to admission, uses Single [place] Cane.    Pt left semi-supine in bed, all lines intact, call bell in reach, comfortable and in NAD, bed alarm set.

## 2022-09-06 NOTE — PROGRESS NOTE ADULT - ASSESSMENT
Assessment  DMT2: 72y Female with DM T2 with hyperglycemia admitted with abdominal wall wound, she is on basal bolus and insulin coverage,  at bed side,  sugars fluctuating due to inconsistent carb intake, no compliant with low carb diet.  Abdominal wall wound: : S/P drain, on medications, monitored.  HTN: On antihypertensive medications, monitored, asymptomatic.  Overweight: No strict exercise routines, neither on low calorie diet.                 Brayden Richards MD  Cell: [phone]  Office: [phone]

## 2022-09-06 NOTE — PROGRESS NOTE ADULT - ASSESSMENT
73 yo F admitted with anterior abdominal wall abscess, s/p bedside I&D x2 and s/p OR debridement and washout on 9/3.     Plan:  -Will consult wound vac team for placement today Tuesday, 9/6  -Continue with daily dressing change (WTD w/ Kerlix)   -0.5mg Dilaudid 30 min prior to dressing change   -ASA started i/s/o new onset thrombocytosis  -Continue to monitor blood glucose, appreciate endo recs: Lantus 50U at bedtime, Admelog 13U premeal and Admelog correction scale   -Appreciate endo recs  -Abx: per ID, continue cephalexin, d/c on Keflex 500mg q6 for total 10 days post initial bedside I&D  -Diet: consistent carbs   -Activity: encourage OOB   -DVT ppx: Lovenox      A Team Surgery  P# [id number]

## 2022-09-06 NOTE — PROGRESS NOTE ADULT - SUBJECTIVE AND OBJECTIVE BOX
Subjective:  No acute overnight events.  Patient seen and examined at bedside with surgical team during morning rounds. + OOB. Offers no new complaints.     Exam:  Gen: AAOx3, non-toxic  Head: NCAT  HEENT: EOMI, oral mucosa moist, normal conjunctiva  Lung: Breathing on RA, unlabored   Abd: Wound with kerlex packing, abd pads    T(C): 36.4 (09-06-22 @ 02:40), Max: 36.9 (09-05-22 @ 21:21)  HR: 96 (09-06-22 @ 02:40) (96 - 108)  BP: 125/70 (09-06-22 @ 02:40) (106/59 - 125/70)  RR: 18 (09-06-22 @ 02:40) (18 - 18)  SpO2: 97% (09-06-22 @ 02:40) (95% - 99%)      09-04-22 @ 07:01  -  09-05-22 @ 07:00  --------------------------------------------------------  IN: 710 mL / OUT: 1450 mL / NET: -740 mL    09-05-22 @ 07:01  -  09-06-22 @ 06:53  --------------------------------------------------------  IN: 510 mL / OUT: 1250 mL / NET: -740 mL        LABS:  cret                        8.7    16.70 )-----------( 781      ( 05 Sep 2022 06:15 )             25.9     09-05    134<L>  |  98  |  17  ----------------------------<  168<H>  4.4   |  24  |  0.81    Ca    9.2      05 Sep 2022 06:15  Phos  3.5     09-05  Mg     1.80     09-05    TPro  7.2  /  Alb  3.4  /  TBili  <0.2  /  DBili  <0.2  /  AST  11  /  ALT  11  /  AlkPhos  121<H>  09-05          acetaminophen     Tablet .. 650 milliGRAM(s) Oral every 6 hours PRN  aspirin  chewable 81 milliGRAM(s) Oral daily  ceFAZolin   IVPB 1000 milliGRAM(s) IV Intermittent every 8 hours  dextrose 5%. 1000 milliLiter(s) IV Continuous <Continuous>  dextrose 5%. 1000 milliLiter(s) IV Continuous <Continuous>  dextrose 50% Injectable 25 Gram(s) IV Push once  dextrose 50% Injectable 12.5 Gram(s) IV Push once  dextrose 50% Injectable 25 Gram(s) IV Push once  dextrose Oral Gel 15 Gram(s) Oral once PRN  enoxaparin Injectable 40 milliGRAM(s) SubCutaneous every 24 hours  glucagon  Injectable 1 milliGRAM(s) IntraMuscular once  influenza  Vaccine (HIGH DOSE) 0.7 milliLiter(s) IntraMuscular once  insulin glargine Injectable (LANTUS) 50 Unit(s) SubCutaneous at bedtime  insulin lispro (ADMELOG) corrective regimen sliding scale   SubCutaneous three times a day before meals  insulin lispro Injectable (ADMELOG) 13 Unit(s) SubCutaneous three times a day before meals  lidocaine 1%/epinephrine 1:100,000 Inj 20 milliLiter(s) Local Injection once  lisinopril 20 milliGRAM(s) Oral daily  oxyCODONE    IR 5 milliGRAM(s) Oral every 4 hours PRN  oxyCODONE    IR 10 milliGRAM(s) Oral every 4 hours PRN  silver nitrate Applicator 1 Application(s) Topical once  simvastatin 40 milliGRAM(s) Oral at bedtime      Imaging:

## 2022-09-06 NOTE — PHYSICAL THERAPY INITIAL EVALUATION ADULT - GENERAL OBSERVATIONS, REHAB EVAL
Pt received seated in recliner at bedside, +IV, all lines intact, in NAD. Pt agreeable to participate in PT evaluation.

## 2022-09-06 NOTE — CHART NOTE - NSCHARTNOTEFT_GEN_A_CORE
Appreciate discussion with surgery team. They are concerned for necrosis that was seen during the OR procedure as well as plan for wound vac in the short term. Escalate to Zosyn for now considering lack of improvement with the WBC and slow clinical response.    I ordered Zosyn, DCed cefazolin    Anderson Trotter MD  Contact on TEAMS messaging from 9am - 5pm  From 5pm-9am, on weekends, or if no response call [phone]
Surgery Preop Note    Patient is a 72y old  Female who presents with a chief complaint of cellulitis (02 Sep 2022 10:43)    Diagnosis: Abdominal wall seroma  Procedure: Debridement of abdominal wound  Surgeon: Dr Gonzales                          9.3    17.02 )-----------( 858      ( 02 Sep 2022 07:25 )             27.9   09-02  136  |  100  |  21  ----------------------------<  163<H>  4.7   |  26  |  0.90  Ca    9.5      02 Sep 2022 07:25  Phos  3.7     09-01  Mg     1.80     09-01  TPro  7.4  /  Alb  3.4  /  TBili  <0.2  /  DBili  x   /  AST  32  /  ALT  42<H>  /  AlkPhos  152<H>  09-02  PT/INR - ( 02 Sep 2022 07:25 )   PT: 12.9 sec;   INR: 1.11 ratio    PTT - ( 02 Sep 2022 07:25 )  PTT:38.3 sec      Chest X RAY: B/l atelectasis  EKG: Sinus tach     MEDICATIONS  (STANDING):  ceFAZolin   IVPB 1000 milliGRAM(s) IV Intermittent every 8 hours  dextrose 5%. 1000 milliLiter(s) (100 mL/Hr) IV Continuous <Continuous>  dextrose 5%. 1000 milliLiter(s) (50 mL/Hr) IV Continuous <Continuous>  dextrose 50% Injectable 25 Gram(s) IV Push once  dextrose 50% Injectable 12.5 Gram(s) IV Push once  dextrose 50% Injectable 25 Gram(s) IV Push once  enoxaparin Injectable 40 milliGRAM(s) SubCutaneous every 24 hours  glucagon  Injectable 1 milliGRAM(s) IntraMuscular once  influenza  Vaccine (HIGH DOSE) 0.7 milliLiter(s) IntraMuscular once  insulin glargine Injectable (LANTUS) 50 Unit(s) SubCutaneous at bedtime  insulin lispro (ADMELOG) corrective regimen sliding scale   SubCutaneous three times a day before meals  insulin lispro Injectable (ADMELOG) 13 Unit(s) SubCutaneous three times a day before meals  lidocaine 1%/epinephrine 1:100,000 Inj 20 milliLiter(s) Local Injection once  lisinopril 20 milliGRAM(s) Oral daily  silver nitrate Applicator 1 Application(s) Topical once  simvastatin 40 milliGRAM(s) Oral at bedtime    MEDICATIONS  (PRN):  acetaminophen     Tablet .. 650 milliGRAM(s) Oral every 6 hours PRN Mild Pain (1 - 3), Moderate Pain (4 - 6)  acetaminophen     Tablet .. 650 milliGRAM(s) Oral every 6 hours PRN Mild Pain (1 - 3)  dextrose Oral Gel 15 Gram(s) Oral once PRN Blood Glucose LESS THAN 70 milliGRAM(s)/deciliter  oxyCODONE    IR 5 milliGRAM(s) Oral every 4 hours PRN Moderate Pain (4 - 6)  oxyCODONE    IR 10 milliGRAM(s) Oral every 4 hours PRN Severe Pain (7 - 10)      Assessment & Plan:  72y Female with abdominal wall seroma s/p I&D now with increasing wbc requiring OR debridement  NPO after midnight, IVF  Pain Control  ABX  Will call endo for lantus regime   DVT prophylaxis    Karen Aguilera PAC [id number]
Post Operative Note  Patient: DAVON ALCALA 72y (1950) Female   MRN: [id number]  Location: [address]  Visit: 08-29-22 Inpatient  Date: 09-03-22 @ 15:29    Procedure: S/P abdominal wound debridement     Subjective: Patient seen and examined post operatively. Reports pain as controlled. Denies nausea, vomiting, fever, chills, chest pain, SOB, cough.      Objective:  Vitals: T(F): 97.4 (09-03-22 @ 13:28), Max: 98.9 (09-03-22 @ 08:47)  HR: 89 (09-03-22 @ 13:28)  BP: 134/66 (09-03-22 @ 13:28) (118/67 - 149/83)  RR: 18 (09-03-22 @ 13:28)  SpO2: 98% (09-03-22 @ 13:28)  Vent Settings:     In:   09-02-22 @ 07:01  -  09-03-22 @ 07:00  --------------------------------------------------------  IN: 1140 mL    09-03-22 @ 07:01  -  09-03-22 @ 15:29  --------------------------------------------------------  IN: 240 mL      IV Fluids: dextrose 5%. 1000 milliLiter(s) (50 mL/Hr) IV Continuous <Continuous>  dextrose 5%. 1000 milliLiter(s) (100 mL/Hr) IV Continuous <Continuous>      Out:   09-02-22 @ 07:01  -  09-03-22 @ 07:00  --------------------------------------------------------  OUT: 900 mL    09-03-22 @ 07:01  -  09-03-22 @ 15:29  --------------------------------------------------------  OUT: 0 mL      EBL:     Voided Urine:   09-02-22 @ 07:01  -  09-03-22 @ 07:00  --------------------------------------------------------  OUT: 900 mL    09-03-22 @ 07:01  -  09-03-22 @ 15:29  --------------------------------------------------------  OUT: 0 mL    Everett Catheter: no    Physical Examination:  General: NAD, resting comfortably in bed  HEENT: Normocephalic atraumatic  Respiratory: Nonlabored respirations, normal CW expansion.  Cardio: S1S2, regular rate and rhythm.  Abdomen: softly distended, appropriately tender, dressings c/d/i  Vascular: extremities are warm and well perfused.     Imaging:  No post-op imaging studies    Assessment:  72yFemale patient S/P abdominal wound debridement for intraabdominal abscess     Plan:  - IV Abx: ancef   - Pain control PRN  - Diet: regular   - Activity: OOB    Date/Time: 09-03-22 @ 15:29

## 2022-09-06 NOTE — PHYSICAL THERAPY INITIAL EVALUATION ADULT - THERAPY FREQUENCY, PT EVAL
Physical Therapy Evaluation    Visit Count: 1   Plan of Care: 10/31/2019 Through: 12/31/2019  Insurance Information:  [employer]  NO PTA  BOMN  Referred by: Gregor De Leon DPM; Next provider visit (if known/scheduled):   Medical Diagnosis (from order):   Diagnosis Information      Diagnosis    V45.89 (ICD-9-CM) - Z98.890 (ICD-10-CM) - S/P foot surgery, left    349.9, 713.5 (ICD-9-CM) - M14.60 (ICD-10-CM) - Charcot's joint              Treatment Diagnosis: left foot symptoms with increased pain/symptoms, impaired strength, impaired range of motion    Date of Surgery: 6/5/19; Surgery performed:1. Subtalar arthrodesis, left foot  2. Medial column arthrodesis, left foot  3. Harvesting of bone marrow aspirate, left lower extremity  4. Application of external fixator left lower extremity  Fixator removed 8/28/19;   Diagnosis Precautions: open wounds  Chart reviewed at time of initial evaluation (relevant co-morbidities, allergies, tests and medications listed): Right leg amputation     SUBJECTIVE   Description of Problem and/or Mechanism of Injury: Patient underwent a left foot arthrodesis for a Charcot's foot. He developed an infection and was seen in wound care. He has recently been released from their care. He presents today to begin PT on the foot. He states he will be leaving for [place] in 3-4 weeks and will stay until April. He is planning on getting fitted for a new artificial leg on the right side in the next month.  Pain:  Intensity (0-10 scale): Current: 0; Pain Range (best-worst): 0      Patient Goal: to ambulate without an AD    Prior Treatment: inpatient physical therapy and inpatient occupational therapy in the past year for current condition. Hospitalization, home health services or skilled nursing facility in the last 30 days: Yes, per patient.  Home Environment/Social Support: Patient lives with significant other; assistance as needed from family/friends available.    Safety:  Do you feel safe at  home, work and/or school? yes, per patient  Patient denies 2 or more falls or an unexplained fall with injury in the last year, further testing not required     OBJECTIVE   Observation/Gait:   Ambulates with an alternating gait using a front wheeled walker. Gait steady  10-15 pin sized holes throughout the left foot that were weeping yellow and red exudate.    Range of Motion (degrees)   Left Right   Date Initial Initial   Hip Flexion (110-120)     Hip Extension (10-15)     Hip Abduction (30-50)     Hip Adduction (30)     Hip Internal Rotation (30-40)     Hip External Rotation (40-60)     Knee Flexion (135)     Knee Extension (0-5)     Ankle Dorsiflexion (20) 5    Ankle Plantar Flexion (50) 20    Ankle Inversion (35) 0    Ankle Eversion (15) 0    Reported in degrees, active range of motion recorded unless noted as AA=active assistive or P=passive; standard testing positions unless otherwise noted, norms included in ( ); *=pain   Only those motions that were assessed are noted.    Strength (out of 5)   Left Right   Date Initial Initial   Hip Flexors 4-    Hip Extensors 4-    Hip Abductors 4-    Hip Adductors 4-    Hip Internal Rotators 4-    Hip External Rotators 4-    Knee Flexors 4-    Knee Extensors 4-    Ankle Dorsiflexors 4-    Ankle Plantar Flexors 4-    Ankle Invertors     Ankle Evertors     standard testing positions unless otherwise noted; *=pain  Only muscle strength that was assessed are noted.      Joint Play Assessment:  2/6 hypomobility AP and PA of the left ankle      Outcome Measures:   Lower Extremity Functional Scale: 30 (0=extreme difficulty; 80=no difficulty)     Initial Treatment   Initial evaluation completed.    Therapeutic Exercise:   PROM ankle DF and PF  Active ankle PF and DF  Toe scrunches 2 x 10  Standing hip abduction 2 x 10  Standing marches 2 x 10    Therapeutic Activity:  Discussed PT POC  Discussed patient questions and concerns  HEP instruction      Skilled input: verbal  instruction/cues    Home Program:  Hip abduction and marches  Ankle PF/DF  Toe scrunches    Writer verbally educated the patient and received verbal consent from the patient on hand placement, positioning of patient, and techniques to be performed today including therapist position for techniques as described above and how they are pertinent to the patient's plan of care.      Suggestions for next session as indicated: progress per plan of care,   ASSESSMENT   64 year old male patient has signs and symptoms consistent with left ankle surgery that has reported functional limitations listed above. Overall he is doing well. Some concerns regarding the weeping exudate from the foot. Patient to monitor.    Patient will benefit from skilled therapy and rehab potential is good based on assessment above   Predicted patient presentation: Low (stable) - Patient comorbidities and complexities, as defined above, will have little effect on progress for prescribed plan of care.    PLAN   Goals: To be obtained by end of this plan of care:  1. Patient will be independent with progressed and modified home exercise program   2. Improve involved strength to 4+/5  3. Improve involved ankle PF/DF range of motion to 35/12 or better  through improvements listed above patient will:  4. Be able to ambulate community distances on even and uneven terrain with least restrictive assistive device    The following skilled interventions to be implemented to achieve above:  Gait Training (25369)  Manual Therapy (96216)  Neuromuscular Re-Education (12868)  Therapeutic Activity (38293)  Therapeutic Exercise (80662)    Frequency/Duration: 2 times per week for 6 weeks with tapering as the patient progresses for an estimated total of 8 visits    patient involved in and agreed to plan of care and goals.   Attendance policy provided at time of evaluation.       Patient Education:   Who will be receiving education: patient  Are they ready to learn:  yes  Preferred learning style: written, verbal, demonstration  Barriers to learning: no barriers apparent at this time   Result of initial outlined education: Verbalizes understanding    THERAPY DAILY BILLING   Insurance: 1. [facility] 2. N/A    Evaluation Procedures:  Physical Therapy Evaluation: Low Complexity    Timed Procedures:  Therapeutic Activity, 15 minutes  Therapeutic Exercise, 15 minutes    Untimed Procedures:  No untimed codes were used on this date of service    Total Treatment Time: 45 minutes       3-5x/week

## 2022-09-07 DIAGNOSIS — N17.9 ACUTE KIDNEY FAILURE, UNSPECIFIED: ICD-10-CM

## 2022-09-07 LAB
ANION GAP SERPL CALC-SCNC: 12 MMOL/L — SIGNIFICANT CHANGE UP (ref 7–14)
ANION GAP SERPL CALC-SCNC: 13 MMOL/L — SIGNIFICANT CHANGE UP (ref 7–14)
APPEARANCE UR: CLEAR — SIGNIFICANT CHANGE UP
BACTERIA # UR AUTO: ABNORMAL
BILIRUB UR-MCNC: NEGATIVE — SIGNIFICANT CHANGE UP
BUN SERPL-MCNC: 28 MG/DL — HIGH (ref 7–23)
BUN SERPL-MCNC: 33 MG/DL — HIGH (ref 7–23)
CALCIUM SERPL-MCNC: 9.1 MG/DL — SIGNIFICANT CHANGE UP (ref 8.4–10.5)
CALCIUM SERPL-MCNC: 9.8 MG/DL — SIGNIFICANT CHANGE UP (ref 8.4–10.5)
CHLORIDE SERPL-SCNC: 95 MMOL/L — LOW (ref 98–107)
CHLORIDE SERPL-SCNC: 96 MMOL/L — LOW (ref 98–107)
CO2 SERPL-SCNC: 21 MMOL/L — LOW (ref 22–31)
CO2 SERPL-SCNC: 24 MMOL/L — SIGNIFICANT CHANGE UP (ref 22–31)
COLOR SPEC: SIGNIFICANT CHANGE UP
CREAT SERPL-MCNC: 1.09 MG/DL — SIGNIFICANT CHANGE UP (ref 0.5–1.3)
CREAT SERPL-MCNC: 1.14 MG/DL — SIGNIFICANT CHANGE UP (ref 0.5–1.3)
DIFF PNL FLD: NEGATIVE — SIGNIFICANT CHANGE UP
EGFR: 52 ML/MIN/1.73M2 — LOW
EGFR: 55 ML/MIN/1.73M2 — LOW
EPI CELLS # UR: 3 /HPF — SIGNIFICANT CHANGE UP (ref 0–5)
GLUCOSE BLDC GLUCOMTR-MCNC: 131 MG/DL — HIGH (ref 70–99)
GLUCOSE BLDC GLUCOMTR-MCNC: 207 MG/DL — HIGH (ref 70–99)
GLUCOSE BLDC GLUCOMTR-MCNC: 305 MG/DL — HIGH (ref 70–99)
GLUCOSE BLDC GLUCOMTR-MCNC: 317 MG/DL — HIGH (ref 70–99)
GLUCOSE SERPL-MCNC: 195 MG/DL — HIGH (ref 70–99)
GLUCOSE SERPL-MCNC: 274 MG/DL — HIGH (ref 70–99)
GLUCOSE UR QL: ABNORMAL
HCT VFR BLD CALC: 27.3 % — LOW (ref 34.5–45)
HGB BLD-MCNC: 8.8 G/DL — LOW (ref 11.5–15.5)
KETONES UR-MCNC: NEGATIVE — SIGNIFICANT CHANGE UP
LEUKOCYTE ESTERASE UR-ACNC: ABNORMAL
MAGNESIUM SERPL-MCNC: 1.7 MG/DL — SIGNIFICANT CHANGE UP (ref 1.6–2.6)
MAGNESIUM SERPL-MCNC: 1.8 MG/DL — SIGNIFICANT CHANGE UP (ref 1.6–2.6)
MCHC RBC-ENTMCNC: 28.5 PG — SIGNIFICANT CHANGE UP (ref 27–34)
MCHC RBC-ENTMCNC: 32.2 GM/DL — SIGNIFICANT CHANGE UP (ref 32–36)
MCV RBC AUTO: 88.3 FL — SIGNIFICANT CHANGE UP (ref 80–100)
NITRITE UR-MCNC: NEGATIVE — SIGNIFICANT CHANGE UP
NRBC # BLD: 0 /100 WBCS — SIGNIFICANT CHANGE UP (ref 0–0)
NRBC # FLD: 0 K/UL — SIGNIFICANT CHANGE UP (ref 0–0)
PH UR: 6 — SIGNIFICANT CHANGE UP (ref 5–8)
PHOSPHATE SERPL-MCNC: 3.8 MG/DL — SIGNIFICANT CHANGE UP (ref 2.5–4.5)
PHOSPHATE SERPL-MCNC: 4.2 MG/DL — SIGNIFICANT CHANGE UP (ref 2.5–4.5)
PLATELET # BLD AUTO: 812 K/UL — HIGH (ref 150–400)
POTASSIUM SERPL-MCNC: 4.8 MMOL/L — SIGNIFICANT CHANGE UP (ref 3.5–5.3)
POTASSIUM SERPL-MCNC: 5.4 MMOL/L — HIGH (ref 3.5–5.3)
POTASSIUM SERPL-SCNC: 4.8 MMOL/L — SIGNIFICANT CHANGE UP (ref 3.5–5.3)
POTASSIUM SERPL-SCNC: 5.4 MMOL/L — HIGH (ref 3.5–5.3)
PROT UR-MCNC: ABNORMAL
RBC # BLD: 3.09 M/UL — LOW (ref 3.8–5.2)
RBC # FLD: 16.3 % — HIGH (ref 10.3–14.5)
RBC CASTS # UR COMP ASSIST: 3 /HPF — SIGNIFICANT CHANGE UP (ref 0–4)
SODIUM SERPL-SCNC: 130 MMOL/L — LOW (ref 135–145)
SODIUM SERPL-SCNC: 131 MMOL/L — LOW (ref 135–145)
SP GR SPEC: 1.01 — SIGNIFICANT CHANGE UP (ref 1.01–1.05)
UROBILINOGEN FLD QL: SIGNIFICANT CHANGE UP
WBC # BLD: 16.25 K/UL — HIGH (ref 3.8–10.5)
WBC # FLD AUTO: 16.25 K/UL — HIGH (ref 3.8–10.5)
WBC UR QL: 11 /HPF — HIGH (ref 0–5)

## 2022-09-07 PROCEDURE — 93010 ELECTROCARDIOGRAM REPORT: CPT

## 2022-09-07 PROCEDURE — 71045 X-RAY EXAM CHEST 1 VIEW: CPT | Mod: 26

## 2022-09-07 RX ORDER — LIDOCAINE HYDROCHLORIDE AND EPINEPHRINE 10; 10 MG/ML; UG/ML
20 INJECTION, SOLUTION INFILTRATION; PERINEURAL ONCE
Refills: 0 | Status: DISCONTINUED | OUTPATIENT
Start: 2022-09-07 | End: 2022-09-09

## 2022-09-07 RX ORDER — POLYETHYLENE GLYCOL 3350 17 G/17G
17 POWDER, FOR SOLUTION ORAL ONCE
Refills: 0 | Status: COMPLETED | OUTPATIENT
Start: 2022-09-07 | End: 2022-09-08

## 2022-09-07 RX ORDER — MAGNESIUM SULFATE 500 MG/ML
2 VIAL (ML) INJECTION ONCE
Refills: 0 | Status: COMPLETED | OUTPATIENT
Start: 2022-09-07 | End: 2022-09-07

## 2022-09-07 RX ORDER — SODIUM CHLORIDE 9 MG/ML
1000 INJECTION INTRAMUSCULAR; INTRAVENOUS; SUBCUTANEOUS
Refills: 0 | Status: DISCONTINUED | OUTPATIENT
Start: 2022-09-07 | End: 2022-09-07

## 2022-09-07 RX ORDER — SODIUM CHLORIDE 9 MG/ML
1000 INJECTION INTRAMUSCULAR; INTRAVENOUS; SUBCUTANEOUS
Refills: 0 | Status: DISCONTINUED | OUTPATIENT
Start: 2022-09-07 | End: 2022-09-10

## 2022-09-07 RX ORDER — SODIUM CHLORIDE 9 MG/ML
1000 INJECTION INTRAMUSCULAR; INTRAVENOUS; SUBCUTANEOUS ONCE
Refills: 0 | Status: COMPLETED | OUTPATIENT
Start: 2022-09-07 | End: 2022-09-07

## 2022-09-07 RX ADMIN — SIMVASTATIN 40 MILLIGRAM(S): 20 TABLET, FILM COATED ORAL at 21:36

## 2022-09-07 RX ADMIN — OXYCODONE HYDROCHLORIDE 10 MILLIGRAM(S): 5 TABLET ORAL at 06:36

## 2022-09-07 RX ADMIN — Medication 5 MILLIGRAM(S): at 12:50

## 2022-09-07 RX ADMIN — Medication 8: at 08:44

## 2022-09-07 RX ADMIN — Medication 81 MILLIGRAM(S): at 12:48

## 2022-09-07 RX ADMIN — Medication 13 UNIT(S): at 08:44

## 2022-09-07 RX ADMIN — OXYCODONE HYDROCHLORIDE 10 MILLIGRAM(S): 5 TABLET ORAL at 22:32

## 2022-09-07 RX ADMIN — Medication 4: at 18:20

## 2022-09-07 RX ADMIN — SODIUM CHLORIDE 75 MILLILITER(S): 9 INJECTION INTRAMUSCULAR; INTRAVENOUS; SUBCUTANEOUS at 13:13

## 2022-09-07 RX ADMIN — Medication 0.25 MILLIGRAM(S): at 21:36

## 2022-09-07 RX ADMIN — Medication 25 GRAM(S): at 10:35

## 2022-09-07 RX ADMIN — SODIUM CHLORIDE 333.33 MILLILITER(S): 9 INJECTION INTRAMUSCULAR; INTRAVENOUS; SUBCUTANEOUS at 09:28

## 2022-09-07 RX ADMIN — Medication 13 UNIT(S): at 12:46

## 2022-09-07 RX ADMIN — PIPERACILLIN AND TAZOBACTAM 25 GRAM(S): 4; .5 INJECTION, POWDER, LYOPHILIZED, FOR SOLUTION INTRAVENOUS at 05:15

## 2022-09-07 RX ADMIN — Medication 13 UNIT(S): at 18:21

## 2022-09-07 RX ADMIN — PIPERACILLIN AND TAZOBACTAM 25 GRAM(S): 4; .5 INJECTION, POWDER, LYOPHILIZED, FOR SOLUTION INTRAVENOUS at 14:55

## 2022-09-07 RX ADMIN — Medication 650 MILLIGRAM(S): at 14:09

## 2022-09-07 RX ADMIN — ENOXAPARIN SODIUM 40 MILLIGRAM(S): 100 INJECTION SUBCUTANEOUS at 21:36

## 2022-09-07 RX ADMIN — PIPERACILLIN AND TAZOBACTAM 25 GRAM(S): 4; .5 INJECTION, POWDER, LYOPHILIZED, FOR SOLUTION INTRAVENOUS at 21:37

## 2022-09-07 RX ADMIN — INSULIN GLARGINE 50 UNIT(S): 100 INJECTION, SOLUTION SUBCUTANEOUS at 21:37

## 2022-09-07 RX ADMIN — OXYCODONE HYDROCHLORIDE 10 MILLIGRAM(S): 5 TABLET ORAL at 23:00

## 2022-09-07 RX ADMIN — Medication 650 MILLIGRAM(S): at 15:01

## 2022-09-07 NOTE — PROVIDER CONTACT NOTE (OTHER) - SITUATION
Potassium level at 5.4 and FS at 305, lab did not notify RN for critical value.
Hyperglycemia 419
Pt FS sliding scale is before meals , but not before bedtime. 22:00 POCT  391.

## 2022-09-07 NOTE — PROGRESS NOTE ADULT - ASSESSMENT
Assessment  DMT2: 72y Female with DM T2 with hyperglycemia admitted with abdominal wall wound, she is on basal bolus and insulin coverage, sugars improving, no compliant with low carb diet.  Abdominal wall wound: : S/P drain, on medications, monitored.  HTN: On antihypertensive medications, monitored, asymptomatic.  Overweight: No strict exercise routines, neither on low calorie diet.                 Brayden Richards MD  Cell: [phone]  Office: [phone]

## 2022-09-07 NOTE — PROGRESS NOTE ADULT - ASSESSMENT
73 yo F admitted with anterior abdominal wall abscess, s/p bedside I&D x2 and s/p OR debridement and washout on 9/3.     Plan:  -Wound care team to place vac in conjunction with Plastic Surgery team for Dermaclosure today Wednesday 9/7.   -0.5mg Dilaudid 30 min prior to dressing change / Vac Placement   -Daily dressing change (WTD w/ Kerlix) until vac placed  -EKG ordered for new onset tachycardia (though patient asymptomatic) Sinus Rhythm with no ST changes.  -ASA started i/s/o new onset thrombocytosis  -Continue to monitor blood glucose, appreciate endo recs: Lantus 50U at bedtime, Admelog 13U premeal and Admelog correction scale   -Abx: per ID, continue cephalexin, d/c on Keflex 500mg q6 for total 10 days post initial bedside I&D (Zosyn if patient worsens)  -Diet: consistent carbs   -Activity: encourage OOB   -PT consult appreciated - No Skilled PT Needs.  -DVT ppx: Lovenox      A Team Surgery  P# [id number] 71 yo F admitted with anterior abdominal wall abscess, s/p bedside I&D x2 and s/p OR debridement and washout on 9/3.     Plan:  -Wound care team to place vac in conjunction with Plastic Surgery team for Dermaclosure today Wednesday 9/7.   -0.5mg Dilaudid 30 min prior to dressing change / Vac Placement   -Daily dressing change (WTD w/ Kerlix) until vac placed  -EKG ordered for new onset tachycardia (though patient asymptomatic) Sinus Rhythm with no ST changes.  -ASA started i/s/o new onset thrombocytosis  -Continue to monitor blood glucose, appreciate endo recs: Lantus 50U at bedtime, Admelog 13U premeal and Admelog correction scale   -Per Endo: At discharge current recommendations would be: home on current basal bolus insulin regimen if FS remain stable at target. Stop Glimeperide at home. May continue Actos and Metformin. FU 4 weeks. Discussed with patient.  -Abx: per ID, continue cephalexin, d/c on Keflex 500mg q6 for total 10 days post initial bedside I&D (Zosyn if patient worsens)  -Diet: consistent carbs   -Activity: encourage OOB   -PT consult appreciated - No Skilled PT Needs.  -DVT ppx: Lovenox      A Team Surgery  P# [id number] 73 yo F admitted with anterior abdominal wall abscess, s/p bedside I&D x2 and s/p OR debridement and washout on 9/3.     Plan:  -Will hold off on Vac placement and Dermaclose at this time pending resolution of Tachycardia  -0.5mg Dilaudid 30 min prior to dressing change  -Daily dressing change (WTD w/ Kerlix) until vac placed  -EKG ordered for new onset tachycardia (though patient asymptomatic) Sinus Rhythm with no ST changes.  -ASA started i/s/o new onset thrombocytosis  -Continue to monitor blood glucose, appreciate endo recs: Lantus 50U at bedtime, Admelog 13U premeal and Admelog correction scale   -Per Endo: At discharge current recommendations would be: home on current basal bolus insulin regimen if FS remain stable at target. Stop Glimeperide at home. May continue Actos and Metformin. FU 4 weeks. Discussed with patient.  -Abx: per ID, continue cephalexin, d/c on Keflex 500mg q6 for total 10 days post initial bedside I&D (Zosyn if patient worsens)  -Diet: consistent carbs   -Activity: encourage OOB   -PT consult appreciated - No Skilled PT Needs.  -DVT ppx: Lovenox      A Team Surgery  P# [id number] 71 yo F admitted with anterior abdominal wall abscess, s/p bedside I&D x2 and s/p OR debridement and washout on 9/3.     Plan:  -Will hold off on Vac placement and Dermaclose at this time pending resolution of Tachycardia  -0.5mg Dilaudid 30 min prior to dressing change  -Daily dressing change (WTD w/ Kerlix) until vac placed  -EKG ordered for new onset tachycardia (though patient asymptomatic) Sinus Rhythm with no ST changes.  -ASA started i/s/o new onset thrombocytosis  -Continue to monitor blood glucose, appreciate endo recs: Lantus 50U at bedtime, Admelog 13U premeal and Admelog correction scale   -Per Endo: At discharge current recommendations would be: home on current basal bolus insulin regimen if FS remain stable at target. Stop Glimeperide at home. May continue Actos and Metformin. FU 4 weeks. Discussed with patient.  -Abx: per ID, escalated to Zosyn 9/6 considering lack of improvement with the WBC and slow clinical response.   -Diet: consistent carbs   -Activity: encourage OOB   -PT consult appreciated - No Skilled PT Needs.  -DVT ppx: Lovenox      A Team Surgery  P# [id number] 73 yo F admitted with anterior abdominal wall abscess, s/p bedside I&D x2 and s/p OR debridement and washout on 9/3.     Plan:  -Will hold off on Vac placement and Dermaclose at this time pending resolution of Tachycardia  -0.5mg Dilaudid 30 min prior to dressing change  -Daily dressing change (WTD w/ Kerlix) until vac placed  -EKG ordered for new onset tachycardia (though patient asymptomatic) Sinus Rhythm with no ST changes.  -ASA started i/s/o new onset thrombocytosis  -Continue to monitor blood glucose, appreciate endo recs: Lantus 50U at bedtime, Admelog 13U premeal and Admelog correction scale   -Per Endo: At discharge current recommendations would be: home on current basal bolus insulin regimen if FS remain stable at target. Stop Glimeperide at home. May continue Actos and Metformin. FU 4 weeks. Discussed with patient.  -Abx: per ID, escalated to Zosyn 9/6 considering lack of improvement with the WBC and slow clinical response.   -Strict I's/O's - Document all voids  -Diet: consistent carbs   -Activity: encourage OOB   -PT consult appreciated - No Skilled PT Needs.  -DVT ppx: Lovenox      A Team Surgery  P# [id number]

## 2022-09-07 NOTE — PROGRESS NOTE ADULT - SUBJECTIVE AND OBJECTIVE BOX
A Team General Surgery Progress Note    S: Pt seen and examined with team on morning rounds. Patient resting comfortably in bed. Admits pain at abdominal surgical site. Denies CP/Palpitations/SOB/Pain in arm or back. (Tachycardia overnight). Per patient's nurse, patient received oxycodone 15 minutes ago. Vitals retaken with team at bedside.    Vital Signs Last 24 Hrs  T(C): 36.7 (07 Sep 2022 06:00), Max: 37.2 (07 Sep 2022 02:11)  T(F): 98 (07 Sep 2022 06:00), Max: 98.9 (07 Sep 2022 02:11)  HR: 105 (07 Sep 2022 06:00) (100 - 117)  BP: 135/60 (07 Sep 2022 06:00) (110/63 - 137/73)  BP(mean): --  RR: 18 (07 Sep 2022 06:00) (16 - 18)  SpO2: 97% (07 Sep 2022 06:00) (95% - 98%)    Parameters below as of 07 Sep 2022 06:00  Patient On (Oxygen Delivery Method): room air        I&O's Summary    06 Sep 2022 07:01  -  07 Sep 2022 07:00  --------------------------------------------------------  IN: 660 mL / OUT: 0 mL / NET: 660 mL      I&O's Detail    06 Sep 2022 07:01  -  07 Sep 2022 07:00  --------------------------------------------------------  IN:    IV PiggyBack: 300 mL    Oral Fluid: 360 mL  Total IN: 660 mL    OUT:    Voided (mL): 0 mL  Total OUT: 0 mL    Total NET: 660 mL          General Appearance: Resting comfortably in bed. Appears well, NAD.  Chest: Breathing comfortably on RA.    CV: S1, S2 @ 109 currently (Vitals retaken with team at bedside)  Abdomen: Soft, nondistended, appropriate tenderness surrounding open surgical site.  Dressings taken down. Wound pink with good granulation tissue. No drainage or bleeding noted. No Surrounding erythema/fluctuance/induration. New wet to dry Kerlex placed (1 full kerlex - no need for part of a second at this point). Dry Abd placed over Kerlex.  Extremities: Moves all extremities.  .  MEDICATIONS  (STANDING):  aspirin  chewable 81 milliGRAM(s) Oral daily  dextrose 5%. 1000 milliLiter(s) (100 mL/Hr) IV Continuous <Continuous>  dextrose 5%. 1000 milliLiter(s) (50 mL/Hr) IV Continuous <Continuous>  dextrose 50% Injectable 25 Gram(s) IV Push once  dextrose 50% Injectable 12.5 Gram(s) IV Push once  dextrose 50% Injectable 25 Gram(s) IV Push once  enoxaparin Injectable 40 milliGRAM(s) SubCutaneous every 24 hours  glucagon  Injectable 1 milliGRAM(s) IntraMuscular once  influenza  Vaccine (HIGH DOSE) 0.7 milliLiter(s) IntraMuscular once  insulin glargine Injectable (LANTUS) 50 Unit(s) SubCutaneous at bedtime  insulin lispro (ADMELOG) corrective regimen sliding scale   SubCutaneous three times a day before meals  insulin lispro Injectable (ADMELOG) 13 Unit(s) SubCutaneous three times a day before meals  lidocaine 1%/epinephrine 1:100,000 Inj 20 milliLiter(s) Local Injection once  lidocaine 1%/epinephrine 1:100,000 Inj 20 milliLiter(s) Local Injection once  lidocaine 1%/epinephrine 1:200,000 Inj 20 milliLiter(s) Local Injection once  lisinopril 20 milliGRAM(s) Oral daily  piperacillin/tazobactam IVPB.. 3.375 Gram(s) IV Intermittent every 8 hours  silver nitrate Applicator 1 Application(s) Topical once  simvastatin 40 milliGRAM(s) Oral at bedtime    MEDICATIONS  (PRN):  acetaminophen     Tablet .. 650 milliGRAM(s) Oral every 6 hours PRN Mild Pain (1 - 3), Moderate Pain (4 - 6), Severe Pain (7 - 10)  dextrose Oral Gel 15 Gram(s) Oral once PRN Blood Glucose LESS THAN 70 milliGRAM(s)/deciliter  oxyCODONE    IR 5 milliGRAM(s) Oral every 4 hours PRN Moderate Pain (4 - 6)  oxyCODONE    IR 10 milliGRAM(s) Oral every 4 hours PRN Severe Pain (7 - 10)      LABS:                        8.3    14.60 )-----------( 779      ( 06 Sep 2022 05:15 )             26.1     09-06    134<L>  |  96<L>  |  23  ----------------------------<  211<H>  5.1   |  24  |  0.90    Ca    9.8      06 Sep 2022 05:15  Phos  4.4     09-06  Mg     1.80     09-06

## 2022-09-07 NOTE — PROGRESS NOTE ADULT - SUBJECTIVE AND OBJECTIVE BOX
Chief complaint    Patient is a 72y old  Female who presents with a chief complaint of abscess (06 Sep 2022 10:10)   Review of systems  Patient in bed, appears comfortable.    Labs and Fingersticks  CAPILLARY BLOOD GLUCOSE      POCT Blood Glucose.: 305 mg/dL (07 Sep 2022 08:24)  POCT Blood Glucose.: 329 mg/dL (06 Sep 2022 21:51)  POCT Blood Glucose.: 222 mg/dL (06 Sep 2022 17:37)  POCT Blood Glucose.: 167 mg/dL (06 Sep 2022 12:10)      Anion Gap, Serum: 12 (09-07 @ 05:53)  Anion Gap, Serum: 14 (09-06 @ 05:15)      Calcium, Total Serum: 9.8 (09-07 @ 05:53)  Calcium, Total Serum: 9.8 (09-06 @ 05:15)          09-07    131<L>  |  95<L>  |  33<H>  ----------------------------<  274<H>  5.4<H>   |  24  |  1.14    Ca    9.8      07 Sep 2022 05:53  Phos  4.2     09-07  Mg     1.70     09-07                          8.8    16.25 )-----------( 812      ( 07 Sep 2022 05:53 )             27.3     Medications  MEDICATIONS  (STANDING):  aspirin  chewable 81 milliGRAM(s) Oral daily  dextrose 5%. 1000 milliLiter(s) (100 mL/Hr) IV Continuous <Continuous>  dextrose 5%. 1000 milliLiter(s) (50 mL/Hr) IV Continuous <Continuous>  dextrose 50% Injectable 25 Gram(s) IV Push once  dextrose 50% Injectable 12.5 Gram(s) IV Push once  dextrose 50% Injectable 25 Gram(s) IV Push once  enoxaparin Injectable 40 milliGRAM(s) SubCutaneous every 24 hours  glucagon  Injectable 1 milliGRAM(s) IntraMuscular once  influenza  Vaccine (HIGH DOSE) 0.7 milliLiter(s) IntraMuscular once  insulin glargine Injectable (LANTUS) 50 Unit(s) SubCutaneous at bedtime  insulin lispro (ADMELOG) corrective regimen sliding scale   SubCutaneous three times a day before meals  insulin lispro Injectable (ADMELOG) 13 Unit(s) SubCutaneous three times a day before meals  lidocaine 1%/epinephrine 1:100,000 Inj 20 milliLiter(s) Local Injection once  lidocaine 1%/epinephrine 1:100,000 Inj 20 milliLiter(s) Local Injection once  lidocaine 1%/epinephrine 1:200,000 Inj 20 milliLiter(s) Local Injection once  lisinopril 20 milliGRAM(s) Oral daily  piperacillin/tazobactam IVPB.. 3.375 Gram(s) IV Intermittent every 8 hours  silver nitrate Applicator 1 Application(s) Topical once  simvastatin 40 milliGRAM(s) Oral at bedtime      Physical Exam  General: Patient comfortable in bed  Vital Signs Last 12 Hrs  T(F): 98 (09-07-22 @ 06:00), Max: 98.9 (09-07-22 @ 02:11)  HR: 105 (09-07-22 @ 06:00) (105 - 117)  BP: 135/60 (09-07-22 @ 06:00) (120/65 - 135/60)  BP(mean): --  RR: 18 (09-07-22 @ 06:00) (18 - 18)  SpO2: 97% (09-07-22 @ 06:00) (95% - 97%)  Neck: No palpable thyroid nodules.  CVS: S1S2, No murmurs  Respiratory: No wheezing, no crepitations  GI: Abdomen soft, bowel sounds positive  Musculoskeletal:  edema lower extremities.     Diagnostics

## 2022-09-07 NOTE — PROVIDER CONTACT NOTE (OTHER) - ASSESSMENT
vs is not in acute distress. no other s/sx of hyperglycemia, denies pain
Hyperglycemia 419. Patient is asymptomatic. RN gave 12 units of due insulin,
Pt is asymptomatic, A&Ox4, no complaints at this time. S/P I&D of midline incision.

## 2022-09-07 NOTE — PROGRESS NOTE ADULT - SUBJECTIVE AND OBJECTIVE BOX
Patient is a 72y old  Female who presents with a chief complaint of abscess (06 Sep 2022 10:10)      DATE OF SERVICE: 09-07-22 @ 10:30    SUBJECTIVE / OVERNIGHT EVENTS: overnight events noted    ROS:  Resp: No cough no sputum production  CVS: No chest pain no palpitations no orthopnea  GI: no N/V/D  : no dysuria, no hematuria  no BM x 2 days        MEDICATIONS  (STANDING):  aspirin  chewable 81 milliGRAM(s) Oral daily  dextrose 5%. 1000 milliLiter(s) (100 mL/Hr) IV Continuous <Continuous>  dextrose 5%. 1000 milliLiter(s) (50 mL/Hr) IV Continuous <Continuous>  dextrose 50% Injectable 25 Gram(s) IV Push once  dextrose 50% Injectable 12.5 Gram(s) IV Push once  dextrose 50% Injectable 25 Gram(s) IV Push once  enoxaparin Injectable 40 milliGRAM(s) SubCutaneous every 24 hours  glucagon  Injectable 1 milliGRAM(s) IntraMuscular once  influenza  Vaccine (HIGH DOSE) 0.7 milliLiter(s) IntraMuscular once  insulin glargine Injectable (LANTUS) 50 Unit(s) SubCutaneous at bedtime  insulin lispro (ADMELOG) corrective regimen sliding scale   SubCutaneous three times a day before meals  insulin lispro Injectable (ADMELOG) 13 Unit(s) SubCutaneous three times a day before meals  lidocaine 1%/epinephrine 1:100,000 Inj 20 milliLiter(s) Local Injection once  lidocaine 1%/epinephrine 1:100,000 Inj 20 milliLiter(s) Local Injection once  lidocaine 1%/epinephrine 1:200,000 Inj 20 milliLiter(s) Local Injection once  lisinopril 20 milliGRAM(s) Oral daily  magnesium sulfate  IVPB 2 Gram(s) IV Intermittent once  piperacillin/tazobactam IVPB.. 3.375 Gram(s) IV Intermittent every 8 hours  silver nitrate Applicator 1 Application(s) Topical once  simvastatin 40 milliGRAM(s) Oral at bedtime    MEDICATIONS  (PRN):  acetaminophen     Tablet .. 650 milliGRAM(s) Oral every 6 hours PRN Mild Pain (1 - 3), Moderate Pain (4 - 6), Severe Pain (7 - 10)  dextrose Oral Gel 15 Gram(s) Oral once PRN Blood Glucose LESS THAN 70 milliGRAM(s)/deciliter  oxyCODONE    IR 5 milliGRAM(s) Oral every 4 hours PRN Moderate Pain (4 - 6)  oxyCODONE    IR 10 milliGRAM(s) Oral every 4 hours PRN Severe Pain (7 - 10)        CAPILLARY BLOOD GLUCOSE      POCT Blood Glucose.: 305 mg/dL (07 Sep 2022 08:24)  POCT Blood Glucose.: 329 mg/dL (06 Sep 2022 21:51)  POCT Blood Glucose.: 222 mg/dL (06 Sep 2022 17:37)  POCT Blood Glucose.: 167 mg/dL (06 Sep 2022 12:10)    I&O's Summary    06 Sep 2022 07:01  -  07 Sep 2022 07:00  --------------------------------------------------------  IN: 660 mL / OUT: 0 mL / NET: 660 mL        Vital Signs Last 24 Hrs  T(C): 36.9 (07 Sep 2022 09:30), Max: 37.2 (07 Sep 2022 02:11)  T(F): 98.4 (07 Sep 2022 09:30), Max: 98.9 (07 Sep 2022 02:11)  HR: 112 (07 Sep 2022 09:30) (100 - 117)  BP: 123/61 (07 Sep 2022 09:30) (110/63 - 137/73)  BP(mean): --  RR: 18 (07 Sep 2022 09:30) (16 - 18)  SpO2: 98% (07 Sep 2022 09:30) (95% - 98%)    PHYSICAL EXAM:  GENERAL: NAD  EYES: EOMI, PERRLA,  NECK: Supple, No JVD  CHEST/LUNG: Clear   HEART: S1S2;  no murmurs  ABDOMEN: Soft nontender  bandage in place  EXTREMITIES: no edema  NEUROLOGY: Alert, nonfocal    LABS:                        8.8    16.25 )-----------( 812      ( 07 Sep 2022 05:53 )             27.3     09-07    131<L>  |  95<L>  |  33<H>  ----------------------------<  274<H>  5.4<H>   |  24  |  1.14    Ca    9.8      07 Sep 2022 05:53  Phos  4.2     09-07  Mg     1.70     09-07                  All consultant(s) notes reviewed and care discussed with other providers        Contact Number, Dr Reed [phone]

## 2022-09-07 NOTE — PROVIDER CONTACT NOTE (OTHER) - BACKGROUND
Admitted for unspecified complication of hernia procedure.
Pt FS sliding scale is before meals , but not before bedtime. 22:00 POCT  391.
s/p I and D on 9/4, type 2 DM, HTN, hyperlipidemia

## 2022-09-07 NOTE — PROVIDER CONTACT NOTE (OTHER) - NAME OF MD/NP/PA/DO NOTIFIED:
Rachelle #[id number] A-Team
Brennon Scott (- In [facility] on Page), [phone]
Rachelle Scott MD

## 2022-09-07 NOTE — PROVIDER CONTACT NOTE (OTHER) - ACTION/TREATMENT ORDERED:
Provider Aware. Recheck blood sugar.
Review sliding scale, no additional orders added at this time.
will order to repeat BMP at 1300, give insulin as ordered. will continue to monitor.

## 2022-09-07 NOTE — PROVIDER CONTACT NOTE (OTHER) - RECOMMENDATIONS
Review sliding scale.
will order to repeat BMP at 1300, give insulin as ordered.
Provider Aware. Recheck blood sugar. RN gave 12 units of due insulin. Will continue to monitor.

## 2022-09-08 LAB
ALBUMIN SERPL ELPH-MCNC: 3.4 G/DL — SIGNIFICANT CHANGE UP (ref 3.3–5)
ALP SERPL-CCNC: 116 U/L — SIGNIFICANT CHANGE UP (ref 40–120)
ALT FLD-CCNC: 8 U/L — SIGNIFICANT CHANGE UP (ref 4–33)
ANION GAP SERPL CALC-SCNC: 11 MMOL/L — SIGNIFICANT CHANGE UP (ref 7–14)
AST SERPL-CCNC: 9 U/L — SIGNIFICANT CHANGE UP (ref 4–32)
BILIRUB DIRECT SERPL-MCNC: <0.2 MG/DL — SIGNIFICANT CHANGE UP (ref 0–0.3)
BILIRUB INDIRECT FLD-MCNC: >0 MG/DL — SIGNIFICANT CHANGE UP (ref 0–1)
BILIRUB SERPL-MCNC: 0.2 MG/DL — SIGNIFICANT CHANGE UP (ref 0.2–1.2)
BUN SERPL-MCNC: 20 MG/DL — SIGNIFICANT CHANGE UP (ref 7–23)
CALCIUM SERPL-MCNC: 9.4 MG/DL — SIGNIFICANT CHANGE UP (ref 8.4–10.5)
CEA SERPL-MCNC: 1.5 NG/ML — SIGNIFICANT CHANGE UP (ref 1–3.8)
CHLORIDE SERPL-SCNC: 100 MMOL/L — SIGNIFICANT CHANGE UP (ref 98–107)
CO2 SERPL-SCNC: 24 MMOL/L — SIGNIFICANT CHANGE UP (ref 22–31)
CREAT SERPL-MCNC: 0.95 MG/DL — SIGNIFICANT CHANGE UP (ref 0.5–1.3)
CULTURE RESULTS: SIGNIFICANT CHANGE UP
EGFR: 64 ML/MIN/1.73M2 — SIGNIFICANT CHANGE UP
GLUCOSE BLDC GLUCOMTR-MCNC: 247 MG/DL — HIGH (ref 70–99)
GLUCOSE BLDC GLUCOMTR-MCNC: 297 MG/DL — HIGH (ref 70–99)
GLUCOSE BLDC GLUCOMTR-MCNC: 298 MG/DL — HIGH (ref 70–99)
GLUCOSE BLDC GLUCOMTR-MCNC: 90 MG/DL — SIGNIFICANT CHANGE UP (ref 70–99)
GLUCOSE SERPL-MCNC: 87 MG/DL — SIGNIFICANT CHANGE UP (ref 70–99)
HCT VFR BLD CALC: 26.3 % — LOW (ref 34.5–45)
HGB BLD-MCNC: 8.7 G/DL — LOW (ref 11.5–15.5)
MAGNESIUM SERPL-MCNC: 1.7 MG/DL — SIGNIFICANT CHANGE UP (ref 1.6–2.6)
MCHC RBC-ENTMCNC: 29.4 PG — SIGNIFICANT CHANGE UP (ref 27–34)
MCHC RBC-ENTMCNC: 33.1 GM/DL — SIGNIFICANT CHANGE UP (ref 32–36)
MCV RBC AUTO: 88.9 FL — SIGNIFICANT CHANGE UP (ref 80–100)
NRBC # BLD: 0 /100 WBCS — SIGNIFICANT CHANGE UP (ref 0–0)
NRBC # FLD: 0 K/UL — SIGNIFICANT CHANGE UP (ref 0–0)
PHOSPHATE SERPL-MCNC: 3.6 MG/DL — SIGNIFICANT CHANGE UP (ref 2.5–4.5)
PLATELET # BLD AUTO: 677 K/UL — HIGH (ref 150–400)
POTASSIUM SERPL-MCNC: 4.9 MMOL/L — SIGNIFICANT CHANGE UP (ref 3.5–5.3)
POTASSIUM SERPL-SCNC: 4.9 MMOL/L — SIGNIFICANT CHANGE UP (ref 3.5–5.3)
PROT SERPL-MCNC: 7.5 G/DL — SIGNIFICANT CHANGE UP (ref 6–8.3)
RBC # BLD: 2.96 M/UL — LOW (ref 3.8–5.2)
RBC # FLD: 16.6 % — HIGH (ref 10.3–14.5)
SODIUM SERPL-SCNC: 135 MMOL/L — SIGNIFICANT CHANGE UP (ref 135–145)
SPECIMEN SOURCE: SIGNIFICANT CHANGE UP
WBC # BLD: 15.04 K/UL — HIGH (ref 3.8–10.5)
WBC # FLD AUTO: 15.04 K/UL — HIGH (ref 3.8–10.5)

## 2022-09-08 PROCEDURE — 99232 SBSQ HOSP IP/OBS MODERATE 35: CPT

## 2022-09-08 RX ORDER — HYDROMORPHONE HYDROCHLORIDE 2 MG/ML
0.5 INJECTION INTRAMUSCULAR; INTRAVENOUS; SUBCUTANEOUS ONCE
Refills: 0 | Status: DISCONTINUED | OUTPATIENT
Start: 2022-09-08 | End: 2022-09-08

## 2022-09-08 RX ORDER — POLYETHYLENE GLYCOL 3350 17 G/17G
17 POWDER, FOR SOLUTION ORAL ONCE
Refills: 0 | Status: COMPLETED | OUTPATIENT
Start: 2022-09-08 | End: 2022-09-08

## 2022-09-08 RX ORDER — POLYETHYLENE GLYCOL 3350 17 G/17G
17 POWDER, FOR SOLUTION ORAL DAILY
Refills: 0 | Status: DISCONTINUED | OUTPATIENT
Start: 2022-09-08 | End: 2022-09-09

## 2022-09-08 RX ORDER — MAGNESIUM SULFATE 500 MG/ML
2 VIAL (ML) INJECTION ONCE
Refills: 0 | Status: COMPLETED | OUTPATIENT
Start: 2022-09-08 | End: 2022-09-08

## 2022-09-08 RX ADMIN — INSULIN GLARGINE 50 UNIT(S): 100 INJECTION, SOLUTION SUBCUTANEOUS at 21:33

## 2022-09-08 RX ADMIN — HYDROMORPHONE HYDROCHLORIDE 0.5 MILLIGRAM(S): 2 INJECTION INTRAMUSCULAR; INTRAVENOUS; SUBCUTANEOUS at 06:49

## 2022-09-08 RX ADMIN — POLYETHYLENE GLYCOL 3350 17 GRAM(S): 17 POWDER, FOR SOLUTION ORAL at 17:16

## 2022-09-08 RX ADMIN — PIPERACILLIN AND TAZOBACTAM 25 GRAM(S): 4; .5 INJECTION, POWDER, LYOPHILIZED, FOR SOLUTION INTRAVENOUS at 05:37

## 2022-09-08 RX ADMIN — OXYCODONE HYDROCHLORIDE 10 MILLIGRAM(S): 5 TABLET ORAL at 23:55

## 2022-09-08 RX ADMIN — SIMVASTATIN 40 MILLIGRAM(S): 20 TABLET, FILM COATED ORAL at 21:32

## 2022-09-08 RX ADMIN — POLYETHYLENE GLYCOL 3350 17 GRAM(S): 17 POWDER, FOR SOLUTION ORAL at 12:54

## 2022-09-08 RX ADMIN — Medication 650 MILLIGRAM(S): at 12:33

## 2022-09-08 RX ADMIN — HYDROMORPHONE HYDROCHLORIDE 0.5 MILLIGRAM(S): 2 INJECTION INTRAMUSCULAR; INTRAVENOUS; SUBCUTANEOUS at 06:19

## 2022-09-08 RX ADMIN — Medication 81 MILLIGRAM(S): at 12:34

## 2022-09-08 RX ADMIN — Medication 650 MILLIGRAM(S): at 13:24

## 2022-09-08 RX ADMIN — Medication 13 UNIT(S): at 17:24

## 2022-09-08 RX ADMIN — Medication 4: at 17:23

## 2022-09-08 RX ADMIN — ENOXAPARIN SODIUM 40 MILLIGRAM(S): 100 INJECTION SUBCUTANEOUS at 21:31

## 2022-09-08 RX ADMIN — Medication 6: at 12:36

## 2022-09-08 RX ADMIN — Medication 25 GRAM(S): at 12:34

## 2022-09-08 RX ADMIN — Medication 13 UNIT(S): at 12:36

## 2022-09-08 RX ADMIN — PIPERACILLIN AND TAZOBACTAM 25 GRAM(S): 4; .5 INJECTION, POWDER, LYOPHILIZED, FOR SOLUTION INTRAVENOUS at 21:30

## 2022-09-08 RX ADMIN — LISINOPRIL 20 MILLIGRAM(S): 2.5 TABLET ORAL at 05:36

## 2022-09-08 RX ADMIN — PIPERACILLIN AND TAZOBACTAM 25 GRAM(S): 4; .5 INJECTION, POWDER, LYOPHILIZED, FOR SOLUTION INTRAVENOUS at 15:07

## 2022-09-08 RX ADMIN — Medication 13 UNIT(S): at 08:30

## 2022-09-08 NOTE — PROGRESS NOTE ADULT - ASSESSMENT
71 yo F admitted with anterior abdominal wall abscess, s/p bedside I&D x2 and s/p OR debridement and washout on 9/3.     Plan:  -Will hold off on Vac placement and Dermaclose at this time pending resolution of Tachycardia, improvement in WBC  -0.5mg Dilaudid 30 min prior to dressing change     -Daily dressing change (WTD w/ Kerlix) until vac placed     -Silk tape ONLY to skin for dressing changes   -ASA started i/s/o new onset thrombocytosis  -Continue to monitor blood glucose, appreciate endo recs: Lantus 50U at bedtime, Admelog 13U premeal and Admelog correction scale      -Per Endo: At discharge current recommendations would be: home on current basal bolus insulin regimen if FS remain stable at target. Stop Glimeperide at home. May continue Actos and Metformin. FU 4 weeks. Discussed with patient.  -Abx: per ID, escalated to Zosyn 9/6 considering lack of improvement with the WBC and slow clinical response.   -Strict I's/O's - Document all voids  -Diet: consistent carbs   -Activity: encourage OOB   -PT consult appreciated - No Skilled PT Needs.  -DVT ppx: Lovenox      [facility] Surgery  P# [phone]

## 2022-09-08 NOTE — PROGRESS NOTE ADULT - SUBJECTIVE AND OBJECTIVE BOX
CC: Wound infection    Saw/spoke to patient. Unchanged. No new complaints.    Allergies  No Known Allergies    ANTIMICROBIALS:  piperacillin/tazobactam IVPB.. 3.375 every 8 hours    PE:    Vital Signs Last 24 Hrs  T(C): 37.2 (08 Sep 2022 14:00), Max: 37.2 (08 Sep 2022 14:00)  T(F): 98.9 (08 Sep 2022 14:00), Max: 98.9 (08 Sep 2022 14:00)  HR: 102 (08 Sep 2022 14:00) (86 - 109)  BP: 121/75 (08 Sep 2022 14:00) (116/60 - 145/64)  RR: 18 (08 Sep 2022 14:00) (18 - 20)  SpO2: 95% (08 Sep 2022 14:00) (95% - 99%)    Gen: AOx3, NAD, non-toxic  CV: Nontachycardic  Resp: Breathing comfortably, RA  Abd: Soft, nontender  IV/Skin: No thrombophlebitis    LABS:                        8.7    15.04 )-----------( 677      ( 08 Sep 2022 07:04 )             26.3     09-08    135  |  100  |  20  ----------------------------<  87  4.9   |  24  |  0.95    Ca    9.4      08 Sep 2022 07:04  Phos  3.6     09-08  Mg     1.70     09-08    TPro  7.5  /  Alb  3.4  /  TBili  0.2  /  DBili  <0.2  /  AST  9   /  ALT  8   /  AlkPhos  116  09-08    Urinalysis Basic - ( 07 Sep 2022 10:25 )    Color: Light Yellow / Appearance: Clear / S.011 / pH: x  Gluc: x / Ketone: Negative  / Bili: Negative / Urobili: <2 mg/dL   Blood: x / Protein: Trace / Nitrite: Negative   Leuk Esterase: Moderate / RBC: 3 /HPF / WBC 11 /HPF   Sq Epi: x / Non Sq Epi: 3 /HPF / Bacteria: Occasional    MICROBIOLOGY:    .Blood Blood  22   No growth to date.  --  --    Clean Catch Clean Catch (Midstream)  22   10,000 - 49,000 CFU/mL Pseudomonas aeruginosa  --  Pseudomonas aeruginosa    Abdominal Fl Abdominal Fluid  22   Moderate Staphylococcus aureus  --  Staphylococcus aureus    .Blood Blood-Peripheral  22   No Growth Final  --  --    .Blood Blood-Peripheral  22   No Growth Final  --  --    RADIOLOGY:     XR:    FINDINGS:  Lines/Devices: None.  Heart/Vascular: The heart size is normal.  Pulmonary: Bilateral lower lung linear atelectasis unchanged from last   exam. No pleural effusion or pneumothorax.  Bones: No acute findings.    Impression:  Bilateral lower lung linear atelectasis, otherwise clear lungs.

## 2022-09-08 NOTE — PROGRESS NOTE ADULT - SUBJECTIVE AND OBJECTIVE BOX
COLORECTAL SURGERY PROGRESS NOTE    SUBJECTIVE    OVERNIGHT EVENTS: Dressing soaked through, reinforced with abd pads. Patient tachycardic to 105x1, repeat vitals wnl.     Patient seen and evaluated on AM rounds. Resting comfortably in bed. Pain well controlled at rest, worse with dressing changes.    10-point review of systems completed and negative except as noted above.      OBJECTIVE    MEDICATIONS  acetaminophen     Tablet .. 650 milliGRAM(s) Oral every 6 hours PRN  aspirin  chewable 81 milliGRAM(s) Oral daily  dextrose 5%. 1000 milliLiter(s) IV Continuous <Continuous>  dextrose 5%. 1000 milliLiter(s) IV Continuous <Continuous>  dextrose 50% Injectable 25 Gram(s) IV Push once  dextrose 50% Injectable 12.5 Gram(s) IV Push once  dextrose 50% Injectable 25 Gram(s) IV Push once  dextrose Oral Gel 15 Gram(s) Oral once PRN  enoxaparin Injectable 40 milliGRAM(s) SubCutaneous every 24 hours  glucagon  Injectable 1 milliGRAM(s) IntraMuscular once  influenza  Vaccine (HIGH DOSE) 0.7 milliLiter(s) IntraMuscular once  insulin glargine Injectable (LANTUS) 50 Unit(s) SubCutaneous at bedtime  insulin lispro (ADMELOG) corrective regimen sliding scale   SubCutaneous three times a day before meals  insulin lispro Injectable (ADMELOG) 13 Unit(s) SubCutaneous three times a day before meals  lidocaine 1%/epinephrine 1:100,000 Inj 20 milliLiter(s) Local Injection once  lidocaine 1%/epinephrine 1:100,000 Inj 20 milliLiter(s) Local Injection once  lidocaine 1%/epinephrine 1:200,000 Inj 20 milliLiter(s) Local Injection once  lisinopril 20 milliGRAM(s) Oral daily  magnesium sulfate  IVPB 2 Gram(s) IV Intermittent once  oxyCODONE    IR 5 milliGRAM(s) Oral every 4 hours PRN  oxyCODONE    IR 10 milliGRAM(s) Oral every 4 hours PRN  piperacillin/tazobactam IVPB.. 3.375 Gram(s) IV Intermittent every 8 hours  polyethylene glycol 3350 17 Gram(s) Oral once  silver nitrate Applicator 1 Application(s) Topical once  simvastatin 40 milliGRAM(s) Oral at bedtime  sodium chloride 0.9%. 1000 milliLiter(s) IV Continuous <Continuous>      PHYSICAL EXAM  T(C): 36.7 (22 @ 05:30), Max: 36.9 (22 @ 09:30)  HR: 98 (22 @ 05:30) (86 - 112)  BP: 137/80 (22 @ 05:30) (116/60 - 145/64)  RR: 18 (22 @ 05:30) (18 - 20)  SpO2: 97% (22 @ 05:30) (97% - 99%)    22 @ 07:01  -  22 @ 07:00  --------------------------------------------------------  IN: 1715 mL / OUT: 2150 mL / NET: -435 mL        General: Appears well, NAD  Neuro: AAOx3  CHEST: Clear to auscultation bilaterally  CV: Regular rate and rhythm  Abdomen: Soft, nondistended, appropriate tenderness surrounding open surgical site. Wound pink with good granulation tissue. No Surrounding erythema/fluctuance/induration. Re-packed with dry Kerlix x1 and abd pads.   Extremities: Moves all extremities.  Extremities: Grossly symmetric    LABS                        8.7    15.04 )-----------( 677      ( 08 Sep 2022 07:04 )             26.3     09-07    130<L>  |  96<L>  |  28<H>  ----------------------------<  195<H>  4.8   |  21<L>  |  1.09    Ca    9.1      07 Sep 2022 14:49  Phos  3.8     09-  Mg     1.80     09-        Urinalysis Basic - ( 07 Sep 2022 10:25 )    Color: Light Yellow / Appearance: Clear / S.011 / pH: x  Gluc: x / Ketone: Negative  / Bili: Negative / Urobili: <2 mg/dL   Blood: x / Protein: Trace / Nitrite: Negative   Leuk Esterase: Moderate / RBC: 3 /HPF / WBC 11 /HPF   Sq Epi: x / Non Sq Epi: 3 /HPF / Bacteria: Occasional        RADIOLOGY & ADDITIONAL STUDIES

## 2022-09-08 NOTE — PROGRESS NOTE ADULT - ASSESSMENT
71 yo F colon CA, DM, diabetic neuropathy, recent hernia repair, presenting with discharge from wound  No fevers, has leukocytosis  Recent hernia report  Concern for possible abscess s/p I+D on day of admission  Culture now growing MSSA  UCX with low CFU Pseudomonas, 2x UA (mild positive to negative)  CT with lower abd wall fluid collection, extending to RLQ  Patient has no symptoms UTI  9/3 OR I+D in OR  Overall,  1) Abscess  - Growing MSSA  - Zosyn 3.375g q 8 (escalated for concerns for nonimproving with cefazolin)  - Wound care per team  - F/U surgery team  2) Positive UCX  - Suspect asymptomatic bacteriuria, low CFU; no symptoms UTI; UA equivocal  - Would hold on Zosyn  - Monitor for signs/symptoms UTI  3) Leukocytosis  - Monitor for alternate sources infection, still no UTI symptoms at this point  - Trend to normal  - If not improving would repeat CT A/P    Anderson Trotter MD  Contact on TEAMS messaging from 9am - 5pm  From 5pm-9am, on weekends, or if no response call [phone]

## 2022-09-08 NOTE — PROGRESS NOTE ADULT - SUBJECTIVE AND OBJECTIVE BOX
Patient is a 72y old  Female who presents with a chief complaint of surgery (07 Sep 2022 10:29)      DATE OF SERVICE: 22 @ 09:08    SUBJECTIVE / OVERNIGHT EVENTS: overnight events noted  "I feel much better today"    ROS:  Resp: No cough no sputum production  CVS: No chest pain no palpitations no orthopnea  GI: no N/V/D  : no dysuria, no hematuria  Neuro: no weakness no paresthesias  Heme: No petechiae no easy bruising  Msk: No joint pain no swelling  Skin: No rash no itching        MEDICATIONS  (STANDING):  aspirin  chewable 81 milliGRAM(s) Oral daily  dextrose 5%. 1000 milliLiter(s) (50 mL/Hr) IV Continuous <Continuous>  dextrose 5%. 1000 milliLiter(s) (100 mL/Hr) IV Continuous <Continuous>  dextrose 50% Injectable 25 Gram(s) IV Push once  dextrose 50% Injectable 12.5 Gram(s) IV Push once  dextrose 50% Injectable 25 Gram(s) IV Push once  enoxaparin Injectable 40 milliGRAM(s) SubCutaneous every 24 hours  glucagon  Injectable 1 milliGRAM(s) IntraMuscular once  influenza  Vaccine (HIGH DOSE) 0.7 milliLiter(s) IntraMuscular once  insulin glargine Injectable (LANTUS) 50 Unit(s) SubCutaneous at bedtime  insulin lispro (ADMELOG) corrective regimen sliding scale   SubCutaneous three times a day before meals  insulin lispro Injectable (ADMELOG) 13 Unit(s) SubCutaneous three times a day before meals  lidocaine 1%/epinephrine 1:100,000 Inj 20 milliLiter(s) Local Injection once  lidocaine 1%/epinephrine 1:100,000 Inj 20 milliLiter(s) Local Injection once  lidocaine 1%/epinephrine 1:200,000 Inj 20 milliLiter(s) Local Injection once  lisinopril 20 milliGRAM(s) Oral daily  magnesium sulfate  IVPB 2 Gram(s) IV Intermittent once  piperacillin/tazobactam IVPB.. 3.375 Gram(s) IV Intermittent every 8 hours  polyethylene glycol 3350 17 Gram(s) Oral once  silver nitrate Applicator 1 Application(s) Topical once  simvastatin 40 milliGRAM(s) Oral at bedtime  sodium chloride 0.9%. 1000 milliLiter(s) (50 mL/Hr) IV Continuous <Continuous>    MEDICATIONS  (PRN):  acetaminophen     Tablet .. 650 milliGRAM(s) Oral every 6 hours PRN Mild Pain (1 - 3), Moderate Pain (4 - 6), Severe Pain (7 - 10)  dextrose Oral Gel 15 Gram(s) Oral once PRN Blood Glucose LESS THAN 70 milliGRAM(s)/deciliter  oxyCODONE    IR 5 milliGRAM(s) Oral every 4 hours PRN Moderate Pain (4 - 6)  oxyCODONE    IR 10 milliGRAM(s) Oral every 4 hours PRN Severe Pain (7 - 10)        CAPILLARY BLOOD GLUCOSE      POCT Blood Glucose.: 90 mg/dL (08 Sep 2022 08:31)  POCT Blood Glucose.: 317 mg/dL (07 Sep 2022 21:02)  POCT Blood Glucose.: 207 mg/dL (07 Sep 2022 17:23)  POCT Blood Glucose.: 131 mg/dL (07 Sep 2022 12:19)    I&O's Summary    07 Sep 2022 07:01  -  08 Sep 2022 07:00  --------------------------------------------------------  IN: 1715 mL / OUT: 2150 mL / NET: -435 mL        Vital Signs Last 24 Hrs  T(C): 36.7 (08 Sep 2022 05:30), Max: 36.9 (07 Sep 2022 09:30)  T(F): 98 (08 Sep 2022 05:30), Max: 98.4 (07 Sep 2022 09:30)  HR: 98 (08 Sep 2022 05:30) (86 - 112)  BP: 137/80 (08 Sep 2022 05:30) (116/60 - 145/64)  BP(mean): --  RR: 18 (08 Sep 2022 05:30) (18 - 20)  SpO2: 97% (08 Sep 2022 05:30) (97% - 99%)      PHYSICAL EXAM:  GENERAL: NAD  EYES: EOMI, PERRLA,  NECK: Supple, No JVD  CHEST/LUNG: Clear   HEART: S1S2;  no murmurs  ABDOMEN: Soft nontender  bandage in place  EXTREMITIES: no edema  NEUROLOGY: Alert, nonfocal    LABS:                        8.7    15.04 )-----------( 677      ( 08 Sep 2022 07:04 )             26.3     09-08    135  |  100  |  20  ----------------------------<  87  4.9   |  24  |  0.95    Ca    9.4      08 Sep 2022 07:04  Phos  3.6     -  Mg     1.70         TPro  7.5  /  Alb  3.4  /  TBili  0.2  /  DBili  <0.2  /  AST  9   /  ALT  8   /  AlkPhos  116            Urinalysis Basic - ( 07 Sep 2022 10:25 )    Color: Light Yellow / Appearance: Clear / S.011 / pH: x  Gluc: x / Ketone: Negative  / Bili: Negative / Urobili: <2 mg/dL   Blood: x / Protein: Trace / Nitrite: Negative   Leuk Esterase: Moderate / RBC: 3 /HPF / WBC 11 /HPF   Sq Epi: x / Non Sq Epi: 3 /HPF / Bacteria: Occasional          All consultant(s) notes reviewed and care discussed with other providers        Contact Number, Dr Reed [phone]

## 2022-09-09 LAB
ANION GAP SERPL CALC-SCNC: 12 MMOL/L — SIGNIFICANT CHANGE UP (ref 7–14)
BLD GP AB SCN SERPL QL: NEGATIVE — SIGNIFICANT CHANGE UP
BUN SERPL-MCNC: 21 MG/DL — SIGNIFICANT CHANGE UP (ref 7–23)
CALCIUM SERPL-MCNC: 9.6 MG/DL — SIGNIFICANT CHANGE UP (ref 8.4–10.5)
CHLORIDE SERPL-SCNC: 99 MMOL/L — SIGNIFICANT CHANGE UP (ref 98–107)
CO2 SERPL-SCNC: 23 MMOL/L — SIGNIFICANT CHANGE UP (ref 22–31)
CREAT SERPL-MCNC: 0.9 MG/DL — SIGNIFICANT CHANGE UP (ref 0.5–1.3)
EGFR: 69 ML/MIN/1.73M2 — SIGNIFICANT CHANGE UP
GLUCOSE BLDC GLUCOMTR-MCNC: 168 MG/DL — HIGH (ref 70–99)
GLUCOSE BLDC GLUCOMTR-MCNC: 178 MG/DL — HIGH (ref 70–99)
GLUCOSE BLDC GLUCOMTR-MCNC: 223 MG/DL — HIGH (ref 70–99)
GLUCOSE BLDC GLUCOMTR-MCNC: 235 MG/DL — HIGH (ref 70–99)
GLUCOSE SERPL-MCNC: 194 MG/DL — HIGH (ref 70–99)
HCT VFR BLD CALC: 27 % — LOW (ref 34.5–45)
HGB BLD-MCNC: 8.4 G/DL — LOW (ref 11.5–15.5)
MAGNESIUM SERPL-MCNC: 1.8 MG/DL — SIGNIFICANT CHANGE UP (ref 1.6–2.6)
MCHC RBC-ENTMCNC: 28 PG — SIGNIFICANT CHANGE UP (ref 27–34)
MCHC RBC-ENTMCNC: 31.1 GM/DL — LOW (ref 32–36)
MCV RBC AUTO: 90 FL — SIGNIFICANT CHANGE UP (ref 80–100)
NRBC # BLD: 0 /100 WBCS — SIGNIFICANT CHANGE UP (ref 0–0)
NRBC # FLD: 0 K/UL — SIGNIFICANT CHANGE UP (ref 0–0)
PHOSPHATE SERPL-MCNC: 3.5 MG/DL — SIGNIFICANT CHANGE UP (ref 2.5–4.5)
PLATELET # BLD AUTO: 654 K/UL — HIGH (ref 150–400)
POTASSIUM SERPL-MCNC: 5 MMOL/L — SIGNIFICANT CHANGE UP (ref 3.5–5.3)
POTASSIUM SERPL-SCNC: 5 MMOL/L — SIGNIFICANT CHANGE UP (ref 3.5–5.3)
RBC # BLD: 3 M/UL — LOW (ref 3.8–5.2)
RBC # FLD: 16.8 % — HIGH (ref 10.3–14.5)
RH IG SCN BLD-IMP: POSITIVE — SIGNIFICANT CHANGE UP
SODIUM SERPL-SCNC: 134 MMOL/L — LOW (ref 135–145)
WBC # BLD: 13.68 K/UL — HIGH (ref 3.8–10.5)
WBC # FLD AUTO: 13.68 K/UL — HIGH (ref 3.8–10.5)

## 2022-09-09 PROCEDURE — 99232 SBSQ HOSP IP/OBS MODERATE 35: CPT

## 2022-09-09 RX ORDER — POLYETHYLENE GLYCOL 3350 17 G/17G
17 POWDER, FOR SOLUTION ORAL DAILY
Refills: 0 | Status: DISCONTINUED | OUTPATIENT
Start: 2022-09-09 | End: 2022-09-13

## 2022-09-09 RX ORDER — PIPERACILLIN AND TAZOBACTAM 4; .5 G/20ML; G/20ML
3.38 INJECTION, POWDER, LYOPHILIZED, FOR SOLUTION INTRAVENOUS EVERY 8 HOURS
Refills: 0 | Status: DISCONTINUED | OUTPATIENT
Start: 2022-09-09 | End: 2022-09-13

## 2022-09-09 RX ORDER — INSULIN LISPRO 100/ML
16 VIAL (ML) SUBCUTANEOUS
Refills: 0 | Status: DISCONTINUED | OUTPATIENT
Start: 2022-09-09 | End: 2022-09-10

## 2022-09-09 RX ORDER — HYDROMORPHONE HYDROCHLORIDE 2 MG/ML
0.5 INJECTION INTRAMUSCULAR; INTRAVENOUS; SUBCUTANEOUS ONCE
Refills: 0 | Status: DISCONTINUED | OUTPATIENT
Start: 2022-09-09 | End: 2022-09-09

## 2022-09-09 RX ORDER — POLYETHYLENE GLYCOL 3350 17 G/17G
34 POWDER, FOR SOLUTION ORAL
Refills: 0 | Status: COMPLETED | OUTPATIENT
Start: 2022-09-09 | End: 2022-09-10

## 2022-09-09 RX ORDER — SENNA PLUS 8.6 MG/1
2 TABLET ORAL AT BEDTIME
Refills: 0 | Status: DISCONTINUED | OUTPATIENT
Start: 2022-09-09 | End: 2022-09-13

## 2022-09-09 RX ORDER — MAGNESIUM SULFATE 500 MG/ML
2 VIAL (ML) INJECTION ONCE
Refills: 0 | Status: COMPLETED | OUTPATIENT
Start: 2022-09-09 | End: 2022-09-09

## 2022-09-09 RX ADMIN — Medication 13 UNIT(S): at 08:42

## 2022-09-09 RX ADMIN — Medication 4: at 12:23

## 2022-09-09 RX ADMIN — INSULIN GLARGINE 50 UNIT(S): 100 INJECTION, SOLUTION SUBCUTANEOUS at 21:20

## 2022-09-09 RX ADMIN — PIPERACILLIN AND TAZOBACTAM 25 GRAM(S): 4; .5 INJECTION, POWDER, LYOPHILIZED, FOR SOLUTION INTRAVENOUS at 05:24

## 2022-09-09 RX ADMIN — SENNA PLUS 2 TABLET(S): 8.6 TABLET ORAL at 21:20

## 2022-09-09 RX ADMIN — HYDROMORPHONE HYDROCHLORIDE 0.5 MILLIGRAM(S): 2 INJECTION INTRAMUSCULAR; INTRAVENOUS; SUBCUTANEOUS at 13:58

## 2022-09-09 RX ADMIN — SIMVASTATIN 40 MILLIGRAM(S): 20 TABLET, FILM COATED ORAL at 21:21

## 2022-09-09 RX ADMIN — PIPERACILLIN AND TAZOBACTAM 25 GRAM(S): 4; .5 INJECTION, POWDER, LYOPHILIZED, FOR SOLUTION INTRAVENOUS at 17:37

## 2022-09-09 RX ADMIN — Medication 25 GRAM(S): at 10:35

## 2022-09-09 RX ADMIN — Medication 16 UNIT(S): at 12:23

## 2022-09-09 RX ADMIN — PIPERACILLIN AND TAZOBACTAM 25 GRAM(S): 4; .5 INJECTION, POWDER, LYOPHILIZED, FOR SOLUTION INTRAVENOUS at 21:20

## 2022-09-09 RX ADMIN — Medication 16 UNIT(S): at 17:39

## 2022-09-09 RX ADMIN — Medication 650 MILLIGRAM(S): at 10:34

## 2022-09-09 RX ADMIN — Medication 2: at 08:41

## 2022-09-09 RX ADMIN — LISINOPRIL 20 MILLIGRAM(S): 2.5 TABLET ORAL at 05:24

## 2022-09-09 RX ADMIN — HYDROMORPHONE HYDROCHLORIDE 0.5 MILLIGRAM(S): 2 INJECTION INTRAMUSCULAR; INTRAVENOUS; SUBCUTANEOUS at 14:28

## 2022-09-09 RX ADMIN — ENOXAPARIN SODIUM 40 MILLIGRAM(S): 100 INJECTION SUBCUTANEOUS at 21:20

## 2022-09-09 RX ADMIN — OXYCODONE HYDROCHLORIDE 10 MILLIGRAM(S): 5 TABLET ORAL at 00:25

## 2022-09-09 RX ADMIN — Medication 81 MILLIGRAM(S): at 12:22

## 2022-09-09 RX ADMIN — Medication 2: at 17:38

## 2022-09-09 RX ADMIN — POLYETHYLENE GLYCOL 3350 34 GRAM(S): 17 POWDER, FOR SOLUTION ORAL at 12:22

## 2022-09-09 NOTE — PROGRESS NOTE ADULT - SUBJECTIVE AND OBJECTIVE BOX
COLORECTAL SURGERY PROGRESS NOTE    SUBJECTIVE    OVERNIGHT EVENTS: Dressing soaked through, reinforced with abd pads. Patient tachycardic to 105x1, repeat vitals wnl.     Patient seen and evaluated on AM rounds. Resting comfortably in bed. Pain well controlled at rest, worse with dressing changes.    10-point review of systems completed and negative except as noted above.      OBJECTIVE    MEDICATIONS  acetaminophen     Tablet .. 650 milliGRAM(s) Oral every 6 hours PRN  aspirin  chewable 81 milliGRAM(s) Oral daily  dextrose 5%. 1000 milliLiter(s) IV Continuous <Continuous>  dextrose 5%. 1000 milliLiter(s) IV Continuous <Continuous>  dextrose 50% Injectable 25 Gram(s) IV Push once  dextrose 50% Injectable 12.5 Gram(s) IV Push once  dextrose 50% Injectable 25 Gram(s) IV Push once  dextrose Oral Gel 15 Gram(s) Oral once PRN  enoxaparin Injectable 40 milliGRAM(s) SubCutaneous every 24 hours  glucagon  Injectable 1 milliGRAM(s) IntraMuscular once  influenza  Vaccine (HIGH DOSE) 0.7 milliLiter(s) IntraMuscular once  insulin glargine Injectable (LANTUS) 50 Unit(s) SubCutaneous at bedtime  insulin lispro (ADMELOG) corrective regimen sliding scale   SubCutaneous three times a day before meals  insulin lispro Injectable (ADMELOG) 13 Unit(s) SubCutaneous three times a day before meals  lidocaine 1%/epinephrine 1:100,000 Inj 20 milliLiter(s) Local Injection once  lidocaine 1%/epinephrine 1:100,000 Inj 20 milliLiter(s) Local Injection once  lidocaine 1%/epinephrine 1:200,000 Inj 20 milliLiter(s) Local Injection once  lisinopril 20 milliGRAM(s) Oral daily  magnesium sulfate  IVPB 2 Gram(s) IV Intermittent once  oxyCODONE    IR 5 milliGRAM(s) Oral every 4 hours PRN  oxyCODONE    IR 10 milliGRAM(s) Oral every 4 hours PRN  piperacillin/tazobactam IVPB.. 3.375 Gram(s) IV Intermittent every 8 hours  polyethylene glycol 3350 17 Gram(s) Oral once  silver nitrate Applicator 1 Application(s) Topical once  simvastatin 40 milliGRAM(s) Oral at bedtime  sodium chloride 0.9%. 1000 milliLiter(s) IV Continuous <Continuous>      PHYSICAL EXAM  Vital Signs Last 24 Hrs  T(C): 37.1 (09 Sep 2022 05:20), Max: 37.2 (08 Sep 2022 14:00)  T(F): 98.7 (09 Sep 2022 05:20), Max: 98.9 (08 Sep 2022 14:00)  HR: 101 (09 Sep 2022 05:20) (101 - 109)  BP: 128/82 (09 Sep 2022 05:20) (121/75 - 141/64)  BP(mean): --  RR: 18 (09 Sep 2022 05:20) (18 - 20)  SpO2: 100% (09 Sep 2022 05:20) (95% - 100%)    Parameters below as of 09 Sep 2022 05:20  Patient On (Oxygen Delivery Method): room air      I&O's Detail    08 Sep 2022 07:01  -  09 Sep 2022 07:00  --------------------------------------------------------  IN:    IV PiggyBack: 250 mL    Oral Fluid: 1310 mL    sodium chloride 0.9%: 1000 mL  Total IN: 2560 mL    OUT:    Voided (mL): 2000 mL  Total OUT: 2000 mL    Total NET: 560 mL      PHYSICAL EXAM  General: Appears well, NAD  Neuro: AAOx3  CHEST: Clear to auscultation bilaterally  CV: Regular rate and rhythm  Abdomen: Soft, nondistended, appropriate tenderness surrounding open surgical site. Wound pink with good granulation tissue. No Surrounding erythema/fluctuance/induration. Re-packed with dry Kerlix x1 and abd pads.   Extremities: Moves all extremities.  Extremities: Grossly symmetric    LABS                        8.7    15.04 )-----------( 677      ( 08 Sep 2022 07:04 )             26.3     09-08    135  |  100  |  20  ----------------------------<  87  4.9   |  24  |  0.95    Ca    9.4      08 Sep 2022 07:04  Phos  3.6     09-08  Mg     1.70     09-08    TPro  7.5  /  Alb  3.4  /  TBili  0.2  /  DBili  <0.2  /  AST  9   /  ALT  8   /  AlkPhos  116  09-08      CAPILLARY BLOOD GLUCOSE  POCT Blood Glucose.: 297 mg/dL (08 Sep 2022 21:20)  POCT Blood Glucose.: 247 mg/dL (08 Sep 2022 17:07)  POCT Blood Glucose.: 298 mg/dL (08 Sep 2022 12:10)  POCT Blood Glucose.: 90 mg/dL (08 Sep 2022 08:31)    Urinalysis Basic - ( 07 Sep 2022 10:25 )    Color: Light Yellow / Appearance: Clear / S.011 / pH: x  Gluc: x / Ketone: Negative  / Bili: Negative / Urobili: <2 mg/dL   Blood: x / Protein: Trace / Nitrite: Negative   Leuk Esterase: Moderate / RBC: 3 /HPF / WBC 11 /HPF   Sq Epi: x / Non Sq Epi: 3 /HPF / Bacteria: Occasional    CULTURE RESULTS:                22 @ 16:00  Specimen Source: --  Method Type: --  Gram Stain - RRL: --  Gram Stain - Wound: --  Bacteria: --  Culture Results:   <10,000 CFU/mL Normal Urogenital Jennifer      Specimen Source:   Method Type:   Gram Stain:   Culture Results: Culture Results:   <10,000 CFU/mL Normal Urogenital Jennifer (22 @ 16:00)  Culture Results:   No growth to date. (22 @ 14:30)  Culture Results:   No growth to date. (22 @ 09:45)    Bacteria: Bacteria: Occasional (22 @ 10:25)

## 2022-09-09 NOTE — PROGRESS NOTE ADULT - SUBJECTIVE AND OBJECTIVE BOX
Patient is a 72y old  Female who presents with a chief complaint of cellulitis (08 Sep 2022 09:08)      DATE OF SERVICE: 22 @ 10:01    SUBJECTIVE / OVERNIGHT EVENTS: overnight events noted  feels generalized weakness otherwise OK  c/o excessive drainage    ROS:  Resp: No cough no sputum production  CVS: No chest pain no palpitations no orthopnea  GI: no N/V/D  : no dysuria, no hematuria  Neuro: no weakness no paresthesias        MEDICATIONS  (STANDING):  aspirin  chewable 81 milliGRAM(s) Oral daily  dextrose 5%. 1000 milliLiter(s) (50 mL/Hr) IV Continuous <Continuous>  dextrose 5%. 1000 milliLiter(s) (100 mL/Hr) IV Continuous <Continuous>  dextrose 50% Injectable 25 Gram(s) IV Push once  dextrose 50% Injectable 12.5 Gram(s) IV Push once  dextrose 50% Injectable 25 Gram(s) IV Push once  enoxaparin Injectable 40 milliGRAM(s) SubCutaneous every 24 hours  glucagon  Injectable 1 milliGRAM(s) IntraMuscular once  influenza  Vaccine (HIGH DOSE) 0.7 milliLiter(s) IntraMuscular once  insulin glargine Injectable (LANTUS) 50 Unit(s) SubCutaneous at bedtime  insulin lispro (ADMELOG) corrective regimen sliding scale   SubCutaneous three times a day before meals  insulin lispro Injectable (ADMELOG) 13 Unit(s) SubCutaneous three times a day before meals  lisinopril 20 milliGRAM(s) Oral daily  magnesium sulfate  IVPB 2 Gram(s) IV Intermittent once  piperacillin/tazobactam IVPB.. 3.375 Gram(s) IV Intermittent every 8 hours  simvastatin 40 milliGRAM(s) Oral at bedtime  sodium chloride 0.9%. 1000 milliLiter(s) (50 mL/Hr) IV Continuous <Continuous>    MEDICATIONS  (PRN):  acetaminophen     Tablet .. 650 milliGRAM(s) Oral every 6 hours PRN Mild Pain (1 - 3), Moderate Pain (4 - 6), Severe Pain (7 - 10)  dextrose Oral Gel 15 Gram(s) Oral once PRN Blood Glucose LESS THAN 70 milliGRAM(s)/deciliter  polyethylene glycol 3350 17 Gram(s) Oral daily PRN Constipation        CAPILLARY BLOOD GLUCOSE      POCT Blood Glucose.: 178 mg/dL (09 Sep 2022 08:24)  POCT Blood Glucose.: 297 mg/dL (08 Sep 2022 21:20)  POCT Blood Glucose.: 247 mg/dL (08 Sep 2022 17:07)  POCT Blood Glucose.: 298 mg/dL (08 Sep 2022 12:10)    I&O's Summary    08 Sep 2022 07:01  -  09 Sep 2022 07:00  --------------------------------------------------------  IN: 2560 mL / OUT: 2000 mL / NET: 560 mL    09 Sep 2022 07:01  -  09 Sep 2022 10:01  --------------------------------------------------------  IN: 200 mL / OUT: 200 mL / NET: 0 mL        Vital Signs Last 24 Hrs  T(C): 36.1 (09 Sep 2022 09:22), Max: 37.2 (08 Sep 2022 14:00)  T(F): 97 (09 Sep 2022 09:22), Max: 98.9 (08 Sep 2022 14:00)  HR: 108 (09 Sep 2022 09:22) (101 - 108)  BP: 119/56 (09 Sep 2022 09:22) (119/56 - 141/64)  BP(mean): --  RR: 20 (09 Sep 2022 09:22) (18 - 20)  SpO2: 97% (09 Sep 2022 09:22) (95% - 100%)    PHYSICAL EXAM:  GENERAL: NAD  EYES: EOMI, PERRLA,  NECK: Supple, No JVD  CHEST/LUNG: Clear no crackles  HEART: S1S2;  no murmurs  ABDOMEN: Soft nontender  bandage in place  EXTREMITIES: no edema  NEUROLOGY: Alert, nonfocal    LABS:                        8.4    13.68 )-----------( 654      ( 09 Sep 2022 07:10 )             27.0     09    134<L>  |  99  |  21  ----------------------------<  194<H>  5.0   |  23  |  0.90    Ca    9.6      09 Sep 2022 07:10  Phos  3.5       Mg     1.80         TPro  7.5  /  Alb  3.4  /  TBili  0.2  /  DBili  <0.2  /  AST  9   /  ALT  8   /  AlkPhos  116            Urinalysis Basic - ( 07 Sep 2022 10:25 )    Color: Light Yellow / Appearance: Clear / S.011 / pH: x  Gluc: x / Ketone: Negative  / Bili: Negative / Urobili: <2 mg/dL   Blood: x / Protein: Trace / Nitrite: Negative   Leuk Esterase: Moderate / RBC: 3 /HPF / WBC 11 /HPF   Sq Epi: x / Non Sq Epi: 3 /HPF / Bacteria: Occasional          All consultant(s) notes reviewed and care discussed with other providers        Contact Number, Dr Reed [phone]

## 2022-09-09 NOTE — PROGRESS NOTE ADULT - SUBJECTIVE AND OBJECTIVE BOX
CC: F/U wound infection    Saw/spoke to patient. No fevers, no chills. No new complaints.    Allergies  No Known Allergies    ANTIMICROBIALS:  piperacillin/tazobactam IVPB.. 3.375 every 8 hours    PE:    Vital Signs Last 24 Hrs  T(C): 36.7 (09 Sep 2022 13:57), Max: 37.1 (09 Sep 2022 02:20)  T(F): 98.1 (09 Sep 2022 13:57), Max: 98.8 (09 Sep 2022 02:20)  HR: 101 (09 Sep 2022 13:57) (101 - 108)  BP: 120/61 (09 Sep 2022 13:57) (119/56 - 141/64)  RR: 18 (09 Sep 2022 13:57) (18 - 20)  SpO2: 94% (09 Sep 2022 13:57) (94% - 100%)    Gen: AOx3, NAD, non-toxic  CV: Nontachycardic  Resp: Breathing comfortably, RA  Abd: Soft, nontender  IV/Skin: No thrombophlebitis    LABS:                        8.4    13.68 )-----------( 654      ( 09 Sep 2022 07:10 )             27.0     09-09    134<L>  |  99  |  21  ----------------------------<  194<H>  5.0   |  23  |  0.90    Ca    9.6      09 Sep 2022 07:10  Phos  3.5     09-09  Mg     1.80     09-09    TPro  7.5  /  Alb  3.4  /  TBili  0.2  /  DBili  <0.2  /  AST  9   /  ALT  8   /  AlkPhos  116  09-08    MICROBIOLOGY:    Clean Catch Clean Catch (Midstream)  09-07-22   <10,000 CFU/mL Normal Urogenital Jennifer  --  --    .Blood Blood-Venous  09-07-22   No growth to date.  --  --    .Blood Blood  09-07-22   No growth to date.  --  --      Clean Catch Clean Catch (Midstream)  08-29-22   10,000 - 49,000 CFU/mL Pseudomonas aeruginosa  --  Pseudomonas aeruginosa    Abdominal Fl Abdominal Fluid  08-29-22   Moderate Staphylococcus aureus  --  Staphylococcus aureus    (otherwise reviewed)    RADIOLOGY:    9/7 XR:    FINDINGS:  Lines/Devices: None.  Heart/Vascular: The heart size is normal.  Pulmonary: Bilateral lower lung linear atelectasis unchanged from last   exam. No pleural effusion or pneumothorax.  Bones: No acute findings.    Impression:  Bilateral lower lung linear atelectasis, otherwise clear lungs.

## 2022-09-09 NOTE — ADVANCED PRACTICE NURSE CONSULT - ASSESSMENT
Patient is A and O x 4. Sensitive to touch at abdominal area.     RLQ toward midline of abdomen surgical wound- measuring 9cm x 16.5cm x 5cm. All tissue is a mixture of adipose and granulation, less than 5 % scattered areas of slough. Base of the wound is fascia, confirmed with Surgical team A. Undermining from 9-3 o'clock ranging 4-6cm with 6cm being at 1 o'clock, Periwound no increased warmth, no erythema, no induration, no edema noted at this time.     NPWT/VAC dressing initiated as per order. Wound & periwound skin cleansed with NS. Liquid barrier film applied to periwound skin. Eakins ring picture-framed around wound edge. 3 Silicone contact layer placed on base of wound, 1 White foam placed in undermining and over the base, 1 black Granufoam placed over contact layer and white foam, covered with VAC drape. TRAC pad bridged to LUQ. Settings as prescribed, dressing collapsed, seal intact.  Plan of care discussed with Surgical team A #[id number], Dr Brennon Scott and primary RN Lana Chanel.     Off not: Patient c/o burning sensation after application of vac. Patient was monitored for 15 min to evaluate patient's pain. Patient stated that her pain/burning sensation subsided from 10 to 7. Surgical team A contacted and patient will receive pain meds IV. If patient unable to tolerate vac dressing, alternative dressing discussed with team A.

## 2022-09-09 NOTE — PROGRESS NOTE ADULT - NS PANP COMMENT GEN_ALL_CORE FT
Pt seen/examined, dressing changed. Wound looks good    - VAC placement  - 1u PRBC for anemia
Pt seen/examined, overall feeling well. Abd soft, mildly tender to reji-incisional palpation. Improved cellulitis and induration.    - cont IV antibx  - cont wound care  - f/u cultures  - gentle hydration  - replace electrolytis
Pt seen/examined, feeling well.    Wound re-explored and packed.  - rec open wound further in inferior aspect to facilitate packing of rt panus pocket.  - d/w dr. Gonzales
Pt seen/examined, overall feeling much better. C/o constipation. No abd pain. Tolerating diet.    - will change dressing in AM prior to poss VAC placement

## 2022-09-09 NOTE — PROGRESS NOTE ADULT - ASSESSMENT
73 yo F colon CA, DM, diabetic neuropathy, recent hernia repair, presenting with discharge from wound  No fevers, has leukocytosis  Recent hernia report  Concern for possible abscess s/p I+D on day of admission  Culture now growing MSSA  UCX with low CFU Pseudomonas, 2x UA (mild positive to negative)  CT with lower abd wall fluid collection, extending to RLQ  Patient has no symptoms UTI  9/3 OR I+D in OR  Overall,  1) Abscess  - Growing MSSA  - Zosyn 3.375g q 8  - Wound care per team  - F/U surgery team  2) Positive UCX  - Suspect asymptomatic bacteriuria, low CFU; no symptoms UTI; UA equivocal  - Monitor for signs/symptoms UTI  3) Leukocytosis  - Monitor for alternate sources infection, still no UTI symptoms at this point; suspect due to low grade inflammation/stress response as opposed to ongoing infectious process  - Trend to normal  - If not improving would repeat CT A/P    Anderson Trotter MD  Contact on TEAMS messaging from 9am - 5pm  From 5pm-9am, on weekends, or if no response call [phone]

## 2022-09-09 NOTE — ADVANCED PRACTICE NURSE CONSULT - REASON FOR CONSULT
Patient seen on skin care rounds after wound care referral received for NPWT VAC placement to RLQ toward midline of abdomen surgical wound.

## 2022-09-09 NOTE — PROGRESS NOTE ADULT - ASSESSMENT
73 yo F admitted with anterior abdominal wall abscess, s/p bedside I&D x2 and s/p OR debridement and washout on 9/3.     Plan:  -Will hold off on Vac placement and Dermaclose at this time pending resolution of Tachycardia, improvement in WBC  -0.5mg Dilaudid 30 min prior to dressing change     -Daily dressing change (WTD w/ Kerlix) until vac placed     -Silk tape ONLY to skin for dressing changes   -ASA started i/s/o new onset thrombocytosis  -Continue to monitor blood glucose, appreciate endo recs: Lantus 50U at bedtime, Admelog 13U premeal and Admelog correction scale      -Per Endo: At discharge current recommendations would be: home on current basal bolus insulin regimen if FS remain stable at target. Stop Glimeperide at home. May continue Actos and Metformin. FU 4 weeks. Discussed with patient.  -Abx: per ID, escalated to Zosyn 9/6 considering lack of improvement with the WBC and slow clinical response.   -ID recs appreciated   -Strict I's/O's - Document all voids  -Diet: consistent carbs   -Activity: encourage OOB   -PT consult appreciated - No Skilled PT Needs.  -DVT ppx: Lovenox      A Team Surgery  P# [id number]

## 2022-09-09 NOTE — PROGRESS NOTE ADULT - PROBLEM SELECTOR PLAN 6
possible VAC  medically stable for VAC placement  tachy resolving  all inflammatory parameters including WBC and thrombocytosis slowly improving  continue antibiotics
possible VAC  medically stable for VAC placement  tachy resolved   cooperative antibiotics   ongoing leukocytosis likely reactive and unlikely to represent clinical worsening
s/p debridement again over the weekend  switched to piperacillin/tazobactam per ID and surgery   management per surgery  leukocytosis likely reactive and unlikely to represent clinical worsening

## 2022-09-10 LAB
ANION GAP SERPL CALC-SCNC: 12 MMOL/L — SIGNIFICANT CHANGE UP (ref 7–14)
BUN SERPL-MCNC: 20 MG/DL — SIGNIFICANT CHANGE UP (ref 7–23)
CALCIUM SERPL-MCNC: 9.7 MG/DL — SIGNIFICANT CHANGE UP (ref 8.4–10.5)
CHLORIDE SERPL-SCNC: 102 MMOL/L — SIGNIFICANT CHANGE UP (ref 98–107)
CO2 SERPL-SCNC: 24 MMOL/L — SIGNIFICANT CHANGE UP (ref 22–31)
CREAT SERPL-MCNC: 0.98 MG/DL — SIGNIFICANT CHANGE UP (ref 0.5–1.3)
EGFR: 62 ML/MIN/1.73M2 — SIGNIFICANT CHANGE UP
GLUCOSE BLDC GLUCOMTR-MCNC: 143 MG/DL — HIGH (ref 70–99)
GLUCOSE BLDC GLUCOMTR-MCNC: 177 MG/DL — HIGH (ref 70–99)
GLUCOSE BLDC GLUCOMTR-MCNC: 193 MG/DL — HIGH (ref 70–99)
GLUCOSE BLDC GLUCOMTR-MCNC: 260 MG/DL — HIGH (ref 70–99)
GLUCOSE SERPL-MCNC: 189 MG/DL — HIGH (ref 70–99)
HCT VFR BLD CALC: 31.8 % — LOW (ref 34.5–45)
HGB BLD-MCNC: 9.8 G/DL — LOW (ref 11.5–15.5)
MAGNESIUM SERPL-MCNC: 2 MG/DL — SIGNIFICANT CHANGE UP (ref 1.6–2.6)
MCHC RBC-ENTMCNC: 27.4 PG — SIGNIFICANT CHANGE UP (ref 27–34)
MCHC RBC-ENTMCNC: 30.8 GM/DL — LOW (ref 32–36)
MCV RBC AUTO: 88.8 FL — SIGNIFICANT CHANGE UP (ref 80–100)
NRBC # BLD: 0 /100 WBCS — SIGNIFICANT CHANGE UP (ref 0–0)
NRBC # FLD: 0 K/UL — SIGNIFICANT CHANGE UP (ref 0–0)
PHOSPHATE SERPL-MCNC: 4.3 MG/DL — SIGNIFICANT CHANGE UP (ref 2.5–4.5)
PLATELET # BLD AUTO: 658 K/UL — HIGH (ref 150–400)
POTASSIUM SERPL-MCNC: 5.2 MMOL/L — SIGNIFICANT CHANGE UP (ref 3.5–5.3)
POTASSIUM SERPL-SCNC: 5.2 MMOL/L — SIGNIFICANT CHANGE UP (ref 3.5–5.3)
RBC # BLD: 3.58 M/UL — LOW (ref 3.8–5.2)
RBC # FLD: 15.9 % — HIGH (ref 10.3–14.5)
SODIUM SERPL-SCNC: 138 MMOL/L — SIGNIFICANT CHANGE UP (ref 135–145)
WBC # BLD: 11.41 K/UL — HIGH (ref 3.8–10.5)
WBC # FLD AUTO: 11.41 K/UL — HIGH (ref 3.8–10.5)

## 2022-09-10 RX ORDER — INSULIN LISPRO 100/ML
17 VIAL (ML) SUBCUTANEOUS
Refills: 0 | Status: DISCONTINUED | OUTPATIENT
Start: 2022-09-10 | End: 2022-09-11

## 2022-09-10 RX ORDER — INSULIN GLARGINE 100 [IU]/ML
50 INJECTION, SOLUTION SUBCUTANEOUS AT BEDTIME
Refills: 0 | Status: DISCONTINUED | OUTPATIENT
Start: 2022-09-10 | End: 2022-09-13

## 2022-09-10 RX ORDER — METOPROLOL TARTRATE 50 MG
25 TABLET ORAL
Refills: 0 | Status: DISCONTINUED | OUTPATIENT
Start: 2022-09-10 | End: 2022-09-13

## 2022-09-10 RX ADMIN — Medication 17 UNIT(S): at 12:49

## 2022-09-10 RX ADMIN — ENOXAPARIN SODIUM 40 MILLIGRAM(S): 100 INJECTION SUBCUTANEOUS at 21:16

## 2022-09-10 RX ADMIN — PIPERACILLIN AND TAZOBACTAM 25 GRAM(S): 4; .5 INJECTION, POWDER, LYOPHILIZED, FOR SOLUTION INTRAVENOUS at 21:16

## 2022-09-10 RX ADMIN — Medication 650 MILLIGRAM(S): at 10:55

## 2022-09-10 RX ADMIN — Medication 17 UNIT(S): at 08:49

## 2022-09-10 RX ADMIN — Medication 2: at 08:48

## 2022-09-10 RX ADMIN — POLYETHYLENE GLYCOL 3350 17 GRAM(S): 17 POWDER, FOR SOLUTION ORAL at 12:50

## 2022-09-10 RX ADMIN — SIMVASTATIN 40 MILLIGRAM(S): 20 TABLET, FILM COATED ORAL at 21:16

## 2022-09-10 RX ADMIN — Medication 650 MILLIGRAM(S): at 10:25

## 2022-09-10 RX ADMIN — Medication 25 MILLIGRAM(S): at 17:48

## 2022-09-10 RX ADMIN — PIPERACILLIN AND TAZOBACTAM 25 GRAM(S): 4; .5 INJECTION, POWDER, LYOPHILIZED, FOR SOLUTION INTRAVENOUS at 05:26

## 2022-09-10 RX ADMIN — Medication 81 MILLIGRAM(S): at 12:50

## 2022-09-10 RX ADMIN — Medication 650 MILLIGRAM(S): at 02:11

## 2022-09-10 RX ADMIN — INSULIN GLARGINE 50 UNIT(S): 100 INJECTION, SOLUTION SUBCUTANEOUS at 21:16

## 2022-09-10 RX ADMIN — Medication 650 MILLIGRAM(S): at 11:04

## 2022-09-10 RX ADMIN — SODIUM CHLORIDE 50 MILLILITER(S): 9 INJECTION INTRAMUSCULAR; INTRAVENOUS; SUBCUTANEOUS at 01:11

## 2022-09-10 RX ADMIN — LISINOPRIL 20 MILLIGRAM(S): 2.5 TABLET ORAL at 05:27

## 2022-09-10 RX ADMIN — POLYETHYLENE GLYCOL 3350 34 GRAM(S): 17 POWDER, FOR SOLUTION ORAL at 05:30

## 2022-09-10 RX ADMIN — Medication 650 MILLIGRAM(S): at 01:11

## 2022-09-10 RX ADMIN — Medication 17 UNIT(S): at 17:42

## 2022-09-10 RX ADMIN — Medication 2: at 12:48

## 2022-09-10 RX ADMIN — PIPERACILLIN AND TAZOBACTAM 25 GRAM(S): 4; .5 INJECTION, POWDER, LYOPHILIZED, FOR SOLUTION INTRAVENOUS at 12:56

## 2022-09-10 NOTE — PROGRESS NOTE ADULT - PROBLEM SELECTOR PROBLEM 5
Essential hypertension
Abdominal wall abscess
Essential hypertension
Essential hypertension
Abdominal wall abscess
Abdominal wall abscess

## 2022-09-10 NOTE — PROGRESS NOTE ADULT - SUBJECTIVE AND OBJECTIVE BOX
COLORECTAL SURGERY PROGRESS NOTE    SUBJECTIVE    Patient seen and evaluated on AM rounds. Resting comfortably in bed. Endorses BM yesterday.    10-point review of systems completed and negative except as noted above.    OBJECTIVE    MEDICATIONS  (STANDING):  aspirin  chewable 81 milliGRAM(s) Oral daily  dextrose 5%. 1000 milliLiter(s) (50 mL/Hr) IV Continuous <Continuous>  dextrose 5%. 1000 milliLiter(s) (100 mL/Hr) IV Continuous <Continuous>  dextrose 50% Injectable 25 Gram(s) IV Push once  dextrose 50% Injectable 12.5 Gram(s) IV Push once  dextrose 50% Injectable 25 Gram(s) IV Push once  enoxaparin Injectable 40 milliGRAM(s) SubCutaneous every 24 hours  glucagon  Injectable 1 milliGRAM(s) IntraMuscular once  influenza  Vaccine (HIGH DOSE) 0.7 milliLiter(s) IntraMuscular once  insulin glargine Injectable (LANTUS) 50 Unit(s) SubCutaneous at bedtime  insulin lispro (ADMELOG) corrective regimen sliding scale   SubCutaneous three times a day before meals  insulin lispro Injectable (ADMELOG) 17 Unit(s) SubCutaneous three times a day before meals  lisinopril 20 milliGRAM(s) Oral daily  piperacillin/tazobactam IVPB.. 3.375 Gram(s) IV Intermittent every 8 hours  polyethylene glycol 3350 17 Gram(s) Oral daily  senna 2 Tablet(s) Oral at bedtime  simvastatin 40 milliGRAM(s) Oral at bedtime    MEDICATIONS  (PRN):  acetaminophen     Tablet .. 650 milliGRAM(s) Oral every 6 hours PRN Mild Pain (1 - 3), Moderate Pain (4 - 6), Severe Pain (7 - 10)  dextrose Oral Gel 15 Gram(s) Oral once PRN Blood Glucose LESS THAN 70 milliGRAM(s)/deciliter    PHYSICAL EXAM  ICU Vital Signs Last 24 Hrs  T(C): 36.7 (10 Sep 2022 10:01), Max: 37.2 (09 Sep 2022 20:50)  T(F): 98 (10 Sep 2022 10:01), Max: 98.9 (09 Sep 2022 20:50)  HR: 97 (10 Sep 2022 10:01) (89 - 101)  BP: 135/66 (10 Sep 2022 10:01) (120/61 - 139/78)  BP(mean): --  ABP: --  ABP(mean): --  RR: 17 (10 Sep 2022 10:01) (17 - 19)  SpO2: 97% (10 Sep 2022 10:01) (94% - 100%)    O2 Parameters below as of 10 Sep 2022 10:01  Patient On (Oxygen Delivery Method): room air    I&O's Detail    09 Sep 2022 07:01  -  10 Sep 2022 07:00  --------------------------------------------------------  IN:    IV PiggyBack: 100 mL    Oral Fluid: 1320 mL    PRBCs (Packed Red Blood Cells): 300 mL    sodium chloride 0.9%: 700 mL  Total IN: 2420 mL    OUT:    VAC (Vacuum Assisted Closure) System (mL): 0 mL    Voided (mL): 3450 mL  Total OUT: 3450 mL    Total NET: -1030 mL      10 Sep 2022 07:01  -  10 Sep 2022 10:27  --------------------------------------------------------  IN:    IV PiggyBack: 200 mL    Oral Fluid: 520 mL  Total IN: 720 mL    OUT:    Voided (mL): 1750 mL  Total OUT: 1750 mL    Total NET: -1030 mL    PHYSICAL EXAM  General: Appears well, NAD  Neuro: AAOx3  CHEST: Clear to auscultation bilaterally  CV: Regular rate and rhythm  Abdomen: Soft, nondistended, appropriate tenderness surrounding wound vac.  Extremities: Moves all extremities.  Extremities: Grossly symmetric    LABS                        9.8    11.41 )-----------( 658      ( 10 Sep 2022 06:08 )             31.8       09-10    138  |  102  |  20  ----------------------------<  189<H>  5.2   |  24  |  0.98    Ca    9.7      10 Sep 2022 06:08  Phos  4.3     09-10  Mg     2.00     09-10

## 2022-09-10 NOTE — PROGRESS NOTE ADULT - ASSESSMENT
71 yo F admitted with anterior abdominal wall abscess, s/p bedside I&D x2 and s/p OR debridement and washout on 9/3.     Plan:  -Dispo planning to home with wound vac  -ASA started i/s/o new onset thrombocytosis  -Continue to monitor blood glucose, appreciate endo recs: Lantus 50U at bedtime, Admelog 13U premeal and Admelog correction scale      -Per Endo: At discharge current recommendations would be: home on current basal bolus insulin regimen if FS remain stable at target. Stop Glimeperide at home. May continue Actos and Metformin. FU 4 weeks. Discussed with patient.  -Abx: per ID, escalated to Zosyn 9/6 considering lack of improvement with the WBC and slow clinical response.   -ID recs appreciated   -Strict I's/O's - Document all voids  -Diet: consistent carbs   -Activity: encourage OOB   -PT consult appreciated - No Skilled PT Needs.  -DVT ppx: Lovenox    A Team Surgery  P# [id number]

## 2022-09-10 NOTE — PROGRESS NOTE ADULT - SUBJECTIVE AND OBJECTIVE BOX
Patient is a 72y old  Female who presents with a chief complaint of surgery (09 Sep 2022 10:01)      DATE OF SERVICE: 09-10-22 @ 10:48    SUBJECTIVE / OVERNIGHT EVENTS: overnight events noted  "I feel much better"     ROS:  Resp: No cough no sputum production  CVS: No chest pain no palpitations no orthopnea  GI: no N/V no diarrhea   : no dysuria, no hematuria  Neuro: no weakness no paresthesias  Heme: No petechiae no easy bruising  Msk: No joint pain no swelling  Skin: No rash no itching        MEDICATIONS  (STANDING):  aspirin  chewable 81 milliGRAM(s) Oral daily  dextrose 5%. 1000 milliLiter(s) (100 mL/Hr) IV Continuous <Continuous>  dextrose 5%. 1000 milliLiter(s) (50 mL/Hr) IV Continuous <Continuous>  dextrose 50% Injectable 25 Gram(s) IV Push once  dextrose 50% Injectable 12.5 Gram(s) IV Push once  dextrose 50% Injectable 25 Gram(s) IV Push once  enoxaparin Injectable 40 milliGRAM(s) SubCutaneous every 24 hours  glucagon  Injectable 1 milliGRAM(s) IntraMuscular once  influenza  Vaccine (HIGH DOSE) 0.7 milliLiter(s) IntraMuscular once  insulin glargine Injectable (LANTUS) 50 Unit(s) SubCutaneous at bedtime  insulin lispro (ADMELOG) corrective regimen sliding scale   SubCutaneous three times a day before meals  insulin lispro Injectable (ADMELOG) 17 Unit(s) SubCutaneous three times a day before meals  lisinopril 20 milliGRAM(s) Oral daily  piperacillin/tazobactam IVPB.. 3.375 Gram(s) IV Intermittent every 8 hours  polyethylene glycol 3350 17 Gram(s) Oral daily  senna 2 Tablet(s) Oral at bedtime  simvastatin 40 milliGRAM(s) Oral at bedtime    MEDICATIONS  (PRN):  acetaminophen     Tablet .. 650 milliGRAM(s) Oral every 6 hours PRN Mild Pain (1 - 3), Moderate Pain (4 - 6), Severe Pain (7 - 10)  dextrose Oral Gel 15 Gram(s) Oral once PRN Blood Glucose LESS THAN 70 milliGRAM(s)/deciliter        CAPILLARY BLOOD GLUCOSE      POCT Blood Glucose.: 177 mg/dL (10 Sep 2022 08:14)  POCT Blood Glucose.: 223 mg/dL (09 Sep 2022 21:06)  POCT Blood Glucose.: 168 mg/dL (09 Sep 2022 17:03)  POCT Blood Glucose.: 235 mg/dL (09 Sep 2022 12:11)    I&O's Summary    09 Sep 2022 07:01  -  10 Sep 2022 07:00  --------------------------------------------------------  IN: 2420 mL / OUT: 3450 mL / NET: -1030 mL    10 Sep 2022 07:01  -  10 Sep 2022 10:48  --------------------------------------------------------  IN: 720 mL / OUT: 1750 mL / NET: -1030 mL        Vital Signs Last 24 Hrs  T(C): 36.7 (10 Sep 2022 10:01), Max: 37.2 (09 Sep 2022 20:50)  T(F): 98 (10 Sep 2022 10:01), Max: 98.9 (09 Sep 2022 20:50)  HR: 97 (10 Sep 2022 10:01) (89 - 101)  BP: 135/66 (10 Sep 2022 10:01) (120/61 - 139/78)  BP(mean): --  RR: 17 (10 Sep 2022 10:01) (17 - 19)  SpO2: 97% (10 Sep 2022 10:01) (94% - 100%)      PHYSICAL EXAM:  GENERAL: NAD  EYES: EOMI, PERRLA,  NECK: Supple, No JVD  CHEST/LUNG: Clear no crackles  HEART: S1S2;  no murmurs  ABDOMEN: Soft nontender  bandage in place  EXTREMITIES: no edema  NEUROLOGY: Alert, nonfocal    LABS:                        9.8    11.41 )-----------( 658      ( 10 Sep 2022 06:08 )             31.8     09-10    138  |  102  |  20  ----------------------------<  189<H>  5.2   |  24  |  0.98    Ca    9.7      10 Sep 2022 06:08  Phos  4.3     09-10  Mg     2.00     09-10                  All consultant(s) notes reviewed and care discussed with other providers        Contact Number, Dr Reed [phone]

## 2022-09-10 NOTE — PROGRESS NOTE ADULT - SUBJECTIVE AND OBJECTIVE BOX
Chief complaint    Patient is a 72y old  Female who presents with a chief complaint of abscess (10 Sep 2022 10:47)   Review of systems  Patient in bed, appears comfortable.    Labs and Fingersticks  CAPILLARY BLOOD GLUCOSE      POCT Blood Glucose.: 143 mg/dL (10 Sep 2022 17:25)  POCT Blood Glucose.: 193 mg/dL (10 Sep 2022 12:15)  POCT Blood Glucose.: 177 mg/dL (10 Sep 2022 08:14)  POCT Blood Glucose.: 223 mg/dL (09 Sep 2022 21:06)      Anion Gap, Serum: 12 (09-10 @ 06:08)  Anion Gap, Serum: 12 (09-09 @ 07:10)      Calcium, Total Serum: 9.7 (09-10 @ 06:08)  Calcium, Total Serum: 9.6 (09-09 @ 07:10)          09-10    138  |  102  |  20  ----------------------------<  189<H>  5.2   |  24  |  0.98    Ca    9.7      10 Sep 2022 06:08  Phos  4.3     09-10  Mg     2.00     09-10                          9.8    11.41 )-----------( 658      ( 10 Sep 2022 06:08 )             31.8     Medications  MEDICATIONS  (STANDING):  aspirin  chewable 81 milliGRAM(s) Oral daily  dextrose 5%. 1000 milliLiter(s) (100 mL/Hr) IV Continuous <Continuous>  dextrose 5%. 1000 milliLiter(s) (50 mL/Hr) IV Continuous <Continuous>  dextrose 50% Injectable 25 Gram(s) IV Push once  dextrose 50% Injectable 12.5 Gram(s) IV Push once  dextrose 50% Injectable 25 Gram(s) IV Push once  enoxaparin Injectable 40 milliGRAM(s) SubCutaneous every 24 hours  glucagon  Injectable 1 milliGRAM(s) IntraMuscular once  influenza  Vaccine (HIGH DOSE) 0.7 milliLiter(s) IntraMuscular once  insulin glargine Injectable (LANTUS) 50 Unit(s) SubCutaneous at bedtime  insulin lispro (ADMELOG) corrective regimen sliding scale   SubCutaneous three times a day before meals  insulin lispro Injectable (ADMELOG) 17 Unit(s) SubCutaneous three times a day before meals  lisinopril 20 milliGRAM(s) Oral daily  metoprolol tartrate 25 milliGRAM(s) Oral two times a day  piperacillin/tazobactam IVPB.. 3.375 Gram(s) IV Intermittent every 8 hours  polyethylene glycol 3350 17 Gram(s) Oral daily  senna 2 Tablet(s) Oral at bedtime  simvastatin 40 milliGRAM(s) Oral at bedtime      Physical Exam  General: Patient comfortable in bed  Vital Signs Last 12 Hrs  T(F): 98 (09-10-22 @ 14:35), Max: 98 (09-10-22 @ 10:01)  HR: 97 (09-10-22 @ 14:35) (97 - 97)  BP: 110/60 (09-10-22 @ 14:35) (110/60 - 135/66)  BP(mean): --  RR: 17 (09-10-22 @ 14:35) (17 - 17)  SpO2: 100% (09-10-22 @ 14:35) (97% - 100%)  Neck: No palpable thyroid nodules.  CVS: S1S2, No murmurs  Respiratory: No wheezing, no crepitations  GI: Abdomen soft, bowel sounds positive  Musculoskeletal:  edema lower extremities.     Diagnostics

## 2022-09-10 NOTE — PROGRESS NOTE ADULT - ASSESSMENT
Assessment  DMT2: 72y Female with DM T2 with hyperglycemia admitted with abdominal wall wound, on basal bolus and insulin coverage, sugars improving,  non compliant with low carb diet.  Abdominal wall wound: : S/P drain, on medications, monitored.  HTN: On antihypertensive medications, monitored, asymptomatic.  Overweight: No strict exercise routines, neither on low calorie diet.                 Brayden Richards MD  Cell: [phone]  Office: [phone]

## 2022-09-10 NOTE — PROGRESS NOTE ADULT - PROBLEM SELECTOR PLAN 5
continue home meds with hold parameters
improved  leukocytosis resolving  s/p VAC placement  tachycardia resolving  continue antibiotics piperacillin/tazobactam
will continue to monitor   ID help appreciated  continue Ancef for MSSA in wound cultures
will continue to monitor   ID help appreciated  continue Ancef  no intervention at this time for pan-sensitive pseudomonas in urine though patient did receive piperacillin/tazobactam
leukocytosis likely secondary to abscess  now debrided and drained  continue antibiotics  leukocytosis resolving   ID follow up appreciated
leukocytosis likely secondary to abscess  now debrided and drained  continue antibiotics  leukocytosis resolving   ID follow up appreciated
leukocytosis likely secondary to abscess  will continue to monitor  patient is optimized for OR today
leukocytosis likely secondary to manipulation of abscess  will continue to monitor  continue to follow ID recommendations  may need vac prior to discharge  discussed with surgery attending
resolving  management per surgery
continue home meds with hold parameters
continue home meds with hold parameters

## 2022-09-11 LAB
ALBUMIN SERPL ELPH-MCNC: 3.6 G/DL — SIGNIFICANT CHANGE UP (ref 3.3–5)
ALP SERPL-CCNC: 127 U/L — HIGH (ref 40–120)
ALT FLD-CCNC: 12 U/L — SIGNIFICANT CHANGE UP (ref 4–33)
ANION GAP SERPL CALC-SCNC: 12 MMOL/L — SIGNIFICANT CHANGE UP (ref 7–14)
AST SERPL-CCNC: 16 U/L — SIGNIFICANT CHANGE UP (ref 4–32)
BILIRUB DIRECT SERPL-MCNC: <0.2 MG/DL — SIGNIFICANT CHANGE UP (ref 0–0.3)
BILIRUB INDIRECT FLD-MCNC: >0 MG/DL — SIGNIFICANT CHANGE UP (ref 0–1)
BILIRUB SERPL-MCNC: 0.2 MG/DL — SIGNIFICANT CHANGE UP (ref 0.2–1.2)
BUN SERPL-MCNC: 22 MG/DL — SIGNIFICANT CHANGE UP (ref 7–23)
CALCIUM SERPL-MCNC: 9.6 MG/DL — SIGNIFICANT CHANGE UP (ref 8.4–10.5)
CHLORIDE SERPL-SCNC: 98 MMOL/L — SIGNIFICANT CHANGE UP (ref 98–107)
CO2 SERPL-SCNC: 24 MMOL/L — SIGNIFICANT CHANGE UP (ref 22–31)
CREAT SERPL-MCNC: 0.96 MG/DL — SIGNIFICANT CHANGE UP (ref 0.5–1.3)
EGFR: 64 ML/MIN/1.73M2 — SIGNIFICANT CHANGE UP
GLUCOSE BLDC GLUCOMTR-MCNC: 121 MG/DL — HIGH (ref 70–99)
GLUCOSE BLDC GLUCOMTR-MCNC: 147 MG/DL — HIGH (ref 70–99)
GLUCOSE BLDC GLUCOMTR-MCNC: 218 MG/DL — HIGH (ref 70–99)
GLUCOSE BLDC GLUCOMTR-MCNC: 220 MG/DL — HIGH (ref 70–99)
GLUCOSE BLDC GLUCOMTR-MCNC: 72 MG/DL — SIGNIFICANT CHANGE UP (ref 70–99)
GLUCOSE SERPL-MCNC: 214 MG/DL — HIGH (ref 70–99)
HCT VFR BLD CALC: 30.9 % — LOW (ref 34.5–45)
HGB BLD-MCNC: 10 G/DL — LOW (ref 11.5–15.5)
MAGNESIUM SERPL-MCNC: 1.8 MG/DL — SIGNIFICANT CHANGE UP (ref 1.6–2.6)
MCHC RBC-ENTMCNC: 29 PG — SIGNIFICANT CHANGE UP (ref 27–34)
MCHC RBC-ENTMCNC: 32.4 GM/DL — SIGNIFICANT CHANGE UP (ref 32–36)
MCV RBC AUTO: 89.6 FL — SIGNIFICANT CHANGE UP (ref 80–100)
NRBC # BLD: 0 /100 WBCS — SIGNIFICANT CHANGE UP (ref 0–0)
NRBC # FLD: 0 K/UL — SIGNIFICANT CHANGE UP (ref 0–0)
PHOSPHATE SERPL-MCNC: 3.8 MG/DL — SIGNIFICANT CHANGE UP (ref 2.5–4.5)
PLATELET # BLD AUTO: 631 K/UL — HIGH (ref 150–400)
POTASSIUM SERPL-MCNC: 5.1 MMOL/L — SIGNIFICANT CHANGE UP (ref 3.5–5.3)
POTASSIUM SERPL-SCNC: 5.1 MMOL/L — SIGNIFICANT CHANGE UP (ref 3.5–5.3)
PROT SERPL-MCNC: 7.9 G/DL — SIGNIFICANT CHANGE UP (ref 6–8.3)
RBC # BLD: 3.45 M/UL — LOW (ref 3.8–5.2)
RBC # FLD: 16.3 % — HIGH (ref 10.3–14.5)
SARS-COV-2 RNA SPEC QL NAA+PROBE: SIGNIFICANT CHANGE UP
SODIUM SERPL-SCNC: 134 MMOL/L — LOW (ref 135–145)
WBC # BLD: 8.63 K/UL — SIGNIFICANT CHANGE UP (ref 3.8–10.5)
WBC # FLD AUTO: 8.63 K/UL — SIGNIFICANT CHANGE UP (ref 3.8–10.5)

## 2022-09-11 RX ORDER — SODIUM CHLORIDE 9 MG/ML
1 INJECTION INTRAMUSCULAR; INTRAVENOUS; SUBCUTANEOUS ONCE
Refills: 0 | Status: COMPLETED | OUTPATIENT
Start: 2022-09-11 | End: 2022-09-11

## 2022-09-11 RX ORDER — MAGNESIUM SULFATE 500 MG/ML
1 VIAL (ML) INJECTION ONCE
Refills: 0 | Status: DISCONTINUED | OUTPATIENT
Start: 2022-09-11 | End: 2022-09-11

## 2022-09-11 RX ORDER — INSULIN LISPRO 100/ML
13 VIAL (ML) SUBCUTANEOUS
Refills: 0 | Status: DISCONTINUED | OUTPATIENT
Start: 2022-09-11 | End: 2022-09-13

## 2022-09-11 RX ORDER — MAGNESIUM SULFATE 500 MG/ML
1 VIAL (ML) INJECTION ONCE
Refills: 0 | Status: COMPLETED | OUTPATIENT
Start: 2022-09-11 | End: 2022-09-11

## 2022-09-11 RX ADMIN — PIPERACILLIN AND TAZOBACTAM 25 GRAM(S): 4; .5 INJECTION, POWDER, LYOPHILIZED, FOR SOLUTION INTRAVENOUS at 05:53

## 2022-09-11 RX ADMIN — Medication 100 GRAM(S): at 11:49

## 2022-09-11 RX ADMIN — SODIUM CHLORIDE 1 GRAM(S): 9 INJECTION INTRAMUSCULAR; INTRAVENOUS; SUBCUTANEOUS at 11:50

## 2022-09-11 RX ADMIN — PIPERACILLIN AND TAZOBACTAM 25 GRAM(S): 4; .5 INJECTION, POWDER, LYOPHILIZED, FOR SOLUTION INTRAVENOUS at 21:22

## 2022-09-11 RX ADMIN — SIMVASTATIN 40 MILLIGRAM(S): 20 TABLET, FILM COATED ORAL at 21:21

## 2022-09-11 RX ADMIN — Medication 25 MILLIGRAM(S): at 05:54

## 2022-09-11 RX ADMIN — Medication 13 UNIT(S): at 18:14

## 2022-09-11 RX ADMIN — Medication 4: at 08:34

## 2022-09-11 RX ADMIN — PIPERACILLIN AND TAZOBACTAM 25 GRAM(S): 4; .5 INJECTION, POWDER, LYOPHILIZED, FOR SOLUTION INTRAVENOUS at 12:15

## 2022-09-11 RX ADMIN — INSULIN GLARGINE 50 UNIT(S): 100 INJECTION, SOLUTION SUBCUTANEOUS at 21:20

## 2022-09-11 RX ADMIN — Medication 17 UNIT(S): at 12:15

## 2022-09-11 RX ADMIN — Medication 25 MILLIGRAM(S): at 18:15

## 2022-09-11 RX ADMIN — ENOXAPARIN SODIUM 40 MILLIGRAM(S): 100 INJECTION SUBCUTANEOUS at 21:21

## 2022-09-11 RX ADMIN — LISINOPRIL 20 MILLIGRAM(S): 2.5 TABLET ORAL at 05:54

## 2022-09-11 RX ADMIN — Medication 81 MILLIGRAM(S): at 11:50

## 2022-09-11 RX ADMIN — Medication 17 UNIT(S): at 08:34

## 2022-09-11 NOTE — PROGRESS NOTE ADULT - SUBJECTIVE AND OBJECTIVE BOX
Patient is a 72y old  Female who presents with a chief complaint of abscess (10 Sep 2022 10:47)      DATE OF SERVICE: 09-11-22 @ 07:54    SUBJECTIVE / OVERNIGHT EVENTS: overnight events noted    ROS:  Resp: No cough no sputum production  CVS: No chest pain no palpitations no orthopnea  GI: no N/V/D  : no dysuria, no hematuria  Neuro: no weakness no paresthesias          MEDICATIONS  (STANDING):  aspirin  chewable 81 milliGRAM(s) Oral daily  dextrose 5%. 1000 milliLiter(s) (100 mL/Hr) IV Continuous <Continuous>  dextrose 5%. 1000 milliLiter(s) (50 mL/Hr) IV Continuous <Continuous>  dextrose 50% Injectable 25 Gram(s) IV Push once  dextrose 50% Injectable 12.5 Gram(s) IV Push once  dextrose 50% Injectable 25 Gram(s) IV Push once  enoxaparin Injectable 40 milliGRAM(s) SubCutaneous every 24 hours  glucagon  Injectable 1 milliGRAM(s) IntraMuscular once  influenza  Vaccine (HIGH DOSE) 0.7 milliLiter(s) IntraMuscular once  insulin glargine Injectable (LANTUS) 50 Unit(s) SubCutaneous at bedtime  insulin lispro (ADMELOG) corrective regimen sliding scale   SubCutaneous three times a day before meals  insulin lispro Injectable (ADMELOG) 17 Unit(s) SubCutaneous three times a day before meals  lisinopril 20 milliGRAM(s) Oral daily  metoprolol tartrate 25 milliGRAM(s) Oral two times a day  piperacillin/tazobactam IVPB.. 3.375 Gram(s) IV Intermittent every 8 hours  polyethylene glycol 3350 17 Gram(s) Oral daily  senna 2 Tablet(s) Oral at bedtime  simvastatin 40 milliGRAM(s) Oral at bedtime    MEDICATIONS  (PRN):  acetaminophen     Tablet .. 650 milliGRAM(s) Oral every 6 hours PRN Mild Pain (1 - 3), Moderate Pain (4 - 6), Severe Pain (7 - 10)  dextrose Oral Gel 15 Gram(s) Oral once PRN Blood Glucose LESS THAN 70 milliGRAM(s)/deciliter        CAPILLARY BLOOD GLUCOSE      POCT Blood Glucose.: 260 mg/dL (10 Sep 2022 21:07)  POCT Blood Glucose.: 143 mg/dL (10 Sep 2022 17:25)  POCT Blood Glucose.: 193 mg/dL (10 Sep 2022 12:15)  POCT Blood Glucose.: 177 mg/dL (10 Sep 2022 08:14)    I&O's Summary    10 Sep 2022 07:01  -  11 Sep 2022 07:00  --------------------------------------------------------  IN: 1610 mL / OUT: 4250 mL / NET: -2640 mL        Vital Signs Last 24 Hrs  T(C): 36.7 (11 Sep 2022 05:21), Max: 37.2 (10 Sep 2022 21:15)  T(F): 98 (11 Sep 2022 05:21), Max: 98.9 (10 Sep 2022 21:15)  HR: 89 (11 Sep 2022 05:21) (88 - 99)  BP: 112/71 (11 Sep 2022 05:21) (110/60 - 135/66)  BP(mean): --  RR: 18 (11 Sep 2022 05:21) (17 - 18)  SpO2: 98% (11 Sep 2022 05:21) (97% - 100%)    PHYSICAL EXAM:  GENERAL: NAD  EYES: EOMI, PERRLA,  NECK: Supple, No JVD  CHEST/LUNG: Clear   HEART: S1S2;  no murmurs  ABDOMEN: Soft nontender  bandage in place  EXTREMITIES: no edema  NEUROLOGY: Alert, nonfocal    LABS:                        9.8    11.41 )-----------( 658      ( 10 Sep 2022 06:08 )             31.8     09-10    138  |  102  |  20  ----------------------------<  189<H>  5.2   |  24  |  0.98    Ca    9.7      10 Sep 2022 06:08  Phos  4.3     09-10  Mg     2.00     09-10                  All consultant(s) notes reviewed and care discussed with other providers        Contact Number, Dr Reed [phone]

## 2022-09-11 NOTE — PROGRESS NOTE ADULT - SUBJECTIVE AND OBJECTIVE BOX
COLORECTAL SURGERY PROGRESS NOTE    SUBJECTIVE  Patient seen and evaluated on AM rounds. Resting comfortably in bed. Pain well controlled. Ambulating, voiding, tolerating PO. No nausea/vomiting overnight. Passing flatus, having bowel movements.    MEDICATIONS  (STANDING):  aspirin  chewable 81 milliGRAM(s) Oral daily  dextrose 5%. 1000 milliLiter(s) (50 mL/Hr) IV Continuous <Continuous>  dextrose 5%. 1000 milliLiter(s) (100 mL/Hr) IV Continuous <Continuous>  dextrose 50% Injectable 25 Gram(s) IV Push once  dextrose 50% Injectable 12.5 Gram(s) IV Push once  dextrose 50% Injectable 25 Gram(s) IV Push once  enoxaparin Injectable 40 milliGRAM(s) SubCutaneous every 24 hours  glucagon  Injectable 1 milliGRAM(s) IntraMuscular once  influenza  Vaccine (HIGH DOSE) 0.7 milliLiter(s) IntraMuscular once  insulin glargine Injectable (LANTUS) 50 Unit(s) SubCutaneous at bedtime  insulin lispro (ADMELOG) corrective regimen sliding scale   SubCutaneous three times a day before meals  insulin lispro Injectable (ADMELOG) 17 Unit(s) SubCutaneous three times a day before meals  lisinopril 20 milliGRAM(s) Oral daily  piperacillin/tazobactam IVPB.. 3.375 Gram(s) IV Intermittent every 8 hours  polyethylene glycol 3350 17 Gram(s) Oral daily  senna 2 Tablet(s) Oral at bedtime  simvastatin 40 milliGRAM(s) Oral at bedtime    MEDICATIONS  (PRN):  acetaminophen     Tablet .. 650 milliGRAM(s) Oral every 6 hours PRN Mild Pain (1 - 3), Moderate Pain (4 - 6), Severe Pain (7 - 10)  dextrose Oral Gel 15 Gram(s) Oral once PRN Blood Glucose LESS THAN 70 milliGRAM(s)/deciliter    OBJECTIVE  Vital Signs Last 24 Hrs  T(C): 36.6 (11 Sep 2022 09:41), Max: 37.2 (10 Sep 2022 21:15)  T(F): 97.8 (11 Sep 2022 09:41), Max: 98.9 (10 Sep 2022 21:15)  HR: 87 (11 Sep 2022 09:41) (87 - 99)  BP: 120/77 (11 Sep 2022 09:41) (110/60 - 131/75)  BP(mean): --  RR: 18 (11 Sep 2022 09:41) (17 - 18)  SpO2: 98% (11 Sep 2022 09:41) (97% - 100%)    Parameters below as of 11 Sep 2022 09:41  Patient On (Oxygen Delivery Method): room air      I&O's Detail    10 Sep 2022 07:01  -  11 Sep 2022 07:00  --------------------------------------------------------  IN:    IV PiggyBack: 450 mL    Oral Fluid: 1160 mL  Total IN: 1610 mL    OUT:    VAC (Vacuum Assisted Closure) System (mL): 150 mL    Voided (mL): 4100 mL  Total OUT: 4250 mL    Total NET: -2640 mL      11 Sep 2022 07:01  -  11 Sep 2022 10:57  --------------------------------------------------------  IN:  Total IN: 0 mL    OUT:    VAC (Vacuum Assisted Closure) System (mL): 0 mL    Voided (mL): 200 mL  Total OUT: 200 mL    Total NET: -200 mL      PHYSICAL EXAM  General: Appears well, NAD  Neuro: AAOx3  CHEST: Clear to auscultation bilaterally  CV: Regular rate and rhythm  Abdomen: Soft, nondistended, appropriate tenderness around abdominal wound, wound vac holding suction  Extremities: Moves all extremities.  Extremities: Grossly symmetric      LABS                        10.0   8.63  )-----------( 631      ( 11 Sep 2022 07:25 )             30.9     09-11    134<L>  |  98  |  22  ----------------------------<  214<H>  5.1   |  24  |  0.96    Ca    9.6      11 Sep 2022 07:25  Phos  3.8     09-11  Mg     1.80     09-11    TPro  7.9  /  Alb  3.6  /  TBili  0.2  /  DBili  <0.2  /  AST  16  /  ALT  12  /  AlkPhos  127<H>  09-11      CAPILLARY BLOOD GLUCOSE  POCT Blood Glucose.: 218 mg/dL (11 Sep 2022 08:25)  POCT Blood Glucose.: 260 mg/dL (10 Sep 2022 21:07)  POCT Blood Glucose.: 143 mg/dL (10 Sep 2022 17:25)  POCT Blood Glucose.: 193 mg/dL (10 Sep 2022 12:15)

## 2022-09-11 NOTE — PROGRESS NOTE ADULT - ASSESSMENT
71 yo F admitted with anterior abdominal wall abscess, s/p bedside I&D x2 and s/p OR debridement and washout on 9/3. Wound vac placed 9/9.    Plan:  -Wound vac change tomorrow 9/12  -Dispo planning to home with wound vac  -ASA started i/s/o new onset thrombocytosis  -Continue to monitor blood glucose, appreciate endo recs: Lantus 50U at bedtime, Admelog 13U premeal and Admelog correction scale      -Per Endo: At discharge current recommendations would be: home on current basal bolus insulin regimen if FS remain stable at target. Stop Glimeperide at home. May continue Actos and Metformin. FU 4 weeks. Discussed with patient.  -Abx: per ID, escalated to Zosyn 9/6 considering lack of improvement with the WBC and slow clinical response.   -ID recs appreciated   -Strict I's/O's - Document all voids  -Diet: consistent carbs   -Activity: encourage OOB   -PT consult appreciated - No Skilled PT Needs.  -DVT ppx: Lovenox    A Team Surgery  P# [id number]

## 2022-09-12 LAB
ANION GAP SERPL CALC-SCNC: 12 MMOL/L — SIGNIFICANT CHANGE UP (ref 7–14)
BUN SERPL-MCNC: 22 MG/DL — SIGNIFICANT CHANGE UP (ref 7–23)
CALCIUM SERPL-MCNC: 9.7 MG/DL — SIGNIFICANT CHANGE UP (ref 8.4–10.5)
CHLORIDE SERPL-SCNC: 101 MMOL/L — SIGNIFICANT CHANGE UP (ref 98–107)
CO2 SERPL-SCNC: 24 MMOL/L — SIGNIFICANT CHANGE UP (ref 22–31)
CREAT SERPL-MCNC: 1.01 MG/DL — SIGNIFICANT CHANGE UP (ref 0.5–1.3)
CULTURE RESULTS: SIGNIFICANT CHANGE UP
CULTURE RESULTS: SIGNIFICANT CHANGE UP
EGFR: 60 ML/MIN/1.73M2 — SIGNIFICANT CHANGE UP
GLUCOSE BLDC GLUCOMTR-MCNC: 135 MG/DL — HIGH (ref 70–99)
GLUCOSE BLDC GLUCOMTR-MCNC: 152 MG/DL — HIGH (ref 70–99)
GLUCOSE BLDC GLUCOMTR-MCNC: 166 MG/DL — HIGH (ref 70–99)
GLUCOSE BLDC GLUCOMTR-MCNC: 241 MG/DL — HIGH (ref 70–99)
GLUCOSE SERPL-MCNC: 115 MG/DL — HIGH (ref 70–99)
HCT VFR BLD CALC: 33.8 % — LOW (ref 34.5–45)
HGB BLD-MCNC: 10.5 G/DL — LOW (ref 11.5–15.5)
MAGNESIUM SERPL-MCNC: 1.8 MG/DL — SIGNIFICANT CHANGE UP (ref 1.6–2.6)
MCHC RBC-ENTMCNC: 28.2 PG — SIGNIFICANT CHANGE UP (ref 27–34)
MCHC RBC-ENTMCNC: 31.1 GM/DL — LOW (ref 32–36)
MCV RBC AUTO: 90.9 FL — SIGNIFICANT CHANGE UP (ref 80–100)
NRBC # BLD: 0 /100 WBCS — SIGNIFICANT CHANGE UP (ref 0–0)
NRBC # FLD: 0 K/UL — SIGNIFICANT CHANGE UP (ref 0–0)
PHOSPHATE SERPL-MCNC: 4.4 MG/DL — SIGNIFICANT CHANGE UP (ref 2.5–4.5)
PLATELET # BLD AUTO: 614 K/UL — HIGH (ref 150–400)
POTASSIUM SERPL-MCNC: 4.6 MMOL/L — SIGNIFICANT CHANGE UP (ref 3.5–5.3)
POTASSIUM SERPL-SCNC: 4.6 MMOL/L — SIGNIFICANT CHANGE UP (ref 3.5–5.3)
RBC # BLD: 3.72 M/UL — LOW (ref 3.8–5.2)
RBC # FLD: 16.5 % — HIGH (ref 10.3–14.5)
SODIUM SERPL-SCNC: 137 MMOL/L — SIGNIFICANT CHANGE UP (ref 135–145)
SPECIMEN SOURCE: SIGNIFICANT CHANGE UP
SPECIMEN SOURCE: SIGNIFICANT CHANGE UP
WBC # BLD: 10.16 K/UL — SIGNIFICANT CHANGE UP (ref 3.8–10.5)
WBC # FLD AUTO: 10.16 K/UL — SIGNIFICANT CHANGE UP (ref 3.8–10.5)

## 2022-09-12 PROCEDURE — 99232 SBSQ HOSP IP/OBS MODERATE 35: CPT

## 2022-09-12 RX ORDER — GLIMEPIRIDE 1 MG
1 TABLET ORAL
Qty: 0 | Refills: 0 | DISCHARGE

## 2022-09-12 RX ORDER — HYDROMORPHONE HYDROCHLORIDE 2 MG/ML
0.5 INJECTION INTRAMUSCULAR; INTRAVENOUS; SUBCUTANEOUS ONCE
Refills: 0 | Status: DISCONTINUED | OUTPATIENT
Start: 2022-09-12 | End: 2022-09-12

## 2022-09-12 RX ORDER — INSULIN GLARGINE 100 [IU]/ML
40 INJECTION, SOLUTION SUBCUTANEOUS
Qty: 0 | Refills: 0 | DISCHARGE

## 2022-09-12 RX ORDER — OXYCODONE HYDROCHLORIDE 5 MG/1
1 TABLET ORAL
Qty: 8 | Refills: 0
Start: 2022-09-12 | End: 2022-09-13

## 2022-09-12 RX ORDER — INSULIN LISPRO 100/ML
17 VIAL (ML) SUBCUTANEOUS
Qty: 3 | Refills: 0
Start: 2022-09-12 | End: 2022-10-11

## 2022-09-12 RX ORDER — MAGNESIUM SULFATE 500 MG/ML
1 VIAL (ML) INJECTION ONCE
Refills: 0 | Status: COMPLETED | OUTPATIENT
Start: 2022-09-12 | End: 2022-09-12

## 2022-09-12 RX ORDER — INSULIN GLARGINE 100 [IU]/ML
50 INJECTION, SOLUTION SUBCUTANEOUS
Qty: 2 | Refills: 0
Start: 2022-09-12 | End: 2022-10-11

## 2022-09-12 RX ORDER — ASPIRIN/CALCIUM CARB/MAGNESIUM 324 MG
1 TABLET ORAL
Qty: 0 | Refills: 0 | DISCHARGE
Start: 2022-09-12

## 2022-09-12 RX ADMIN — Medication 81 MILLIGRAM(S): at 11:22

## 2022-09-12 RX ADMIN — Medication 13 UNIT(S): at 17:59

## 2022-09-12 RX ADMIN — ENOXAPARIN SODIUM 40 MILLIGRAM(S): 100 INJECTION SUBCUTANEOUS at 21:49

## 2022-09-12 RX ADMIN — PIPERACILLIN AND TAZOBACTAM 25 GRAM(S): 4; .5 INJECTION, POWDER, LYOPHILIZED, FOR SOLUTION INTRAVENOUS at 05:07

## 2022-09-12 RX ADMIN — Medication 650 MILLIGRAM(S): at 15:12

## 2022-09-12 RX ADMIN — SIMVASTATIN 40 MILLIGRAM(S): 20 TABLET, FILM COATED ORAL at 21:50

## 2022-09-12 RX ADMIN — Medication 25 MILLIGRAM(S): at 18:00

## 2022-09-12 RX ADMIN — Medication 100 GRAM(S): at 10:44

## 2022-09-12 RX ADMIN — LISINOPRIL 20 MILLIGRAM(S): 2.5 TABLET ORAL at 05:07

## 2022-09-12 RX ADMIN — PIPERACILLIN AND TAZOBACTAM 25 GRAM(S): 4; .5 INJECTION, POWDER, LYOPHILIZED, FOR SOLUTION INTRAVENOUS at 12:29

## 2022-09-12 RX ADMIN — Medication 4: at 17:59

## 2022-09-12 RX ADMIN — INSULIN GLARGINE 50 UNIT(S): 100 INJECTION, SOLUTION SUBCUTANEOUS at 21:49

## 2022-09-12 RX ADMIN — Medication 25 MILLIGRAM(S): at 05:06

## 2022-09-12 RX ADMIN — Medication 13 UNIT(S): at 12:29

## 2022-09-12 RX ADMIN — Medication 2: at 08:39

## 2022-09-12 RX ADMIN — Medication 650 MILLIGRAM(S): at 15:59

## 2022-09-12 RX ADMIN — HYDROMORPHONE HYDROCHLORIDE 0.5 MILLIGRAM(S): 2 INJECTION INTRAMUSCULAR; INTRAVENOUS; SUBCUTANEOUS at 09:25

## 2022-09-12 RX ADMIN — PIPERACILLIN AND TAZOBACTAM 25 GRAM(S): 4; .5 INJECTION, POWDER, LYOPHILIZED, FOR SOLUTION INTRAVENOUS at 21:49

## 2022-09-12 RX ADMIN — Medication 13 UNIT(S): at 08:39

## 2022-09-12 RX ADMIN — HYDROMORPHONE HYDROCHLORIDE 0.5 MILLIGRAM(S): 2 INJECTION INTRAMUSCULAR; INTRAVENOUS; SUBCUTANEOUS at 09:07

## 2022-09-12 NOTE — PROGRESS NOTE ADULT - SUBJECTIVE AND OBJECTIVE BOX
COLORECTAL SURGERY PROGRESS NOTE    Overnight Events: NAEO    SUBJECTIVE: Pt seen and examined at bedside. Patient comfortable and in no-apparent distress. No nausea, vomiting, diarrhea. Pain is controlled. +Gas/+BM. Tolerating diet.    Vital Signs Last 24 Hrs  T(C): 36.6 (12 Sep 2022 01:52), Max: 36.9 (11 Sep 2022 02:28)  T(F): 97.9 (12 Sep 2022 01:52), Max: 98.4 (11 Sep 2022 02:28)  HR: 81 (12 Sep 2022 01:52) (81 - 97)  BP: 122/72 (12 Sep 2022 01:52) (112/71 - 131/75)  BP(mean): --  RR: 18 (12 Sep 2022 01:52) (18 - 18)  SpO2: 98% (12 Sep 2022 01:52) (97% - 100%)    Parameters below as of 12 Sep 2022 01:52  Patient On (Oxygen Delivery Method): room air        Physical Exam:  General Appearance: Appears well, NAD  Respiratory: No labored breathing  CV: Pulse regularly present  Abdomen: Soft, nontender, ***incision c/d/i, [place] holding suction    LABS:                        10.0   8.63  )-----------( 631      ( 11 Sep 2022 07:25 )             30.9     09-11    134<L>  |  98  |  22  ----------------------------<  214<H>  5.1   |  24  |  0.96    Ca    9.6      11 Sep 2022 07:25  Phos  3.8     09-11  Mg     1.80     09-11    TPro  7.9  /  Alb  3.6  /  TBili  0.2  /  DBili  <0.2  /  AST  16  /  ALT  12  /  AlkPhos  127<H>  09-11          INs and OUTs:    09-10-22 @ 07:01  -  09-11-22 @ 07:00  --------------------------------------------------------  IN: 1610 mL / OUT: 4250 mL / NET: -2640 mL    09-11-22 @ 07:01  -  09-12-22 @ 02:09  --------------------------------------------------------  IN: 640 mL / OUT: 1400 mL / NET: -760 mL         Surgery Daily Progress Note  =====================================================  Interval / Overnight Events: No acute events overnight.      HPI:  Patient is a 72 year old female with a PMHx of colon cancer, DM2, HTN, HLD, diabetic neuropathy and hernia (S/P repair on 7/20/22) who presented with yellow / green drainage from wound on lower abdomen. (29 Aug 2022 16:39)      PAST MEDICAL & SURGICAL HISTORY:  Essential hypertension  Diabetes mellitus  type 2  Hyperlipidemia  Diabetes mellitus, type 2  Essential hypertension  Hyperlipidemia, unspecified hyperlipidemia type  Obesity (BMI 30.0-34.9)  Hepatic flexure mass  malignant neoplasm  Malignant neoplasm of hepatic flexure  No significant past surgical history  H/O hemicolectomy  right - 1/30/2017      ALLERGIES:  No Known Allergies    --------------------------------------------------------------------------------------    MEDICATIONS:    Neurologic Medications  acetaminophen     Tablet .. 650 milliGRAM(s) Oral every 6 hours PRN Mild Pain (1 - 3), Moderate Pain (4 - 6), Severe Pain (7 - 10)    Cardiovascular Medications  lisinopril 20 milliGRAM(s) Oral daily  metoprolol tartrate 25 milliGRAM(s) Oral two times a day    Gastrointestinal Medications  dextrose 5%. 1000 milliLiter(s) IV Continuous <Continuous>  dextrose 5%. 1000 milliLiter(s) IV Continuous <Continuous>  polyethylene glycol 3350 17 Gram(s) Oral daily  senna 2 Tablet(s) Oral at bedtime    Hematologic/Oncologic Medications  aspirin  chewable 81 milliGRAM(s) Oral daily  enoxaparin Injectable 40 milliGRAM(s) SubCutaneous every 24 hours  influenza  Vaccine (HIGH DOSE) 0.7 milliLiter(s) IntraMuscular once    Antimicrobial/Immunologic Medications  piperacillin/tazobactam IVPB.. 3.375 Gram(s) IV Intermittent every 8 hours    Endocrine/Metabolic Medications  dextrose 50% Injectable 12.5 Gram(s) IV Push once  dextrose 50% Injectable 25 Gram(s) IV Push once  dextrose 50% Injectable 25 Gram(s) IV Push once  dextrose Oral Gel 15 Gram(s) Oral once PRN Blood Glucose LESS THAN 70 milliGRAM(s)/deciliter  glucagon  Injectable 1 milliGRAM(s) IntraMuscular once  insulin glargine Injectable (LANTUS) 50 Unit(s) SubCutaneous at bedtime  insulin lispro (ADMELOG) corrective regimen sliding scale   SubCutaneous three times a day before meals  insulin lispro Injectable (ADMELOG) 13 Unit(s) SubCutaneous three times a day before meals  simvastatin 40 milliGRAM(s) Oral at bedtime    --------------------------------------------------------------------------------------    VITAL SIGNS:  T(C): 36.7 (12 Sep 2022 05:04), Max: 36.9 (11 Sep 2022 13:10)  T(F): 98.1 (12 Sep 2022 05:04), Max: 98.4 (11 Sep 2022 13:10)  HR: 88 (12 Sep 2022 05:04) (81 - 97)  BP: 126/69 (12 Sep 2022 05:04) (120/77 - 128/59)  RR: 16 (12 Sep 2022 05:04) (16 - 18)  SpO2: 97% (12 Sep 2022 05:04) (97% - 100%)    --------------------------------------------------------------------------------------    INS AND OUTS:    11 Sep 2022 07:01  -  12 Sep 2022 07:00  --------------------------------------------------------  IN:    IV PiggyBack: 500 mL    Oral Fluid: 480 mL  Total IN: 980 mL    OUT:    VAC (Vacuum Assisted Closure) System (mL): 50 mL    Voided (mL): 1625 mL  Total OUT: 1675 mL    Total NET: -695 mL    --------------------------------------------------------------------------------------    EXAM    NEUROLOGY    Exam: Normal, in no acute distress.    HEENT  Exam: Normocephalic, atraumatic.    RESPIRATORY  Exam: Normal expansion / effort.    CARDIOVASCULAR  Exam: S1, S2.  Regular rate and rhythm.    GI/NUTRITION  Exam: Abdomen soft, Non-tender, Non-distended.  VAC in place.  Current Diet: Regular diet    MUSCULOSKELETAL  Exam: All extremities moving spontaneously without limitations.      METABOLIC / FLUIDS / ELECTROLYTES  dextrose 5%. 1000 milliLiter(s) IV Continuous <Continuous>  dextrose 5%. 1000 milliLiter(s) IV Continuous <Continuous>      HEMATOLOGIC  [x] VTE Prophylaxis: aspirin  chewable 81 milliGRAM(s) Oral daily  enoxaparin Injectable 40 milliGRAM(s) SubCutaneous every 24 hours      INFECTIOUS DISEASE  Antimicrobials/Immunologic Medications:  influenza  Vaccine (HIGH DOSE) 0.7 milliLiter(s) IntraMuscular once  piperacillin/tazobactam IVPB.. 3.375 Gram(s) IV Intermittent every 8 hours    --------------------------------------------------------------------------------------

## 2022-09-12 NOTE — PROGRESS NOTE ADULT - ASSESSMENT
Health Maintenance Due   Topic Date Due   • Shingles Vaccine (1 of 2) 08/01/1995   • Medicare Wellness 65+  01/21/2020       Patient is due for topics as listed above but is not proceeding with Immunization(s) Shingles at this time.            71 yo F colon CA, DM, diabetic neuropathy, recent hernia repair, presenting with discharge from wound  No fevers, has leukocytosis  Recent hernia report  Concern for possible abscess s/p I+D on day of admission  Culture now growing MSSA  UCX with low CFU Pseudomonas, 2x UA (mild positive to negative)  CT with lower abd wall fluid collection, extending to RLQ  Patient has no symptoms UTI  9/3 OR I+D in OR  Overall,  1) Abscess  - Growing MSSA, suspect is primary pathogen  - Zosyn 3.375g q 8 through 9/16/22, when DC planning can complete with Augmentin 875mg po q 12  - Wound care per team  - F/U surgery team  2) Positive UCX  - Suspect asymptomatic bacteriuria, low CFU; no symptoms UTI; UA equivocal  3) Leukocytosis  - Monitor for alternate sources infection, still no UTI symptoms at this point; suspect due to low grade inflammation/stress response as opposed to ongoing infectious process  - Trend to normal  - If not improving would repeat CT A/P    Signing off. Please call with further questions or change in status.    Anderson Trotter MD  Contact on TEAMS messaging from 9am - 5pm  From 5pm-9am, on weekends, or if no response call [phone]

## 2022-09-12 NOTE — PROGRESS NOTE ADULT - ASSESSMENT
71 yo F admitted with anterior abdominal wall abscess, s/p bedside I&D x2 and s/p OR debridement and washout on 9/3. Wound vac placed 9/9.    Plan:  -Wound vac change today 9/12  -Dispo planning to home with wound vac  -ASA started i/s/o new onset thrombocytosis  -Continue to monitor blood glucose, appreciate endo recs: Lantus 50U at bedtime, Admelog 13U premeal and Admelog correction scale      -Per Endo: At discharge current recommendations would be: home on current basal bolus insulin regimen if FS remain stable at target. Stop Glimeperide at home. May continue Actos and Metformin. FU 4 weeks. Discussed with patient.  -Abx: per ID, escalated to Zosyn 9/6 considering lack of improvement with the WBC and slow clinical response.   -ID recs appreciated   -Strict I's/O's - Document all voids  -Diet: consistent carbs   -Activity: encourage OOB   -PT consult appreciated - No Skilled PT Needs.  -DVT ppx: Lovenox    A Team Surgery  P# [id number]     Patient is a 72 year old female with a PMHx of colon cancer, DM2, HTN, HLD, diabetic neuropathy and hernia (S/P repair on 7/20/22) who presented with yellow / green drainage from wound on lower abdomen.  CT Abdomen / Pelvis performed showing a 6.0 x 2.0 cm fluid collection of the lower abdominal wall, likely an abscess given the extensive surrounding infiltration and tract extending to the right lower quadrant abdominal wall skin surface.  Patient is now S/P I&D on 9/3/22.  Wound VAC placed on 9/9/22.      PLAN:  - Regular diet  - Planned for wound VAC change today  - Continue with Aspirin for new onset thrombocytosis  - Appreciate endocrine recommendations  - Appreciate ID recommendations  - Continue with IV Zosyn  - Out of bed  - Pain control  - VTE prophylaxis with Lovenox subcutaneous  - Discharge planning when VAC arrives      #[id number]  A Team Surgery

## 2022-09-12 NOTE — PROGRESS NOTE ADULT - SUBJECTIVE AND OBJECTIVE BOX
Patient is a 72y old  Female who presents with a chief complaint of abscess (11 Sep 2022 07:54)      DATE OF SERVICE: 09-12-22 @ 11:42    SUBJECTIVE / OVERNIGHT EVENTS: overnight events noted    ROS:  Resp: No cough no sputum production  CVS: No chest pain no palpitations no orthopnea  GI: no N/V/D  : no dysuria, no hematuria  Neuro: no weakness no paresthesias  "I feel much better"       MEDICATIONS  (STANDING):  aspirin  chewable 81 milliGRAM(s) Oral daily  dextrose 5%. 1000 milliLiter(s) (50 mL/Hr) IV Continuous <Continuous>  dextrose 5%. 1000 milliLiter(s) (100 mL/Hr) IV Continuous <Continuous>  dextrose 50% Injectable 25 Gram(s) IV Push once  dextrose 50% Injectable 12.5 Gram(s) IV Push once  dextrose 50% Injectable 25 Gram(s) IV Push once  enoxaparin Injectable 40 milliGRAM(s) SubCutaneous every 24 hours  glucagon  Injectable 1 milliGRAM(s) IntraMuscular once  influenza  Vaccine (HIGH DOSE) 0.7 milliLiter(s) IntraMuscular once  insulin glargine Injectable (LANTUS) 50 Unit(s) SubCutaneous at bedtime  insulin lispro (ADMELOG) corrective regimen sliding scale   SubCutaneous three times a day before meals  insulin lispro Injectable (ADMELOG) 13 Unit(s) SubCutaneous three times a day before meals  lisinopril 20 milliGRAM(s) Oral daily  metoprolol tartrate 25 milliGRAM(s) Oral two times a day  piperacillin/tazobactam IVPB.. 3.375 Gram(s) IV Intermittent every 8 hours  polyethylene glycol 3350 17 Gram(s) Oral daily  senna 2 Tablet(s) Oral at bedtime  simvastatin 40 milliGRAM(s) Oral at bedtime    MEDICATIONS  (PRN):  acetaminophen     Tablet .. 650 milliGRAM(s) Oral every 6 hours PRN Mild Pain (1 - 3), Moderate Pain (4 - 6), Severe Pain (7 - 10)  dextrose Oral Gel 15 Gram(s) Oral once PRN Blood Glucose LESS THAN 70 milliGRAM(s)/deciliter        CAPILLARY BLOOD GLUCOSE      POCT Blood Glucose.: 152 mg/dL (12 Sep 2022 08:33)  POCT Blood Glucose.: 147 mg/dL (11 Sep 2022 21:06)  POCT Blood Glucose.: 220 mg/dL (11 Sep 2022 17:58)  POCT Blood Glucose.: 72 mg/dL (11 Sep 2022 16:33)  POCT Blood Glucose.: 121 mg/dL (11 Sep 2022 12:05)    I&O's Summary    11 Sep 2022 07:01  -  12 Sep 2022 07:00  --------------------------------------------------------  IN: 980 mL / OUT: 1675 mL / NET: -695 mL    12 Sep 2022 07:01  -  12 Sep 2022 11:42  --------------------------------------------------------  IN: 0 mL / OUT: 225 mL / NET: -225 mL        Vital Signs Last 24 Hrs  T(C): 36.3 (12 Sep 2022 09:35), Max: 36.9 (11 Sep 2022 13:10)  T(F): 97.3 (12 Sep 2022 09:35), Max: 98.4 (11 Sep 2022 13:10)  HR: 92 (12 Sep 2022 09:35) (81 - 97)  BP: 136/86 (12 Sep 2022 09:35) (122/72 - 136/86)  BP(mean): --  RR: 18 (12 Sep 2022 09:35) (16 - 18)  SpO2: 98% (12 Sep 2022 09:35) (97% - 100%)    PHYSICAL EXAM:  GENERAL: NAD  EYES: EOMI, PERRLA,  NECK: Supple, No JVD  CHEST/LUNG: Clear   HEART: S1S2;  no murmurs  ABDOMEN: Soft nontender  VAC in place  EXTREMITIES: no edema  NEUROLOGY: Alert, nonfocal    LABS:                        10.5   10.16 )-----------( 614      ( 12 Sep 2022 06:15 )             33.8     09-12    137  |  101  |  22  ----------------------------<  115<H>  4.6   |  24  |  1.01    Ca    9.7      12 Sep 2022 06:15  Phos  4.4     09-12  Mg     1.80     09-12    TPro  7.9  /  Alb  3.6  /  TBili  0.2  /  DBili  <0.2  /  AST  16  /  ALT  12  /  AlkPhos  127<H>  09-11                All consultant(s) notes reviewed and care discussed with other providers        Contact Number, Dr Reed [phone]

## 2022-09-12 NOTE — PROGRESS NOTE ADULT - SUBJECTIVE AND OBJECTIVE BOX
Chief complaint    Patient is a 72y old  Female who presents with a chief complaint of abscess (12 Sep 2022 11:41)   Review of systems  Patient in bed, appears comfortable.    Labs and Fingersticks  CAPILLARY BLOOD GLUCOSE      POCT Blood Glucose.: 135 mg/dL (12 Sep 2022 12:23)  POCT Blood Glucose.: 152 mg/dL (12 Sep 2022 08:33)  POCT Blood Glucose.: 147 mg/dL (11 Sep 2022 21:06)  POCT Blood Glucose.: 220 mg/dL (11 Sep 2022 17:58)  POCT Blood Glucose.: 72 mg/dL (11 Sep 2022 16:33)      Anion Gap, Serum: 12 (09-12 @ 06:15)  Anion Gap, Serum: 12 (09-11 @ 07:25)      Calcium, Total Serum: 9.7 (09-12 @ 06:15)  Calcium, Total Serum: 9.6 (09-11 @ 07:25)  Albumin, Serum: 3.6 (09-11 @ 07:25)    Alanine Aminotransferase (ALT/SGPT): 12 (09-11 @ 07:25)  Alkaline Phosphatase, Serum: 127 *H* (09-11 @ 07:25)  Aspartate Aminotransferase (AST/SGOT): 16 (09-11 @ 07:25)        09-12    137  |  101  |  22  ----------------------------<  115<H>  4.6   |  24  |  1.01    Ca    9.7      12 Sep 2022 06:15  Phos  4.4     09-12  Mg     1.80     09-12    TPro  7.9  /  Alb  3.6  /  TBili  0.2  /  DBili  <0.2  /  AST  16  /  ALT  12  /  AlkPhos  127<H>  09-11                        10.5   10.16 )-----------( 614      ( 12 Sep 2022 06:15 )             33.8     Medications  MEDICATIONS  (STANDING):  aspirin  chewable 81 milliGRAM(s) Oral daily  dextrose 5%. 1000 milliLiter(s) (100 mL/Hr) IV Continuous <Continuous>  dextrose 5%. 1000 milliLiter(s) (50 mL/Hr) IV Continuous <Continuous>  dextrose 50% Injectable 25 Gram(s) IV Push once  dextrose 50% Injectable 12.5 Gram(s) IV Push once  dextrose 50% Injectable 25 Gram(s) IV Push once  enoxaparin Injectable 40 milliGRAM(s) SubCutaneous every 24 hours  glucagon  Injectable 1 milliGRAM(s) IntraMuscular once  influenza  Vaccine (HIGH DOSE) 0.7 milliLiter(s) IntraMuscular once  insulin glargine Injectable (LANTUS) 50 Unit(s) SubCutaneous at bedtime  insulin lispro (ADMELOG) corrective regimen sliding scale   SubCutaneous three times a day before meals  insulin lispro Injectable (ADMELOG) 13 Unit(s) SubCutaneous three times a day before meals  lisinopril 20 milliGRAM(s) Oral daily  metoprolol tartrate 25 milliGRAM(s) Oral two times a day  piperacillin/tazobactam IVPB.. 3.375 Gram(s) IV Intermittent every 8 hours  polyethylene glycol 3350 17 Gram(s) Oral daily  senna 2 Tablet(s) Oral at bedtime  simvastatin 40 milliGRAM(s) Oral at bedtime      Physical Exam  General: Patient comfortable in bed  Vital Signs Last 12 Hrs  T(F): 98 (09-12-22 @ 13:37), Max: 98.1 (09-12-22 @ 05:04)  HR: 90 (09-12-22 @ 13:37) (81 - 92)  BP: 122/67 (09-12-22 @ 13:37) (122/67 - 136/86)  BP(mean): --  RR: 18 (09-12-22 @ 13:37) (16 - 18)  SpO2: 99% (09-12-22 @ 13:37) (97% - 99%)  Neck: No palpable thyroid nodules.  CVS: S1S2, No murmurs  Respiratory: No wheezing, no crepitations  GI: Abdomen soft, bowel sounds positive  Musculoskeletal:  edema lower extremities.     Diagnostics

## 2022-09-12 NOTE — PROGRESS NOTE ADULT - NUTRITIONAL ASSESSMENT
This patient has been assessed with a concern for Malnutrition and has been determined to have a diagnosis/diagnoses of Severe protein-calorie malnutrition.    This patient is being managed with:   Diet Regular-  Consistent Carbohydrate {No Snacks} (CSTCHO)  Supplement Feeding Modality:  Oral  Glucerna Shake Cans or Servings Per Day:  1       Frequency:  Three Times a day  Entered: Sep  6 2022 10:26AM    
This patient has been assessed with a concern for Malnutrition and has been determined to have a diagnosis/diagnoses of Severe protein-calorie malnutrition.    This patient is being managed with:   Diet Regular-  Consistent Carbohydrate {No Snacks} (CSTCHO)  Entered: Sep  3 2022 11:03AM    
This patient has been assessed with a concern for Malnutrition and has been determined to have a diagnosis/diagnoses of Severe protein-calorie malnutrition.    This patient is being managed with:   Diet Regular-  Consistent Carbohydrate {No Snacks} (CSTCHO)  Supplement Feeding Modality:  Oral  Glucerna Shake Cans or Servings Per Day:  1       Frequency:  Three Times a day  Entered: Sep  6 2022 10:26AM    
This patient has been assessed with a concern for Malnutrition and has been determined to have a diagnosis/diagnoses of Severe protein-calorie malnutrition.    This patient is being managed with:   Diet Regular-  Consistent Carbohydrate {No Snacks} (CSTCHO)  Supplement Feeding Modality:  Oral  Glucerna Shake Cans or Servings Per Day:  1       Frequency:  Three Times a day  Entered: Sep  6 2022 10:26AM

## 2022-09-12 NOTE — PROGRESS NOTE ADULT - SUBJECTIVE AND OBJECTIVE BOX
CC: F/U for wound infection    Saw/spoke to patient. Unchanged. No new complaints.    Allergies  No Known Allergies    ANTIMICROBIALS:  piperacillin/tazobactam IVPB.. 3.375 every 8 hours    PE:    Vital Signs Last 24 Hrs  T(C): 36.7 (12 Sep 2022 13:37), Max: 36.7 (12 Sep 2022 05:04)  T(F): 98 (12 Sep 2022 13:37), Max: 98.1 (12 Sep 2022 05:04)  HR: 90 (12 Sep 2022 13:37) (81 - 97)  BP: 122/67 (12 Sep 2022 13:37) (122/67 - 136/86)  RR: 18 (12 Sep 2022 13:37) (16 - 18)  SpO2: 99% (12 Sep 2022 13:37) (97% - 100%)    Gen: AOx3, NAD, non-toxic  CV: Nontachycardic  Resp: Breathing comfortably, RA  Abd: Wound vac  IV/Skin: No thrombophlebitis    LABS:                        10.5   10.16 )-----------( 614      ( 12 Sep 2022 06:15 )             33.8     09-12    137  |  101  |  22  ----------------------------<  115<H>  4.6   |  24  |  1.01    Ca    9.7      12 Sep 2022 06:15  Phos  4.4     09-12  Mg     1.80     09-12    TPro  7.9  /  Alb  3.6  /  TBili  0.2  /  DBili  <0.2  /  AST  16  /  ALT  12  /  AlkPhos  127<H>  09-11    MICROBIOLOGY:    Clean Catch Clean Catch (Midstream)  09-07-22   <10,000 CFU/mL Normal Urogenital Jennifer  --  --    .Blood Blood-Venous  09-07-22   No growth to date.  --  --    .Blood Blood  09-07-22   No growth to date.  --  --    Clean Catch Clean Catch (Midstream)  08-29-22   10,000 - 49,000 CFU/mL Pseudomonas aeruginosa  --  Pseudomonas aeruginosa    Abdominal Fl Abdominal Fluid  08-29-22   Moderate Staphylococcus aureus  --  Staphylococcus aureus    (otherwise reviewed)    RADIOLOGY:    9/7 XR:    FINDINGS:  Lines/Devices: None.  Heart/Vascular: The heart size is normal.  Pulmonary: Bilateral lower lung linear atelectasis unchanged from last   exam. No pleural effusion or pneumothorax.  Bones: No acute findings.    Impression:  Bilateral lower lung linear atelectasis, otherwise clear lungs.

## 2022-09-13 ENCOUNTER — TRANSCRIPTION ENCOUNTER (OUTPATIENT)
Age: 72
End: 2022-09-13

## 2022-09-13 VITALS
DIASTOLIC BLOOD PRESSURE: 57 MMHG | TEMPERATURE: 98 F | HEART RATE: 88 BPM | OXYGEN SATURATION: 98 % | SYSTOLIC BLOOD PRESSURE: 124 MMHG | RESPIRATION RATE: 18 BRPM

## 2022-09-13 LAB
ANION GAP SERPL CALC-SCNC: 11 MMOL/L — SIGNIFICANT CHANGE UP (ref 7–14)
BUN SERPL-MCNC: 24 MG/DL — HIGH (ref 7–23)
CALCIUM SERPL-MCNC: 9.4 MG/DL — SIGNIFICANT CHANGE UP (ref 8.4–10.5)
CHLORIDE SERPL-SCNC: 104 MMOL/L — SIGNIFICANT CHANGE UP (ref 98–107)
CO2 SERPL-SCNC: 22 MMOL/L — SIGNIFICANT CHANGE UP (ref 22–31)
CREAT SERPL-MCNC: 0.96 MG/DL — SIGNIFICANT CHANGE UP (ref 0.5–1.3)
EGFR: 64 ML/MIN/1.73M2 — SIGNIFICANT CHANGE UP
GLUCOSE BLDC GLUCOMTR-MCNC: 128 MG/DL — HIGH (ref 70–99)
GLUCOSE BLDC GLUCOMTR-MCNC: 195 MG/DL — HIGH (ref 70–99)
GLUCOSE SERPL-MCNC: 186 MG/DL — HIGH (ref 70–99)
HCT VFR BLD CALC: 32.8 % — LOW (ref 34.5–45)
HGB BLD-MCNC: 10.5 G/DL — LOW (ref 11.5–15.5)
MAGNESIUM SERPL-MCNC: 1.7 MG/DL — SIGNIFICANT CHANGE UP (ref 1.6–2.6)
MCHC RBC-ENTMCNC: 28.5 PG — SIGNIFICANT CHANGE UP (ref 27–34)
MCHC RBC-ENTMCNC: 32 GM/DL — SIGNIFICANT CHANGE UP (ref 32–36)
MCV RBC AUTO: 89.1 FL — SIGNIFICANT CHANGE UP (ref 80–100)
NRBC # BLD: 0 /100 WBCS — SIGNIFICANT CHANGE UP (ref 0–0)
NRBC # FLD: 0 K/UL — SIGNIFICANT CHANGE UP (ref 0–0)
PHOSPHATE SERPL-MCNC: 4.5 MG/DL — SIGNIFICANT CHANGE UP (ref 2.5–4.5)
PLATELET # BLD AUTO: 578 K/UL — HIGH (ref 150–400)
POTASSIUM SERPL-MCNC: 4.7 MMOL/L — SIGNIFICANT CHANGE UP (ref 3.5–5.3)
POTASSIUM SERPL-SCNC: 4.7 MMOL/L — SIGNIFICANT CHANGE UP (ref 3.5–5.3)
RBC # BLD: 3.68 M/UL — LOW (ref 3.8–5.2)
RBC # FLD: 16.6 % — HIGH (ref 10.3–14.5)
SODIUM SERPL-SCNC: 137 MMOL/L — SIGNIFICANT CHANGE UP (ref 135–145)
WBC # BLD: 9.56 K/UL — SIGNIFICANT CHANGE UP (ref 3.8–10.5)
WBC # FLD AUTO: 9.56 K/UL — SIGNIFICANT CHANGE UP (ref 3.8–10.5)

## 2022-09-13 RX ORDER — INSULIN LISPRO 100/ML
13 VIAL (ML) SUBCUTANEOUS
Qty: 10 | Refills: 0
Start: 2022-09-13 | End: 2022-10-12

## 2022-09-13 RX ORDER — METOPROLOL TARTRATE 50 MG
1 TABLET ORAL
Qty: 28 | Refills: 0
Start: 2022-09-13 | End: 2022-09-26

## 2022-09-13 RX ORDER — SENNA PLUS 8.6 MG/1
2 TABLET ORAL
Qty: 0 | Refills: 0 | DISCHARGE
Start: 2022-09-13

## 2022-09-13 RX ORDER — INSULIN GLARGINE 100 [IU]/ML
50 INJECTION, SOLUTION SUBCUTANEOUS
Qty: 2 | Refills: 0
Start: 2022-09-13 | End: 2022-10-12

## 2022-09-13 RX ORDER — MAGNESIUM SULFATE 500 MG/ML
2 VIAL (ML) INJECTION ONCE
Refills: 0 | Status: COMPLETED | OUTPATIENT
Start: 2022-09-13 | End: 2022-09-13

## 2022-09-13 RX ORDER — POLYETHYLENE GLYCOL 3350 17 G/17G
17 POWDER, FOR SOLUTION ORAL
Qty: 0 | Refills: 0 | DISCHARGE
Start: 2022-09-13

## 2022-09-13 RX ORDER — INSULIN ASPART 100 [IU]/ML
13 INJECTION, SOLUTION SUBCUTANEOUS
Qty: 5 | Refills: 0
Start: 2022-09-13 | End: 2022-10-12

## 2022-09-13 RX ADMIN — Medication 13 UNIT(S): at 12:50

## 2022-09-13 RX ADMIN — Medication 13 UNIT(S): at 09:06

## 2022-09-13 RX ADMIN — Medication 2: at 09:05

## 2022-09-13 RX ADMIN — POLYETHYLENE GLYCOL 3350 17 GRAM(S): 17 POWDER, FOR SOLUTION ORAL at 12:50

## 2022-09-13 RX ADMIN — PIPERACILLIN AND TAZOBACTAM 25 GRAM(S): 4; .5 INJECTION, POWDER, LYOPHILIZED, FOR SOLUTION INTRAVENOUS at 05:14

## 2022-09-13 RX ADMIN — Medication 25 GRAM(S): at 10:48

## 2022-09-13 RX ADMIN — Medication 81 MILLIGRAM(S): at 12:50

## 2022-09-13 RX ADMIN — PIPERACILLIN AND TAZOBACTAM 25 GRAM(S): 4; .5 INJECTION, POWDER, LYOPHILIZED, FOR SOLUTION INTRAVENOUS at 12:49

## 2022-09-13 RX ADMIN — Medication 25 MILLIGRAM(S): at 05:15

## 2022-09-13 NOTE — PROGRESS NOTE ADULT - SUBJECTIVE AND OBJECTIVE BOX
Chief complaint    Patient is a 72y old  Female who presents with a chief complaint of abscess (12 Sep 2022 11:41)   Review of systems  Patient in bed, appears comfortable.    Labs and Fingersticks  CAPILLARY BLOOD GLUCOSE      POCT Blood Glucose.: 128 mg/dL (13 Sep 2022 11:59)  POCT Blood Glucose.: 195 mg/dL (13 Sep 2022 08:31)  POCT Blood Glucose.: 166 mg/dL (12 Sep 2022 21:23)  POCT Blood Glucose.: 241 mg/dL (12 Sep 2022 17:33)      Anion Gap, Serum: 11 (09-13 @ 07:20)  Anion Gap, Serum: 12 (09-12 @ 06:15)      Calcium, Total Serum: 9.4 (09-13 @ 07:20)  Calcium, Total Serum: 9.7 (09-12 @ 06:15)          09-13    137  |  104  |  24<H>  ----------------------------<  186<H>  4.7   |  22  |  0.96    Ca    9.4      13 Sep 2022 07:20  Phos  4.5     09-13  Mg     1.70     09-13                          10.5   9.56  )-----------( 578      ( 13 Sep 2022 07:20 )             32.8     Medications  MEDICATIONS  (STANDING):  aspirin  chewable 81 milliGRAM(s) Oral daily  dextrose 5%. 1000 milliLiter(s) (100 mL/Hr) IV Continuous <Continuous>  dextrose 5%. 1000 milliLiter(s) (50 mL/Hr) IV Continuous <Continuous>  dextrose 50% Injectable 25 Gram(s) IV Push once  dextrose 50% Injectable 12.5 Gram(s) IV Push once  dextrose 50% Injectable 25 Gram(s) IV Push once  enoxaparin Injectable 40 milliGRAM(s) SubCutaneous every 24 hours  glucagon  Injectable 1 milliGRAM(s) IntraMuscular once  influenza  Vaccine (HIGH DOSE) 0.7 milliLiter(s) IntraMuscular once  insulin glargine Injectable (LANTUS) 50 Unit(s) SubCutaneous at bedtime  insulin lispro (ADMELOG) corrective regimen sliding scale   SubCutaneous three times a day before meals  insulin lispro Injectable (ADMELOG) 13 Unit(s) SubCutaneous three times a day before meals  lisinopril 20 milliGRAM(s) Oral daily  magnesium sulfate  IVPB 2 Gram(s) IV Intermittent once  metoprolol tartrate 25 milliGRAM(s) Oral two times a day  piperacillin/tazobactam IVPB.. 3.375 Gram(s) IV Intermittent every 8 hours  polyethylene glycol 3350 17 Gram(s) Oral daily  senna 2 Tablet(s) Oral at bedtime  simvastatin 40 milliGRAM(s) Oral at bedtime      Physical Exam  General: Patient comfortable in bed  Vital Signs Last 12 Hrs  T(F): 98.2 (09-13-22 @ 09:44), Max: 98.3 (09-13-22 @ 02:05)  HR: 88 (09-13-22 @ 09:44) (83 - 92)  BP: 124/57 (09-13-22 @ 09:44) (120/76 - 124/57)  BP(mean): --  RR: 18 (09-13-22 @ 09:44) (17 - 18)  SpO2: 98% (09-13-22 @ 09:44) (98% - 100%)  Neck: No palpable thyroid nodules.  CVS: S1S2, No murmurs  Respiratory: No wheezing, no crepitations  GI: Abdomen soft, bowel sounds positive  Musculoskeletal:  edema lower extremities.     Diagnostics

## 2022-09-13 NOTE — PROGRESS NOTE ADULT - PROVIDER SPECIALTY LIST ADULT
Colorectal Surgery
Colorectal Surgery
Endocrinology
Endocrinology
Infectious Disease
Internal Medicine
Surgery
Colorectal Surgery
Colorectal Surgery
Infectious Disease
Infectious Disease
Internal Medicine
Surgery
Surgery
Colorectal Surgery
Infectious Disease
Colorectal Surgery
Infectious Disease
Endocrinology
Endocrinology
Internal Medicine
Internal Medicine
Endocrinology
Internal Medicine
Internal Medicine
Endocrinology
Endocrinology
Internal Medicine
Endocrinology
Internal Medicine
Endocrinology
Internal Medicine
Internal Medicine

## 2022-09-13 NOTE — PROGRESS NOTE ADULT - PROBLEM SELECTOR PLAN 3
Continue medications, monitoring, FU primary team recommendations. .
resolved  monitor on statin  US negative except steatosis
Continue medications, monitoring, FU primary team recommendations. .
continue statin   LFTs stay normal   monitor periodically
resolved  monitor on statin
continue home meds with hold parameters
continue home meds with hold parameters
Continue medications, monitoring, FU primary team recommendations. .
Continue medications, monitoring, FU primary team recommendations. .
resolving  unclear etiology  will continue to trend
Continue medications, monitoring, FU primary team recommendations. .
Continue medications, monitoring, FU primary team recommendations. .
resolving  unclear etiology  US negative except hepatic steatosis
continue home meds with hold parameters
resolved  monitor on statin  US negative except steatosis
resolved  monitor on statin  US negative except steatosis
continue statin   LFTs stay normal   monitor LFTs periodically
continue statin   monitor LFT periodically
resolved  monitor on statin
continue statin   LFTs stay normal   monitor periodically

## 2022-09-13 NOTE — PROGRESS NOTE ADULT - PROBLEM SELECTOR PROBLEM 4
Abdominal wall abscess
Essential hypertension
Essential hypertension
Abdominal wall abscess
Essential hypertension
Transaminitis
Essential hypertension
Transaminitis
Essential hypertension
Essential hypertension
Abdominal wall abscess
Essential hypertension
Transaminitis
Essential hypertension

## 2022-09-13 NOTE — PROGRESS NOTE ADULT - SUBJECTIVE AND OBJECTIVE BOX
Surgery Daily Progress Note  =====================================================  Interval / Overnight Events: No acute events overnight.      HPI:  Patient is a 72 year old female with a PMHx of colon cancer, DM2, HTN, HLD, diabetic neuropathy and hernia (S/P repair on 7/20/22) who presented with yellow / green drainage from wound on lower abdomen. (29 Aug 2022 16:39)      PAST MEDICAL & SURGICAL HISTORY:  Essential hypertension  Diabetes mellitus  type 2  Hyperlipidemia  Diabetes mellitus, type 2  Essential hypertension  Hyperlipidemia, unspecified hyperlipidemia type  Obesity (BMI 30.0-34.9)  Hepatic flexure mass  malignant neoplasm  Malignant neoplasm of hepatic flexure  No significant past surgical history  H/O hemicolectomy  right - 1/30/2017      ALLERGIES:  No Known Allergies    --------------------------------------------------------------------------------------    MEDICATIONS:    Neurologic Medications  acetaminophen     Tablet .. 650 milliGRAM(s) Oral every 6 hours PRN Mild Pain (1 - 3), Moderate Pain (4 - 6), Severe Pain (7 - 10)    Cardiovascular Medications  lisinopril 20 milliGRAM(s) Oral daily  metoprolol tartrate 25 milliGRAM(s) Oral two times a day    Gastrointestinal Medications  dextrose 5%. 1000 milliLiter(s) IV Continuous <Continuous>  dextrose 5%. 1000 milliLiter(s) IV Continuous <Continuous>  polyethylene glycol 3350 17 Gram(s) Oral daily  senna 2 Tablet(s) Oral at bedtime    Hematologic/Oncologic Medications  aspirin  chewable 81 milliGRAM(s) Oral daily  enoxaparin Injectable 40 milliGRAM(s) SubCutaneous every 24 hours  influenza  Vaccine (HIGH DOSE) 0.7 milliLiter(s) IntraMuscular once    Antimicrobial/Immunologic Medications  piperacillin/tazobactam IVPB.. 3.375 Gram(s) IV Intermittent every 8 hours    Endocrine/Metabolic Medications  dextrose 50% Injectable 25 Gram(s) IV Push once  dextrose 50% Injectable 12.5 Gram(s) IV Push once  dextrose 50% Injectable 25 Gram(s) IV Push once  dextrose Oral Gel 15 Gram(s) Oral once PRN Blood Glucose LESS THAN 70 milliGRAM(s)/deciliter  glucagon  Injectable 1 milliGRAM(s) IntraMuscular once  insulin glargine Injectable (LANTUS) 50 Unit(s) SubCutaneous at bedtime  insulin lispro (ADMELOG) corrective regimen sliding scale   SubCutaneous three times a day before meals  insulin lispro Injectable (ADMELOG) 13 Unit(s) SubCutaneous three times a day before meals  simvastatin 40 milliGRAM(s) Oral at bedtime    --------------------------------------------------------------------------------------    VITAL SIGNS:  T(C): 36.8 (13 Sep 2022 06:30), Max: 37 (12 Sep 2022 22:45)  T(F): 98.2 (13 Sep 2022 06:30), Max: 98.6 (12 Sep 2022 22:45)  HR: 92 (13 Sep 2022 06:30) (83 - 99)  BP: 123/67 (13 Sep 2022 06:30) (120/76 - 136/86)  RR: 18 (13 Sep 2022 06:30) (17 - 18)  SpO2: 100% (13 Sep 2022 06:30) (98% - 100%)    --------------------------------------------------------------------------------------    INS AND OUTS:    12 Sep 2022 07:01  -  13 Sep 2022 07:00  --------------------------------------------------------  IN:    IV PiggyBack: 200 mL  Total IN: 200 mL    OUT:    VAC (Vacuum Assisted Closure) System (mL): 25 mL    Voided (mL): 600 mL  Total OUT: 625 mL    Total NET: -425 mL    --------------------------------------------------------------------------------------    EXAM    NEUROLOGY    Exam: Normal, in no acute distress.    HEENT  Exam: Normocephalic, atraumatic.    RESPIRATORY  Exam: Normal expansion / effort.    CARDIOVASCULAR  Exam: S1, S2.  Regular rate and rhythm.    GI/NUTRITION  Exam: Abdomen soft, Non-tender, Non-distended.  VAC in place.  Current Diet: Regular diet    MUSCULOSKELETAL  Exam: All extremities moving spontaneously without limitations.      METABOLIC / FLUIDS / ELECTROLYTES  dextrose 5%. 1000 milliLiter(s) IV Continuous <Continuous>  dextrose 5%. 1000 milliLiter(s) IV Continuous <Continuous>      HEMATOLOGIC  [x] VTE Prophylaxis: aspirin  chewable 81 milliGRAM(s) Oral daily  enoxaparin Injectable 40 milliGRAM(s) SubCutaneous every 24 hours      INFECTIOUS DISEASE  Antimicrobials/Immunologic Medications:  influenza  Vaccine (HIGH DOSE) 0.7 milliLiter(s) IntraMuscular once  piperacillin/tazobactam IVPB.. 3.375 Gram(s) IV Intermittent every 8 hours    --------------------------------------------------------------------------------------

## 2022-09-13 NOTE — PROGRESS NOTE ADULT - ASSESSMENT
Patient is a 72 year old female with a PMHx of colon cancer, DM2, HTN, HLD, diabetic neuropathy and hernia (S/P repair on 7/20/22) who presented with yellow / green drainage from wound on lower abdomen.  CT Abdomen / Pelvis performed showing a 6.0 x 2.0 cm fluid collection of the lower abdominal wall, likely an abscess given the extensive surrounding infiltration and tract extending to the right lower quadrant abdominal wall skin surface.  Patient is now S/P I&D on 9/3/22.  Wound VAC placed on 9/9/22.      PLAN:  - Regular diet  - Planned for wound VAC change today  - Continue with Aspirin for new onset thrombocytosis  - Appreciate endocrine recommendations  - Appreciate ID recommendations  - Continue with IV Zosyn  - Out of bed  - Pain control  - VTE prophylaxis with Lovenox subcutaneous  - Discharge planning with Augmentin when VAC arrives      #[id number]  A Team Surgery

## 2022-09-13 NOTE — DISCHARGE NOTE NURSING/CASE MANAGEMENT/SOCIAL WORK - NSDCFUADDAPPT_GEN_ALL_CORE_FT
Follow up with your surgeon Dr. Hernandez within 7-10 days following discharge, call for an appointment.    Follow up with your medicine doctor or cardiologist to have your blood pressure checked and for continued care.    Follow-up with endocrinologist within 4 weeks following discharge. Call to schedule an appointment.

## 2022-09-13 NOTE — PROGRESS NOTE ADULT - PROBLEM SELECTOR PROBLEM 1
Diabetes mellitus
CONNIE (acute kidney injury)
Diabetes mellitus
CONNIE (acute kidney injury)
Diabetes mellitus
CONNIE (acute kidney injury)
Diabetes mellitus
Diabetes mellitus
CONNIE (acute kidney injury)

## 2022-09-13 NOTE — PROGRESS NOTE ADULT - PROBLEM SELECTOR PLAN 2
Suggest to continue medications, monitoring, FU primary/ sx team recommendations. .
fasting lipid panel noted  resume statin on discharge
Suggest to continue medications, monitoring, FU primary/ sx team recommendations. .
fasting lipid panel in AM noted  resume statin if LFTs improved on discharge
Suggest to continue medications, monitoring, FU primary/ sx team recommendations. .
Suggest to continue medications, monitoring, FU primary/ sx team recommendations. .
continue statin   LFTs stable and normal
continue statin   monitor LFT periodically
Suggest to continue medications, monitoring, FU primary/ sx team recommendations. .
Suggest to continue medications, monitoring, FU primary/ sx team recommendations. .
uncontrolled DM Type 2 with hyperglycemia on admission   continue to follow endo recommendations  change piperacillin/tazobactam to ge mixed in NS
uncontrolled DM Type 2 with hyperglycemia on admission   now much improved   endo help appreciated
Suggest to continue medications, monitoring, FU primary/ sx team recommendations. .
fasting lipid panel noted  resume statin   monitor LFTs
resume statin   monitor LFTs periodically
resume statin   monitor LFTs periodically
Suggest to continue medications, monitoring, FU primary/ sx team recommendations. .
continue statin   LFTs stable and normal
resume statin   LFTs stay normal   monitor LFTs periodically
Suggest to continue medications, monitoring, FU primary/ sx team recommendations. .
uncontrolled DM Type 2 with hyperglycemia on admission   now much improved   AM glu ~ 90s   will defer adjustment to endo
fasting lipid panel noted  resume statin   monitor LFTs  normal today
uncontrolled DM Type 2 with hyperglycemia on admission   continue to follow endo recommendations

## 2022-09-13 NOTE — PROGRESS NOTE ADULT - ATTENDING COMMENTS
Pt seen/examined earlier today, she's feeling well. Vac intact, in place.    - d/c plan today with home VAC
date of service 9/5/22    pt seen and examined  pt chart and imaging reviewed in detail  agree with note above  72F s/p emergent incarcerated VHR with SBO c/w infected seroma / abscess s/p I&D @ bedside now POD 2 s/p RTOR for I&D and sharp debridement of abd wall.  pt resting in bed, NAD  wound clean and intact.  dressing changed W>D by HS.   cont iv abx per med/id   cont wound care / assess for VAC on tues 9/6.  f/u PRSx consult in am for possible delayed closure.   f/u labs/cxs in am   appreciate med/endo/ID input  needs better FS control for wound healing.   d/w pt &  @ bedside in detail.
Pt seen/examined earlier today, overall feeling much better since blood transfusion. Positive bowel function, tolerating diet.  VAC in place and functioning    - likely d/c home next wk with home VAC and VNS
Pt seen/examined, dressing changed. Wound looks good, no necrotic tissue, no purulence.    - cont current wound care  - d/w ID antibx plan in view of persistently elevated WBC and poor Glucose control
date of service 8/31/22    pt seen and examined  pt chart and imaging reviewed in  detail  agree with note above  72F s/p emergent incarcerated VHR with SBO c/w likely infected seroma / abscess.  cont iv abx per med/id   cont wound care   f/u labs/cxs in am   if wbc remains elevated consider further I&D / wound debridement  appreciate med/endo/ID  d/w pt &  @ bedside in detail.
date of service 9/4/22    pt seen and examined  pt chart and imaging reviewed in detail  agree with note above  72F s/p emergent incarcerated VHR with SBO c/w infected seroma / abscess s/p I&D @ bedside now POD 1 s/p RTOR for I&D and sharp debridement of abd wall.  pt resting in bed, NAD  wound clean and intact.  dressing changed W>D by HS.   cont iv abx per med/id   cont wound care / assess for VAC on tues 9/6.  f/u labs/cxs in am   appreciate med/endo/ID input  needs better FS control for wound healing.   d/w pt &  @ bedside in detail.
Pt seen/examined, agree with above.  Plan discussed with pt and her . All questions answered to satisfaction

## 2022-09-13 NOTE — DISCHARGE NOTE NURSING/CASE MANAGEMENT/SOCIAL WORK - NSDCPEFALRISK_GEN_ALL_CORE
For information on Fall & Injury Prevention, visit: [email or web address] OR  [email or web address] OR  https://www.cdc.gov/steadi/patient.html

## 2022-09-13 NOTE — PROGRESS NOTE ADULT - PROBLEM SELECTOR PROBLEM 3
Essential hypertension
Transaminitis
Transaminitis
Essential hypertension
Essential hypertension
Transaminitis
Essential hypertension
Transaminitis
Transaminitis
Hyperlipidemia
Essential hypertension
Transaminitis
Hyperlipidemia
Transaminitis

## 2022-09-13 NOTE — DISCHARGE NOTE NURSING/CASE MANAGEMENT/SOCIAL WORK - NSSCCONTNUM_GEN_ALL_CORE
[facility]  [phone] .   Nurse to call and visit day after discharge

## 2022-09-13 NOTE — DISCHARGE NOTE NURSING/CASE MANAGEMENT/SOCIAL WORK - PATIENT PORTAL LINK FT
You can access the FollowMyHealth Patient Portal offered by [facility] by registering at the following website: [email or web address]. By joining [employer]’s FollowMyHealth portal, you will also be able to view your health information using other applications (apps) compatible with our system.

## 2022-09-13 NOTE — PROGRESS NOTE ADULT - PROBLEM SELECTOR PROBLEM 2
Abdominal wall abscess
Hyperlipidemia
Abdominal wall abscess
Abdominal wall abscess
Diabetes mellitus
Abdominal wall abscess
Hyperlipidemia
Hyperlipidemia
Abdominal wall abscess
Diabetes mellitus
Hyperlipidemia
Diabetes mellitus
Hyperlipidemia
Abdominal wall abscess
Abdominal wall abscess
Diabetes mellitus
Hyperlipidemia

## 2022-09-13 NOTE — PROGRESS NOTE ADULT - ASSESSMENT
Assessment  DMT2: 72y Female with DM T2 with hyperglycemia admitted with abdominal wall wound, on basal bolus and insulin coverage, sugars improving, eating meals, planing DC home today.  Abdominal wall wound: : S/P drain, on medications, monitored.  HTN: On antihypertensive medications, monitored, asymptomatic.  Overweight: No strict exercise routines, neither on low calorie diet.                 Brayden Richards MD  Cell: [phone]  Office: [phone]

## 2022-09-13 NOTE — PROGRESS NOTE ADULT - PROBLEM SELECTOR PLAN 1
Will continue current insulin regimen for now. Will continue monitoring  blood sugars, will Follow up.  Patient needs tight sugar control in the setting of active wound /abcess.  Suggest to DC home on current basal bolus insulin regimen if FS remain stable at target. Stop Glimeperide at home. May continue Actos and Metformin. FU 4 weeks. Discussed with patient.  Patient counseled for compliance with consistent low carb diet.
Will continue current insulin regimen for now. Will continue monitoring  blood sugars, will Follow up.  Patient needs tight sugar control in the setting of active wound/abcess.  Suggest to DC home on current basal bolus insulin regimen if FS remain stable at target. Stop Glimeperide at home. May continue Actos and Metformin. FU 4 weeks. Discussed with patient.  Patient counseled for compliance with consistent low carb diet.
Will continue Lantus to 50 units at bed time.  Will increase Admelog to 13 units before each meal in addition to Admelog correction scale coverage.  Patient needs tight sugar control in the setting of active wound/abcess.  Suggest to DC home on current basal bolus insulin regimen if FS remain stable at target. Stop Glimeperide at home. May continue Actos and Metformin. FU 4 weeks. Discussed with patient.  Patient counseled for compliance with consistent low carb diet.
Will continue current insulin regimen for now. Will continue monitoring  blood sugars, will Follow up.  Suggest to DC home on current basal bolus insulin regimen if FS remain stable at target. Stop Glimeperide at home. May continue Actos and Metformin. FU 4 weeks. Discussed with patient.  Patient counseled for compliance with consistent low carb diet.
uncontrolled DM Type 2 with hyperglycemia  endo help appreciated  finger sticks much improved   continue finger sticks with short acting insulin sliding scale
uncontrolled DM Type 2 with hyperglycemia on admission   now much improved   endo help appreciated
uncontrolled DM Type 2 with hyperglycemia on admission   continue to follow endo recommendations on discharge  discussed with endo attending
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
resolved  creatinine close to baseline   no intervention at this time  Recommend continue NS 50 ml while inpatient
Will continue current insulin regimen for now. Will continue monitoring  blood sugars, will Follow up.  Patient needs tight sugar control in the setting of active wound/abcess.  Suggest to DC home on current basal bolus insulin regimen if FS remain stable at target. Stop Glimeperide at home. May continue Actos and Metformin. FU 4 weeks. Discussed with patient.  Patient counseled for compliance with consistent low carb diet.
Will continue current insulin regimen for now. Will continue monitoring  blood sugars, will Follow up.  Suggest to DC home on current basal bolus insulin regimen if FS remain stable at target. Stop Glimeperide at home. May continue Actos and Metformin. FU 4 weeks. Discussed with patient.  Patient counseled for compliance with consistent low carb diet.
uncontrolled DM Type 2 with hyperglycemia on admission   continue to follow endo recommendations
Will continue current insulin regimen for now. Will continue monitoring  blood sugars, will Follow up.  Patient needs tight sugar control in the setting of active wound/abcess.  Suggest to DC home on current basal bolus insulin regimen if FS remain stable at target. Stop Glimeperide at home. May continue Actos and Metformin. FU 4 weeks. Discussed with patient.  Patient counseled for compliance with consistent low carb diet.
Will continue current insulin regimen for now. Will continue monitoring  blood sugars, will Follow up.  Suggest to DC home on current basal bolus insulin regimen if FS remain stable at target. Stop Glimeperide at home. May continue Actos and Metformin. FU 4 weeks. Discussed with patient.  Patient counseled for compliance with consistent low carb diet.
uncontrolled DM Type 2 with hyperglycemia  endo help requested  meanwhile increase Lantus to 40 units  continue finger sticks with short acting insulin sliding scale
Will increase Admelog to 16 units before each meal in addition to Admelog correction scale coverage.  Suggest to DC home on current basal bolus insulin regimen if FS remain stable at target. Stop Glimeperide at home. May continue Actos and Metformin. FU 4 weeks. Discussed with patient.  Patient counseled for compliance with consistent low carb diet.
uncontrolled DM Type 2 with hyperglycemia on admission   now much improved   continue finger sticks with short acting insulin sliding scale  continue to follow endo recommendations
uncontrolled DM Type 2 with hyperglycemia on admission   continue to follow endo recommendations on discharge
likely secondary to dehydration from copious fluid losses from oozing wound   NS IV bolus 1000 ml x 1   no diarrhea to account for losses otherwise   dulcolax 1 dose requested by patient  repeat BMP at 2 PM  if creatinine any worse will have renal see  bladder scan to r/o retention though patient states she voided and her urine is not any different color
resolved  no intervention at this time
uncontrolled DM Type 2 with hyperglycemia  endo help appreciated  finger sticks much improved   continue finger sticks with short acting insulin sliding scale
uncontrolled DM Type 2 with hyperglycemia on admission   now much improved   continue finger sticks with short acting insulin sliding scale  continue to follow endo recommendations
resolved  no intervention at this time  Recommend continue NS 50 ml while inpatient to keep up with losses from copious drainage from wound

## 2022-09-13 NOTE — PROGRESS NOTE ADULT - REASON FOR ADMISSION
abscess
Abdominal wall abscess
abscess
surgery
surgery
abscess
cellulitis
abscess
surgery
abscess
cellulitis
abscess
surgery

## 2022-09-13 NOTE — PROGRESS NOTE ADULT - SUBJECTIVE AND OBJECTIVE BOX
Patient is a 72y old  Female who presents with a chief complaint of abscess (12 Sep 2022 11:41)      DATE OF SERVICE: 09-13-22 @ 13:39    SUBJECTIVE / OVERNIGHT EVENTS: overnight events noted    ROS:  Resp: No cough no sputum production  CVS: No chest pain no palpitations no orthopnea  GI: no N/V/D  : no dysuria, no hematuria  Neuro: no weakness no paresthesias  Heme: No petechiae no easy bruising  Msk: No joint pain no swelling  Skin: No rash no itching  "I feel much better"         MEDICATIONS  (STANDING):  aspirin  chewable 81 milliGRAM(s) Oral daily  dextrose 5%. 1000 milliLiter(s) (100 mL/Hr) IV Continuous <Continuous>  dextrose 5%. 1000 milliLiter(s) (50 mL/Hr) IV Continuous <Continuous>  dextrose 50% Injectable 25 Gram(s) IV Push once  dextrose 50% Injectable 12.5 Gram(s) IV Push once  dextrose 50% Injectable 25 Gram(s) IV Push once  enoxaparin Injectable 40 milliGRAM(s) SubCutaneous every 24 hours  glucagon  Injectable 1 milliGRAM(s) IntraMuscular once  influenza  Vaccine (HIGH DOSE) 0.7 milliLiter(s) IntraMuscular once  insulin glargine Injectable (LANTUS) 50 Unit(s) SubCutaneous at bedtime  insulin lispro (ADMELOG) corrective regimen sliding scale   SubCutaneous three times a day before meals  insulin lispro Injectable (ADMELOG) 13 Unit(s) SubCutaneous three times a day before meals  lisinopril 20 milliGRAM(s) Oral daily  metoprolol tartrate 25 milliGRAM(s) Oral two times a day  piperacillin/tazobactam IVPB.. 3.375 Gram(s) IV Intermittent every 8 hours  polyethylene glycol 3350 17 Gram(s) Oral daily  senna 2 Tablet(s) Oral at bedtime  simvastatin 40 milliGRAM(s) Oral at bedtime    MEDICATIONS  (PRN):  acetaminophen     Tablet .. 650 milliGRAM(s) Oral every 6 hours PRN Mild Pain (1 - 3), Moderate Pain (4 - 6), Severe Pain (7 - 10)  dextrose Oral Gel 15 Gram(s) Oral once PRN Blood Glucose LESS THAN 70 milliGRAM(s)/deciliter        CAPILLARY BLOOD GLUCOSE      POCT Blood Glucose.: 128 mg/dL (13 Sep 2022 11:59)  POCT Blood Glucose.: 195 mg/dL (13 Sep 2022 08:31)  POCT Blood Glucose.: 166 mg/dL (12 Sep 2022 21:23)  POCT Blood Glucose.: 241 mg/dL (12 Sep 2022 17:33)    I&O's Summary    12 Sep 2022 07:01  -  13 Sep 2022 07:00  --------------------------------------------------------  IN: 200 mL / OUT: 625 mL / NET: -425 mL        Vital Signs Last 24 Hrs  T(C): 36.8 (13 Sep 2022 09:44), Max: 37 (12 Sep 2022 22:45)  T(F): 98.2 (13 Sep 2022 09:44), Max: 98.6 (12 Sep 2022 22:45)  HR: 88 (13 Sep 2022 09:44) (83 - 99)  BP: 124/57 (13 Sep 2022 09:44) (120/76 - 135/55)  BP(mean): --  RR: 18 (13 Sep 2022 09:44) (17 - 18)  SpO2: 98% (13 Sep 2022 09:44) (98% - 100%)    PHYSICAL EXAM:  GENERAL: NAD  EYES: EOMI, PERRLA,  NECK: Supple, No JVD  CHEST/LUNG: Clear   HEART: S1S2;  no murmurs  ABDOMEN: Soft nontender  VAC in place  EXTREMITIES: no edema  NEUROLOGY: Alert, nonfocal    LABS:                        10.5   9.56  )-----------( 578      ( 13 Sep 2022 07:20 )             32.8     09-13    137  |  104  |  24<H>  ----------------------------<  186<H>  4.7   |  22  |  0.96    Ca    9.4      13 Sep 2022 07:20  Phos  4.5     09-13  Mg     1.70     09-13                  All consultant(s) notes reviewed and care discussed with other providers        Contact Number, Dr Reed [phone]

## 2022-09-13 NOTE — PROGRESS NOTE ADULT - NSPROGADDITIONALINFOA_GEN_ALL_CORE
discussed with patient in detail, expresses understanding of treatment plans.  discussed with patient's  at bedside
discussed with patient in detail, expresses understanding of treatment plans.  discussed with patient's  at bedside
discussed with patient in detail, expresses understanding of treatment plans.  discussed with patient's family at bedside in detail
discussed with patient in detail, expresses understanding of treatment plans.
discussed with patient in detail, expresses understanding of treatment plans.
discussed with patient in detail, expresses understanding of treatment plans.  discussed with patient's  at bedside
discharge planning per surgery
discussed with patient in detail, expresses understanding of treatment plans.  discussed with patient's  at bedside
discussed with patient in detail, expresses understanding of treatment plans.
discussed with patient in detail, expresses understanding of treatment plans.  discussed with patient's  at bedside
discussed with patient in detail, expresses understanding of treatment plans.
discussed with patient in detail, expresses understanding of treatment plans.  discussed with patient's  at bedside  discussed with surgery attending

## 2022-09-13 NOTE — PROGRESS NOTE ADULT - PROBLEM SELECTOR PLAN 4
continue home meds with hold parameters
continue home meds with hold parameters
improved  leukocytosis resolved  s/p VAC placement  Augmentin on discharge   duration per ID
continue home meds with hold parameters
resolved  monitor on statin
continue home meds with hold parameters
improved  leukocytosis resolved  s/p VAC placement  tachycardia resolved  continue antibiotics piperacillin/tazobactam
continue home meds with hold parameters
continue home meds with hold parameters
improved  leukocytosis resolved  s/p VAC placement  tachycardia resolved  continue antibiotics piperacillin/tazobactam
continue home meds with hold parameters
continue home meds with hold parameters
resolved  monitor on statin
resolved  monitor on statin

## 2022-09-15 LAB — SURGICAL PATHOLOGY STUDY: SIGNIFICANT CHANGE UP

## 2023-07-06 NOTE — DISCHARGE NOTE ADULT - THE PATIENT HAS
Discharge clinicals sent via [facility] to [facility]    no difficulties the EKG is unchanged from prior EKG

## 2023-07-27 NOTE — ED ADULT NURSE NOTE - NS_SISCREENINGSR_GEN_ALL_ED
Negative Hemigard Intro: Due to skin fragility and wound tension, it was decided to use HEMIGARD adhesive retention suture devices to permit a linear closure. The skin was cleaned and dried for a 6cm distance away from the wound. Excessive hair, if present, was removed to allow for adhesion.

## 2023-09-30 NOTE — CONSULT NOTE ADULT - ATTENDING COMMENTS
date of service 7/19/22    pt seen and examined  pt chart and imaging reviewed in detail   agree with note above  72F s/p RHC for colon ca in 2017 with known ventral hernia on CT 2019. pt reports hernia normally reducible and hasn't seen Karis Higgins/Mary for a few years.  now with SBO secondary to ventral hernia. no signs of ischemia on CT.  pt resting in bed, NAD  softly distended, mild tender @ hernia, no r/g  no erythema, induration.   admit to A team surgery  npo, ivf, ngt  strict IOs  serial abd exams  f/u labs, lactate in am  attempt decompression via ngt overnight if pt worsens or clinically doesn't improve - may require operative reduction/repair.  d/w pt & family @bedside in detail.
I agree with the detailed interval history, physical, and plan, which I have reviewed and edited where appropriate'; also agree with notes/assessment with my team on service.  I have personally examined the patient.  I was physically present for the key portions of the evaluation and management (E/M) service provided.  I reviewed all the pertinent data.  The patient is a critical care patient with life threatening hemodynamic and metabolic instability in SICU.  The SICU team has a constant risk benefit analyzes discussion and coordinating care with the primary team and all consultants.   The patient is in SICU with the chief complaint and diagnosis mentioned in the note.   The plan will be specified in the note.  72F  s/p ex lap with ventral hernia repair with respiratory distress, CTA negative for PE. Admitted to SICU for respiratory distress.   EXAM  NEUROLOGY  Exam: alert, oriented x 3,    RESPIRATORY  Exam: + accessory muscle usage, RR of 40. + expiratory wheezes in right chest.   CARDIOVASCULAR  Exam:  Regular rate   GI/NUTRITION  Exam: soft, NTND,  PLAN:    Neurologic:   - pain control PRN with tylenol, dilaudid PRN  Respiratory:   - wean HFNC as tolerated   Cardiovascular:   - enalaprilat 1.25 q6h  Gastrointestinal/Nutrition:   - Clears  Renal/Genitourinary:   - monitor electrolytes  Hematologic:   - DVT ppx: Lovenox  Infectious Disease:   - monitor WBC,  Endocrine:   - Lantus 15   - ISS  Disposition: SICU
pt with c/o urinary retention since yesterday ,has lower suprapubic pain.

## 2023-10-27 NOTE — CONSULT NOTE ADULT - PROBLEM SELECTOR RECOMMENDATION 9
Physical Therapy  Cancellation/No-show Note  Patient Name:  [place]  :  1999   Date:  10/27/2023  Cancelled visits to date: 2  No-shows to date: 0    For today's appointment patient:  [x]  Cancelled  []  Rescheduled appointment  []  No-show     Reason given by patient:  []  Patient ill  []  Conflicting appointment  []  No transportation    []  Conflict with work  []  No reason given  [x]  Other:     Comments:  Attempted to come to therapy, but couldn't make it     Electronically signed by:  Glenroy Canela, PTA
Will continue Lantus 50 units at bed time.  Will increase Admelog to 10 units before each meal in addition to Admelog correction scale coverage. FU
will continue to monitor finger sticks with short acting insulin sliding scale  patient on 3 oral meds and 50 of Basaglar at home  will start Lantus 25 units inpatient   HbA1C 9 consistent with uncontrolled DM Type 2 with hyperglycemia

## 2024-03-13 NOTE — PATIENT PROFILE ADULT - ARRIVAL FROM
[facility] - [facility] (1st Fl)  Discharge Note  Short Stay    Procedure(s) (LRB):  VITRECTOMY, PARS PLANA APPROACH (Right)      OUTCOME: Patient tolerated treatment/procedure well without complication and is now ready for discharge.    DISPOSITION: Home or Self Care    FINAL DIAGNOSIS:  [facility] OD    FOLLOWUP: In clinic    DISCHARGE INSTRUCTIONS:    Discharge Procedure Orders   Diet general     Lifting restrictions     Call MD for:  temperature >100.4     Call MD for:  persistent nausea and vomiting     Call MD for:  severe uncontrolled pain     Call MD for:  difficulty breathing, headache or visual disturbances     Call MD for:  redness, tenderness, or signs of infection (pain, swelling, redness, odor or green/yellow discharge around incision site)     Call MD for:  hives     Call MD for:  persistent dizziness or light-headedness     Call MD for:  extreme fatigue        TIME SPENT ON DISCHARGE:    minutes   Home

## 2024-03-15 NOTE — PATIENT PROFILE ADULT - ARE SIGNIFICANT INDICATORS COMPLETE.
Bed: ED14  Expected date:   Expected time:   Means of arrival:   Comments:  F[address]    24 yo  Doesn't want to be at his group home anymore   Yes

## 2024-04-17 NOTE — PHYSICAL THERAPY INITIAL EVALUATION ADULT - NSPTDISCHREC_GEN_A_CORE
[facility]     Progress Note    Patient Name: Charlette Rai  : 1935  MRN: [id number]  Primary Care Physician:  Rafael Lyons MD  Date of admission: 2024      Subjective   Brief summary.  Patient admitted with volume overload and shortness of breath      HPI:  Patient seen during dialysis, feeling better, ready for discharge, this is her third dialysis in 3 days.    Review of Systems     Denies chest pain some congestion, no cough no fever    Objective     Vitals:   Temp:  [97.4 °F (36.3 °C)-97.7 °F (36.5 °C)] 97.4 °F (36.3 °C)  Heart Rate:  [64-81] 81  Resp:  [11-20] 11  BP: (117-165)/(38-54) 118/44  Flow (L/min):  [3-6] 6    Physical Exam :     Female not in acute distress.  Heart regular.  Diminished breath sounds bilaterally but both lungs clear.  Abdomen soft, nontender.  Extremities no edema      Result Review:  I have personally reviewed the results from the time of this admission to 2024 19:48 EDT and agree with these findings:  []  Laboratory  []  Microbiology  []  Radiology  []  EKG/Telemetry   []  Cardiology/Vascular   []  Pathology  []  Old records  []  Other:           Assessment / Plan       Active Hospital Problems:  Active Hospital Problems    Diagnosis     Volume overload     Anemia of chronic disease     Essential hypertension     Chronic obstructive pulmonary disease     Type 2 diabetes mellitus        Plan:   Stable and improving, discharged home postdialysis, discussed with nephrologist.       DVT prophylaxis:  Mechanical DVT prophylaxis orders are present.        CODE STATUS:   Level Of Support Discussed With: Patient  Code Status (Patient has no pulse and is not breathing): CPR (Attempt to Resuscitate)  Medical Interventions (Patient has pulse or is breathing): Full Support              Electronically signed by Rafael Lynos MD, 24, 7:49 PM EDT.              No skilled PT needs

## 2024-07-01 NOTE — DISCHARGE NOTE NURSING/CASE MANAGEMENT/SOCIAL WORK - NSFLUVACAGEDISCH_IMM_ALL_CORE
Follow Up Office Visit      Encounter Date: 07/01/2024   Patient Name: Gabriella Jha  YOB: 1957   Medical Record Number: [id number]   Primary Diagnosis: Encounter to discuss test results [Z71.2]     Radiation Completion Date:     10/19/2022 RUL SBRT   7/1/2020 ETELVINA SBRT    Chief Complaint:    Chief Complaint   Patient presents with    Follow-up    Lung Cancer     Follow-up after PET/CT scan        Oncology/Hematology History   Malignant neoplasm of upper lobe, right bronchus or lung   9/30/2022 Initial Diagnosis    Malignant neoplasm of upper lobe, right bronchus or lung (HCC)     10/10/2022 - 10/19/2022 Radiation    Radiation OncologyTreatment Course:  Gabriella Jha received 5500 cGy in 5 fractions to right upper lobe via SBRT.         History of Present Illness: Gabriella Jha is a 66 y.o. female who returns to [facility] for short interval follow-up of her lung cancer after undergoing PET/CT scan. She presents today to discuss these results. She reports feeling well overall with no new complaints or concerns. She denies any significant changes in her breathing since prior visit. She reports mild shortness of breath with exertion that does not hinder her ADLs. She has occasional cough productive of clear sputum. She denies headaches, hemoptysis, chest tightness, confusion, focal weakness or weight loss.     Subjective      Review of Systems: Review of Systems   Constitutional:  Positive for fatigue (3/10). Negative for appetite change.   HENT:  Negative for hearing loss, sore throat, tinnitus and trouble swallowing.    Eyes:  Negative for visual disturbance.   Respiratory:  Positive for cough (dry) and wheezing (Occasionally, ongoing). Negative for chest tightness and shortness of breath.    Cardiovascular:  Negative for chest pain, palpitations and leg swelling.   Gastrointestinal:  Positive for abdominal distention (x couple of weeks.). Negative for abdominal pain, anal  bleeding, blood in stool, constipation, diarrhea, nausea and vomiting.   Genitourinary:  Negative for difficulty urinating, dysuria, frequency, hematuria and urgency.   Musculoskeletal:  Negative for arthralgias, back pain, gait problem and joint swelling.   Skin:  Negative for color change and rash.   Neurological:  Positive for dizziness (Noted with postural changes, ongoing). Negative for weakness and headaches.   Psychiatric/Behavioral:  Negative for sleep disturbance.        The following portions of the patient's history were reviewed and updated as appropriate: allergies, current medications, past family history, past medical history, past social history, past surgical history and problem list.    Medications:     Current Outpatient Medications:     albuterol sulfate  (90 Base) MCG/ACT inhaler, Inhale 2 puffs Every 4 (Four) Hours As Needed for Wheezing or Shortness of Air., Disp: 18 g, Rfl: 4    ARIPiprazole (ABILIFY) 30 MG tablet, Take 1 tablet by mouth Every Night., Disp: , Rfl:     buPROPion HCl (WELLBUTRIN PO), Take 1 tablet by mouth Daily., Disp: , Rfl:     Cholecalciferol (Vitamin D3) 1.25 MG (49381 UT) capsule, Take 1 capsule by mouth Every 7 (Seven) Days.  , Disp: , Rfl:     clonazePAM (KlonoPIN) 1 MG tablet, Take 1 tablet by mouth 2 (Two) Times a Day As Needed., Disp: , Rfl:     clopidogrel (PLAVIX) 75 MG tablet, Take 1 tablet by mouth Daily., Disp: , Rfl:     DULoxetine HCl (CYMBALTA PO), Take 1 capsule by mouth Daily., Disp: , Rfl:     Ferrous Sulfate (IRON PO), Take 1 tablet by mouth Daily., Disp: , Rfl:     gabapentin (NEURONTIN) 400 MG capsule, Take 1 capsule by mouth 3 (Three) Times a Day., Disp: , Rfl:     guaiFENesin (Mucinex) 600 MG 12 hr tablet, Take 2 tablets by mouth 2 (Two) Times a Day. (Patient not taking: Reported on 6/21/2024), Disp: 60 tablet, Rfl: 2    rosuvastatin (CRESTOR) 10 MG tablet, Take 1 tablet by mouth every night at bedtime., Disp: , Rfl:     tiotropium  bromide-olodaterol (Stiolto Respimat) 2.5-2.5 MCG/ACT aerosol solution inhaler, Inhale 2 puffs Daily., Disp: 4 g, Rfl: 11    venlafaxine XR (EFFEXOR-XR) 150 MG 24 hr capsule, Take 1 capsule by mouth Daily., Disp: , Rfl:     Allergies:   Allergies   Allergen Reactions    Clindamycin Hcl Rash       Patient Smoking History:   Social History     Tobacco Use   Smoking Status Every Day    Current packs/day: 0.25    Average packs/day: 0.3 packs/day for 49.6 years (12.4 ttl pk-yrs)    Types: Cigarettes    Start date: 11/18/1974   Smokeless Tobacco Never       Measures:  PHQ-9 Total Score:     Quality of Life: 100 - Full Activity   ECOG score: 0  ECOG: (0) Fully active, able to carry on all predisease performance without restriction  Pain: (on a scale of 0-10)   Pain Score    07/01/24 1339   PainSc: 0-No pain     Objective     Physical Exam:   Vital Signs:   Vitals:    07/01/24 1339   BP: 108/82   Pulse: 77   Resp: 12   Temp: 98.4 °F (36.9 °C)   TempSrc: Temporal   SpO2: 92%   Weight: 59.3 kg (130 lb 11.2 oz)   PainSc: 0-No pain     Body mass index is 24.7 kg/m².   Wt Readings from Last 3 Encounters:   07/01/24 59.3 kg (130 lb 11.2 oz)   06/21/24 59.3 kg (130 lb 11.7 oz)   05/07/24 58.8 kg (129 lb 10.1 oz)       Physical Exam  Vitals reviewed.   Constitutional:       General: She is not in acute distress.     Appearance: Normal appearance. She is normal weight. She is not ill-appearing.   HENT:      Head: Normocephalic and atraumatic.      Mouth/Throat:      Mouth: Mucous membranes are moist.      Pharynx: Oropharynx is clear. No oropharyngeal exudate or posterior oropharyngeal erythema.   Eyes:      Conjunctiva/sclera: Conjunctivae normal.      Pupils: Pupils are equal, round, and reactive to light.   Cardiovascular:      Rate and Rhythm: Normal rate and regular rhythm.      Pulses: Normal pulses.      Heart sounds: Normal heart sounds.   Pulmonary:      Effort: Pulmonary effort is normal. No respiratory distress.       "Breath sounds: No wheezing, rhonchi or rales.      Comments: Decreased throughout, no adventitious lung sounds heard  Abdominal:      General: Bowel sounds are normal.      Palpations: Abdomen is soft.   Musculoskeletal:         General: Normal range of motion.      Cervical back: Normal range of motion.   Skin:     General: Skin is warm and dry.   Neurological:      General: No focal deficit present.      Mental Status: She is alert and oriented to person, place, and time. Mental status is at baseline.   Psychiatric:         Mood and Affect: Mood normal.         Behavior: Behavior normal.       Result Review: I independently reviewed the following data.  -- NM PET/CT Skull Base to Mid Thigh (06/27/2024 13:30)   -- CT Chest With Contrast Diagnostic (06/19/2024 16:12)   -- CT Chest With Contrast Diagnostic (05/06/2024 10:59)   -- CT Chest With Contrast Diagnostic (11/28/2023 19:30)     Pathology: No results found for: \"CLININFO\", \"FINALDX\", \"SYNOPTIC\"    Imaging: NM PET/CT Skull Base to Mid Thigh    Result Date: 6/28/2024  Impression: 1. Left perihilar airspace disease consistent with treatment related change. This has low-level metabolic activity. No definite residual or recurrent disease identified. No evidence of metastatic disease seen within the neck, chest, abdomen, or pelvis. 2. Stable appearance of multiple hepatic cyst. Electronically Signed: Sherif Griffith MD  6/28/2024 3:07 PM EDT  Workstation ID: [id number]    CT Chest With Contrast Diagnostic    Result Date: 6/20/2024  Impression: 1. Stable CT chest findings since 5/6/2024. 2. Left upper lobe perihilar masslike density is unchanged from the prior CT chest examination, but appears increased in size when compared to a more remote CT chest of 4/17/2023, and remain suspicious for recurrent malignancy at the site of treated disease. 3.. 8 mm short axis prevascular mediastinal lymph node. Stable 4 mm right middle lobe nodule. Electronically Signed: Teressa" MD John  6/20/2024 3:33 PM EDT  Workstation ID: [id number]    CT Chest With Contrast Diagnostic    Result Date: 5/7/2024  1. Left hilar soft tissue slowly increasing over multiple prior comparisons in region of previously treated tumor, suspicious for local progression. Short-term follow-up chest CT can be considered versus correlation with EBUS. 2. Overall stable to maybe slightly decreased mediastinal, retrocrural and gastrohepatic adenopathy. 3. Peribronchial thickening with minimal groundglass and nodular opacities in the bilateral lower lobes, likely infectious/inflammatory. Recommend attention on follow-up imaging.  Electronically Signed By-Leonidas Barriga MD On:5/7/2024 8:24 AM      XR Sacrum & Coccyx    Result Date: 4/17/2024  Impression: Limited evaluation as described in findings. Osteopenia. Moderate arthritis of the hips. No acute pelvic fractures on radiograph. No displaced sacrococcygeal fracture identified.   Electronically Signed By-Yara Prater MD On:4/17/2024 5:29 PM        Labs:   WBC   Date Value Ref Range Status   11/30/2023 6.69 3.40 - 10.80 10*3/mm3 Final     Hemoglobin   Date Value Ref Range Status   11/30/2023 9.4 (L) 12.0 - 15.9 g/dL Final     Hematocrit   Date Value Ref Range Status   11/30/2023 36.6 34.0 - 46.6 % Final     Platelets   Date Value Ref Range Status   11/30/2023 175 140 - 450 10*3/mm3 Final     Creatinine   Date Value Ref Range Status   06/19/2024 1.10 0.60 - 1.30 mg/dL Final     Comment:     Serial Number: [id number]:  [id number]     BUN   Date Value Ref Range Status   11/30/2023 14 8 - 23 mg/dL Final     eGFR   Date Value Ref Range Status   06/19/2024 56.2 (L) >60.0 mL/min/1.73 Final     TSH   Date Value Ref Range Status   02/17/2020 2.690 0.270 - 4.200 m[iU]/L Final     Assessment / Plan      Impression: Gabriella Jha is a pleasant 66 y.o. female with history of a PET avid left upper lobe nodule presumed primary lung malignancy in 2020. She was not a surgical  candidate and was treated for clinical diagnosis of lung cancer. She is status post stereotactic body radiotherapy to left upper lobe, completed on 7/1/2020. She had an excellent radiographic response. She subsequently developed a FDG avid right upper lobe lesion consistent with primary lung cancer as per PET scan dated 9/20/2022. She is status post stereotactic body radiotherapy to the right upper lobe, completed on 10/19/2022. Her post-treatment CT scan of the chest without contrast on 1/9/2023 showed good radiographic treatment response as the previously noted noncalcified nodule in the right upper lobe had essentially resolved. I discussed findings of her recent CT chest on 5/6/24 as the left hilar soft tissue is slowly increasing over multiple prior comparisons in region of previously treated tumor. Dr. Kurtz independently reviewed images comparing 5/6/24 study to study on 1/9/23 and he does not appreciate a significant increase in size. Discussed findings with Ms. Jha and discussed recommendation of proceeding with EBUS and biopsy of this site. She is not interested in undergoing biopsy at that time monitored with short interval imaging in 6 weeks. Repeat CT chest on 6/19/24 reveals the left upper lobe perihilar masslike density remains unchanged from prior study but appears increased in size when compared to CT chest on 4/17/23 and remains suspicious for recurrent malignancy at the site of treated disease. She subsequently underwent PET/CT scan on 6/27/24 which demonstrates left perihilar airspace disease consistent with treatment related change. This has low-level metabolic activity. No definite residual or recurrent disease identified. Will repeat imaging in 6 months for surveillance.     Pulmonary nodule: CT chest on 6/19/24 demonstrates stable 4 mm right middle lobe nodule. Will continue to monitor on serial imaging.      COPD: patients reports known history of COPD. She has politely declined  referral to Pulmonology. I have previously prescribed Albuterol inhaler and will be happy to refill prescription when needed.      Nicotine dependence: she is a current cigarette smoker; 3 cigarettes/day. She verbalizes understanding of need for smoking cessation and is actively decreasing her number of cigarettes per day.      Assessment/Plan:   Diagnoses and all orders for this visit:    1. Encounter to discuss test results (Primary)    2. Malignant neoplasm of upper lobe of left lung  -     CT Chest Without Contrast; Future    3. Malignant neoplasm of upper lobe, right bronchus or lung  -     CT Chest Without Contrast; Future    4. Personal history of radiation therapy  -     CT Chest Without Contrast; Future    5. Chronic obstructive pulmonary disease, unspecified COPD type    6. Pulmonary nodule    7. Nicotine dependence, cigarettes, uncomplicated      Follow Up:   Return for follow-up in 6 months with CT chest prior.     Return in about 6 months (around 1/1/2025) for Office Visit.  Gabriella Jha was encouraged to contact me in the interim with any questions or concerns regarding her care.      AMADOR Rangel  Radiation Oncology  [facility]    This document has been signed by AMADOR Banda on July 1, 2024 13:56 EDT    Adult

## 2024-10-02 NOTE — PHYSICAL THERAPY INITIAL EVALUATION ADULT - WEIGHT-BEARING RESTRICTIONS: STAND/SIT, REHAB EVAL
range)   benzonatate (TESSALON) capsule 100 mg (has no administration in time range)   methylPREDNISolone sodium succ (SOLU-MEDROL) injection 40 mg (40 mg IntraVENous Given 10/3/24 0809)     Followed by   predniSONE (DELTASONE) tablet 40 mg (has no administration in time range)   perflutren lipid microspheres (DEFINITY) injection 1.5 mL (has no administration in time range)   cefTRIAXone (ROCEPHIN) 2,000 mg in sodium chloride 0.9 % 50 mL IVPB (mini-bag) (has no administration in time range)   cefTRIAXone (ROCEPHIN) 1,000 mg in sterile water 10 mL IV syringe (has no administration in time range)   budesonide-formoterol (SYMBICORT) 160-4.5 MCG/ACT inhaler 2 puff (2 puffs Inhalation Given 10/3/24 0815)   albuterol sulfate HFA (PROVENTIL;VENTOLIN;PROAIR) 108 (90 Base) MCG/ACT inhaler 2 puff (has no administration in time range)   albuterol sulfate HFA (PROVENTIL;VENTOLIN;PROAIR) 108 (90 Base) MCG/ACT inhaler 2 puff (has no administration in time range)   amLODIPine (NORVASC) tablet 10 mg (10 mg Oral Given 10/3/24 1007)   aspirin EC tablet 81 mg (81 mg Oral Given 10/3/24 1007)   cetirizine (ZYRTEC) tablet 10 mg (10 mg Oral Given 10/3/24 1007)   cyclobenzaprine (FLEXERIL) tablet 10 mg (10 mg Oral Given 10/3/24 1012)   escitalopram (LEXAPRO) tablet 10 mg (10 mg Oral Not Given 10/3/24 1042)   levothyroxine (SYNTHROID) tablet 175 mcg (175 mcg Oral Given 10/3/24 1007)   losartan (COZAAR) tablet 25 mg (25 mg Oral Given 10/3/24 1007)   pantoprazole (PROTONIX) tablet 40 mg (has no administration in time range)   tiotropium (SPIRIVA RESPIMAT) 2.5 MCG/ACT inhaler 2 puff (2 puffs Inhalation Given 10/3/24 1159)   ipratropium 0.5 mg-albuterol 2.5 mg (DUONEB) nebulizer solution 3 Dose (3 Doses Inhalation Given 10/2/24 1014)   predniSONE (DELTASONE) tablet 60 mg (60 mg Oral Given 10/2/24 1018)   aspirin chewable tablet 324 mg (324 mg Oral Given 10/2/24 1054)   iopamidol (ISOVUE-370) 76 % injection 75 mL (75 mLs IntraVENous Given  10/2/24 1125)   diphenhydrAMINE (BENADRYL) injection 50 mg (50 mg IntraVENous Given 10/2/24 1106)   cefTRIAXone (ROCEPHIN) 1,000 mg in sterile water 10 mL IV syringe (1,000 mg IntraVENous Given 10/2/24 1308)   azithromycin (ZITHROMAX) 500 mg in 250 mL addavial (0 mg IntraVENous Stopped 10/2/24 1751)   ondansetron (ZOFRAN) injection 4 mg (4 mg IntraVENous Given 10/2/24 1335)   sterile water injection (1 mL  Given 10/3/24 0810)        Patient was given prescriptions for the following medications. I counseled the patient as to how to take these medications.  Current Discharge Medication List          Patient instructed to follow up with:   No follow-up provider specified.    All questions were answered and the patient/family expressed understanding and agreement with the plan.     I am the primary attending of record.     PROCEDURES   None      CRITICAL CARE  None    CLINICAL IMPRESSION  1. Chest pain, unspecified type          DISPOSITION    Admitted 10/02/2024 01:30:16 PM     Ruben Henley MD      Note: This chart was created using voice recognition dictation software. Efforts were made by me to ensure accuracy, however some errors may be present due to limitations of this technology and occasionally words are not transcribed correctly.      Ruben Henley MD  10/03/24 2529     full weight-bearing

## 2025-07-08 NOTE — PROCEDURE NOTE - ATTENDING PROVIDER
"Chief Complaint  Abdominal Pain    Subjective          History of Present Illness    Denis oLwe is a  77 y.o. female presents for new patient evaluation.  Self referring for upper abdominal pain.  She will be following with Dr. Hollins.    LUQ pain worse after eating. She cannot eat late or it will really flare. Fried foods make this worse. Describes pain as \"just hurts\", not stabbing. Gabapentin helps this. She does take pepto bismol for stomach pain but not the left sided pain. LUQ pain has been off and on for several years. No black or bloody stool unless she has taken pepto bismol. She does take ibuprofen daily. She has lost 10 pounds over the last 2 weeks but has not been eating due to stress as her  is currently [facility]/ [facility].     UPPER GI ENDOSCOPY (10/22/2018 14:09) [place] A esophagitis, gastritis, duodenitis.     Colonoscopy 2016 Dr. Meyers  at [place]. Normal.     Family history of colon cancer in mother.     Objective   Vital Signs:   /81 (BP Location: Left arm)   Pulse 77   Temp 97.1 °F (36.2 °C)   Ht 152.4 cm (60\")   Wt 66.5 kg (146 lb 9.6 oz)   BMI 28.63 kg/m²       Physical Exam  Constitutional:       General: She is not in acute distress.     Appearance: Normal appearance.   Eyes:      General: No scleral icterus.  Pulmonary:      Effort: Pulmonary effort is normal.   Abdominal:      General: Abdomen is flat. There is no distension.      Tenderness: There is no abdominal tenderness (Upper abdominal tenderness to palpation). There is no guarding.   Skin:     Coloration: Skin is not jaundiced.   Neurological:      General: No focal deficit present.      Mental Status: She is alert and oriented to person, place, and time.   Psychiatric:         Mood and Affect: Mood normal.         Behavior: Behavior normal.          Result Review :   The following data was reviewed by: Tammy Villalobos PA-C on 07/08/2025:              Assessment:   Diagnoses and all "
orders for this visit:    1. Left upper quadrant abdominal pain (Primary)  -     pantoprazole (PROTONIX) 40 MG EC tablet; Take 1 tablet by mouth Daily.  Dispense: 90 tablet; Refill: 0  -     CT Abdomen Pelvis With Contrast; Future  -     Case Request; Standing  -     Case Request    2. History of esophagitis  -     pantoprazole (PROTONIX) 40 MG EC tablet; Take 1 tablet by mouth Daily.  Dispense: 90 tablet; Refill: 0  -     CT Abdomen Pelvis With Contrast; Future  -     Case Request; Standing  -     Case Request    3. Early satiety  -     CT Abdomen Pelvis With Contrast; Future  -     Case Request; Standing  -     Case Request    4. Decreased appetite  -     CT Abdomen Pelvis With Contrast; Future  -     Case Request; Standing  -     Case Request    5. Encounter for screening for malignant neoplasm of colon  -     Case Request; Standing  -     Case Request    6. NSAID long-term use  -     Case Request; Standing  -     Case Request    Other orders  -     Implement Anesthesia orders day of procedure.; Standing  -     Follow Anesthesia Guidelines / Protocol; Future  -     Verify bowel prep was successful; Standing  -     Give tap water enema if bowel prep was insufficient; Standing  -     Obtain Informed Consent; Standing          Plan:   -recommend EGD given LUQ pain after eating especially in setting of NSAID use. Due for screening colonoscopy as well w/ family history of colon cancer.   - Advise she avoid using NSAIDs  -Will send in PPI for her to take once daily  -Will also check CT abdomen pelvis given tenderness on exam.      Follow Up   Return for EGD/Colonoscopy .    Dragon dictation used throughout this note.         Tammy Villalobos PA-C  [facility]  [address] [place] [address]  Office: [phone]  
Dr. Hernandez
[place]

## (undated) DEVICE — STAPLER SKIN VISI-STAT 35 WIDE

## (undated) DEVICE — DRSG COMBINE 5X9"

## (undated) DEVICE — PACK MAJOR ABDOMINAL SUPINE

## (undated) DEVICE — WARMING BLANKET FULL ADULT

## (undated) DEVICE — URETERAL CATH RED RUBBER 14FR (GREEN)

## (undated) DEVICE — DRSG STERISTRIPS 0.5 X 4"

## (undated) DEVICE — DRSG PREVENA PEEL & PLACE KIT 20CM

## (undated) DEVICE — WARMING BLANKET LOWER ADULT

## (undated) DEVICE — SUT SILK 3-0 30" SH

## (undated) DEVICE — SOL IRR POUR H2O 250ML

## (undated) DEVICE — DRSG TELFA 2 X 3

## (undated) DEVICE — SOL IRR POUR NS 0.9% 500ML

## (undated) DEVICE — SUT VICRYL 3-0 27" SH UNDYED

## (undated) DEVICE — SUT VICRYL 2-0 27" SH UNDYED

## (undated) DEVICE — POSITIONER FOAM EGG CRATE ULNAR 2PCS (PINK)

## (undated) DEVICE — DRAPE LAPAROTOMY W VELCRO CORD TABS

## (undated) DEVICE — DRSG OPSITE 13.75 X 4"

## (undated) DEVICE — VISITEC 4X4

## (undated) DEVICE — DRSG TEGADERM 4X4.75"

## (undated) DEVICE — PACK BASIN SPECIAL PROCEDURE

## (undated) DEVICE — SPECIMEN CONTAINER 100ML

## (undated) DEVICE — SYR ASEPTO

## (undated) DEVICE — SYR LUER LOK 20CC

## (undated) DEVICE — DRAPE MAYO STAND 30"

## (undated) DEVICE — DRAPE MAGNETIC INSTRUMENT MEDIUM

## (undated) DEVICE — FOLEY TRAY 16FR 5CC LTX UMETER CLOSED

## (undated) DEVICE — DRAPE INSTRUMENT POUCH 6.75" X 11"

## (undated) DEVICE — DRSG TELFA 3 X 8

## (undated) DEVICE — DRSG VAC ABTHERS

## (undated) DEVICE — OSTOMY KIT 2-PIECE 4" NS (YELLOW)

## (undated) DEVICE — Device

## (undated) DEVICE — TUBING SUCTION 20FT

## (undated) DEVICE — MARKING PEN W RULER

## (undated) DEVICE — DRAPE TOWEL BLUE 17" X 24"

## (undated) DEVICE — ELCTR BOVIE PENCIL HANDPIECE

## (undated) DEVICE — PACK MINOR WITH LAP

## (undated) DEVICE — PREP BETADINE SPONGE STICKS

## (undated) DEVICE — DRAIN RESERVOIR FOR JACKSON PRATT 100CC CARDINAL

## (undated) DEVICE — BLADE SCALPEL SAFETYLOCK #15

## (undated) DEVICE — DRSG COBAN 6"

## (undated) DEVICE — MEDICATION LABELS W MARKER

## (undated) DEVICE — LAP PAD 18 X 18"

## (undated) DEVICE — CANISTER SUCTION 2000CC

## (undated) DEVICE — NDL COUNTER FOAM AND MAGNET 40-70

## (undated) DEVICE — ELCTR BOVIE TIP BLADE INSULATED 2.8" EDGE WITH SAFETY

## (undated) DEVICE — PREP BETADINE KIT

## (undated) DEVICE — LIGASURE ATLAS 10MM 20CM

## (undated) DEVICE — VENODYNE/SCD SLEEVE CALF MEDIUM

## (undated) DEVICE — LABELS BLANK W PEN

## (undated) DEVICE — LIGASURE IMPACT

## (undated) DEVICE — WARMING BLANKET UPPER ADULT

## (undated) DEVICE — POSITIONER STRAP ARMBOARD VELCRO TS-30

## (undated) DEVICE — GOWN TRIMAX LG

## (undated) DEVICE — SUCTION YANKAUER NO CONTROL VENT

## (undated) DEVICE — ELCTR GROUNDING PAD ADULT COVIDIEN

## (undated) DEVICE — SUT SILK 3-0 18" SH (POP-OFF)

## (undated) DEVICE — LAP PAD W RING 18 X 18"

## (undated) DEVICE — BASIN SET DOUBLE

## (undated) DEVICE — LIJ/LIA-RENTAL VAC MACHINE VACUUM ASSISTED C: Type: DURABLE MEDICAL EQUIPMENT